# Patient Record
Sex: MALE | Race: BLACK OR AFRICAN AMERICAN | Employment: OTHER | URBAN - METROPOLITAN AREA
[De-identification: names, ages, dates, MRNs, and addresses within clinical notes are randomized per-mention and may not be internally consistent; named-entity substitution may affect disease eponyms.]

---

## 2017-07-18 ENCOUNTER — HOSPITAL ENCOUNTER (EMERGENCY)
Age: 47
Discharge: HOME | End: 2017-07-18
Payer: COMMERCIAL

## 2017-07-18 VITALS
RESPIRATION RATE: 18 BRPM | HEART RATE: 109 BPM | SYSTOLIC BLOOD PRESSURE: 129 MMHG | TEMPERATURE: 98.9 F | DIASTOLIC BLOOD PRESSURE: 87 MMHG | HEIGHT: 71 IN | OXYGEN SATURATION: 98 % | BODY MASS INDEX: 44.1 KG/M2 | WEIGHT: 315 LBS

## 2017-07-18 DIAGNOSIS — F32.A DEPRESSION, UNSPECIFIED DEPRESSION TYPE: Primary | ICD-10-CM

## 2017-07-18 LAB
ALBUMIN SERPL-MCNC: 4.4 G/DL (ref 3.9–4.9)
ALP BLD-CCNC: 82 U/L (ref 35–104)
ALT SERPL-CCNC: 15 U/L (ref 0–41)
AMPHETAMINE SCREEN, URINE: NORMAL
ANION GAP SERPL CALCULATED.3IONS-SCNC: 14 MEQ/L (ref 7–13)
AST SERPL-CCNC: 17 U/L (ref 0–40)
BACTERIA: NORMAL /HPF
BARBITURATE SCREEN URINE: NORMAL
BASOPHILS ABSOLUTE: 0 K/UL (ref 0–0.2)
BASOPHILS RELATIVE PERCENT: 0.4 %
BENZODIAZEPINE SCREEN, URINE: NORMAL
BILIRUB SERPL-MCNC: 0.3 MG/DL (ref 0–1.2)
BILIRUBIN URINE: NEGATIVE
BLOOD, URINE: NEGATIVE
BUN BLDV-MCNC: 42 MG/DL (ref 6–20)
CALCIUM SERPL-MCNC: 9.4 MG/DL (ref 8.6–10.2)
CANNABINOID SCREEN URINE: NORMAL
CHLORIDE BLD-SCNC: 101 MEQ/L (ref 98–107)
CLARITY: CLEAR
CO2: 29 MEQ/L (ref 22–29)
COCAINE METABOLITE SCREEN URINE: NORMAL
COLOR: YELLOW
CREAT SERPL-MCNC: 6.08 MG/DL (ref 0.7–1.2)
EOSINOPHILS ABSOLUTE: 0.4 K/UL (ref 0–0.7)
EOSINOPHILS RELATIVE PERCENT: 4.7 %
ETHANOL PERCENT: NORMAL G/DL
ETHANOL: <10 MG/DL (ref 0–0.08)
GFR AFRICAN AMERICAN: 12.1
GFR NON-AFRICAN AMERICAN: 10
GLOBULIN: 2.5 G/DL (ref 2.3–3.5)
GLUCOSE BLD-MCNC: 110 MG/DL (ref 74–109)
GLUCOSE URINE: NEGATIVE MG/DL
HCT VFR BLD CALC: 35 % (ref 42–52)
HEMOGLOBIN: 11.4 G/DL (ref 14–18)
INR BLD: 1.3
KETONES, URINE: NEGATIVE MG/DL
LEUKOCYTE ESTERASE, URINE: ABNORMAL
LYMPHOCYTES ABSOLUTE: 1.2 K/UL (ref 1–4.8)
LYMPHOCYTES RELATIVE PERCENT: 14.8 %
Lab: NORMAL
MCH RBC QN AUTO: 30 PG (ref 27–31.3)
MCHC RBC AUTO-ENTMCNC: 32.5 % (ref 33–37)
MCV RBC AUTO: 92.3 FL (ref 80–100)
MONOCYTES ABSOLUTE: 0.7 K/UL (ref 0.2–0.8)
MONOCYTES RELATIVE PERCENT: 8.2 %
NEUTROPHILS ABSOLUTE: 6.1 K/UL (ref 1.4–6.5)
NEUTROPHILS RELATIVE PERCENT: 71.9 %
NITRITE, URINE: NEGATIVE
OPIATE SCREEN URINE: NORMAL
PDW BLD-RTO: 16.1 % (ref 11.5–14.5)
PH UA: 6.5 (ref 5–9)
PHENCYCLIDINE SCREEN URINE: NORMAL
PLATELET # BLD: 234 K/UL (ref 130–400)
POTASSIUM SERPL-SCNC: 3.9 MEQ/L (ref 3.5–5.1)
PROTEIN UA: ABNORMAL MG/DL
PROTHROMBIN TIME: 14.3 SEC (ref 8.1–13.7)
RBC # BLD: 3.79 M/UL (ref 4.7–6.1)
RBC UA: NORMAL /HPF (ref 0–2)
SODIUM BLD-SCNC: 144 MEQ/L (ref 132–144)
SPECIFIC GRAVITY UA: 1.01 (ref 1–1.03)
TOTAL CK: 143 U/L (ref 0–190)
TOTAL PROTEIN: 6.9 G/DL (ref 6.4–8.1)
TSH SERPL DL<=0.05 MIU/L-ACNC: 1.08 UIU/ML (ref 0.27–4.2)
UROBILINOGEN, URINE: 1 E.U./DL
VALPROIC ACID LEVEL: <2.8 UG/ML (ref 50–100)
WBC # BLD: 8.4 K/UL (ref 4.8–10.8)
WBC UA: NORMAL /HPF (ref 0–5)

## 2017-07-18 PROCEDURE — 84443 ASSAY THYROID STIM HORMONE: CPT

## 2017-07-18 PROCEDURE — 36415 COLL VENOUS BLD VENIPUNCTURE: CPT

## 2017-07-18 PROCEDURE — G0480 DRUG TEST DEF 1-7 CLASSES: HCPCS

## 2017-07-18 PROCEDURE — 82550 ASSAY OF CK (CPK): CPT

## 2017-07-18 PROCEDURE — 85025 COMPLETE CBC W/AUTO DIFF WBC: CPT

## 2017-07-18 PROCEDURE — 80307 DRUG TEST PRSMV CHEM ANLYZR: CPT

## 2017-07-18 PROCEDURE — 80164 ASSAY DIPROPYLACETIC ACD TOT: CPT

## 2017-07-18 PROCEDURE — 85610 PROTHROMBIN TIME: CPT

## 2017-07-18 PROCEDURE — 80053 COMPREHEN METABOLIC PANEL: CPT

## 2017-07-18 PROCEDURE — 99285 EMERGENCY DEPT VISIT HI MDM: CPT

## 2017-07-18 PROCEDURE — 81001 URINALYSIS AUTO W/SCOPE: CPT

## 2017-07-18 ASSESSMENT — ENCOUNTER SYMPTOMS
SHORTNESS OF BREATH: 0
ABDOMINAL PAIN: 0
RHINORRHEA: 0
ABDOMINAL DISTENTION: 0
SORE THROAT: 0
COLOR CHANGE: 0
EYE DISCHARGE: 0
CONSTIPATION: 0

## 2017-08-20 ENCOUNTER — HOSPITAL ENCOUNTER (INPATIENT)
Age: 47
LOS: 3 days | Discharge: HOME | DRG: 244 | End: 2017-08-24
Attending: EMERGENCY MEDICINE | Admitting: INTERNAL MEDICINE
Payer: COMMERCIAL

## 2017-08-20 DIAGNOSIS — K92.1 GASTROINTESTINAL HEMORRHAGE WITH MELENA: Primary | ICD-10-CM

## 2017-08-20 LAB
BASOPHILS ABSOLUTE: 0 K/UL (ref 0–0.2)
BASOPHILS RELATIVE PERCENT: 0.5 %
EOSINOPHILS ABSOLUTE: 0.4 K/UL (ref 0–0.7)
EOSINOPHILS RELATIVE PERCENT: 4.2 %
HCT VFR BLD CALC: 31.7 % (ref 42–52)
HEMOGLOBIN: 10.5 G/DL (ref 14–18)
LYMPHOCYTES ABSOLUTE: 1.3 K/UL (ref 1–4.8)
LYMPHOCYTES RELATIVE PERCENT: 15.3 %
MCH RBC QN AUTO: 30.9 PG (ref 27–31.3)
MCHC RBC AUTO-ENTMCNC: 33 % (ref 33–37)
MCV RBC AUTO: 93.5 FL (ref 80–100)
MONOCYTES ABSOLUTE: 0.7 K/UL (ref 0.2–0.8)
MONOCYTES RELATIVE PERCENT: 8.6 %
NEUTROPHILS ABSOLUTE: 6.2 K/UL (ref 1.4–6.5)
NEUTROPHILS RELATIVE PERCENT: 71.4 %
PDW BLD-RTO: 16 % (ref 11.5–14.5)
PLATELET # BLD: 210 K/UL (ref 130–400)
RBC # BLD: 3.39 M/UL (ref 4.7–6.1)
WBC # BLD: 8.7 K/UL (ref 4.8–10.8)

## 2017-08-20 PROCEDURE — G0480 DRUG TEST DEF 1-7 CLASSES: HCPCS

## 2017-08-20 PROCEDURE — 99284 EMERGENCY DEPT VISIT MOD MDM: CPT

## 2017-08-20 PROCEDURE — 86901 BLOOD TYPING SEROLOGIC RH(D): CPT

## 2017-08-20 PROCEDURE — 93005 ELECTROCARDIOGRAM TRACING: CPT

## 2017-08-20 PROCEDURE — 85025 COMPLETE CBC W/AUTO DIFF WBC: CPT

## 2017-08-20 PROCEDURE — 82553 CREATINE MB FRACTION: CPT

## 2017-08-20 PROCEDURE — 84484 ASSAY OF TROPONIN QUANT: CPT

## 2017-08-20 PROCEDURE — 86900 BLOOD TYPING SEROLOGIC ABO: CPT

## 2017-08-20 PROCEDURE — 96374 THER/PROPH/DIAG INJ IV PUSH: CPT

## 2017-08-20 PROCEDURE — 80053 COMPREHEN METABOLIC PANEL: CPT

## 2017-08-20 PROCEDURE — 86850 RBC ANTIBODY SCREEN: CPT

## 2017-08-20 PROCEDURE — 36415 COLL VENOUS BLD VENIPUNCTURE: CPT

## 2017-08-20 PROCEDURE — 82550 ASSAY OF CK (CPK): CPT

## 2017-08-20 PROCEDURE — 83735 ASSAY OF MAGNESIUM: CPT

## 2017-08-20 RX ORDER — PANTOPRAZOLE SODIUM 40 MG/10ML
40 INJECTION, POWDER, LYOPHILIZED, FOR SOLUTION INTRAVENOUS ONCE
Status: COMPLETED | OUTPATIENT
Start: 2017-08-20 | End: 2017-08-21

## 2017-08-20 RX ORDER — 0.9 % SODIUM CHLORIDE 0.9 %
10 VIAL (ML) INJECTION DAILY
Status: DISCONTINUED | OUTPATIENT
Start: 2017-08-21 | End: 2017-08-21 | Stop reason: ALTCHOICE

## 2017-08-20 RX ORDER — 0.9 % SODIUM CHLORIDE 0.9 %
1000 INTRAVENOUS SOLUTION INTRAVENOUS ONCE
Status: DISCONTINUED | OUTPATIENT
Start: 2017-08-20 | End: 2017-08-20

## 2017-08-20 ASSESSMENT — ENCOUNTER SYMPTOMS
ALLERGIC/IMMUNOLOGIC NEGATIVE: 1
TROUBLE SWALLOWING: 0
ABDOMINAL PAIN: 0
APNEA: 0
SHORTNESS OF BREATH: 1
VOMITING: 1
COLOR CHANGE: 0
ANAL BLEEDING: 1
EYE PAIN: 0

## 2017-08-20 ASSESSMENT — PAIN DESCRIPTION - LOCATION: LOCATION: LEG

## 2017-08-20 ASSESSMENT — PAIN SCALES - GENERAL: PAINLEVEL_OUTOF10: 6

## 2017-08-20 ASSESSMENT — PAIN DESCRIPTION - ORIENTATION: ORIENTATION: RIGHT;LEFT;LOWER

## 2017-08-20 ASSESSMENT — PAIN DESCRIPTION - PAIN TYPE: TYPE: CHRONIC PAIN

## 2017-08-21 PROBLEM — K92.0 GASTROINTESTINAL HEMORRHAGE WITH HEMATEMESIS: Status: ACTIVE | Noted: 2017-08-21

## 2017-08-21 PROBLEM — I48.0 PAROXYSMAL ATRIAL FIBRILLATION (HCC): Status: ACTIVE | Noted: 2017-08-21

## 2017-08-21 PROBLEM — K21.9 GERD (GASTROESOPHAGEAL REFLUX DISEASE): Status: ACTIVE | Noted: 2017-08-21

## 2017-08-21 PROBLEM — E27.40 ADRENAL INSUFFICIENCY (HCC): Status: ACTIVE | Noted: 2017-08-21

## 2017-08-21 PROBLEM — F32.A DEPRESSION: Status: ACTIVE | Noted: 2017-08-21

## 2017-08-21 PROBLEM — D50.0 IRON DEFICIENCY ANEMIA DUE TO CHRONIC BLOOD LOSS: Status: ACTIVE | Noted: 2017-08-21

## 2017-08-21 PROBLEM — I12.0 ESRD DUE TO HYPERTENSION (HCC): Status: ACTIVE | Noted: 2017-08-21

## 2017-08-21 PROBLEM — E66.09 OBESITY DUE TO EXCESS CALORIES: Status: ACTIVE | Noted: 2017-08-21

## 2017-08-21 PROBLEM — E03.9 ACQUIRED HYPOTHYROIDISM: Status: ACTIVE | Noted: 2017-08-21

## 2017-08-21 PROBLEM — M19.90 DJD (DEGENERATIVE JOINT DISEASE): Status: ACTIVE | Noted: 2017-08-21

## 2017-08-21 PROBLEM — F41.0 ANXIETY ATTACK: Status: ACTIVE | Noted: 2017-08-21

## 2017-08-21 PROBLEM — E11.9 DIABETES MELLITUS TYPE 2, DIET-CONTROLLED (HCC): Status: ACTIVE | Noted: 2017-08-21

## 2017-08-21 PROBLEM — E78.2 HYPERCHOLESTEROLEMIA WITH HYPERGLYCERIDEMIA: Status: ACTIVE | Noted: 2017-08-21

## 2017-08-21 PROBLEM — K27.4 PEPTIC ULCER WITH HEMORRHAGE: Status: ACTIVE | Noted: 2017-08-21

## 2017-08-21 PROBLEM — N18.6 ESRD DUE TO HYPERTENSION (HCC): Status: ACTIVE | Noted: 2017-08-21

## 2017-08-21 PROBLEM — I10 ESSENTIAL HYPERTENSION: Status: ACTIVE | Noted: 2017-08-21

## 2017-08-21 PROBLEM — Z86.79: Status: ACTIVE | Noted: 2017-08-21

## 2017-08-21 PROBLEM — G47.33 OSA (OBSTRUCTIVE SLEEP APNEA): Status: ACTIVE | Noted: 2017-08-21

## 2017-08-21 PROBLEM — Z86.718 HISTORY OF DVT OF LOWER EXTREMITY: Status: ACTIVE | Noted: 2017-08-21

## 2017-08-21 LAB
ABO/RH: NORMAL
ALBUMIN SERPL-MCNC: 3.9 G/DL (ref 3.9–4.9)
ALBUMIN SERPL-MCNC: 4.2 G/DL (ref 3.9–4.9)
ALP BLD-CCNC: 59 U/L (ref 35–104)
ALP BLD-CCNC: 75 U/L (ref 35–104)
ALT SERPL-CCNC: 13 U/L (ref 0–41)
ALT SERPL-CCNC: 15 U/L (ref 0–41)
ANION GAP SERPL CALCULATED.3IONS-SCNC: 21 MEQ/L (ref 7–13)
ANTIBODY SCREEN: NORMAL
APTT: 31.9 SEC (ref 21.6–35.4)
AST SERPL-CCNC: 16 U/L (ref 0–40)
AST SERPL-CCNC: 17 U/L (ref 0–40)
BASOPHILS ABSOLUTE: 0 K/UL (ref 0–0.2)
BASOPHILS RELATIVE PERCENT: 0.5 %
BILIRUB SERPL-MCNC: 0.3 MG/DL (ref 0–1.2)
BILIRUB SERPL-MCNC: 0.4 MG/DL (ref 0–1.2)
BILIRUBIN DIRECT: 0 MG/DL (ref 0–0.3)
BILIRUBIN, INDIRECT: 0.3 MG/DL (ref 0–0.6)
BUN BLDV-MCNC: 61 MG/DL (ref 6–20)
CALCIUM SERPL-MCNC: 10.2 MG/DL (ref 8.6–10.2)
CHLORIDE BLD-SCNC: 100 MEQ/L (ref 98–107)
CHOLESTEROL, TOTAL: 194 MG/DL (ref 0–199)
CHP ED QC CHECK: NORMAL
CK MB: 2.7 NG/ML (ref 0–6.7)
CO2: 25 MEQ/L (ref 22–29)
CREAT SERPL-MCNC: 9.32 MG/DL (ref 0.7–1.2)
CREATINE KINASE-MB INDEX: 0.8 % (ref 0–3.5)
EOSINOPHILS ABSOLUTE: 0.4 K/UL (ref 0–0.7)
EOSINOPHILS RELATIVE PERCENT: 5.2 %
ETHANOL PERCENT: NORMAL G/DL
ETHANOL: <10 MG/DL (ref 0–0.08)
FOLATE: >20 NG/ML (ref 7.3–26.1)
GFR AFRICAN AMERICAN: 7.4
GFR NON-AFRICAN AMERICAN: 6.1
GLOBULIN: 2.6 G/DL (ref 2.3–3.5)
GLUCOSE BLD-MCNC: 102 MG/DL (ref 74–109)
HBA1C MFR BLD: 5.5 % (ref 4.8–5.9)
HCT VFR BLD CALC: 29.3 % (ref 42–52)
HCT VFR BLD CALC: 30.9 % (ref 42–52)
HDLC SERPL-MCNC: 40 MG/DL (ref 40–59)
HEMOCCULT STL QL: NORMAL
HEMOGLOBIN: 10 G/DL (ref 14–18)
HEMOGLOBIN: 9.7 G/DL (ref 14–18)
INR BLD: 1.4
IRON SATURATION: 30 % (ref 11–46)
IRON: 64 UG/DL (ref 59–158)
LDL CHOLESTEROL CALCULATED: 100 MG/DL (ref 0–129)
LYMPHOCYTES ABSOLUTE: 1.5 K/UL (ref 1–4.8)
LYMPHOCYTES RELATIVE PERCENT: 21.2 %
MAGNESIUM: 2.2 MG/DL (ref 1.7–2.3)
MAGNESIUM: 2.3 MG/DL (ref 1.7–2.3)
MCH RBC QN AUTO: 31.2 PG (ref 27–31.3)
MCHC RBC AUTO-ENTMCNC: 33.2 % (ref 33–37)
MCV RBC AUTO: 94 FL (ref 80–100)
MONOCYTES ABSOLUTE: 0.7 K/UL (ref 0.2–0.8)
MONOCYTES RELATIVE PERCENT: 9.5 %
NEUTROPHILS ABSOLUTE: 4.6 K/UL (ref 1.4–6.5)
NEUTROPHILS RELATIVE PERCENT: 63.6 %
PDW BLD-RTO: 15.9 % (ref 11.5–14.5)
PLATELET # BLD: 181 K/UL (ref 130–400)
POTASSIUM SERPL-SCNC: 4.1 MEQ/L (ref 3.5–5.1)
PROTHROMBIN TIME: 14.5 SEC (ref 8.1–13.7)
RBC # BLD: 3.12 M/UL (ref 4.7–6.1)
SODIUM BLD-SCNC: 146 MEQ/L (ref 132–144)
TOTAL CK: 322 U/L (ref 0–190)
TOTAL IRON BINDING CAPACITY: 216 UG/DL (ref 178–450)
TOTAL PROTEIN: 6.2 G/DL (ref 6.4–8.1)
TOTAL PROTEIN: 6.8 G/DL (ref 6.4–8.1)
TRIGL SERPL-MCNC: 269 MG/DL (ref 0–200)
TROPONIN: <0.01 NG/ML (ref 0–0.01)
TSH SERPL DL<=0.05 MIU/L-ACNC: 1.09 UIU/ML (ref 0.27–4.2)
VITAMIN B-12: 503 PG/ML (ref 211–946)
WBC # BLD: 7.3 K/UL (ref 4.8–10.8)

## 2017-08-21 PROCEDURE — 2580000003 HC RX 258: Performed by: INTERNAL MEDICINE

## 2017-08-21 PROCEDURE — 80076 HEPATIC FUNCTION PANEL: CPT

## 2017-08-21 PROCEDURE — 85025 COMPLETE CBC W/AUTO DIFF WBC: CPT

## 2017-08-21 PROCEDURE — 6370000000 HC RX 637 (ALT 250 FOR IP): Performed by: EMERGENCY MEDICINE

## 2017-08-21 PROCEDURE — 6360000002 HC RX W HCPCS: Performed by: PHYSICIAN ASSISTANT

## 2017-08-21 PROCEDURE — 85018 HEMOGLOBIN: CPT

## 2017-08-21 PROCEDURE — 80061 LIPID PANEL: CPT

## 2017-08-21 PROCEDURE — 83735 ASSAY OF MAGNESIUM: CPT

## 2017-08-21 PROCEDURE — 83540 ASSAY OF IRON: CPT

## 2017-08-21 PROCEDURE — C9113 INJ PANTOPRAZOLE SODIUM, VIA: HCPCS | Performed by: PHYSICIAN ASSISTANT

## 2017-08-21 PROCEDURE — 6370000000 HC RX 637 (ALT 250 FOR IP): Performed by: INTERNAL MEDICINE

## 2017-08-21 PROCEDURE — 85610 PROTHROMBIN TIME: CPT

## 2017-08-21 PROCEDURE — 84443 ASSAY THYROID STIM HORMONE: CPT

## 2017-08-21 PROCEDURE — 82607 VITAMIN B-12: CPT

## 2017-08-21 PROCEDURE — 5A1D60Z PERFORMANCE OF URINARY FILTRATION, MULTIPLE: ICD-10-PCS | Performed by: INTERNAL MEDICINE

## 2017-08-21 PROCEDURE — 6360000002 HC RX W HCPCS: Performed by: EMERGENCY MEDICINE

## 2017-08-21 PROCEDURE — 83550 IRON BINDING TEST: CPT

## 2017-08-21 PROCEDURE — 1210000000 HC MED SURG R&B

## 2017-08-21 PROCEDURE — 85730 THROMBOPLASTIN TIME PARTIAL: CPT

## 2017-08-21 PROCEDURE — 82746 ASSAY OF FOLIC ACID SERUM: CPT

## 2017-08-21 PROCEDURE — 83036 HEMOGLOBIN GLYCOSYLATED A1C: CPT

## 2017-08-21 PROCEDURE — 8010000000 HC HEMODIALYSIS ACUTE INPT

## 2017-08-21 PROCEDURE — 36415 COLL VENOUS BLD VENIPUNCTURE: CPT

## 2017-08-21 PROCEDURE — 85014 HEMATOCRIT: CPT

## 2017-08-21 RX ORDER — HYDROCODONE BITARTRATE AND ACETAMINOPHEN 5; 325 MG/1; MG/1
1 TABLET ORAL ONCE
Status: COMPLETED | OUTPATIENT
Start: 2017-08-21 | End: 2017-08-21

## 2017-08-21 RX ORDER — PHYTONADIONE 10 MG/ML
10 INJECTION, EMULSION INTRAMUSCULAR; INTRAVENOUS; SUBCUTANEOUS ONCE
Status: COMPLETED | OUTPATIENT
Start: 2017-08-21 | End: 2017-08-21

## 2017-08-21 RX ORDER — SODIUM CHLORIDE 0.9 % (FLUSH) 0.9 %
10 SYRINGE (ML) INJECTION PRN
Status: DISCONTINUED | OUTPATIENT
Start: 2017-08-21 | End: 2017-08-24 | Stop reason: HOSPADM

## 2017-08-21 RX ORDER — 0.9 % SODIUM CHLORIDE 0.9 %
10 VIAL (ML) INJECTION EVERY 12 HOURS SCHEDULED
Status: DISCONTINUED | OUTPATIENT
Start: 2017-08-21 | End: 2017-08-24 | Stop reason: HOSPADM

## 2017-08-21 RX ORDER — 0.9 % SODIUM CHLORIDE 0.9 %
10 VIAL (ML) INJECTION PRN
Status: DISCONTINUED | OUTPATIENT
Start: 2017-08-21 | End: 2017-08-24 | Stop reason: HOSPADM

## 2017-08-21 RX ORDER — POTASSIUM CHLORIDE 20 MEQ/1
20 TABLET, EXTENDED RELEASE ORAL DAILY
Status: DISCONTINUED | OUTPATIENT
Start: 2017-08-21 | End: 2017-08-24 | Stop reason: HOSPADM

## 2017-08-21 RX ORDER — ATORVASTATIN CALCIUM 10 MG/1
10 TABLET, FILM COATED ORAL DAILY
Status: DISCONTINUED | OUTPATIENT
Start: 2017-08-21 | End: 2017-08-24 | Stop reason: HOSPADM

## 2017-08-21 RX ORDER — METOPROLOL TARTRATE 50 MG/1
100 TABLET, FILM COATED ORAL DAILY
Status: DISCONTINUED | OUTPATIENT
Start: 2017-08-21 | End: 2017-08-24 | Stop reason: HOSPADM

## 2017-08-21 RX ORDER — ONDANSETRON 2 MG/ML
4 INJECTION INTRAMUSCULAR; INTRAVENOUS EVERY 6 HOURS PRN
Status: DISCONTINUED | OUTPATIENT
Start: 2017-08-21 | End: 2017-08-24 | Stop reason: HOSPADM

## 2017-08-21 RX ORDER — MIDODRINE HYDROCHLORIDE 5 MG/1
5 TABLET ORAL DAILY
Status: DISCONTINUED | OUTPATIENT
Start: 2017-08-21 | End: 2017-08-24 | Stop reason: HOSPADM

## 2017-08-21 RX ORDER — PANTOPRAZOLE SODIUM 40 MG/10ML
40 INJECTION, POWDER, LYOPHILIZED, FOR SOLUTION INTRAVENOUS DAILY
Status: DISCONTINUED | OUTPATIENT
Start: 2017-08-21 | End: 2017-08-24 | Stop reason: HOSPADM

## 2017-08-21 RX ORDER — HYDROCODONE BITARTRATE AND ACETAMINOPHEN 5; 325 MG/1; MG/1
1 TABLET ORAL EVERY 4 HOURS PRN
Status: DISCONTINUED | OUTPATIENT
Start: 2017-08-21 | End: 2017-08-24 | Stop reason: HOSPADM

## 2017-08-21 RX ORDER — DIVALPROEX SODIUM 250 MG/1
250 TABLET, EXTENDED RELEASE ORAL 2 TIMES DAILY
Status: DISCONTINUED | OUTPATIENT
Start: 2017-08-21 | End: 2017-08-24 | Stop reason: HOSPADM

## 2017-08-21 RX ORDER — LEVOTHYROXINE SODIUM 0.1 MG/1
100 TABLET ORAL DAILY
Status: DISCONTINUED | OUTPATIENT
Start: 2017-08-21 | End: 2017-08-24 | Stop reason: HOSPADM

## 2017-08-21 RX ORDER — SODIUM CHLORIDE 9 MG/ML
INJECTION, SOLUTION INTRAVENOUS
Status: DISPENSED
Start: 2017-08-21 | End: 2017-08-22

## 2017-08-21 RX ORDER — SODIUM CHLORIDE 0.9 % (FLUSH) 0.9 %
10 SYRINGE (ML) INJECTION EVERY 12 HOURS SCHEDULED
Status: DISCONTINUED | OUTPATIENT
Start: 2017-08-21 | End: 2017-08-24 | Stop reason: HOSPADM

## 2017-08-21 RX ORDER — SODIUM CHLORIDE 9 MG/ML
INJECTION, SOLUTION INTRAVENOUS CONTINUOUS
Status: DISCONTINUED | OUTPATIENT
Start: 2017-08-21 | End: 2017-08-22 | Stop reason: ALTCHOICE

## 2017-08-21 RX ORDER — DICYCLOMINE HCL 20 MG
20 TABLET ORAL 3 TIMES DAILY
Status: DISCONTINUED | OUTPATIENT
Start: 2017-08-21 | End: 2017-08-24 | Stop reason: HOSPADM

## 2017-08-21 RX ORDER — HYDROCORTISONE 10 MG/1
10 TABLET ORAL DAILY
Status: DISCONTINUED | OUTPATIENT
Start: 2017-08-21 | End: 2017-08-24 | Stop reason: HOSPADM

## 2017-08-21 RX ORDER — PHYTONADIONE 10 MG/ML
5 INJECTION, EMULSION INTRAMUSCULAR; INTRAVENOUS; SUBCUTANEOUS ONCE
Status: DISCONTINUED | OUTPATIENT
Start: 2017-08-21 | End: 2017-08-21

## 2017-08-21 RX ORDER — ACETAMINOPHEN 325 MG/1
650 TABLET ORAL EVERY 4 HOURS PRN
Status: DISCONTINUED | OUTPATIENT
Start: 2017-08-21 | End: 2017-08-24 | Stop reason: HOSPADM

## 2017-08-21 RX ORDER — DIVALPROEX SODIUM 250 MG/1
250 TABLET, EXTENDED RELEASE ORAL DAILY
Status: DISCONTINUED | OUTPATIENT
Start: 2017-08-21 | End: 2017-08-21

## 2017-08-21 RX ADMIN — DIVALPROEX SODIUM 250 MG: 250 TABLET, FILM COATED, EXTENDED RELEASE ORAL at 23:51

## 2017-08-21 RX ADMIN — PANTOPRAZOLE SODIUM 40 MG: 40 INJECTION, POWDER, FOR SOLUTION INTRAVENOUS at 01:01

## 2017-08-21 RX ADMIN — HYDROCODONE BITARTRATE AND ACETAMINOPHEN 1 TABLET: 5; 325 TABLET ORAL at 02:00

## 2017-08-21 RX ADMIN — PHYTONADIONE 10 MG: 10 INJECTION, EMULSION INTRAMUSCULAR; INTRAVENOUS; SUBCUTANEOUS at 02:25

## 2017-08-21 RX ADMIN — SODIUM CHLORIDE: 9 INJECTION, SOLUTION INTRAVENOUS at 05:40

## 2017-08-21 RX ADMIN — HYDROCODONE BITARTRATE AND ACETAMINOPHEN 1 TABLET: 5; 325 TABLET ORAL at 23:54

## 2017-08-21 ASSESSMENT — PAIN SCALES - GENERAL
PAINLEVEL_OUTOF10: 6
PAINLEVEL_OUTOF10: 0
PAINLEVEL_OUTOF10: 8
PAINLEVEL_OUTOF10: 0
PAINLEVEL_OUTOF10: 6

## 2017-08-21 ASSESSMENT — PAIN DESCRIPTION - ORIENTATION: ORIENTATION: LOWER

## 2017-08-21 ASSESSMENT — PAIN DESCRIPTION - LOCATION: LOCATION: BACK

## 2017-08-21 ASSESSMENT — PAIN DESCRIPTION - PAIN TYPE: TYPE: ACUTE PAIN

## 2017-08-22 ENCOUNTER — ANESTHESIA EVENT (OUTPATIENT)
Dept: OPERATING ROOM | Age: 47
DRG: 244 | End: 2017-08-22
Payer: COMMERCIAL

## 2017-08-22 ENCOUNTER — ANESTHESIA (OUTPATIENT)
Dept: OPERATING ROOM | Age: 47
DRG: 244 | End: 2017-08-22
Payer: COMMERCIAL

## 2017-08-22 VITALS
DIASTOLIC BLOOD PRESSURE: 51 MMHG | SYSTOLIC BLOOD PRESSURE: 102 MMHG | OXYGEN SATURATION: 97 % | RESPIRATION RATE: 17 BRPM

## 2017-08-22 LAB
ANION GAP SERPL CALCULATED.3IONS-SCNC: 17 MEQ/L (ref 7–13)
BASOPHILS ABSOLUTE: 0 K/UL (ref 0–0.2)
BASOPHILS RELATIVE PERCENT: 0.5 %
BUN BLDV-MCNC: 31 MG/DL (ref 6–20)
CALCIUM SERPL-MCNC: 8.8 MG/DL (ref 8.6–10.2)
CHLORIDE BLD-SCNC: 98 MEQ/L (ref 98–107)
CO2: 27 MEQ/L (ref 22–29)
CREAT SERPL-MCNC: 5.77 MG/DL (ref 0.7–1.2)
EOSINOPHILS ABSOLUTE: 0.4 K/UL (ref 0–0.7)
EOSINOPHILS RELATIVE PERCENT: 5.8 %
GFR AFRICAN AMERICAN: 12.8
GFR NON-AFRICAN AMERICAN: 10.6
GLUCOSE BLD-MCNC: 101 MG/DL (ref 60–115)
GLUCOSE BLD-MCNC: 110 MG/DL (ref 60–115)
GLUCOSE BLD-MCNC: 115 MG/DL (ref 60–115)
GLUCOSE BLD-MCNC: 95 MG/DL (ref 74–109)
HCT VFR BLD CALC: 33.1 % (ref 42–52)
HCT VFR BLD CALC: 33.3 % (ref 42–52)
HCT VFR BLD CALC: 34.5 % (ref 42–52)
HEMOGLOBIN: 10.9 G/DL (ref 14–18)
HEMOGLOBIN: 10.9 G/DL (ref 14–18)
HEMOGLOBIN: 11.3 G/DL (ref 14–18)
INR BLD: 2.2
LYMPHOCYTES ABSOLUTE: 1.1 K/UL (ref 1–4.8)
LYMPHOCYTES RELATIVE PERCENT: 15.4 %
MCH RBC QN AUTO: 31 PG (ref 27–31.3)
MCHC RBC AUTO-ENTMCNC: 32.8 % (ref 33–37)
MCV RBC AUTO: 94.3 FL (ref 80–100)
MONOCYTES ABSOLUTE: 0.6 K/UL (ref 0.2–0.8)
MONOCYTES RELATIVE PERCENT: 8.7 %
NEUTROPHILS ABSOLUTE: 4.8 K/UL (ref 1.4–6.5)
NEUTROPHILS RELATIVE PERCENT: 69.6 %
PDW BLD-RTO: 15.9 % (ref 11.5–14.5)
PERFORMED ON: ABNORMAL
PERFORMED ON: NORMAL
PHOSPHORUS: 5.2 MG/DL (ref 2.5–4.5)
PHOSPHORUS: 5.5 MG/DL (ref 2.5–4.5)
PLATELET # BLD: 190 K/UL (ref 130–400)
POC SAMPLE TYPE: ABNORMAL
POTASSIUM SERPL-SCNC: 3.6 MEQ/L (ref 3.5–5.1)
PROTHROMBIN TIME, POC: 25.5 SEC (ref 9.5–12.5)
RBC # BLD: 3.53 M/UL (ref 4.7–6.1)
SODIUM BLD-SCNC: 142 MEQ/L (ref 132–144)
WBC # BLD: 7 K/UL (ref 4.8–10.8)

## 2017-08-22 PROCEDURE — 2580000003 HC RX 258: Performed by: INTERNAL MEDICINE

## 2017-08-22 PROCEDURE — 7100000001 HC PACU RECOVERY - ADDTL 15 MIN: Performed by: SPECIALIST

## 2017-08-22 PROCEDURE — 3609012400 HC EGD TRANSORAL BIOPSY SINGLE/MULTIPLE: Performed by: SPECIALIST

## 2017-08-22 PROCEDURE — 84100 ASSAY OF PHOSPHORUS: CPT

## 2017-08-22 PROCEDURE — 36415 COLL VENOUS BLD VENIPUNCTURE: CPT

## 2017-08-22 PROCEDURE — 1210000000 HC MED SURG R&B

## 2017-08-22 PROCEDURE — 2500000003 HC RX 250 WO HCPCS: Performed by: NURSE ANESTHETIST, CERTIFIED REGISTERED

## 2017-08-22 PROCEDURE — 85018 HEMOGLOBIN: CPT

## 2017-08-22 PROCEDURE — 85014 HEMATOCRIT: CPT

## 2017-08-22 PROCEDURE — 7100000000 HC PACU RECOVERY - FIRST 15 MIN: Performed by: SPECIALIST

## 2017-08-22 PROCEDURE — 2580000003 HC RX 258: Performed by: SPECIALIST

## 2017-08-22 PROCEDURE — 3700000000 HC ANESTHESIA ATTENDED CARE: Performed by: SPECIALIST

## 2017-08-22 PROCEDURE — 6370000000 HC RX 637 (ALT 250 FOR IP): Performed by: INTERNAL MEDICINE

## 2017-08-22 PROCEDURE — 2500000003 HC RX 250 WO HCPCS: Performed by: INTERNAL MEDICINE

## 2017-08-22 PROCEDURE — 85025 COMPLETE CBC W/AUTO DIFF WBC: CPT

## 2017-08-22 PROCEDURE — 6360000002 HC RX W HCPCS: Performed by: INTERNAL MEDICINE

## 2017-08-22 PROCEDURE — 80048 BASIC METABOLIC PNL TOTAL CA: CPT

## 2017-08-22 PROCEDURE — 3700000001 HC ADD 15 MINUTES (ANESTHESIA): Performed by: SPECIALIST

## 2017-08-22 PROCEDURE — 6360000002 HC RX W HCPCS: Performed by: NURSE ANESTHETIST, CERTIFIED REGISTERED

## 2017-08-22 PROCEDURE — 2580000003 HC RX 258: Performed by: ANESTHESIOLOGY

## 2017-08-22 RX ORDER — LIDOCAINE HYDROCHLORIDE 10 MG/ML
1 INJECTION, SOLUTION EPIDURAL; INFILTRATION; INTRACAUDAL; PERINEURAL
Status: DISCONTINUED | OUTPATIENT
Start: 2017-08-22 | End: 2017-08-22 | Stop reason: HOSPADM

## 2017-08-22 RX ORDER — METOCLOPRAMIDE HYDROCHLORIDE 5 MG/ML
10 INJECTION INTRAMUSCULAR; INTRAVENOUS
Status: DISCONTINUED | OUTPATIENT
Start: 2017-08-22 | End: 2017-08-22

## 2017-08-22 RX ORDER — HYDROCODONE BITARTRATE AND ACETAMINOPHEN 5; 325 MG/1; MG/1
2 TABLET ORAL PRN
Status: DISCONTINUED | OUTPATIENT
Start: 2017-08-22 | End: 2017-08-22

## 2017-08-22 RX ORDER — SODIUM CHLORIDE 0.9 % (FLUSH) 0.9 %
10 SYRINGE (ML) INJECTION EVERY 12 HOURS SCHEDULED
Status: DISCONTINUED | OUTPATIENT
Start: 2017-08-22 | End: 2017-08-22 | Stop reason: HOSPADM

## 2017-08-22 RX ORDER — HYDROCODONE BITARTRATE AND ACETAMINOPHEN 5; 325 MG/1; MG/1
1 TABLET ORAL PRN
Status: DISCONTINUED | OUTPATIENT
Start: 2017-08-22 | End: 2017-08-22

## 2017-08-22 RX ORDER — LIDOCAINE HYDROCHLORIDE 20 MG/ML
INJECTION, SOLUTION INFILTRATION; PERINEURAL PRN
Status: DISCONTINUED | OUTPATIENT
Start: 2017-08-22 | End: 2017-08-22 | Stop reason: SDUPTHER

## 2017-08-22 RX ORDER — MEPERIDINE HYDROCHLORIDE 25 MG/ML
12.5 INJECTION INTRAMUSCULAR; INTRAVENOUS; SUBCUTANEOUS EVERY 5 MIN PRN
Status: DISCONTINUED | OUTPATIENT
Start: 2017-08-22 | End: 2017-08-22

## 2017-08-22 RX ORDER — SODIUM CHLORIDE 0.9 % (FLUSH) 0.9 %
10 SYRINGE (ML) INJECTION PRN
Status: DISCONTINUED | OUTPATIENT
Start: 2017-08-22 | End: 2017-08-22 | Stop reason: HOSPADM

## 2017-08-22 RX ORDER — FENTANYL CITRATE 50 UG/ML
50 INJECTION, SOLUTION INTRAMUSCULAR; INTRAVENOUS EVERY 10 MIN PRN
Status: DISCONTINUED | OUTPATIENT
Start: 2017-08-22 | End: 2017-08-22

## 2017-08-22 RX ORDER — MAGNESIUM HYDROXIDE 1200 MG/15ML
LIQUID ORAL PRN
Status: DISCONTINUED | OUTPATIENT
Start: 2017-08-22 | End: 2017-08-22 | Stop reason: HOSPADM

## 2017-08-22 RX ORDER — SODIUM CHLORIDE 9 MG/ML
INJECTION, SOLUTION INTRAVENOUS CONTINUOUS
Status: DISCONTINUED | OUTPATIENT
Start: 2017-08-22 | End: 2017-08-23

## 2017-08-22 RX ORDER — DIPHENHYDRAMINE HYDROCHLORIDE 50 MG/ML
12.5 INJECTION INTRAMUSCULAR; INTRAVENOUS
Status: DISCONTINUED | OUTPATIENT
Start: 2017-08-22 | End: 2017-08-22

## 2017-08-22 RX ORDER — ONDANSETRON 2 MG/ML
4 INJECTION INTRAMUSCULAR; INTRAVENOUS
Status: DISCONTINUED | OUTPATIENT
Start: 2017-08-22 | End: 2017-08-22

## 2017-08-22 RX ORDER — GLYCOPYRROLATE 0.2 MG/ML
INJECTION INTRAMUSCULAR; INTRAVENOUS PRN
Status: DISCONTINUED | OUTPATIENT
Start: 2017-08-22 | End: 2017-08-22 | Stop reason: SDUPTHER

## 2017-08-22 RX ORDER — CYANOCOBALAMIN 1000 UG/ML
1000 INJECTION INTRAMUSCULAR; SUBCUTANEOUS ONCE
Status: COMPLETED | OUTPATIENT
Start: 2017-08-22 | End: 2017-08-22

## 2017-08-22 RX ORDER — PROPOFOL 10 MG/ML
INJECTION, EMULSION INTRAVENOUS PRN
Status: DISCONTINUED | OUTPATIENT
Start: 2017-08-22 | End: 2017-08-22 | Stop reason: SDUPTHER

## 2017-08-22 RX ADMIN — SODIUM CHLORIDE, PRESERVATIVE FREE 10 ML: 5 INJECTION INTRAVENOUS at 22:43

## 2017-08-22 RX ADMIN — LEVOTHYROXINE SODIUM 100 MCG: 100 TABLET ORAL at 06:04

## 2017-08-22 RX ADMIN — SODIUM CHLORIDE, PRESERVATIVE FREE 10 ML: 5 INJECTION INTRAVENOUS at 22:44

## 2017-08-22 RX ADMIN — CYANOCOBALAMIN 1000 MCG: 1000 INJECTION INTRAMUSCULAR; SUBCUTANEOUS at 09:13

## 2017-08-22 RX ADMIN — CALCIUM ACETATE 1334 MG: 667 CAPSULE ORAL at 18:57

## 2017-08-22 RX ADMIN — GLYCOPYRROLATE 0.2 MG: 0.2 INJECTION, SOLUTION INTRAMUSCULAR; INTRAVENOUS at 15:51

## 2017-08-22 RX ADMIN — LIDOCAINE HYDROCHLORIDE 5 ML: 20 INJECTION, SOLUTION INFILTRATION; PERINEURAL at 15:56

## 2017-08-22 RX ADMIN — PROPOFOL 30 MG: 10 INJECTION, EMULSION INTRAVENOUS at 15:57

## 2017-08-22 RX ADMIN — PROPOFOL 30 MG: 10 INJECTION, EMULSION INTRAVENOUS at 15:59

## 2017-08-22 RX ADMIN — SODIUM CHLORIDE: 9 INJECTION, SOLUTION INTRAVENOUS at 14:58

## 2017-08-22 RX ADMIN — SODIUM CHLORIDE: 9 INJECTION, SOLUTION INTRAVENOUS at 00:26

## 2017-08-22 RX ADMIN — PROPOFOL 60 MG: 10 INJECTION, EMULSION INTRAVENOUS at 15:56

## 2017-08-22 RX ADMIN — DIVALPROEX SODIUM 250 MG: 250 TABLET, FILM COATED, EXTENDED RELEASE ORAL at 22:43

## 2017-08-22 RX ADMIN — PROPOFOL 20 MG: 10 INJECTION, EMULSION INTRAVENOUS at 16:01

## 2017-08-22 RX ADMIN — ASCORBIC ACID, VITAMIN A PALMITATE, CHOLECALCIFEROL, THIAMINE HYDROCHLORIDE, RIBOFLAVIN-5 PHOSPHATE SODIUM, PYRIDOXINE HYDROCHLORIDE, NIACINAMIDE, DEXPANTHENOL, ALPHA-TOCOPHEROL ACETATE, VITAMIN K1, FOLIC ACID, BIOTIN, CYANOCOBALAMIN: 200; 3300; 200; 6; 3.6; 6; 40; 15; 10; 150; 600; 60; 5 INJECTION, SOLUTION INTRAVENOUS at 04:31

## 2017-08-22 RX ADMIN — PROPOFOL 30 MG: 10 INJECTION, EMULSION INTRAVENOUS at 15:58

## 2017-08-22 RX ADMIN — PROPOFOL 20 MG: 10 INJECTION, EMULSION INTRAVENOUS at 16:00

## 2017-08-22 RX ADMIN — DICYCLOMINE HYDROCHLORIDE 20 MG: 20 TABLET ORAL at 22:43

## 2017-08-22 RX ADMIN — PROPOFOL 20 MG: 10 INJECTION, EMULSION INTRAVENOUS at 16:02

## 2017-08-22 ASSESSMENT — PAIN SCALES - GENERAL: PAINLEVEL_OUTOF10: 0

## 2017-08-23 LAB
HCT VFR BLD CALC: 32.5 % (ref 42–52)
HCT VFR BLD CALC: 38.5 % (ref 42–52)
HEMOGLOBIN: 10.6 G/DL (ref 14–18)
HEMOGLOBIN: 13 G/DL (ref 14–18)
PHOSPHORUS: 3.5 MG/DL (ref 2.5–4.5)
PHOSPHORUS: 5.7 MG/DL (ref 2.5–4.5)

## 2017-08-23 PROCEDURE — 85014 HEMATOCRIT: CPT

## 2017-08-23 PROCEDURE — 2580000003 HC RX 258

## 2017-08-23 PROCEDURE — 2580000003 HC RX 258: Performed by: INTERNAL MEDICINE

## 2017-08-23 PROCEDURE — 0DJ08ZZ INSPECTION OF UPPER INTESTINAL TRACT, VIA NATURAL OR ARTIFICIAL OPENING ENDOSCOPIC: ICD-10-PCS | Performed by: SPECIALIST

## 2017-08-23 PROCEDURE — 6360000002 HC RX W HCPCS: Performed by: INTERNAL MEDICINE

## 2017-08-23 PROCEDURE — 1210000000 HC MED SURG R&B

## 2017-08-23 PROCEDURE — 90937 HEMODIALYSIS REPEATED EVAL: CPT

## 2017-08-23 PROCEDURE — 84100 ASSAY OF PHOSPHORUS: CPT

## 2017-08-23 PROCEDURE — 2580000003 HC RX 258: Performed by: ANESTHESIOLOGY

## 2017-08-23 PROCEDURE — 2580000003 HC RX 258: Performed by: SPECIALIST

## 2017-08-23 PROCEDURE — 6370000000 HC RX 637 (ALT 250 FOR IP): Performed by: INTERNAL MEDICINE

## 2017-08-23 PROCEDURE — 85018 HEMOGLOBIN: CPT

## 2017-08-23 PROCEDURE — C9113 INJ PANTOPRAZOLE SODIUM, VIA: HCPCS | Performed by: INTERNAL MEDICINE

## 2017-08-23 PROCEDURE — 36415 COLL VENOUS BLD VENIPUNCTURE: CPT

## 2017-08-23 RX ORDER — SODIUM CHLORIDE 9 MG/ML
INJECTION, SOLUTION INTRAVENOUS
Status: COMPLETED
Start: 2017-08-23 | End: 2017-08-23

## 2017-08-23 RX ADMIN — POTASSIUM CHLORIDE 20 MEQ: 20 TABLET, EXTENDED RELEASE ORAL at 10:18

## 2017-08-23 RX ADMIN — DIVALPROEX SODIUM 250 MG: 250 TABLET, FILM COATED, EXTENDED RELEASE ORAL at 10:19

## 2017-08-23 RX ADMIN — PANTOPRAZOLE SODIUM 40 MG: 40 INJECTION, POWDER, FOR SOLUTION INTRAVENOUS at 00:56

## 2017-08-23 RX ADMIN — HYDROCORTISONE 10 MG: 10 TABLET ORAL at 10:18

## 2017-08-23 RX ADMIN — MIDODRINE HYDROCHLORIDE 5 MG: 5 TABLET ORAL at 10:18

## 2017-08-23 RX ADMIN — CALCIUM ACETATE 1334 MG: 667 CAPSULE ORAL at 13:22

## 2017-08-23 RX ADMIN — SODIUM CHLORIDE: 900 INJECTION, SOLUTION INTRAVENOUS at 18:59

## 2017-08-23 RX ADMIN — METOPROLOL TARTRATE 100 MG: 50 TABLET ORAL at 10:18

## 2017-08-23 RX ADMIN — PANTOPRAZOLE SODIUM 40 MG: 40 INJECTION, POWDER, FOR SOLUTION INTRAVENOUS at 22:45

## 2017-08-23 RX ADMIN — DIVALPROEX SODIUM 250 MG: 250 TABLET, FILM COATED, EXTENDED RELEASE ORAL at 21:31

## 2017-08-23 RX ADMIN — DICYCLOMINE HYDROCHLORIDE 20 MG: 20 TABLET ORAL at 13:22

## 2017-08-23 RX ADMIN — SODIUM CHLORIDE, PRESERVATIVE FREE 10 ML: 5 INJECTION INTRAVENOUS at 10:20

## 2017-08-23 RX ADMIN — DICYCLOMINE HYDROCHLORIDE 20 MG: 20 TABLET ORAL at 10:18

## 2017-08-23 RX ADMIN — LEVOTHYROXINE SODIUM 100 MCG: 100 TABLET ORAL at 05:46

## 2017-08-23 RX ADMIN — CALCIUM ACETATE 1334 MG: 667 CAPSULE ORAL at 10:18

## 2017-08-23 RX ADMIN — ATORVASTATIN CALCIUM 10 MG: 10 TABLET, FILM COATED ORAL at 21:31

## 2017-08-23 RX ADMIN — DICYCLOMINE HYDROCHLORIDE 20 MG: 20 TABLET ORAL at 21:31

## 2017-08-23 RX ADMIN — HYDROCODONE BITARTRATE AND ACETAMINOPHEN 1 TABLET: 5; 325 TABLET ORAL at 22:38

## 2017-08-23 RX ADMIN — SODIUM CHLORIDE: 9 INJECTION, SOLUTION INTRAVENOUS at 00:55

## 2017-08-23 RX ADMIN — SODIUM CHLORIDE, PRESERVATIVE FREE 10 ML: 5 INJECTION INTRAVENOUS at 21:31

## 2017-08-23 RX ADMIN — SODIUM CHLORIDE, PRESERVATIVE FREE 10 ML: 5 INJECTION INTRAVENOUS at 22:40

## 2017-08-23 ASSESSMENT — PAIN SCALES - GENERAL
PAINLEVEL_OUTOF10: 8
PAINLEVEL_OUTOF10: 0
PAINLEVEL_OUTOF10: 0

## 2017-08-24 VITALS
HEIGHT: 71 IN | BODY MASS INDEX: 44.1 KG/M2 | DIASTOLIC BLOOD PRESSURE: 52 MMHG | OXYGEN SATURATION: 97 % | SYSTOLIC BLOOD PRESSURE: 99 MMHG | HEART RATE: 88 BPM | RESPIRATION RATE: 15 BRPM | TEMPERATURE: 98 F | WEIGHT: 315 LBS

## 2017-08-24 LAB
ANION GAP SERPL CALCULATED.3IONS-SCNC: 16 MEQ/L (ref 7–13)
BASOPHILS ABSOLUTE: 0 K/UL (ref 0–0.2)
BASOPHILS RELATIVE PERCENT: 0.4 %
BUN BLDV-MCNC: 28 MG/DL (ref 6–20)
CALCIUM SERPL-MCNC: 9.8 MG/DL (ref 8.6–10.2)
CHLORIDE BLD-SCNC: 97 MEQ/L (ref 98–107)
CO2: 25 MEQ/L (ref 22–29)
CREAT SERPL-MCNC: 5.88 MG/DL (ref 0.7–1.2)
EOSINOPHILS ABSOLUTE: 0.4 K/UL (ref 0–0.7)
EOSINOPHILS RELATIVE PERCENT: 4.9 %
GFR AFRICAN AMERICAN: 12.5
GFR NON-AFRICAN AMERICAN: 10.4
GLUCOSE BLD-MCNC: 86 MG/DL (ref 74–109)
GLUCOSE BLD-MCNC: 98 MG/DL (ref 60–115)
HCT VFR BLD CALC: 35.9 % (ref 42–52)
HEMOGLOBIN: 11.8 G/DL (ref 14–18)
LYMPHOCYTES ABSOLUTE: 1.3 K/UL (ref 1–4.8)
LYMPHOCYTES RELATIVE PERCENT: 15.2 %
MAGNESIUM: 2.3 MG/DL (ref 1.7–2.3)
MCH RBC QN AUTO: 31 PG (ref 27–31.3)
MCHC RBC AUTO-ENTMCNC: 32.8 % (ref 33–37)
MCV RBC AUTO: 94.4 FL (ref 80–100)
MONOCYTES ABSOLUTE: 0.9 K/UL (ref 0.2–0.8)
MONOCYTES RELATIVE PERCENT: 10.3 %
NEUTROPHILS ABSOLUTE: 5.9 K/UL (ref 1.4–6.5)
NEUTROPHILS RELATIVE PERCENT: 69.2 %
PDW BLD-RTO: 16.3 % (ref 11.5–14.5)
PERFORMED ON: NORMAL
PHOSPHORUS: 5.1 MG/DL (ref 2.5–4.5)
PHOSPHORUS: 5.1 MG/DL (ref 2.5–4.5)
PLATELET # BLD: 183 K/UL (ref 130–400)
POTASSIUM SERPL-SCNC: 4.1 MEQ/L (ref 3.5–5.1)
RBC # BLD: 3.8 M/UL (ref 4.7–6.1)
SODIUM BLD-SCNC: 138 MEQ/L (ref 132–144)
WBC # BLD: 8.5 K/UL (ref 4.8–10.8)

## 2017-08-24 PROCEDURE — 84100 ASSAY OF PHOSPHORUS: CPT

## 2017-08-24 PROCEDURE — 83735 ASSAY OF MAGNESIUM: CPT

## 2017-08-24 PROCEDURE — 6370000000 HC RX 637 (ALT 250 FOR IP): Performed by: INTERNAL MEDICINE

## 2017-08-24 PROCEDURE — 80048 BASIC METABOLIC PNL TOTAL CA: CPT

## 2017-08-24 PROCEDURE — 2580000003 HC RX 258: Performed by: SPECIALIST

## 2017-08-24 PROCEDURE — 36415 COLL VENOUS BLD VENIPUNCTURE: CPT

## 2017-08-24 PROCEDURE — 85025 COMPLETE CBC W/AUTO DIFF WBC: CPT

## 2017-08-24 RX ORDER — ACETAMINOPHEN 325 MG/1
650 TABLET ORAL EVERY 4 HOURS PRN
Qty: 120 TABLET | Refills: 1 | Status: SHIPPED | OUTPATIENT
Start: 2017-08-24

## 2017-08-24 RX ADMIN — LEVOTHYROXINE SODIUM 100 MCG: 100 TABLET ORAL at 05:54

## 2017-08-24 RX ADMIN — HYDROCORTISONE 10 MG: 10 TABLET ORAL at 08:59

## 2017-08-24 RX ADMIN — SODIUM CHLORIDE, PRESERVATIVE FREE 10 ML: 5 INJECTION INTRAVENOUS at 11:58

## 2017-08-24 RX ADMIN — CALCIUM ACETATE 1334 MG: 667 CAPSULE ORAL at 08:59

## 2017-08-24 RX ADMIN — MIDODRINE HYDROCHLORIDE 5 MG: 5 TABLET ORAL at 08:59

## 2017-08-24 RX ADMIN — CALCIUM ACETATE 1334 MG: 667 CAPSULE ORAL at 11:57

## 2017-08-24 RX ADMIN — DIVALPROEX SODIUM 250 MG: 250 TABLET, FILM COATED, EXTENDED RELEASE ORAL at 08:59

## 2017-08-24 RX ADMIN — DICYCLOMINE HYDROCHLORIDE 20 MG: 20 TABLET ORAL at 08:59

## 2017-08-24 RX ADMIN — POTASSIUM CHLORIDE 20 MEQ: 20 TABLET, EXTENDED RELEASE ORAL at 08:59

## 2017-08-25 LAB
EKG ATRIAL RATE: 92 BPM
EKG P AXIS: 34 DEGREES
EKG P-R INTERVAL: 214 MS
EKG Q-T INTERVAL: 358 MS
EKG QRS DURATION: 98 MS
EKG QTC CALCULATION (BAZETT): 442 MS
EKG R AXIS: 0 DEGREES
EKG T AXIS: 16 DEGREES
EKG VENTRICULAR RATE: 92 BPM

## 2017-10-05 ENCOUNTER — HOSPITAL ENCOUNTER (EMERGENCY)
Age: 47
Discharge: HOME | End: 2017-10-05
Attending: STUDENT IN AN ORGANIZED HEALTH CARE EDUCATION/TRAINING PROGRAM
Payer: COMMERCIAL

## 2017-10-05 VITALS
TEMPERATURE: 98.4 F | WEIGHT: 315 LBS | DIASTOLIC BLOOD PRESSURE: 86 MMHG | SYSTOLIC BLOOD PRESSURE: 128 MMHG | RESPIRATION RATE: 24 BRPM | HEART RATE: 88 BPM | OXYGEN SATURATION: 98 % | HEIGHT: 71 IN | BODY MASS INDEX: 44.1 KG/M2

## 2017-10-05 DIAGNOSIS — Z87.448 HISTORY OF RENAL FAILURE: ICD-10-CM

## 2017-10-05 DIAGNOSIS — D64.9 CHRONIC ANEMIA: ICD-10-CM

## 2017-10-05 DIAGNOSIS — K62.5 PRB (RECTAL BLEEDING): Primary | ICD-10-CM

## 2017-10-05 DIAGNOSIS — K64.8 INTERNAL HEMORRHOIDS: ICD-10-CM

## 2017-10-05 LAB
APTT: 36.2 SEC (ref 21.6–35.4)
BASOPHILS ABSOLUTE: 0 K/UL (ref 0–0.2)
BASOPHILS RELATIVE PERCENT: 0.4 %
EOSINOPHILS ABSOLUTE: 0.4 K/UL (ref 0–0.7)
EOSINOPHILS RELATIVE PERCENT: 5.9 %
HCT VFR BLD CALC: 36 % (ref 42–52)
HEMOGLOBIN: 11.6 G/DL (ref 14–18)
INR BLD: 1.5
LYMPHOCYTES ABSOLUTE: 0.8 K/UL (ref 1–4.8)
LYMPHOCYTES RELATIVE PERCENT: 11.6 %
MCH RBC QN AUTO: 30.3 PG (ref 27–31.3)
MCHC RBC AUTO-ENTMCNC: 32.2 % (ref 33–37)
MCV RBC AUTO: 94.2 FL (ref 80–100)
MONOCYTES ABSOLUTE: 0.7 K/UL (ref 0.2–0.8)
MONOCYTES RELATIVE PERCENT: 10.6 %
NEUTROPHILS ABSOLUTE: 5 K/UL (ref 1.4–6.5)
NEUTROPHILS RELATIVE PERCENT: 71.5 %
PDW BLD-RTO: 16.7 % (ref 11.5–14.5)
PLATELET # BLD: 229 K/UL (ref 130–400)
PROTHROMBIN TIME: 15.4 SEC (ref 8.1–13.7)
RBC # BLD: 3.83 M/UL (ref 4.7–6.1)
WBC # BLD: 7.1 K/UL (ref 4.8–10.8)

## 2017-10-05 PROCEDURE — 85610 PROTHROMBIN TIME: CPT

## 2017-10-05 PROCEDURE — 99283 EMERGENCY DEPT VISIT LOW MDM: CPT

## 2017-10-05 PROCEDURE — 36415 COLL VENOUS BLD VENIPUNCTURE: CPT

## 2017-10-05 PROCEDURE — 85730 THROMBOPLASTIN TIME PARTIAL: CPT

## 2017-10-05 PROCEDURE — 85025 COMPLETE CBC W/AUTO DIFF WBC: CPT

## 2017-10-05 ASSESSMENT — ENCOUNTER SYMPTOMS
CHEST TIGHTNESS: 0
ABDOMINAL PAIN: 0
SINUS PRESSURE: 0
BACK PAIN: 0
TROUBLE SWALLOWING: 0
DIARRHEA: 0
VOMITING: 0
ANAL BLEEDING: 1
SHORTNESS OF BREATH: 0
COUGH: 0

## 2017-10-05 NOTE — ED AVS SNAPSHOT
potassium chloride 20 MEQ extended release tablet   Commonly known as:  KLOR-CON M   Take 20 mEq by mouth daily       warfarin 5 MG tablet   Commonly known as:  COUMADIN   Take 5 mg by mouth daily            Where to Get Your Medications      Information about where to get these medications is not yet available     ! Ask your nurse or doctor about these medications     hydrocortisone 2.5 % rectal cream               Allergies as of 10/5/2017     No Known Allergies      Immunizations as of 10/5/2017     No immunizations on file. After Visit Summary    This summary was created for you. Thank you for entrusting your care to us. The following information includes details about your hospital/visit stay along with steps you should take to help with your recovery once you leave the hospital.  In this packet, you will find information about the topics listed below:    · Instructions about your medications including a list of your home medications  · A summary of your hospital visit  · Follow-up appointments once you have left the hospital  · Your care plan at home      You may receive a survey regarding the care you received during your stay. Your input is valuable to us. We encourage you to complete and return your survey in the envelope provided. We hope you will choose us in the future for your healthcare needs. Patient Information     Patient Name ANTONIO Grande 1970      Care Provided at:     Name Address Phone       [address] [place] [address] [phone]            Your Visit    Here you will find information about your visit, including the reason for your visit. Please take this sheet with you when you visit your doctor or other health care provider in the future. It will help determine the best possible medical care for you at that time.   If you have any questions once you leave the hospital, please call the department phone number This section includes instructions you will need to follow once you leave the hospital.  Your care team will discuss these with you, so you and those caring for you know how to best care for your health needs at home. This section may also include educational information about certain health topics that may be of help to you. Important Information if you smoke or are exposed to smoking       SMOKING: QUIT SMOKING. THIS IS THE MOST IMPORTANT ACTION YOU CAN TAKE TO IMPROVE YOUR CURRENT AND FUTURE HEALTH. Call the [facility] [place] NOW ([phone])    Smoking harms nonsmokers. When nonsmokers are around people who smoke, they absorb nicotine, carbon monoxide, and other ingredients of tobacco smoke. DO NOT SMOKE AROUND CHILDREN     Children exposed to secondhand smoke are at an increased risk of:  Sudden Infant Death Syndrome (SIDS), acute respiratory infections, inflammation of the middle ear, and severe asthma. Over a longer time, it causes heart disease and lung cancer. There is no safe level of exposure to secondhand smoke. [employer] Signup     [employer] allows you to send messages to your doctor, view your test results, renew your prescriptions, schedule appointments, view visit notes, and more. How Do I Sign Up? 1. In your Internet browser, go to [email or web address]  2. Click on the Sign Up Now link in the Sign In box. You will see the New Member Sign Up page. 3. Enter your [employer] Access Code exactly as it appears below. You will not need to use this code after youve completed the sign-up process. If you do not sign up before the expiration date, you must request a new code. [employer] Access Code: [email or web address]  Expires: 12/4/2017  3:50 PM    4. Enter your Social Security Number ([id number]) and Date of Birth (mm/dd/yyyy) as indicated and click Submit. You will be taken to the next sign-up page. · Take your medicines exactly as prescribed. Call your doctor if you think you are having a problem with your medicine. · If your doctor recommends iron pills, take them as directed:  ¨ Try to take the pills on an empty stomach about 1 hour before or 2 hours after meals. But you may need to take iron with food to avoid an upset stomach. ¨ Do not take antacids or drink milk or caffeine drinks (such as coffee, tea, or cola) at the same time or within 2 hours of the time that you take your iron. They can make it hard for your body to absorb the iron. ¨ Vitamin C (from food or supplements) helps your body absorb iron. Try taking iron pills with a glass of orange juice or some other food that is high in vitamin C, such as citrus fruits. ¨ Iron pills may cause stomach problems, such as heartburn, nausea, diarrhea, constipation, and cramps. Be sure to drink plenty of fluids, and include fruits, vegetables, and fiber in your diet each day. Iron pills often make your bowel movements dark or green. ¨ If you forget to take an iron pill, do not take a double dose of iron the next time you take a pill. ¨ Keep iron pills out of the reach of small children. An overdose of iron can be very dangerous. · Follow your doctor's advice about eating iron-rich foods. These include red meat, shellfish, poultry, eggs, beans, raisins, whole-grain bread, and leafy green vegetables. · Steam vegetables to help them keep their iron content. When should you call for help? Call 911 anytime you think you may need emergency care. For example, call if:  · You have symptoms of a heart attack. These may include:  ¨ Chest pain or pressure, or a strange feeling in the chest.  ¨ Sweating. ¨ Shortness of breath. ¨ Nausea or vomiting. ¨ Pain, pressure, or a strange feeling in the back, neck, jaw, or upper belly or in one or both shoulders or arms. ¨ Lightheadedness or sudden weakness. ¨ A fast or irregular heartbeat. After you call 911, the  may tell you to chew 1 adult-strength or 2 to 4 low-dose aspirin. Wait for an ambulance. Do not try to drive yourself. · You passed out (lost consciousness). Call your doctor now or seek immediate medical care if:  · You have new or increased shortness of breath. · You are dizzy or lightheaded, or you feel like you may faint. · Your fatigue and weakness continue or get worse. · You have any abnormal bleeding, such as:  ¨ Nosebleeds. ¨ Vaginal bleeding that is different (heavier, more frequent, at a different time of the month) than what you are used to. ¨ Bloody or black stools, or rectal bleeding. ¨ Bloody or pink urine. Watch closely for changes in your health, and be sure to contact your doctor if:  · You do not get better as expected. Where can you learn more? Go to [email or web address] org and sign in to your [employer] account. Enter R301 in the [employer] box to learn more about \"Anemia: Care Instructions. \"     If you do not have an account, please click on the \"Sign Up Now\" link. Current as of: October 13, 2016  Content Version: 11.3  © 1605-0628 [employer]. Care instructions adapted under license by [facility] [employer] [facility] ([facility]). If you have questions about a medical condition or this instruction, always ask your healthcare professional. [place] [address] any warranty or liability for your use of this information.

## 2017-10-05 NOTE — ED PROVIDER NOTES
[facility] ED  eMERGENCY dEPARTMENT eNCOUnter      Pt Name: Herbie Pastrana  MRN: [id number]  [place] 1970  Date of evaluation: 10/5/2017  Provider: Myrna Bernal, [address] [place] [address]       Chief Complaint   Patient presents with    Rectal Bleeding     for 2 days denies abd pain or black stool did have a constipated type stool a few days ago         HISTORY OF PRESENT ILLNESS   (Location/Symptom, Timing/Onset, Context/Setting, Quality, Duration, Modifying Factors, Severity)  Note limiting factors. Herbie Pastrana is a 52 y.o. male who presents to the emergency department With complaint that he had blood in his stool with a bowel movement today. Patient states his this is right red blood. Patient denies any abdominal pain. No fever. No chills. No nausea or vomiting. No diarrhea. HPI    Nursing Notes were reviewed. REVIEW OF SYSTEMS    (2-9 systems for level 4, 10 or more for level 5)     Review of Systems   Constitutional: Negative for activity change, appetite change, chills, fever and unexpected weight change. HENT: Negative for drooling, ear pain, nosebleeds, sinus pressure and trouble swallowing. Respiratory: Negative for cough, chest tightness and shortness of breath. Cardiovascular: Negative for chest pain and leg swelling. Gastrointestinal: Positive for anal bleeding. Negative for abdominal pain, diarrhea and vomiting. Endocrine: Negative for polydipsia and polyphagia. Genitourinary: Negative for dysuria, flank pain and frequency. Musculoskeletal: Negative for back pain and myalgias. Skin: Negative for pallor and rash. Neurological: Negative for syncope, weakness and headaches. Hematological: Does not bruise/bleed easily. All other systems reviewed and are negative. Except as noted above the remainder of the review of systems was reviewed and negative.        PAST MEDICAL HISTORY     Past Medical History:   Diagnosis Date    Anticoagulant long-term use     Anxiety     Asthma     Chronic back pain     Depression     GERD (gastroesophageal reflux disease)     Headache     Hyperlipidemia     Hypertension     Hypothyroidism     Osteoarthritis     Seizures (HCC)     Sleep apnea          SURGICAL HISTORY       Past Surgical History:   Procedure Laterality Date    BRAIN SURGERY      NV EGD TRANSORAL BIOPSY SINGLE/MULTIPLE N/A 8/22/2017    EGD  performed by Brandon Yepez MD at [facility]       Previous Medications    ACETAMINOPHEN (TYLENOL) 325 MG TABLET    Take 2 tablets by mouth every 4 hours as needed for Pain    ACETAMINOPHEN-CODEINE (TYLENOL #3) 300-30 MG PER TABLET        ATORVASTATIN (LIPITOR) 10 MG TABLET    Take 10 mg by mouth daily     CALCIUM ACETATE (PHOSLO) 667 MG CAPSULE    Take 667 mg by mouth daily     DICYCLOMINE (BENTYL) 20 MG TABLET    Take 1 tablet by mouth 3 times daily as needed    DIVALPROEX (DEPAKOTE ER) 250 MG ER TABLET    Take 250 mg by mouth daily     GENTAMICIN (GARAMYCIN) 0.1 % CREAM        HYDROCORTISONE (CORTEF) 20 MG TABLET    Take 10 mg by mouth daily     LEVOTHYROXINE (SYNTHROID) 100 MCG TABLET    Take 100 mcg by mouth daily     METOPROLOL (LOPRESSOR) 100 MG TABLET    Take 100 mg by mouth daily     MIDODRINE (PROAMATINE) 5 MG TABLET    Take 5 mg by mouth daily     OMEPRAZOLE (PRILOSEC) 40 MG CAPSULE    Take 40 mg by mouth daily     POTASSIUM CHLORIDE SA (K-DUR;KLOR-CON M) 20 MEQ TABLET    Take 20 mEq by mouth daily     WARFARIN (COUMADIN) 5 MG TABLET    Take 5 mg by mouth daily        ALLERGIES     Review of patient's allergies indicates no known allergies.     FAMILY HISTORY       Family History   Problem Relation Age of Onset    Heart Disease Father     Seizures Sister           SOCIAL HISTORY       Social History     Social History    Marital status:      Spouse name: N/A    Number of children: N/A    Years of education: N/A     Social History Main Topics    Smoking

## 2023-02-24 LAB
ANION GAP IN SER/PLAS: 19 MMOL/L (ref 10–20)
BASOPHILS (10*3/UL) IN BLOOD BY AUTOMATED COUNT: 0.07 X10E9/L (ref 0–0.1)
BASOPHILS/100 LEUKOCYTES IN BLOOD BY AUTOMATED COUNT: 0.9 % (ref 0–2)
CALCIUM (MG/DL) IN SER/PLAS: 9.1 MG/DL (ref 8.6–10.3)
CARBON DIOXIDE, TOTAL (MMOL/L) IN SER/PLAS: 20 MMOL/L (ref 21–32)
CHLORIDE (MMOL/L) IN SER/PLAS: 107 MMOL/L (ref 98–107)
CREATININE (MG/DL) IN SER/PLAS: 8.53 MG/DL (ref 0.5–1.3)
EOSINOPHILS (10*3/UL) IN BLOOD BY AUTOMATED COUNT: 1.3 X10E9/L (ref 0–0.7)
EOSINOPHILS/100 LEUKOCYTES IN BLOOD BY AUTOMATED COUNT: 17.5 % (ref 0–6)
ERYTHROCYTE DISTRIBUTION WIDTH (RATIO) BY AUTOMATED COUNT: 17.9 % (ref 11.5–14.5)
ERYTHROCYTE MEAN CORPUSCULAR HEMOGLOBIN CONCENTRATION (G/DL) BY AUTOMATED: 30.8 G/DL (ref 32–36)
ERYTHROCYTE MEAN CORPUSCULAR VOLUME (FL) BY AUTOMATED COUNT: 89 FL (ref 80–100)
ERYTHROCYTES (10*6/UL) IN BLOOD BY AUTOMATED COUNT: 4.54 X10E12/L (ref 4.5–5.9)
GFR MALE: 7 ML/MIN/1.73M2
GLUCOSE (MG/DL) IN SER/PLAS: 59 MG/DL (ref 74–99)
HEMATOCRIT (%) IN BLOOD BY AUTOMATED COUNT: 40.6 % (ref 41–52)
HEMOGLOBIN (G/DL) IN BLOOD: 12.5 G/DL (ref 13.5–17.5)
IMMATURE GRANULOCYTES/100 LEUKOCYTES IN BLOOD BY AUTOMATED COUNT: 0.3 % (ref 0–0.9)
LEUKOCYTES (10*3/UL) IN BLOOD BY AUTOMATED COUNT: 7.4 X10E9/L (ref 4.4–11.3)
LYMPHOCYTES (10*3/UL) IN BLOOD BY AUTOMATED COUNT: 1.22 X10E9/L (ref 1.2–4.8)
LYMPHOCYTES/100 LEUKOCYTES IN BLOOD BY AUTOMATED COUNT: 16.4 % (ref 13–44)
MONOCYTES (10*3/UL) IN BLOOD BY AUTOMATED COUNT: 0.39 X10E9/L (ref 0.1–1)
MONOCYTES/100 LEUKOCYTES IN BLOOD BY AUTOMATED COUNT: 5.2 % (ref 2–10)
NEUTROPHILS (10*3/UL) IN BLOOD BY AUTOMATED COUNT: 4.43 X10E9/L (ref 1.2–7.7)
NEUTROPHILS/100 LEUKOCYTES IN BLOOD BY AUTOMATED COUNT: 59.7 % (ref 40–80)
PLATELETS (10*3/UL) IN BLOOD AUTOMATED COUNT: 263 X10E9/L (ref 150–450)
POTASSIUM (MMOL/L) IN SER/PLAS: 5.3 MMOL/L (ref 3.5–5.3)
SODIUM (MMOL/L) IN SER/PLAS: 141 MMOL/L (ref 136–145)
UREA NITROGEN (MG/DL) IN SER/PLAS: 84 MG/DL (ref 6–23)

## 2023-02-26 LAB
ABO GROUP (TYPE) IN BLOOD: NORMAL
ANTIBODY SCREEN: NEGATIVE
RH FACTOR: POSITIVE
STAPH/MRSA SCREEN, CULTURE: ABNORMAL

## 2023-11-10 ENCOUNTER — APPOINTMENT (OUTPATIENT)
Dept: RADIOLOGY | Facility: HOSPITAL | Age: 53
End: 2023-11-10
Payer: MEDICARE

## 2023-11-10 ENCOUNTER — HOSPITAL ENCOUNTER (OUTPATIENT)
Facility: HOSPITAL | Age: 53
Setting detail: OBSERVATION
LOS: 3 days | Discharge: HOME | End: 2023-11-14
Attending: EMERGENCY MEDICINE | Admitting: INTERNAL MEDICINE
Payer: MEDICARE

## 2023-11-10 DIAGNOSIS — Z99.2 DIALYSIS PATIENT (CMS-HCC): ICD-10-CM

## 2023-11-10 DIAGNOSIS — R11.2 NAUSEA AND VOMITING, UNSPECIFIED VOMITING TYPE: Primary | ICD-10-CM

## 2023-11-10 DIAGNOSIS — N18.9 CHRONIC KIDNEY DISEASE, UNSPECIFIED CKD STAGE: ICD-10-CM

## 2023-11-10 LAB
ALBUMIN SERPL BCP-MCNC: 4 G/DL (ref 3.4–5)
ALP SERPL-CCNC: 261 U/L (ref 33–120)
ALT SERPL W P-5'-P-CCNC: 12 U/L (ref 10–52)
AMPHETAMINES UR QL SCN: NORMAL
AMPHETAMINES UR QL SCN: NORMAL
ANION GAP SERPL CALC-SCNC: 19 MMOL/L (ref 10–20)
APPEARANCE UR: CLEAR
AST SERPL W P-5'-P-CCNC: 14 U/L (ref 9–39)
BARBITURATES UR QL SCN: NORMAL
BARBITURATES UR QL SCN: NORMAL
BASOPHILS # BLD AUTO: 0.02 X10*3/UL (ref 0–0.1)
BASOPHILS NFR BLD AUTO: 0.4 %
BENZODIAZ UR QL SCN: NORMAL
BENZODIAZ UR QL SCN: NORMAL
BILIRUB SERPL-MCNC: 0.6 MG/DL (ref 0–1.2)
BILIRUB UR STRIP.AUTO-MCNC: NEGATIVE MG/DL
BUN SERPL-MCNC: 99 MG/DL (ref 6–23)
BZE UR QL SCN: NORMAL
BZE UR QL SCN: NORMAL
CALCIUM SERPL-MCNC: 10.5 MG/DL (ref 8.6–10.3)
CANNABINOIDS UR QL SCN: NORMAL
CANNABINOIDS UR QL SCN: NORMAL
CARDIAC TROPONIN I PNL SERPL HS: 9 NG/L (ref 0–20)
CHLORIDE SERPL-SCNC: 114 MMOL/L (ref 98–107)
CO2 SERPL-SCNC: 14 MMOL/L (ref 21–32)
COLOR UR: ABNORMAL
CREAT SERPL-MCNC: 8.5 MG/DL (ref 0.5–1.3)
EOSINOPHIL # BLD AUTO: 0.55 X10*3/UL (ref 0–0.7)
EOSINOPHIL NFR BLD AUTO: 10.8 %
ERYTHROCYTE [DISTWIDTH] IN BLOOD BY AUTOMATED COUNT: 14.1 % (ref 11.5–14.5)
FENTANYL+NORFENTANYL UR QL SCN: NORMAL
FENTANYL+NORFENTANYL UR QL SCN: NORMAL
FLUAV RNA RESP QL NAA+PROBE: NOT DETECTED
FLUBV RNA RESP QL NAA+PROBE: NOT DETECTED
GFR SERPL CREATININE-BSD FRML MDRD: 7 ML/MIN/1.73M*2
GLUCOSE SERPL-MCNC: 72 MG/DL (ref 74–99)
GLUCOSE UR STRIP.AUTO-MCNC: NEGATIVE MG/DL
HCT VFR BLD AUTO: 39.9 % (ref 41–52)
HGB BLD-MCNC: 11.8 G/DL (ref 13.5–17.5)
HOLD SPECIMEN: NORMAL
IMM GRANULOCYTES # BLD AUTO: 0.02 X10*3/UL (ref 0–0.7)
IMM GRANULOCYTES NFR BLD AUTO: 0.4 % (ref 0–0.9)
KETONES UR STRIP.AUTO-MCNC: NEGATIVE MG/DL
LEUKOCYTE ESTERASE UR QL STRIP.AUTO: NEGATIVE
LIPASE SERPL-CCNC: 111 U/L (ref 9–82)
LYMPHOCYTES # BLD AUTO: 1.01 X10*3/UL (ref 1.2–4.8)
LYMPHOCYTES NFR BLD AUTO: 19.8 %
MCH RBC QN AUTO: 29.4 PG (ref 26–34)
MCHC RBC AUTO-ENTMCNC: 29.6 G/DL (ref 32–36)
MCV RBC AUTO: 99 FL (ref 80–100)
MONOCYTES # BLD AUTO: 0.28 X10*3/UL (ref 0.1–1)
MONOCYTES NFR BLD AUTO: 5.5 %
NEUTROPHILS # BLD AUTO: 3.23 X10*3/UL (ref 1.2–7.7)
NEUTROPHILS NFR BLD AUTO: 63.1 %
NITRITE UR QL STRIP.AUTO: NEGATIVE
NRBC BLD-RTO: 0 /100 WBCS (ref 0–0)
OPIATES UR QL SCN: NORMAL
OPIATES UR QL SCN: NORMAL
OXYCODONE+OXYMORPHONE UR QL SCN: NORMAL
OXYCODONE+OXYMORPHONE UR QL SCN: NORMAL
PCP UR QL SCN: NORMAL
PCP UR QL SCN: NORMAL
PH UR STRIP.AUTO: 5 [PH]
PLATELET # BLD AUTO: 64 X10*3/UL (ref 150–450)
POTASSIUM SERPL-SCNC: 5.1 MMOL/L (ref 3.5–5.3)
PROT SERPL-MCNC: 7.4 G/DL (ref 6.4–8.2)
PROT UR STRIP.AUTO-MCNC: ABNORMAL MG/DL
RBC # BLD AUTO: 4.02 X10*6/UL (ref 4.5–5.9)
RBC # UR STRIP.AUTO: ABNORMAL /UL
RBC #/AREA URNS AUTO: NORMAL /HPF
RSV RNA RESP QL NAA+PROBE: NOT DETECTED
SARS-COV-2 RNA RESP QL NAA+PROBE: NOT DETECTED
SODIUM SERPL-SCNC: 142 MMOL/L (ref 136–145)
SP GR UR STRIP.AUTO: 1.01
UROBILINOGEN UR STRIP.AUTO-MCNC: <2 MG/DL
WBC # BLD AUTO: 5.1 X10*3/UL (ref 4.4–11.3)
WBC #/AREA URNS AUTO: NORMAL /HPF

## 2023-11-10 PROCEDURE — 81001 URINALYSIS AUTO W/SCOPE: CPT | Mod: 59 | Performed by: EMERGENCY MEDICINE

## 2023-11-10 PROCEDURE — 83690 ASSAY OF LIPASE: CPT | Performed by: EMERGENCY MEDICINE

## 2023-11-10 PROCEDURE — 36415 COLL VENOUS BLD VENIPUNCTURE: CPT | Performed by: EMERGENCY MEDICINE

## 2023-11-10 PROCEDURE — 74176 CT ABD & PELVIS W/O CONTRAST: CPT

## 2023-11-10 PROCEDURE — 84484 ASSAY OF TROPONIN QUANT: CPT | Performed by: EMERGENCY MEDICINE

## 2023-11-10 PROCEDURE — 80307 DRUG TEST PRSMV CHEM ANLYZR: CPT | Performed by: HOSPITALIST

## 2023-11-10 PROCEDURE — 74176 CT ABD & PELVIS W/O CONTRAST: CPT | Performed by: RADIOLOGY

## 2023-11-10 PROCEDURE — 84443 ASSAY THYROID STIM HORMONE: CPT | Performed by: HOSPITALIST

## 2023-11-10 PROCEDURE — 87637 SARSCOV2&INF A&B&RSV AMP PRB: CPT | Performed by: EMERGENCY MEDICINE

## 2023-11-10 PROCEDURE — 71250 CT THORAX DX C-: CPT | Performed by: RADIOLOGY

## 2023-11-10 PROCEDURE — 99285 EMERGENCY DEPT VISIT HI MDM: CPT | Mod: 25 | Performed by: EMERGENCY MEDICINE

## 2023-11-10 PROCEDURE — 99223 1ST HOSP IP/OBS HIGH 75: CPT | Performed by: HOSPITALIST

## 2023-11-10 PROCEDURE — 80053 COMPREHEN METABOLIC PANEL: CPT | Performed by: EMERGENCY MEDICINE

## 2023-11-10 PROCEDURE — 1210000001 HC SEMI-PRIVATE ROOM DAILY

## 2023-11-10 PROCEDURE — 80307 DRUG TEST PRSMV CHEM ANLYZR: CPT | Performed by: EMERGENCY MEDICINE

## 2023-11-10 PROCEDURE — 85025 COMPLETE CBC W/AUTO DIFF WBC: CPT | Performed by: EMERGENCY MEDICINE

## 2023-11-10 RX ORDER — SODIUM BICARBONATE 650 MG/1
325 TABLET ORAL 2 TIMES DAILY
Status: DISCONTINUED | OUTPATIENT
Start: 2023-11-10 | End: 2023-11-14 | Stop reason: HOSPADM

## 2023-11-10 RX ORDER — SEVELAMER CARBONATE 800 MG/1
1600 TABLET, FILM COATED ORAL
Status: DISCONTINUED | OUTPATIENT
Start: 2023-11-11 | End: 2023-11-14 | Stop reason: HOSPADM

## 2023-11-10 RX ORDER — EZETIMIBE 10 MG/1
10 TABLET ORAL DAILY
Status: DISCONTINUED | OUTPATIENT
Start: 2023-11-11 | End: 2023-11-14 | Stop reason: HOSPADM

## 2023-11-10 RX ORDER — ASPIRIN 81 MG/1
81 TABLET ORAL DAILY
COMMUNITY

## 2023-11-10 RX ORDER — SEVELAMER CARBONATE 800 MG/1
2 TABLET, FILM COATED ORAL
Status: ON HOLD | COMMUNITY
End: 2023-11-22 | Stop reason: SDUPTHER

## 2023-11-10 RX ORDER — SENNOSIDES 8.6 MG/1
2 TABLET ORAL 2 TIMES DAILY
Status: DISCONTINUED | OUTPATIENT
Start: 2023-11-10 | End: 2023-11-14 | Stop reason: HOSPADM

## 2023-11-10 RX ORDER — METOPROLOL TARTRATE 25 MG/1
25 TABLET, FILM COATED ORAL 2 TIMES DAILY
Status: DISCONTINUED | OUTPATIENT
Start: 2023-11-10 | End: 2023-11-14 | Stop reason: HOSPADM

## 2023-11-10 RX ORDER — ALLOPURINOL 100 MG/1
100 TABLET ORAL DAILY
Status: DISCONTINUED | OUTPATIENT
Start: 2023-11-11 | End: 2023-11-14 | Stop reason: HOSPADM

## 2023-11-10 RX ORDER — B-COMPLEX WITH VITAMIN C
1 TABLET ORAL DAILY
COMMUNITY
End: 2023-11-17 | Stop reason: ENTERED-IN-ERROR

## 2023-11-10 RX ORDER — ASPIRIN 81 MG/1
81 TABLET ORAL DAILY
Status: DISCONTINUED | OUTPATIENT
Start: 2023-11-11 | End: 2023-11-14 | Stop reason: HOSPADM

## 2023-11-10 RX ORDER — FINASTERIDE 5 MG/1
5 TABLET, FILM COATED ORAL DAILY
COMMUNITY

## 2023-11-10 RX ORDER — ROSUVASTATIN CALCIUM 10 MG/1
10 TABLET, COATED ORAL NIGHTLY
Status: DISCONTINUED | OUTPATIENT
Start: 2023-11-10 | End: 2023-11-14 | Stop reason: HOSPADM

## 2023-11-10 RX ORDER — EZETIMIBE 10 MG/1
10 TABLET ORAL DAILY
Status: ON HOLD | COMMUNITY
End: 2023-12-04 | Stop reason: ENTERED-IN-ERROR

## 2023-11-10 RX ORDER — MIDODRINE HYDROCHLORIDE 10 MG/1
10 TABLET ORAL EVERY 8 HOURS
Status: ON HOLD | COMMUNITY
End: 2023-11-22 | Stop reason: SDUPTHER

## 2023-11-10 RX ORDER — ACETAMINOPHEN 325 MG/1
650 TABLET ORAL EVERY 4 HOURS PRN
Status: DISCONTINUED | OUTPATIENT
Start: 2023-11-10 | End: 2023-11-14 | Stop reason: HOSPADM

## 2023-11-10 RX ORDER — SODIUM BICARBONATE 325 MG/1
325 TABLET ORAL 2 TIMES DAILY
COMMUNITY
End: 2023-11-22 | Stop reason: HOSPADM

## 2023-11-10 RX ORDER — ONDANSETRON 4 MG/1
4 TABLET, ORALLY DISINTEGRATING ORAL EVERY 8 HOURS PRN
Status: DISCONTINUED | OUTPATIENT
Start: 2023-11-10 | End: 2023-11-14 | Stop reason: HOSPADM

## 2023-11-10 RX ORDER — ONDANSETRON 4 MG/1
4 TABLET, FILM COATED ORAL EVERY 12 HOURS PRN
COMMUNITY

## 2023-11-10 RX ORDER — MIDODRINE HYDROCHLORIDE 5 MG/1
10 TABLET ORAL EVERY 8 HOURS
Status: DISCONTINUED | OUTPATIENT
Start: 2023-11-10 | End: 2023-11-14 | Stop reason: HOSPADM

## 2023-11-10 RX ORDER — METOPROLOL TARTRATE 25 MG/1
25 TABLET, FILM COATED ORAL 2 TIMES DAILY
COMMUNITY
End: 2023-11-22 | Stop reason: HOSPADM

## 2023-11-10 RX ORDER — B-COMPLEX WITH VITAMIN C
1 TABLET ORAL DAILY
Status: DISCONTINUED | OUTPATIENT
Start: 2023-11-11 | End: 2023-11-14 | Stop reason: HOSPADM

## 2023-11-10 RX ORDER — MELATONIN 3 MG
1 CAPSULE ORAL NIGHTLY
COMMUNITY

## 2023-11-10 RX ORDER — ALLOPURINOL 100 MG/1
100 TABLET ORAL DAILY
COMMUNITY
End: 2023-11-22 | Stop reason: HOSPADM

## 2023-11-10 RX ORDER — ROSUVASTATIN CALCIUM 20 MG/1
20 TABLET, COATED ORAL NIGHTLY
COMMUNITY

## 2023-11-10 RX ORDER — TALC
3 POWDER (GRAM) TOPICAL NIGHTLY
Status: DISCONTINUED | OUTPATIENT
Start: 2023-11-10 | End: 2023-11-14 | Stop reason: HOSPADM

## 2023-11-10 RX ORDER — ACETAMINOPHEN 500 MG
500 TABLET ORAL DAILY PRN
COMMUNITY

## 2023-11-10 RX ORDER — ERGOCALCIFEROL 1.25 MG/1
1.25 CAPSULE ORAL
COMMUNITY

## 2023-11-10 RX ORDER — ONDANSETRON HYDROCHLORIDE 2 MG/ML
4 INJECTION, SOLUTION INTRAVENOUS EVERY 8 HOURS PRN
Status: DISCONTINUED | OUTPATIENT
Start: 2023-11-10 | End: 2023-11-14 | Stop reason: HOSPADM

## 2023-11-10 RX ORDER — FINASTERIDE 5 MG/1
5 TABLET, FILM COATED ORAL DAILY
Status: DISCONTINUED | OUTPATIENT
Start: 2023-11-11 | End: 2023-11-14 | Stop reason: HOSPADM

## 2023-11-10 ASSESSMENT — COLUMBIA-SUICIDE SEVERITY RATING SCALE - C-SSRS
6. HAVE YOU EVER DONE ANYTHING, STARTED TO DO ANYTHING, OR PREPARED TO DO ANYTHING TO END YOUR LIFE?: NO
2. HAVE YOU ACTUALLY HAD ANY THOUGHTS OF KILLING YOURSELF?: NO
1. IN THE PAST MONTH, HAVE YOU WISHED YOU WERE DEAD OR WISHED YOU COULD GO TO SLEEP AND NOT WAKE UP?: NO

## 2023-11-10 ASSESSMENT — ENCOUNTER SYMPTOMS
VOMITING: 1
FLANK PAIN: 0
HEMATURIA: 0
COLOR CHANGE: 0
NEUROLOGICAL NEGATIVE: 1
HEMATOLOGIC/LYMPHATIC NEGATIVE: 1
FATIGUE: 1
CHILLS: 0
DYSURIA: 0
PALPITATIONS: 0
PSYCHIATRIC NEGATIVE: 1
ALLERGIC/IMMUNOLOGIC NEGATIVE: 1
CARDIOVASCULAR NEGATIVE: 1
EYE PAIN: 0
EYES NEGATIVE: 1
SHORTNESS OF BREATH: 0
SORE THROAT: 0
MUSCULOSKELETAL NEGATIVE: 1
RESPIRATORY NEGATIVE: 1
COUGH: 0
SEIZURES: 0
NAUSEA: 1
CONSTITUTIONAL NEGATIVE: 1
DIARRHEA: 1
FEVER: 0
ABDOMINAL PAIN: 1

## 2023-11-10 ASSESSMENT — PAIN SCALES - GENERAL: PAINLEVEL_OUTOF10: 8

## 2023-11-10 ASSESSMENT — PAIN DESCRIPTION - LOCATION: LOCATION: KNEE

## 2023-11-10 ASSESSMENT — PAIN - FUNCTIONAL ASSESSMENT: PAIN_FUNCTIONAL_ASSESSMENT: 0-10

## 2023-11-10 ASSESSMENT — PAIN DESCRIPTION - PROGRESSION: CLINICAL_PROGRESSION: GRADUALLY WORSENING

## 2023-11-10 ASSESSMENT — PAIN DESCRIPTION - ORIENTATION: ORIENTATION: LEFT

## 2023-11-10 ASSESSMENT — PAIN DESCRIPTION - PAIN TYPE: TYPE: CHRONIC PAIN

## 2023-11-10 NOTE — ED PROVIDER NOTES
HPI   Chief Complaint   Patient presents with    Vomiting         53-year-old male, end-stage renal disease hemodialysis noncompliant with dialysis for at least several weeks, uncertain exact timeframe.  Presents with not feeling well.  Shaky, nausea, vomiting, occasional hematemesis, loose stool but no serenity hematochezia or melena.  Crampy abdominal gurgling sensation off and on as well.  No significant chest pain.  No marked shortness of breath cough sputum hemoptysis.  No fevers or chills.  He still makes urine without dysuria urgency or frequency.  No leg swelling or pain.  Access is left fistula, normally gets dialysis at [facility] locally uncertain exact location, follows with the nephrologist Dr. Goodman.  Patient states he is compliant with his medications which do include Eliquis for prior CVA and likely underlying atrial fibrillation.                          Stan Coma Scale Score: 15                  Patient History   No past medical history on file.  Past Surgical History:   Procedure Laterality Date    CT HEAD ANGIO W AND WO IV CONTRAST  2/4/2014    CT HEAD ANGIO W AND WO IV CONTRAST 2/4/2014 [facility]    CT HEAD ANGIO W AND WO IV CONTRAST  2/13/2014    CT HEAD ANGIO W AND WO IV CONTRAST 2/13/2014 [facility] CLINICAL [facility]GAC[facility]     No family history on file.  Social History     Tobacco Use    Smoking status: Not on file    Smokeless tobacco: Not on file   Substance Use Topics    Alcohol use: Not on file    Drug use: Not on file       Review of Systems   Constitutional:  Positive for fatigue. Negative for chills and fever.   HENT:  Negative for ear pain and sore throat.    Eyes:  Negative for pain and visual disturbance.   Respiratory:  Negative for cough and shortness of breath.    Cardiovascular:  Negative for chest pain and palpitations.   Gastrointestinal:  Positive for abdominal pain, diarrhea, nausea and vomiting.   Genitourinary:  Negative for dysuria, flank pain and hematuria.   Skin:  Negative for  color change and rash.   Neurological:  Negative for seizures and syncope.   All other systems reviewed and are negative.       Physical Exam   ED Triage Vitals   Temp Pulse Resp BP   -- -- -- --      SpO2 Temp src Heart Rate Source Patient Position   -- -- -- --      BP Location FiO2 (%)     -- --       Physical Exam  Vitals reviewed.   Constitutional:       General: He is not in acute distress.     Appearance: He is well-developed. He is obese.   HENT:      Head: Normocephalic and atraumatic.      Right Ear: External ear normal.      Left Ear: External ear normal.      Nose: Nose normal.      Mouth/Throat:      Mouth: Mucous membranes are moist.      Pharynx: Oropharynx is clear.   Eyes:      Conjunctiva/sclera: Conjunctivae normal.      Pupils: Pupils are equal, round, and reactive to light.   Neck:      Vascular: No JVD.   Cardiovascular:      Rate and Rhythm: Normal rate. Rhythm irregular.      Pulses: Normal pulses.   Pulmonary:      Effort: Pulmonary effort is normal. No accessory muscle usage or respiratory distress.      Breath sounds: Normal breath sounds.   Abdominal:      General: Abdomen is flat. There is no distension.      Palpations: Abdomen is soft. There is no mass.      Tenderness: There is no abdominal tenderness.   Musculoskeletal:         General: Normal range of motion.      Cervical back: Normal range of motion and neck supple.      Comments: Palpable thrill in fistula in left forearm.   Lymphadenopathy:      Cervical: No cervical adenopathy.   Skin:     General: Skin is warm and dry.      Capillary Refill: Capillary refill takes less than 2 seconds.      Comments: No zoster rash seen   Neurological:      General: No focal deficit present.      Mental Status: He is alert. Mental status is at baseline.   Psychiatric:         Mood and Affect: Mood normal.                 ECG if performed, ordered and interpreted by ED physician: Atrial fibrillation, 97 bpm.  Normal rate, unchanged rhythm,  normal axis, normal intervals.  No acute ischemic findings.  Atrial fibrillation old per prior ECG.        Labs Reviewed   CBC WITH AUTO DIFFERENTIAL - Abnormal       Result Value    WBC 5.1      nRBC 0.0      RBC 4.02 (*)     Hemoglobin 11.8 (*)     Hematocrit 39.9 (*)     MCV 99      MCH 29.4      MCHC 29.6 (*)     RDW 14.1      Platelets 64 (*)     Neutrophils % 63.1      Immature Granulocytes %, Automated 0.4      Lymphocytes % 19.8      Monocytes % 5.5      Eosinophils % 10.8      Basophils % 0.4      Neutrophils Absolute 3.23      Immature Granulocytes Absolute, Automated 0.02      Lymphocytes Absolute 1.01 (*)     Monocytes Absolute 0.28      Eosinophils Absolute 0.55      Basophils Absolute 0.02     COMPREHENSIVE METABOLIC PANEL - Abnormal    Glucose 72 (*)     Sodium 142      Potassium 5.1      Chloride 114 (*)     Bicarbonate 14 (*)     Anion Gap 19      Urea Nitrogen 99 (*)     Creatinine 8.50 (*)     eGFR 7 (*)     Calcium 10.5 (*)     Albumin 4.0      Alkaline Phosphatase 261 (*)     Total Protein 7.4      AST 14      Bilirubin, Total 0.6      ALT 12     LIPASE - Abnormal    Lipase 111 (*)     Narrative:     Venipuncture immediately after or during the administration of Metamizole may lead to falsely low results. Testing should be performed immediately prior to Metamizole dosing.   URINALYSIS WITH REFLEX MICROSCOPIC AND CULTURE - Abnormal    Color, Urine Straw      Appearance, Urine Clear      Specific Gravity, Urine 1.011      pH, Urine 5.0      Protein, Urine 100 (2+) (*)     Glucose, Urine NEGATIVE      Blood, Urine SMALL (1+) (*)     Ketones, Urine NEGATIVE      Bilirubin, Urine NEGATIVE      Urobilinogen, Urine <2.0      Nitrite, Urine NEGATIVE      Leukocyte Esterase, Urine NEGATIVE     SARS-COV-2 PCR, SYMPTOMATIC - Normal    Coronavirus 2019, PCR Not Detected      Narrative:     This assay has received FDA Emergency Use Authorization (EUA) and is only authorized for the duration of time that  circumstances exist to justify the authorization of the emergency use of in vitro diagnostic tests for the detection of SARS-CoV-2 virus and/or diagnosis of COVID-19 infection under section 564(b)(1) of the Act, 21 U.S.C. 360bbb-3(b)(1). This assay is an in vitro diagnostic nucleic acid amplification test for the qualitative detection of SARS-CoV-2 from nasopharyngeal specimens and has been validated for use at [facility]. Negative results do not preclude COVID-19 infections and should not be used as the sole basis for diagnosis, treatment, or other management decisions.     INFLUENZA A AND B PCR - Normal    Flu A Result Not Detected      Flu B Result Not Detected      Narrative:     This assay is an in vitro diagnostic multiplex nucleic acid amplification test for the detection and discrimination of Influenza A & B from nasopharyngeal specimens, and has been validated for use at [facility]. Negative results do not preclude Influenza A/B infections, and should not be used as the sole basis for diagnosis, treatment, or other management decisions. If Influenza A/B and RSV PCR results are negative, testing for Parainfluenza virus, Adenovirus and Metapneumovirus is routinely performed for [facility] pediatric oncology and intensive care inpatients, and is available on other patients by placing an add-on request.   RSV PCR - Normal    RSV PCR Not Detected      Narrative:     This assay is an FDA-cleared, in vitro diagnostic nucleic acid amplification test for the detection of RSV from nasopharyngeal specimens, and has been validated for use at [facility]. Negative results do not preclude RSV infections, and should not be used as the sole basis for diagnosis, treatment, or other management decisions. If Influenza A/B and RSV PCR results are negative, testing for Parainfluenza virus, Adenovirus and Metapneumovirus is routinely performed for pediatric oncology  and intensive care inpatients at [facility], and is available on other patients by placing an add-on request.       DRUG SCREEN,URINE - Normal    Amphetamine Screen, Urine Presumptive Negative      Barbiturate Screen, Urine Presumptive Negative      Benzodiazepines Screen, Urine Presumptive Negative      Cannabinoid Screen, Urine Presumptive Negative      Cocaine Metabolite Screen, Urine Presumptive Negative      Fentanyl Screen, Urine Presumptive Negative      Opiate Screen, Urine Presumptive Negative      Oxycodone Screen, Urine Presumptive Negative      PCP Screen, Urine Presumptive Negative      Narrative:     Drug screen results are presumptive and should not be used to assess   compliance with prescribed medication. Contact the performing [facility] laboratory   to add-on definitive confirmatory testing if clinically indicated.    Toxicology screening results are reported qualitatively. The concentration must   be greater than or equal to the cutoff to be reported as positive. The concentration   at which the screening test can detect an individual drug or metabolite varies.   The absence of expected drug(s) and/or drug metabolite(s) may indicate non-compliance,   inappropriate timing of specimen collection relative to drug administration, poor drug   absorption, diluted/adulterated urine, or limitations of testing. For medical purposes   only; not valid for forensic use.    Interpretive questions should be directed to the laboratory medical directors.   SERIAL TROPONIN-INITIAL - Normal    Troponin I, High Sensitivity 9      Narrative:     Less than 99th percentile of normal range cutoff-  Female and children under 18 years old <14 ng/L; Male <21 ng/L: Negative  Repeat testing should be performed if clinically indicated.     Female and children under 18 years old 14-50 ng/L; Male 21-50 ng/L:  Consistent with possible cardiac damage and possible increased clinical   risk. Serial measurements may help to assess extent of  myocardial damage.     >50 ng/L: Consistent with cardiac damage, increased clinical risk and  myocardial infarction. Serial measurements may help assess extent of   myocardial damage.      NOTE: Children less than 1 year old may have higher baseline troponin   levels and results should be interpreted in conjunction with the overall   clinical context.     NOTE: Troponin I testing is performed using a different   testing methodology at [facility] than at other   [facility]. Direct result comparisons should only   be made within the same method.   URINALYSIS MICROSCOPIC WITH REFLEX CULTURE - Normal    WBC, Urine NONE      RBC, Urine 1-2     URINALYSIS WITH REFLEX MICROSCOPIC AND CULTURE    Narrative:     The following orders were created for panel order Urinalysis with Reflex Microscopic and Culture.  Procedure                               Abnormality         Status                     ---------                               -----------         ------                     Urinalysis with Reflex M...[[id number]]  Abnormal            Final result               Extra Urine Gray Tube[[id number]]                            Final result                 Please view results for these tests on the individual orders.   TROPONIN SERIES- (INITIAL, 1 HR)    Narrative:     The following orders were created for panel order Troponin I Series, High Sensitivity (0, 1 HR).  Procedure                               Abnormality         Status                     ---------                               -----------         ------                     Troponin I, High Sensiti...[[id number]]  Normal              Final result               Troponin, High Sensitivi...[[id number]]                                                   Please view results for these tests on the individual orders.   EXTRA URINE GRAY TUBE    Extra Tube Hold for add-ons.            CT chest abdomen pelvis wo IV contrast   Final Result   No acute  abnormality.        Small fat containing right inguinal and umbilical hernias.        Redemonstrated thyromegaly with stable mild narrowing of the trachea   at the level of the upper mediastinum.        Atrophic kidneys with multiple bilateral renal cysts. Findings   suggesting renal osteodystrophy.        Signed by: Trixie Moe 11/10/2023 6:55 PM   Dictation workstation:   [id number]               ED Course & MDM                Diagnoses as of 11/10/23 2032   Nausea and vomiting, unspecified vomiting type   Chronic kidney disease, unspecified CKD stage   Dialysis patient (CMS/[facility])       Medical Decision Making  Noncompliant diabetic hypertension end-stage renal disease hemodialysis patient.  Likely noncompliant for several weeks.  Said he was having nausea vomiting occasional hematemesis, contacted his nephrologist and asked him to present to the ED.      Patient likely needs dialysis.  BUN markedly elevated obviously.  Bicarb down to 14 but potassium 5.1.  No marked EKG changes.  Imaging with no acute process chest abdomen pelvis.  Given persistent nausea vomiting, likely from acidosis and has renal failure noncompliance with dialysis, recommend hospitalization.            Procedure  Procedures                   Dilip Burden MD MPH  11/10/23 1920       Dilip Burden MD MPH  11/10/23 2032

## 2023-11-10 NOTE — ED TRIAGE NOTES
Patient presents to the ed VIA private vehicle for multiple medical complaints. Patient states that he has missed a month and a half of dialysis. Reports that this morning he started to have bloody vomit and chest pain. Spoke with his dialysis center and was told to come to ED

## 2023-11-11 ENCOUNTER — APPOINTMENT (OUTPATIENT)
Dept: DIALYSIS | Facility: HOSPITAL | Age: 53
End: 2023-11-11
Payer: COMMERCIAL

## 2023-11-11 LAB
ANION GAP SERPL CALC-SCNC: 17 MMOL/L (ref 10–20)
BUN SERPL-MCNC: 107 MG/DL (ref 6–23)
CALCIUM SERPL-MCNC: 9.5 MG/DL (ref 8.6–10.3)
CHLORIDE SERPL-SCNC: 116 MMOL/L (ref 98–107)
CO2 SERPL-SCNC: 15 MMOL/L (ref 21–32)
CREAT SERPL-MCNC: 8.9 MG/DL (ref 0.5–1.3)
ERYTHROCYTE [DISTWIDTH] IN BLOOD BY AUTOMATED COUNT: 14.4 % (ref 11.5–14.5)
GFR SERPL CREATININE-BSD FRML MDRD: 7 ML/MIN/1.73M*2
GLUCOSE SERPL-MCNC: 97 MG/DL (ref 74–99)
HBV SURFACE AB SER-ACNC: 3.6 MIU/ML
HCT VFR BLD AUTO: 30.8 % (ref 41–52)
HGB BLD-MCNC: 9.7 G/DL (ref 13.5–17.5)
MCH RBC QN AUTO: 30 PG (ref 26–34)
MCHC RBC AUTO-ENTMCNC: 31.5 G/DL (ref 32–36)
MCV RBC AUTO: 95 FL (ref 80–100)
NRBC BLD-RTO: 0 /100 WBCS (ref 0–0)
PLATELET # BLD AUTO: 213 X10*3/UL (ref 150–450)
POTASSIUM SERPL-SCNC: 4.7 MMOL/L (ref 3.5–5.3)
RBC # BLD AUTO: 3.23 X10*6/UL (ref 4.5–5.9)
SODIUM SERPL-SCNC: 143 MMOL/L (ref 136–145)
TSH SERPL-ACNC: 1.86 MIU/L (ref 0.44–3.98)
WBC # BLD AUTO: 6.7 X10*3/UL (ref 4.4–11.3)

## 2023-11-11 PROCEDURE — 80048 BASIC METABOLIC PNL TOTAL CA: CPT | Performed by: HOSPITALIST

## 2023-11-11 PROCEDURE — 99222 1ST HOSP IP/OBS MODERATE 55: CPT | Performed by: INTERNAL MEDICINE

## 2023-11-11 PROCEDURE — 2500000004 HC RX 250 GENERAL PHARMACY W/ HCPCS (ALT 636 FOR OP/ED): Performed by: HOSPITALIST

## 2023-11-11 PROCEDURE — 90937 HEMODIALYSIS REPEATED EVAL: CPT

## 2023-11-11 PROCEDURE — 86706 HEP B SURFACE ANTIBODY: CPT | Mod: AHULAB | Performed by: INTERNAL MEDICINE

## 2023-11-11 PROCEDURE — 99233 SBSQ HOSP IP/OBS HIGH 50: CPT | Performed by: INTERNAL MEDICINE

## 2023-11-11 PROCEDURE — 8010000001 HC DIALYSIS - HEMODIALYSIS PER DAY

## 2023-11-11 PROCEDURE — 36415 COLL VENOUS BLD VENIPUNCTURE: CPT | Performed by: HOSPITALIST

## 2023-11-11 PROCEDURE — 2500000001 HC RX 250 WO HCPCS SELF ADMINISTERED DRUGS (ALT 637 FOR MEDICARE OP): Performed by: HOSPITALIST

## 2023-11-11 PROCEDURE — 36415 COLL VENOUS BLD VENIPUNCTURE: CPT | Performed by: INTERNAL MEDICINE

## 2023-11-11 PROCEDURE — 2500000002 HC RX 250 W HCPCS SELF ADMINISTERED DRUGS (ALT 637 FOR MEDICARE OP, ALT 636 FOR OP/ED): Performed by: HOSPITALIST

## 2023-11-11 PROCEDURE — 1100000001 HC PRIVATE ROOM DAILY

## 2023-11-11 PROCEDURE — 87340 HEPATITIS B SURFACE AG IA: CPT | Mod: AHULAB | Performed by: INTERNAL MEDICINE

## 2023-11-11 PROCEDURE — 85027 COMPLETE CBC AUTOMATED: CPT | Performed by: HOSPITALIST

## 2023-11-11 RX ADMIN — Medication 3 MG: at 20:47

## 2023-11-11 RX ADMIN — SEVELAMER CARBONATE 1600 MG: 800 TABLET, FILM COATED ORAL at 09:13

## 2023-11-11 RX ADMIN — METOPROLOL TARTRATE 25 MG: 25 TABLET, FILM COATED ORAL at 20:47

## 2023-11-11 RX ADMIN — SODIUM BICARBONATE 325 MG: 650 TABLET ORAL at 11:29

## 2023-11-11 RX ADMIN — ROSUVASTATIN 10 MG: 10 TABLET, FILM COATED ORAL at 00:02

## 2023-11-11 RX ADMIN — ROSUVASTATIN 10 MG: 10 TABLET, FILM COATED ORAL at 20:47

## 2023-11-11 RX ADMIN — MIDODRINE HYDROCHLORIDE 10 MG: 5 TABLET ORAL at 06:35

## 2023-11-11 RX ADMIN — APIXABAN 2.5 MG: 5 TABLET, FILM COATED ORAL at 00:02

## 2023-11-11 RX ADMIN — Medication 3 MG: at 00:02

## 2023-11-11 RX ADMIN — MIDODRINE HYDROCHLORIDE 10 MG: 5 TABLET ORAL at 18:22

## 2023-11-11 RX ADMIN — SEVELAMER CARBONATE 1600 MG: 800 TABLET, FILM COATED ORAL at 18:22

## 2023-11-11 RX ADMIN — SODIUM CHLORIDE 1000 ML: 9 INJECTION, SOLUTION INTRAVENOUS at 00:03

## 2023-11-11 RX ADMIN — SENNOSIDES 17.2 MG: 8.6 TABLET, FILM COATED ORAL at 09:18

## 2023-11-11 RX ADMIN — Medication 1 TABLET: at 11:30

## 2023-11-11 RX ADMIN — METOPROLOL TARTRATE 25 MG: 25 TABLET, FILM COATED ORAL at 00:02

## 2023-11-11 RX ADMIN — SENNOSIDES 17.2 MG: 8.6 TABLET, FILM COATED ORAL at 00:02

## 2023-11-11 RX ADMIN — ACETAMINOPHEN 650 MG: 325 TABLET ORAL at 00:02

## 2023-11-11 RX ADMIN — METOPROLOL TARTRATE 25 MG: 25 TABLET, FILM COATED ORAL at 09:15

## 2023-11-11 RX ADMIN — ALLOPURINOL 100 MG: 100 TABLET ORAL at 09:16

## 2023-11-11 RX ADMIN — ACETAMINOPHEN 650 MG: 325 TABLET ORAL at 23:53

## 2023-11-11 RX ADMIN — ASPIRIN 81 MG: 81 TABLET, COATED ORAL at 09:16

## 2023-11-11 RX ADMIN — FINASTERIDE 5 MG: 5 TABLET, FILM COATED ORAL at 09:14

## 2023-11-11 RX ADMIN — SODIUM BICARBONATE 325 MG: 650 TABLET ORAL at 01:27

## 2023-11-11 RX ADMIN — ACETAMINOPHEN 650 MG: 325 TABLET ORAL at 11:27

## 2023-11-11 RX ADMIN — EZETIMIBE 10 MG: 10 TABLET ORAL at 09:15

## 2023-11-11 RX ADMIN — APIXABAN 2.5 MG: 5 TABLET, FILM COATED ORAL at 09:18

## 2023-11-11 RX ADMIN — APIXABAN 2.5 MG: 5 TABLET, FILM COATED ORAL at 20:47

## 2023-11-11 RX ADMIN — MIDODRINE HYDROCHLORIDE 10 MG: 5 TABLET ORAL at 00:02

## 2023-11-11 RX ADMIN — SODIUM BICARBONATE 325 MG: 650 TABLET ORAL at 20:47

## 2023-11-11 RX ADMIN — SENNOSIDES 17.2 MG: 8.6 TABLET, FILM COATED ORAL at 20:47

## 2023-11-11 SDOH — HEALTH STABILITY: PHYSICAL HEALTH: ON AVERAGE, HOW MANY MINUTES DO YOU ENGAGE IN EXERCISE AT THIS LEVEL?: 0 MIN

## 2023-11-11 SDOH — ECONOMIC STABILITY: INCOME INSECURITY
IN THE PAST 12 MONTHS, HAS THE ELECTRIC, GAS, OIL, OR WATER COMPANY THREATENED TO SHUT OFF SERVICE IN YOUR HOME?: PATIENT REFUSED

## 2023-11-11 SDOH — SOCIAL STABILITY: SOCIAL INSECURITY: DO YOU FEEL ANYONE HAS EXPLOITED OR TAKEN ADVANTAGE OF YOU FINANCIALLY OR OF YOUR PERSONAL PROPERTY?: NO

## 2023-11-11 SDOH — SOCIAL STABILITY: SOCIAL INSECURITY: WITHIN THE LAST YEAR, HAVE YOU BEEN HUMILIATED OR EMOTIONALLY ABUSED IN OTHER WAYS BY YOUR PARTNER OR EX-PARTNER?: NO

## 2023-11-11 SDOH — SOCIAL STABILITY: SOCIAL INSECURITY: ARE THERE ANY APPARENT SIGNS OF INJURIES/BEHAVIORS THAT COULD BE RELATED TO ABUSE/NEGLECT?: NO

## 2023-11-11 SDOH — ECONOMIC STABILITY: TRANSPORTATION INSECURITY
IN THE PAST 12 MONTHS, HAS THE LACK OF TRANSPORTATION KEPT YOU FROM MEDICAL APPOINTMENTS OR FROM GETTING MEDICATIONS?: PATIENT DECLINED

## 2023-11-11 SDOH — HEALTH STABILITY: MENTAL HEALTH
STRESS IS WHEN SOMEONE FEELS TENSE, NERVOUS, ANXIOUS, OR CAN'T SLEEP AT NIGHT BECAUSE THEIR MIND IS TROUBLED. HOW STRESSED ARE YOU?: NOT AT ALL

## 2023-11-11 SDOH — SOCIAL STABILITY: SOCIAL INSECURITY
WITHIN THE LAST YEAR, HAVE TO BEEN RAPED OR FORCED TO HAVE ANY KIND OF SEXUAL ACTIVITY BY YOUR PARTNER OR EX-PARTNER?: NO

## 2023-11-11 SDOH — SOCIAL STABILITY: SOCIAL INSECURITY: HAS ANYONE EVER THREATENED TO HURT YOUR FAMILY OR YOUR PETS?: NO

## 2023-11-11 SDOH — HEALTH STABILITY: MENTAL HEALTH: HOW OFTEN DO YOU HAVE A DRINK CONTAINING ALCOHOL?: NEVER

## 2023-11-11 SDOH — SOCIAL STABILITY: SOCIAL INSECURITY: WERE YOU ABLE TO COMPLETE ALL THE BEHAVIORAL HEALTH SCREENINGS?: YES

## 2023-11-11 SDOH — ECONOMIC STABILITY: TRANSPORTATION INSECURITY
IN THE PAST 12 MONTHS, HAS LACK OF TRANSPORTATION KEPT YOU FROM MEETINGS, WORK, OR FROM GETTING THINGS NEEDED FOR DAILY LIVING?: PATIENT DECLINED

## 2023-11-11 SDOH — ECONOMIC STABILITY: HOUSING INSECURITY
IN THE LAST 12 MONTHS, WAS THERE A TIME WHEN YOU DID NOT HAVE A STEADY PLACE TO SLEEP OR SLEPT IN A SHELTER (INCLUDING NOW)?: PATIENT REFUSED

## 2023-11-11 SDOH — SOCIAL STABILITY: SOCIAL INSECURITY: DO YOU FEEL UNSAFE GOING BACK TO THE PLACE WHERE YOU ARE LIVING?: NO

## 2023-11-11 SDOH — SOCIAL STABILITY: SOCIAL NETWORK: ARE YOU MARRIED, WIDOWED, DIVORCED, SEPARATED, NEVER MARRIED, OR LIVING WITH A PARTNER?: NEVER MARRIED

## 2023-11-11 SDOH — SOCIAL STABILITY: SOCIAL INSECURITY: ARE YOU OR HAVE YOU BEEN THREATENED OR ABUSED PHYSICALLY, EMOTIONALLY, OR SEXUALLY BY ANYONE?: NO

## 2023-11-11 SDOH — SOCIAL STABILITY: SOCIAL INSECURITY: HAVE YOU HAD THOUGHTS OF HARMING ANYONE ELSE?: NO

## 2023-11-11 SDOH — ECONOMIC STABILITY: INCOME INSECURITY: IN THE LAST 12 MONTHS, WAS THERE A TIME WHEN YOU WERE NOT ABLE TO PAY THE MORTGAGE OR RENT ON TIME?: PATIENT REFUSED

## 2023-11-11 SDOH — HEALTH STABILITY: MENTAL HEALTH: HOW OFTEN DO YOU HAVE 6 OR MORE DRINKS ON ONE OCCASION?: NEVER

## 2023-11-11 SDOH — SOCIAL STABILITY: SOCIAL NETWORK: HOW OFTEN DO YOU GET TOGETHER WITH FRIENDS OR RELATIVES?: ONCE A WEEK

## 2023-11-11 SDOH — SOCIAL STABILITY: SOCIAL NETWORK
DO YOU BELONG TO ANY CLUBS OR ORGANIZATIONS SUCH AS CHURCH GROUPS UNIONS, FRATERNAL OR ATHLETIC GROUPS, OR SCHOOL GROUPS?: YES

## 2023-11-11 SDOH — HEALTH STABILITY: MENTAL HEALTH: HOW MANY STANDARD DRINKS CONTAINING ALCOHOL DO YOU HAVE ON A TYPICAL DAY?: PATIENT DOES NOT DRINK

## 2023-11-11 SDOH — SOCIAL STABILITY: SOCIAL INSECURITY: WITHIN THE LAST YEAR, HAVE YOU BEEN AFRAID OF YOUR PARTNER OR EX-PARTNER?: NO

## 2023-11-11 SDOH — SOCIAL STABILITY: SOCIAL NETWORK: HOW OFTEN DO YOU ATTEND CHURCH OR RELIGIOUS SERVICES?: 1 TO 4 TIMES PER YEAR

## 2023-11-11 SDOH — ECONOMIC STABILITY: INCOME INSECURITY: HOW HARD IS IT FOR YOU TO PAY FOR THE VERY BASICS LIKE FOOD, HOUSING, MEDICAL CARE, AND HEATING?: NOT HARD AT ALL

## 2023-11-11 SDOH — SOCIAL STABILITY: SOCIAL NETWORK
IN A TYPICAL WEEK, HOW MANY TIMES DO YOU TALK ON THE PHONE WITH FAMILY, FRIENDS, OR NEIGHBORS?: MORE THAN THREE TIMES A WEEK

## 2023-11-11 SDOH — SOCIAL STABILITY: SOCIAL INSECURITY
WITHIN THE LAST YEAR, HAVE YOU BEEN KICKED, HIT, SLAPPED, OR OTHERWISE PHYSICALLY HURT BY YOUR PARTNER OR EX-PARTNER?: NO

## 2023-11-11 SDOH — SOCIAL STABILITY: SOCIAL INSECURITY: DOES ANYONE TRY TO KEEP YOU FROM HAVING/CONTACTING OTHER FRIENDS OR DOING THINGS OUTSIDE YOUR HOME?: NO

## 2023-11-11 SDOH — HEALTH STABILITY: PHYSICAL HEALTH: ON AVERAGE, HOW MANY DAYS PER WEEK DO YOU ENGAGE IN MODERATE TO STRENUOUS EXERCISE (LIKE A BRISK WALK)?: 0 DAYS

## 2023-11-11 SDOH — ECONOMIC STABILITY: FOOD INSECURITY: WITHIN THE PAST 12 MONTHS, YOU WORRIED THAT YOUR FOOD WOULD RUN OUT BEFORE YOU GOT MONEY TO BUY MORE.: PATIENT DECLINED

## 2023-11-11 SDOH — SOCIAL STABILITY: SOCIAL NETWORK: HOW OFTEN DO YOU ATTENT MEETINGS OF THE CLUB OR ORGANIZATION YOU BELONG TO?: 1 TO 4 TIMES PER YEAR

## 2023-11-11 SDOH — ECONOMIC STABILITY: FOOD INSECURITY: WITHIN THE PAST 12 MONTHS, THE FOOD YOU BOUGHT JUST DIDN'T LAST AND YOU DIDN'T HAVE MONEY TO GET MORE.: PATIENT DECLINED

## 2023-11-11 SDOH — SOCIAL STABILITY: SOCIAL INSECURITY: ABUSE: ADULT

## 2023-11-11 ASSESSMENT — PAIN DESCRIPTION - ORIENTATION
ORIENTATION: LEFT
ORIENTATION: LEFT

## 2023-11-11 ASSESSMENT — PAIN - FUNCTIONAL ASSESSMENT
PAIN_FUNCTIONAL_ASSESSMENT: 0-10

## 2023-11-11 ASSESSMENT — LIFESTYLE VARIABLES
HOW OFTEN DO YOU HAVE 6 OR MORE DRINKS ON ONE OCCASION: NEVER
SUBSTANCE_ABUSE_PAST_12_MONTHS: NO
AUDIT-C TOTAL SCORE: 0
HOW MANY STANDARD DRINKS CONTAINING ALCOHOL DO YOU HAVE ON A TYPICAL DAY: PATIENT DOES NOT DRINK
SKIP TO QUESTIONS 9-10: 1
SKIP TO QUESTIONS 9-10: 1
HOW OFTEN DO YOU HAVE A DRINK CONTAINING ALCOHOL: NEVER
PRESCIPTION_ABUSE_PAST_12_MONTHS: NO

## 2023-11-11 ASSESSMENT — COGNITIVE AND FUNCTIONAL STATUS - GENERAL
DAILY ACTIVITIY SCORE: 24
PATIENT BASELINE BEDBOUND: NO
MOBILITY SCORE: 24
MOBILITY SCORE: 24
DAILY ACTIVITIY SCORE: 24

## 2023-11-11 ASSESSMENT — PAIN SCALES - GENERAL
PAINLEVEL_OUTOF10: 0 - NO PAIN
PAINLEVEL_OUTOF10: 8
PAINLEVEL_OUTOF10: 3
PAINLEVEL_OUTOF10: 0 - NO PAIN

## 2023-11-11 ASSESSMENT — ENCOUNTER SYMPTOMS
CARDIOVASCULAR NEGATIVE: 1
EYES NEGATIVE: 1
MUSCULOSKELETAL NEGATIVE: 1
CONSTITUTIONAL NEGATIVE: 1
RESPIRATORY NEGATIVE: 1
VOMITING: 1
NEUROLOGICAL NEGATIVE: 1
ENDOCRINE NEGATIVE: 1
PSYCHIATRIC NEGATIVE: 1
NAUSEA: 1

## 2023-11-11 ASSESSMENT — ACTIVITIES OF DAILY LIVING (ADL)
TOILETING: INDEPENDENT
HEARING - LEFT EAR: FUNCTIONAL
DRESSING YOURSELF: INDEPENDENT
FEEDING YOURSELF: INDEPENDENT
GROOMING: INDEPENDENT
JUDGMENT_ADEQUATE_SAFELY_COMPLETE_DAILY_ACTIVITIES: YES
PATIENT'S MEMORY ADEQUATE TO SAFELY COMPLETE DAILY ACTIVITIES?: YES
WALKS IN HOME: INDEPENDENT
ASSISTIVE_DEVICE: WALKER;CANE
ADEQUATE_TO_COMPLETE_ADL: YES
BATHING: INDEPENDENT
HEARING - RIGHT EAR: FUNCTIONAL

## 2023-11-11 ASSESSMENT — PATIENT HEALTH QUESTIONNAIRE - PHQ9
SUM OF ALL RESPONSES TO PHQ9 QUESTIONS 1 & 2: 0
1. LITTLE INTEREST OR PLEASURE IN DOING THINGS: NOT AT ALL
2. FEELING DOWN, DEPRESSED OR HOPELESS: NOT AT ALL

## 2023-11-11 ASSESSMENT — PAIN DESCRIPTION - LOCATION
LOCATION: KNEE
LOCATION: KNEE

## 2023-11-11 NOTE — PROGRESS NOTES
Pharmacy Medication History Review    Kj Colmenares is a 53 y.o. male admitted for No Principal Problem: There is no principal problem currently on the Problem List. Please update the Problem List and refresh.. Pharmacy reviewed the patient's [id number] medications and allergies for accuracy.    The list below reflectives the updated PTA list. Please review each medication in order reconciliation for additional clarification and justification.  Prior to Admission Medications   Prescriptions Last Dose Informant Patient Reported? Taking?   B complex-vitamin C tablet 11/9/2023  Yes No   Sig: Take 1 tablet by mouth once daily.   acetaminophen (Tylenol) 500 mg tablet 11/9/2023  Yes No   Sig: Take 1 tablet (500 mg) by mouth once daily as needed for mild pain (1 - 3).   allopurinol (Zyloprim) 100 mg tablet 11/9/2023  Yes No   Sig: Take 1 tablet (100 mg) by mouth once daily.   apixaban (Eliquis) 2.5 mg tablet 11/9/2023  Yes No   Sig: Take 1 tablet (2.5 mg) by mouth 2 times a day.   aspirin 81 mg EC tablet 11/9/2023  Yes No   Sig: Take 1 tablet (81 mg) by mouth once daily.   ergocalciferol (Vitamin D-2) 1.25 MG (13874 UT) capsule 11/6/2023  Yes No   Sig: Take 1 capsule (1,250 mcg) by mouth 1 (one) time per week. MONDAY NEED AN REFILL   ezetimibe (Zetia) 10 mg tablet 11/9/2023  Yes No   Sig: Take 1 tablet (10 mg) by mouth once daily.   finasteride (Proscar) 5 mg tablet 11/9/2023  Yes No   Sig: Take 1 tablet (5 mg) by mouth once daily. Do not crush, chew, or split.   melatonin 3 mg capsule 11/9/2023  Yes No   Sig: Take 1 capsule by mouth once daily at bedtime.   metoprolol tartrate (Lopressor) 25 mg tablet 11/9/2023  Yes No   Sig: Take 1 tablet (25 mg) by mouth 2 times a day.   midodrine (Proamatine) 10 mg tablet 11/9/2023  Yes No   Sig: Take 1 tablet (10 mg) by mouth every 8 hours.   ondansetron (Zofran) 4 mg tablet 11/9/2023  Yes No   Sig: Take 1 tablet (4 mg) by mouth every 12 hours if needed for nausea or  vomiting.   rosuvastatin (Crestor) 20 mg tablet 11/9/2023  Yes No   Sig: Take 1 tablet (20 mg) by mouth once daily at bedtime.   sevelamer carbonate (Renvela) 800 mg tablet 11/9/2023  Yes No   Sig: Take 2 tablets (1,600 mg) by mouth 3 times a day with meals. Swallow tablet whole; do not crush, break, or chew.   sodium bicarbonate 325 mg tablet 11/9/2023  Yes No   Sig: Take 1 tablet (325 mg) by mouth 2 times a day. 1 TABS 2 TIMES FOR 7 DAYS      Facility-Administered Medications: None           The list below reflectives the updated allergy list. Please review each documented allergy for additional clarification and justification.  Allergies  Reviewed by Dilip Burden MD MPH on 11/10/2023   Not on File         Below are additional concerns with the patient's PTA list.  PATIENT HAVE SOME OF HIS MEDICATION IN HIS BAG  WENT OVER MEDICATION WITH PATIENT    Sheyla Tomlinson CPhT

## 2023-11-11 NOTE — H&P
History Of Present Illness  Kj Colmenares is a 53 y.o. male with a PMH of ESRD on HD, DM2, HTN, GERD, DVT (on Eliquis), HLD, PAF, stroke,  presenting with nausea and vomiting.    Mr Colmenares had one episode of nausea and vomiting this morning. No associated abdominal pain.   Has missed what he says is a month of dialysis, has not been taking all of his medications. Called his nephrologist today, Dr Coronel and Taras Meza who instructed him to get to the ED. Mr Colmenares came here...    Work up in the ED shows Cr of 8.5, K 5.1, bicarb 14   WBC ct 5.1, thrombocytopenic at 64.   CT C/A/P shows no acute process.        Past Medical History  No past medical history on file.    Surgical History  Past Surgical History:   Procedure Laterality Date    CT HEAD ANGIO W AND WO IV CONTRAST  2/4/2014    CT HEAD ANGIO W AND WO IV CONTRAST 2/4/2014 [facility] CLINICAL LEGACY    CT HEAD ANGIO W AND WO IV CONTRAST  2/13/2014    CT HEAD ANGIO W AND WO IV CONTRAST 2/13/2014 [facility] CLINICAL LEGACY        Social History  He has no history on file for tobacco use, alcohol use, and drug use.    Family History  No family history on file.     Allergies  Patient has no allergy information on record.    Review of Systems   Constitutional: Negative.    HENT: Negative.     Eyes: Negative.    Respiratory: Negative.     Cardiovascular: Negative.    Gastrointestinal:  Positive for nausea and vomiting.   Genitourinary: Negative.    Musculoskeletal: Negative.    Skin: Negative.    Allergic/Immunologic: Negative.    Neurological: Negative.    Hematological: Negative.    Psychiatric/Behavioral: Negative.          Physical Exam  Vitals reviewed.   Constitutional:       Appearance: Normal appearance.   HENT:      Head: Normocephalic and atraumatic.      Mouth/Throat:      Mouth: Mucous membranes are moist.   Eyes:      Extraocular Movements: Extraocular movements intact.      Conjunctiva/sclera: Conjunctivae normal.      Pupils: Pupils are equal, round, and  "reactive to light.   Cardiovascular:      Rate and Rhythm: Normal rate and regular rhythm.      Pulses: Normal pulses.      Heart sounds: Normal heart sounds.   Pulmonary:      Effort: Pulmonary effort is normal.      Breath sounds: Normal breath sounds.   Abdominal:      General: Abdomen is flat. Bowel sounds are normal.      Palpations: Abdomen is soft.   Musculoskeletal:         General: Normal range of motion.      Comments: Good thrill in fistula left forearm   Skin:     General: Skin is warm and dry.   Neurological:      General: No focal deficit present.      Mental Status: He is alert and oriented to person, place, and time.   Psychiatric:         Mood and Affect: Mood normal.         Behavior: Behavior normal.         Thought Content: Thought content normal.         Judgment: Judgment normal.          Last Recorded Vitals  Blood pressure (!) 166/102, pulse 99, resp. rate 18, height 1.803 m (5' 11\"), weight 108 kg (239 lb), SpO2 99 %.    Relevant Results        Results for orders placed or performed during the hospital encounter of 11/10/23 (from the past 24 hour(s))   CBC and Auto Differential   Result Value Ref Range    WBC 5.1 4.4 - 11.3 x10*3/uL    nRBC 0.0 0.0 - 0.0 /100 WBCs    RBC 4.02 (L) 4.50 - 5.90 x10*6/uL    Hemoglobin 11.8 (L) 13.5 - 17.5 g/dL    Hematocrit 39.9 (L) 41.0 - 52.0 %    MCV 99 80 - 100 fL    MCH 29.4 26.0 - 34.0 pg    MCHC 29.6 (L) 32.0 - 36.0 g/dL    RDW 14.1 11.5 - 14.5 %    Platelets 64 (L) 150 - 450 x10*3/uL    Neutrophils % 63.1 40.0 - 80.0 %    Immature Granulocytes %, Automated 0.4 0.0 - 0.9 %    Lymphocytes % 19.8 13.0 - 44.0 %    Monocytes % 5.5 2.0 - 10.0 %    Eosinophils % 10.8 0.0 - 6.0 %    Basophils % 0.4 0.0 - 2.0 %    Neutrophils Absolute 3.23 1.20 - 7.70 x10*3/uL    Immature Granulocytes Absolute, Automated 0.02 0.00 - 0.70 x10*3/uL    Lymphocytes Absolute 1.01 (L) 1.20 - 4.80 x10*3/uL    Monocytes Absolute 0.28 0.10 - 1.00 x10*3/uL    Eosinophils Absolute 0.55 0.00 " - 0.70 x10*3/uL    Basophils Absolute 0.02 0.00 - 0.10 x10*3/uL   Comprehensive Metabolic Panel   Result Value Ref Range    Glucose 72 (L) 74 - 99 mg/dL    Sodium 142 136 - 145 mmol/L    Potassium 5.1 3.5 - 5.3 mmol/L    Chloride 114 (H) 98 - 107 mmol/L    Bicarbonate 14 (L) 21 - 32 mmol/L    Anion Gap 19 10 - 20 mmol/L    Urea Nitrogen 99 (HH) 6 - 23 mg/dL    Creatinine 8.50 (H) 0.50 - 1.30 mg/dL    eGFR 7 (L) >60 mL/min/1.73m*2    Calcium 10.5 (H) 8.6 - 10.3 mg/dL    Albumin 4.0 3.4 - 5.0 g/dL    Alkaline Phosphatase 261 (H) 33 - 120 U/L    Total Protein 7.4 6.4 - 8.2 g/dL    AST 14 9 - 39 U/L    Bilirubin, Total 0.6 0.0 - 1.2 mg/dL    ALT 12 10 - 52 U/L   Lipase   Result Value Ref Range    Lipase 111 (H) 9 - 82 U/L   Sars-CoV-2 PCR, Symptomatic   Result Value Ref Range    Coronavirus 2019, PCR Not Detected Not Detected   Influenza A, and B PCR   Result Value Ref Range    Flu A Result Not Detected Not Detected    Flu B Result Not Detected Not Detected   RSV PCR   Result Value Ref Range    RSV PCR Not Detected Not Detected   Troponin I, High Sensitivity, Initial   Result Value Ref Range    Troponin I, High Sensitivity 9 0 - 20 ng/L   Urinalysis with Reflex Microscopic and Culture   Result Value Ref Range    Color, Urine Straw Straw, Yellow    Appearance, Urine Clear Clear    Specific Gravity, Urine 1.011 1.005 - 1.035    pH, Urine 5.0 5.0, 5.5, 6.0, 6.5, 7.0, 7.5, 8.0    Protein, Urine 100 (2+) (N) NEGATIVE mg/dL    Glucose, Urine NEGATIVE NEGATIVE mg/dL    Blood, Urine SMALL (1+) (A) NEGATIVE    Ketones, Urine NEGATIVE NEGATIVE mg/dL    Bilirubin, Urine NEGATIVE NEGATIVE    Urobilinogen, Urine <2.0 <2.0 mg/dL    Nitrite, Urine NEGATIVE NEGATIVE    Leukocyte Esterase, Urine NEGATIVE NEGATIVE   Extra Urine Gray Tube   Result Value Ref Range    Extra Tube Hold for add-ons.    Urinalysis Microscopic   Result Value Ref Range    WBC, Urine NONE 1-5, NONE /HPF    RBC, Urine 1-2 NONE, 1-2, 3-5 /HPF   Drug Screen, Urine    Result Value Ref Range    Amphetamine Screen, Urine Presumptive Negative Presumptive Negative    Barbiturate Screen, Urine Presumptive Negative Presumptive Negative    Benzodiazepines Screen, Urine Presumptive Negative Presumptive Negative    Cannabinoid Screen, Urine Presumptive Negative Presumptive Negative    Cocaine Metabolite Screen, Urine Presumptive Negative Presumptive Negative    Fentanyl Screen, Urine Presumptive Negative Presumptive Negative    Opiate Screen, Urine Presumptive Negative Presumptive Negative    Oxycodone Screen, Urine Presumptive Negative Presumptive Negative    PCP Screen, Urine Presumptive Negative Presumptive Negative   DRUG SCREEN,URINE   Result Value Ref Range    Amphetamine Screen, Urine Presumptive Negative Presumptive Negative    Barbiturate Screen, Urine Presumptive Negative Presumptive Negative    Benzodiazepines Screen, Urine Presumptive Negative Presumptive Negative    Cannabinoid Screen, Urine Presumptive Negative Presumptive Negative    Cocaine Metabolite Screen, Urine Presumptive Negative Presumptive Negative    Fentanyl Screen, Urine Presumptive Negative Presumptive Negative    Opiate Screen, Urine Presumptive Negative Presumptive Negative    Oxycodone Screen, Urine Presumptive Negative Presumptive Negative    PCP Screen, Urine Presumptive Negative Presumptive Negative        CT chest abdomen pelvis wo IV contrast    Result Date: 11/10/2023  Interpreted By:  Trixie Moe, STUDY: CT CHEST ABDOMEN PELVIS WO CONTRAST;  11/10/2023 5:52 pm   INDICATION: Signs/Symptoms:sob and pain.   COMPARISON: 10/19/2021   ACCESSION NUMBER(S): [id number]   ORDERING CLINICIAN: BANDAR BENNETT   TECHNIQUE: CT of the chest, abdomen, and pelvis was performed.  Contiguous axial images were obtained at 3 mm slice thickness through the chest, abdomen and pelvis. Coronal and sagittal reconstructions at 3 mm slice thickness were performed. No intravenous contrast. Limited evaluation for solid  organs and vasculature due to lack of intravenous contrast.   FINDINGS: Lungs and Pleura: There is a 4 mm right perifissural pulmonary nodule suggestive of an intrapulmonary lymph node. Stable 3 mm left lower lobe pulmonary nodule. No pulmonary consolidation. No pleural effusion. No pneumothorax.   Mediastinum: No adenopathy by CT size criteria. No cardiomegaly or pericardial effusion. No thoracic aortic aneurysm. Enlargement of the thyroid gland with narrowing of the upper trachea at the level of the mediastinum. This finding is stable from prior exam. Surgical clips in the anterior mediastinum. Question small hiatal hernia.   Liver: Layering sludge in the gallbladder.   Gallbladder and Biliary: Unremarkable.   Pancreas: No abnormality identified in the pancreas.   Spleen: No abnormality identified in the spleen.   Adrenals: Bilateral adrenal gland thickening.   Urinary: Small kidneys with multiple bilateral renal cysts. No hydronephrosis.   Gastrointestinal/Peritoneum: No small or large bowel obstruction in the visualized abdomen. In the abdomen, there is no extraluminal air. No significant free fluid. No evidence of acute appendicitis.   Vascular: Abdominal aorta is normal in caliber.   Lymphatics: No enlarged lymph nodes by size criteria.   MSK/Body Wall/Chest Wall: Renal osteodystrophy. Mild left gynecomastia. Small fat containing right inguinal hernia. Small fat containing umbilical hernia.       No acute abnormality.   Small fat containing right inguinal and umbilical hernias.   Redemonstrated thyromegaly with stable mild narrowing of the trachea at the level of the upper mediastinum.   Atrophic kidneys with multiple bilateral renal cysts. Findings suggesting renal osteodystrophy.   Signed by: Trixie Moe 11/10/2023 6:55 PM Dictation workstation:   [id number]     Assessment/Plan   Principal Problem:    Nausea and vomiting, unspecified vomiting type  - IVF,   - Zofran  - Only had one episode at home, no  N/V hre     ERSD on HD  - Nephrology consult for HD  - pt has been non-compliant with some of his medications, dora bicarb  - [facility]    DM2   - ISS  - did not see any medications for DM    HTN  - Metoprolol      HLD  - continue statin    Hypothyroidism?   - not on any medication  - check TSH    PAF  - telemetry  - Eliquis    Code status  - FULL       I spent 60 minutes in the professional and overall care of this patient.      Kristine Sabillon MD

## 2023-11-11 NOTE — CONSULTS
NEPHROLOGY CONSULT NOTE    Reason For Consult  Reason For Consult  End-stage renal disease on hemodialysis    History Of Present Illness  Kj Colmenares is a 53 y.o. male  with a PMH of ESRD on HD, DM2, HTN, GERD, DVT (on Eliquis), HLD, PAF, stroke,  presenting with nausea and vomiting.  Patient notifies that he still makes urine and is only scheduled for twice a week hemodialysis.      On admission 11/10/2023: Mr Colmenares had one episode of nausea and vomiting this morning. No associated abdominal pain. Has missed what he says is a month of dialysis, has not been taking all of his medications. Called his nephrologist today, Dr Coronel and Taras Meza who instructed him to get to the ED. Mr Colmenares came here...     Work up in the ED shows Cr of 8.5, K 5.1, bicarb 14   WBC ct 5.1, thrombocytopenic at 64.   CT C/A/P shows no acute process.     Past Medical History:   Diagnosis Date    Stroke (CMS/HCC)      Past Surgical History:   Procedure Laterality Date    CT HEAD ANGIO W AND WO IV CONTRAST  2/4/2014    CT HEAD ANGIO W AND WO IV CONTRAST 2/4/2014 [facility] CLINICAL LEGACY    CT HEAD ANGIO W AND WO IV CONTRAST  2/13/2014    CT HEAD ANGIO W AND WO IV CONTRAST 2/13/2014 [facility] CLINICAL LEGACY     (Not in a hospital admission)    Patient has no allergy information on record.  Social History     Tobacco Use    Smoking status: Never    Smokeless tobacco: Never   Substance Use Topics    Alcohol use: Never     No family history on file.    Scheduled Medications:   allopurinol, 100 mg, oral, Daily  apixaban, 2.5 mg, oral, BID  aspirin, 81 mg, oral, Daily  B complex-vitamin C, 1 tablet, oral, Daily  ezetimibe, 10 mg, oral, Daily  finasteride, 5 mg, oral, Daily  melatonin, 3 mg, oral, Nightly  metoprolol tartrate, 25 mg, oral, BID  midodrine, 10 mg, oral, q8h  rosuvastatin, 10 mg, oral, Nightly  sennosides, 2  tablet, oral, BID  sevelamer carbonate, 1,600 mg, oral, TID with meals  sodium bicarbonate, 325 mg, oral, BID         Continuous Medications:         PRN Medications:   PRN medications: acetaminophen, acetaminophen, ondansetron ODT **OR** ondansetron    Review of Systems:  Review of Systems   Constitutional: Negative.    HENT: Negative.     Eyes: Negative.    Respiratory: Negative.     Cardiovascular: Negative.    Gastrointestinal:  Positive for nausea and vomiting.   Endocrine: Negative.    Genitourinary: Negative.    Musculoskeletal: Negative.    Skin: Negative.    Neurological: Negative.    Psychiatric/Behavioral: Negative.     All other systems reviewed and are negative.       Objective   Vitals:  Most Recent:  Vitals:    11/11/23 1339   BP:    Pulse: 87   Resp:    Temp: 36.4 °C (97.5 °F)   SpO2:        24hr Min/Max:  Temp  Min: 36.4 °C (97.5 °F)  Max: 36.8 °C (98.2 °F)  Pulse  Min: 72  Max: 123  BP  Min: 111/75  Max: 166/102  Resp  Min: 14  Max: 47  SpO2  Min: 98 %  Max: 100 %    LDA:            Hemodynamic parameters for last 24 hours:         Intake/Output Summary (Last 24 hours) at 11/11/2023 1627  Last data filed at 11/11/2023 1132  Gross per 24 hour   Intake 1183.91 ml   Output 550 ml   Net 633.91 ml           Physical exam:    Physical Exam   Vitals reviewed.   Constitutional:       Appearance: Normal appearance.   HENT:      Head: Normocephalic and atraumatic.      Mouth/Throat:      Mouth: Mucous membranes are moist.   Eyes:      Extraocular Movements: Extraocular movements intact.      Conjunctiva/sclera: Conjunctivae normal.      Pupils: Pupils are equal, round, and reactive to light.   Cardiovascular:      Rate and Rhythm: Normal rate and regular rhythm.      Pulses: Normal pulses.      Heart sounds: Normal heart sounds.   Pulmonary:      Effort: Pulmonary effort is normal.      Breath sounds: Normal breath sounds.   Abdominal:      General: Abdomen is flat. Bowel sounds are normal.      Palpations:  Abdomen is soft.   Musculoskeletal:         General: Normal range of motion.      Comments: Good thrill in fistula left forearm   Skin:     General: Skin is warm and dry.   Neurological:      General: No focal deficit present.      Mental Status: He is alert and oriented to person, place, and time.   Psychiatric:         Mood and Affect: Mood normal.         Behavior: Behavior normal.         Thought Content: Thought content normal.         Judgment: Judgment normal.      Lab/Radiology/Diagnostic Review:  Results for orders placed or performed during the hospital encounter of 11/10/23 (from the past 24 hour(s))   CBC and Auto Differential   Result Value Ref Range    WBC 5.1 4.4 - 11.3 x10*3/uL    nRBC 0.0 0.0 - 0.0 /100 WBCs    RBC 4.02 (L) 4.50 - 5.90 x10*6/uL    Hemoglobin 11.8 (L) 13.5 - 17.5 g/dL    Hematocrit 39.9 (L) 41.0 - 52.0 %    MCV 99 80 - 100 fL    MCH 29.4 26.0 - 34.0 pg    MCHC 29.6 (L) 32.0 - 36.0 g/dL    RDW 14.1 11.5 - 14.5 %    Platelets 64 (L) 150 - 450 x10*3/uL    Neutrophils % 63.1 40.0 - 80.0 %    Immature Granulocytes %, Automated 0.4 0.0 - 0.9 %    Lymphocytes % 19.8 13.0 - 44.0 %    Monocytes % 5.5 2.0 - 10.0 %    Eosinophils % 10.8 0.0 - 6.0 %    Basophils % 0.4 0.0 - 2.0 %    Neutrophils Absolute 3.23 1.20 - 7.70 x10*3/uL    Immature Granulocytes Absolute, Automated 0.02 0.00 - 0.70 x10*3/uL    Lymphocytes Absolute 1.01 (L) 1.20 - 4.80 x10*3/uL    Monocytes Absolute 0.28 0.10 - 1.00 x10*3/uL    Eosinophils Absolute 0.55 0.00 - 0.70 x10*3/uL    Basophils Absolute 0.02 0.00 - 0.10 x10*3/uL   Comprehensive Metabolic Panel   Result Value Ref Range    Glucose 72 (L) 74 - 99 mg/dL    Sodium 142 136 - 145 mmol/L    Potassium 5.1 3.5 - 5.3 mmol/L    Chloride 114 (H) 98 - 107 mmol/L    Bicarbonate 14 (L) 21 - 32 mmol/L    Anion Gap 19 10 - 20 mmol/L    Urea Nitrogen 99 (HH) 6 - 23 mg/dL    Creatinine 8.50 (H) 0.50 - 1.30 mg/dL    eGFR 7 (L) >60 mL/min/1.73m*2    Calcium 10.5 (H) 8.6 - 10.3 mg/dL     Albumin 4.0 3.4 - 5.0 g/dL    Alkaline Phosphatase 261 (H) 33 - 120 U/L    Total Protein 7.4 6.4 - 8.2 g/dL    AST 14 9 - 39 U/L    Bilirubin, Total 0.6 0.0 - 1.2 mg/dL    ALT 12 10 - 52 U/L   Lipase   Result Value Ref Range    Lipase 111 (H) 9 - 82 U/L   Sars-CoV-2 PCR, Symptomatic   Result Value Ref Range    Coronavirus 2019, PCR Not Detected Not Detected   Influenza A, and B PCR   Result Value Ref Range    Flu A Result Not Detected Not Detected    Flu B Result Not Detected Not Detected   RSV PCR   Result Value Ref Range    RSV PCR Not Detected Not Detected   Troponin I, High Sensitivity, Initial   Result Value Ref Range    Troponin I, High Sensitivity 9 0 - 20 ng/L   Urinalysis with Reflex Microscopic and Culture   Result Value Ref Range    Color, Urine Straw Straw, Yellow    Appearance, Urine Clear Clear    Specific Gravity, Urine 1.011 1.005 - 1.035    pH, Urine 5.0 5.0, 5.5, 6.0, 6.5, 7.0, 7.5, 8.0    Protein, Urine 100 (2+) (N) NEGATIVE mg/dL    Glucose, Urine NEGATIVE NEGATIVE mg/dL    Blood, Urine SMALL (1+) (A) NEGATIVE    Ketones, Urine NEGATIVE NEGATIVE mg/dL    Bilirubin, Urine NEGATIVE NEGATIVE    Urobilinogen, Urine <2.0 <2.0 mg/dL    Nitrite, Urine NEGATIVE NEGATIVE    Leukocyte Esterase, Urine NEGATIVE NEGATIVE   Extra Urine Gray Tube   Result Value Ref Range    Extra Tube Hold for add-ons.    Urinalysis Microscopic   Result Value Ref Range    WBC, Urine NONE 1-5, NONE /HPF    RBC, Urine 1-2 NONE, 1-2, 3-5 /HPF   Drug Screen, Urine   Result Value Ref Range    Amphetamine Screen, Urine Presumptive Negative Presumptive Negative    Barbiturate Screen, Urine Presumptive Negative Presumptive Negative    Benzodiazepines Screen, Urine Presumptive Negative Presumptive Negative    Cannabinoid Screen, Urine Presumptive Negative Presumptive Negative    Cocaine Metabolite Screen, Urine Presumptive Negative Presumptive Negative    Fentanyl Screen, Urine Presumptive Negative Presumptive Negative    Opiate  Screen, Urine Presumptive Negative Presumptive Negative    Oxycodone Screen, Urine Presumptive Negative Presumptive Negative    PCP Screen, Urine Presumptive Negative Presumptive Negative   TSH   Result Value Ref Range    Thyroid Stimulating Hormone 1.86 0.44 - 3.98 mIU/L   DRUG SCREEN,URINE   Result Value Ref Range    Amphetamine Screen, Urine Presumptive Negative Presumptive Negative    Barbiturate Screen, Urine Presumptive Negative Presumptive Negative    Benzodiazepines Screen, Urine Presumptive Negative Presumptive Negative    Cannabinoid Screen, Urine Presumptive Negative Presumptive Negative    Cocaine Metabolite Screen, Urine Presumptive Negative Presumptive Negative    Fentanyl Screen, Urine Presumptive Negative Presumptive Negative    Opiate Screen, Urine Presumptive Negative Presumptive Negative    Oxycodone Screen, Urine Presumptive Negative Presumptive Negative    PCP Screen, Urine Presumptive Negative Presumptive Negative   CBC   Result Value Ref Range    WBC 6.7 4.4 - 11.3 x10*3/uL    nRBC 0.0 0.0 - 0.0 /100 WBCs    RBC 3.23 (L) 4.50 - 5.90 x10*6/uL    Hemoglobin 9.7 (L) 13.5 - 17.5 g/dL    Hematocrit 30.8 (L) 41.0 - 52.0 %    MCV 95 80 - 100 fL    MCH 30.0 26.0 - 34.0 pg    MCHC 31.5 (L) 32.0 - 36.0 g/dL    RDW 14.4 11.5 - 14.5 %    Platelets 213 150 - 450 x10*3/uL   Basic metabolic panel   Result Value Ref Range    Glucose 97 74 - 99 mg/dL    Sodium 143 136 - 145 mmol/L    Potassium 4.7 3.5 - 5.3 mmol/L    Chloride 116 (H) 98 - 107 mmol/L    Bicarbonate 15 (L) 21 - 32 mmol/L    Anion Gap 17 10 - 20 mmol/L    Urea Nitrogen 107 (HH) 6 - 23 mg/dL    Creatinine 8.90 (H) 0.50 - 1.30 mg/dL    eGFR 7 (L) >60 mL/min/1.73m*2    Calcium 9.5 8.6 - 10.3 mg/dL     CT chest abdomen pelvis wo IV contrast    Result Date: 11/10/2023  Interpreted By:  Trixie Moe, STUDY: CT CHEST ABDOMEN PELVIS WO CONTRAST;  11/10/2023 5:52 pm   INDICATION: Signs/Symptoms:sob and pain.   COMPARISON: 10/19/2021   ACCESSION  NUMBER(S): [id number]   ORDERING CLINICIAN: BANDAR BENNETT   TECHNIQUE: CT of the chest, abdomen, and pelvis was performed.  Contiguous axial images were obtained at 3 mm slice thickness through the chest, abdomen and pelvis. Coronal and sagittal reconstructions at 3 mm slice thickness were performed. No intravenous contrast. Limited evaluation for solid organs and vasculature due to lack of intravenous contrast.   FINDINGS: Lungs and Pleura: There is a 4 mm right perifissural pulmonary nodule suggestive of an intrapulmonary lymph node. Stable 3 mm left lower lobe pulmonary nodule. No pulmonary consolidation. No pleural effusion. No pneumothorax.   Mediastinum: No adenopathy by CT size criteria. No cardiomegaly or pericardial effusion. No thoracic aortic aneurysm. Enlargement of the thyroid gland with narrowing of the upper trachea at the level of the mediastinum. This finding is stable from prior exam. Surgical clips in the anterior mediastinum. Question small hiatal hernia.   Liver: Layering sludge in the gallbladder.   Gallbladder and Biliary: Unremarkable.   Pancreas: No abnormality identified in the pancreas.   Spleen: No abnormality identified in the spleen.   Adrenals: Bilateral adrenal gland thickening.   Urinary: Small kidneys with multiple bilateral renal cysts. No hydronephrosis.   Gastrointestinal/Peritoneum: No small or large bowel obstruction in the visualized abdomen. In the abdomen, there is no extraluminal air. No significant free fluid. No evidence of acute appendicitis.   Vascular: Abdominal aorta is normal in caliber.   Lymphatics: No enlarged lymph nodes by size criteria.   MSK/Body Wall/Chest Wall: Renal osteodystrophy. Mild left gynecomastia. Small fat containing right inguinal hernia. Small fat containing umbilical hernia.       No acute abnormality.   Small fat containing right inguinal and umbilical hernias.   Redemonstrated thyromegaly with stable mild narrowing of the trachea at the  level of the upper mediastinum.   Atrophic kidneys with multiple bilateral renal cysts. Findings suggesting renal osteodystrophy.   Signed by: Trixie Moe 11/10/2023 6:55 PM Dictation workstation:   [id number]      Assessment/Plan   Principal Problem:    Nausea and vomiting, unspecified vomiting type    Kj Colmenares is a 53 y.o. male  with a PMH of ESRD on HD, DM2, HTN, GERD, DVT (on Eliquis), HLD, PAF, stroke,  presenting with nausea and vomiting.  Patient notifies that he still makes urine and is only scheduled for twice a week hemodialysis. On admission 11/10/2023: Mr Colmenares had one episode of nausea and vomiting this morning. No associated abdominal pain. Has missed what he says is a month of dialysis, has not been taking all of his medications. Called his nephrologist today, Dr Coronel and Taras Meza who instructed him to get to the ED.    End-stage renal disease on hemodialysis  11/1/2023: Patient notifies that he still makes urine and is only scheduled for twice a week hemodialysis.  Patient was seen on hemodialysis today.  Patient is adamant that does not hemodialysis more than 2.5 hrs  Using F1 60 dialyzer, with a target ultrafiltration of 1.5 kg, blood flow 350 and dialysate flow 700  Patient tolerating hemodialysis well    2.  Symptomatic uremia with the refractory nausea and vomiting  BUN of 107  Uremia management with hemodialysis as above      I spent 60 minutes of cumulative time with the patient.  Greater than 50% of that time was spent in the direct collaboration and or coordination of care of the patient.     Dragon dictation software was used to dictate this note and thus there may be minor errors in translation/transcription including garbled speech or misspellings. Please contact for clarification if needed.      Cale Santana MD

## 2023-11-11 NOTE — PROGRESS NOTES
"Kj Colmenares is a 53 y.o. male on day 1 of admission presenting with Nausea and vomiting, unspecified vomiting type.    Subjective   No acute events overnight, nausea is improving with dialysis.        Objective     VITALS  Blood pressure (!) 122/98, pulse 95, temperature 36.8 °C (98.2 °F), temperature source Temporal, resp. rate 20, height 1.803 m (5' 11\"), weight 108 kg (239 lb), SpO2 98 %.  Physical Exam  Vitals and nursing note reviewed.   Constitutional:       General: He is not in acute distress.     Appearance: He is not toxic-appearing.   HENT:      Head: Normocephalic and atraumatic.   Eyes:      Extraocular Movements: Extraocular movements intact.      Conjunctiva/sclera: Conjunctivae normal.   Cardiovascular:      Rate and Rhythm: Normal rate and regular rhythm.      Heart sounds: Normal heart sounds.   Pulmonary:      Effort: Pulmonary effort is normal. No respiratory distress.      Breath sounds: Normal breath sounds.   Abdominal:      General: Abdomen is flat. Bowel sounds are normal.      Palpations: Abdomen is soft.   Musculoskeletal:         General: Normal range of motion.   Skin:     General: Skin is warm and dry.      Capillary Refill: Capillary refill takes less than 2 seconds.   Neurological:      General: No focal deficit present.      Mental Status: He is alert and oriented to person, place, and time.   Psychiatric:         Mood and Affect: Mood normal.         Behavior: Behavior normal.           Intake/Output last 3 Shifts:  I/O last 3 completed shifts:  In: - (0 mL/kg)   Out: 200 (1.8 mL/kg) [Urine:200 (0.1 mL/kg/hr)]  Weight: 108.4 kg     Relevant Results  Results for orders placed or performed during the hospital encounter of 11/10/23 (from the past 24 hour(s))   CBC and Auto Differential   Result Value Ref Range    WBC 5.1 4.4 - 11.3 x10*3/uL    nRBC 0.0 0.0 - 0.0 /100 WBCs    RBC 4.02 (L) 4.50 - 5.90 x10*6/uL    Hemoglobin 11.8 (L) 13.5 - 17.5 g/dL    Hematocrit 39.9 (L) 41.0 - " 52.0 %    MCV 99 80 - 100 fL    MCH 29.4 26.0 - 34.0 pg    MCHC 29.6 (L) 32.0 - 36.0 g/dL    RDW 14.1 11.5 - 14.5 %    Platelets 64 (L) 150 - 450 x10*3/uL    Neutrophils % 63.1 40.0 - 80.0 %    Immature Granulocytes %, Automated 0.4 0.0 - 0.9 %    Lymphocytes % 19.8 13.0 - 44.0 %    Monocytes % 5.5 2.0 - 10.0 %    Eosinophils % 10.8 0.0 - 6.0 %    Basophils % 0.4 0.0 - 2.0 %    Neutrophils Absolute 3.23 1.20 - 7.70 x10*3/uL    Immature Granulocytes Absolute, Automated 0.02 0.00 - 0.70 x10*3/uL    Lymphocytes Absolute 1.01 (L) 1.20 - 4.80 x10*3/uL    Monocytes Absolute 0.28 0.10 - 1.00 x10*3/uL    Eosinophils Absolute 0.55 0.00 - 0.70 x10*3/uL    Basophils Absolute 0.02 0.00 - 0.10 x10*3/uL   Comprehensive Metabolic Panel   Result Value Ref Range    Glucose 72 (L) 74 - 99 mg/dL    Sodium 142 136 - 145 mmol/L    Potassium 5.1 3.5 - 5.3 mmol/L    Chloride 114 (H) 98 - 107 mmol/L    Bicarbonate 14 (L) 21 - 32 mmol/L    Anion Gap 19 10 - 20 mmol/L    Urea Nitrogen 99 (HH) 6 - 23 mg/dL    Creatinine 8.50 (H) 0.50 - 1.30 mg/dL    eGFR 7 (L) >60 mL/min/1.73m*2    Calcium 10.5 (H) 8.6 - 10.3 mg/dL    Albumin 4.0 3.4 - 5.0 g/dL    Alkaline Phosphatase 261 (H) 33 - 120 U/L    Total Protein 7.4 6.4 - 8.2 g/dL    AST 14 9 - 39 U/L    Bilirubin, Total 0.6 0.0 - 1.2 mg/dL    ALT 12 10 - 52 U/L   Lipase   Result Value Ref Range    Lipase 111 (H) 9 - 82 U/L   Sars-CoV-2 PCR, Symptomatic   Result Value Ref Range    Coronavirus 2019, PCR Not Detected Not Detected   Influenza A, and B PCR   Result Value Ref Range    Flu A Result Not Detected Not Detected    Flu B Result Not Detected Not Detected   RSV PCR   Result Value Ref Range    RSV PCR Not Detected Not Detected   Troponin I, High Sensitivity, Initial   Result Value Ref Range    Troponin I, High Sensitivity 9 0 - 20 ng/L   Urinalysis with Reflex Microscopic and Culture   Result Value Ref Range    Color, Urine Straw Straw, Yellow    Appearance, Urine Clear Clear    Specific Gravity,  Urine 1.011 1.005 - 1.035    pH, Urine 5.0 5.0, 5.5, 6.0, 6.5, 7.0, 7.5, 8.0    Protein, Urine 100 (2+) (N) NEGATIVE mg/dL    Glucose, Urine NEGATIVE NEGATIVE mg/dL    Blood, Urine SMALL (1+) (A) NEGATIVE    Ketones, Urine NEGATIVE NEGATIVE mg/dL    Bilirubin, Urine NEGATIVE NEGATIVE    Urobilinogen, Urine <2.0 <2.0 mg/dL    Nitrite, Urine NEGATIVE NEGATIVE    Leukocyte Esterase, Urine NEGATIVE NEGATIVE   Extra Urine Gray Tube   Result Value Ref Range    Extra Tube Hold for add-ons.    Urinalysis Microscopic   Result Value Ref Range    WBC, Urine NONE 1-5, NONE /HPF    RBC, Urine 1-2 NONE, 1-2, 3-5 /HPF   Drug Screen, Urine   Result Value Ref Range    Amphetamine Screen, Urine Presumptive Negative Presumptive Negative    Barbiturate Screen, Urine Presumptive Negative Presumptive Negative    Benzodiazepines Screen, Urine Presumptive Negative Presumptive Negative    Cannabinoid Screen, Urine Presumptive Negative Presumptive Negative    Cocaine Metabolite Screen, Urine Presumptive Negative Presumptive Negative    Fentanyl Screen, Urine Presumptive Negative Presumptive Negative    Opiate Screen, Urine Presumptive Negative Presumptive Negative    Oxycodone Screen, Urine Presumptive Negative Presumptive Negative    PCP Screen, Urine Presumptive Negative Presumptive Negative   TSH   Result Value Ref Range    Thyroid Stimulating Hormone 1.86 0.44 - 3.98 mIU/L   DRUG SCREEN,URINE   Result Value Ref Range    Amphetamine Screen, Urine Presumptive Negative Presumptive Negative    Barbiturate Screen, Urine Presumptive Negative Presumptive Negative    Benzodiazepines Screen, Urine Presumptive Negative Presumptive Negative    Cannabinoid Screen, Urine Presumptive Negative Presumptive Negative    Cocaine Metabolite Screen, Urine Presumptive Negative Presumptive Negative    Fentanyl Screen, Urine Presumptive Negative Presumptive Negative    Opiate Screen, Urine Presumptive Negative Presumptive Negative    Oxycodone Screen, Urine  Presumptive Negative Presumptive Negative    PCP Screen, Urine Presumptive Negative Presumptive Negative   CBC   Result Value Ref Range    WBC 6.7 4.4 - 11.3 x10*3/uL    nRBC 0.0 0.0 - 0.0 /100 WBCs    RBC 3.23 (L) 4.50 - 5.90 x10*6/uL    Hemoglobin 9.7 (L) 13.5 - 17.5 g/dL    Hematocrit 30.8 (L) 41.0 - 52.0 %    MCV 95 80 - 100 fL    MCH 30.0 26.0 - 34.0 pg    MCHC 31.5 (L) 32.0 - 36.0 g/dL    RDW 14.4 11.5 - 14.5 %    Platelets 213 150 - 450 x10*3/uL   Basic metabolic panel   Result Value Ref Range    Glucose 97 74 - 99 mg/dL    Sodium 143 136 - 145 mmol/L    Potassium 4.7 3.5 - 5.3 mmol/L    Chloride 116 (H) 98 - 107 mmol/L    Bicarbonate 15 (L) 21 - 32 mmol/L    Anion Gap 17 10 - 20 mmol/L    Urea Nitrogen 107 (HH) 6 - 23 mg/dL    Creatinine 8.90 (H) 0.50 - 1.30 mg/dL    eGFR 7 (L) >60 mL/min/1.73m*2    Calcium 9.5 8.6 - 10.3 mg/dL       Imaging Results  CT chest abdomen pelvis wo IV contrast   Final Result   No acute abnormality.        Small fat containing right inguinal and umbilical hernias.        Redemonstrated thyromegaly with stable mild narrowing of the trachea   at the level of the upper mediastinum.        Atrophic kidneys with multiple bilateral renal cysts. Findings   suggesting renal osteodystrophy.        Signed by: Trixei Moe 11/10/2023 6:55 PM   Dictation workstation:   [id number]          Medications:  allopurinol, 100 mg, oral, Daily  apixaban, 2.5 mg, oral, BID  aspirin, 81 mg, oral, Daily  B complex-vitamin C, 1 tablet, oral, Daily  ezetimibe, 10 mg, oral, Daily  finasteride, 5 mg, oral, Daily  melatonin, 3 mg, oral, Nightly  metoprolol tartrate, 25 mg, oral, BID  midodrine, 10 mg, oral, q8h  rosuvastatin, 10 mg, oral, Nightly  sennosides, 2 tablet, oral, BID  sevelamer carbonate, 1,600 mg, oral, TID with meals  sodium bicarbonate, 325 mg, oral, BID       PRN medications: acetaminophen, acetaminophen, ondansetron ODT **OR** ondansetron        Assessment/Plan   Principal Problem:     Nausea and vomiting, unspecified vomiting type  Nausea and vomiting, unspecified vomiting type suspect related to uremia   - IVF,   - Zofran     ERSD on HD  - Nephrology consult for HD  - pt has been non-compliant      DM2   - ISS  - did not see any medications for DM     HTN  - Metoprolol        HLD  - continue statin     Hypothyroidism?   - not on any medication  - check TSH     PAF  - telemetry  - Eliquis     Code status  - FULL      DVT Prophylaxis:  Eliquis    Disposition:  Likely DC tomorrow or Monday after dialysis     Elliot Quinones, [facility]

## 2023-11-12 LAB
ANION GAP SERPL CALC-SCNC: 17 MMOL/L (ref 10–20)
BUN SERPL-MCNC: 64 MG/DL (ref 6–23)
CALCIUM SERPL-MCNC: 9.6 MG/DL (ref 8.6–10.3)
CHLORIDE SERPL-SCNC: 107 MMOL/L (ref 98–107)
CO2 SERPL-SCNC: 21 MMOL/L (ref 21–32)
CREAT SERPL-MCNC: 6.62 MG/DL (ref 0.5–1.3)
GFR SERPL CREATININE-BSD FRML MDRD: 9 ML/MIN/1.73M*2
GLUCOSE BLD MANUAL STRIP-MCNC: 109 MG/DL (ref 74–99)
GLUCOSE SERPL-MCNC: 117 MG/DL (ref 74–99)
HBV SURFACE AG SERPL QL IA: NONREACTIVE
POTASSIUM SERPL-SCNC: 3.7 MMOL/L (ref 3.5–5.3)
SODIUM SERPL-SCNC: 141 MMOL/L (ref 136–145)

## 2023-11-12 PROCEDURE — 99233 SBSQ HOSP IP/OBS HIGH 50: CPT | Performed by: INTERNAL MEDICINE

## 2023-11-12 PROCEDURE — 96376 TX/PRO/DX INJ SAME DRUG ADON: CPT

## 2023-11-12 PROCEDURE — 2500000002 HC RX 250 W HCPCS SELF ADMINISTERED DRUGS (ALT 637 FOR MEDICARE OP, ALT 636 FOR OP/ED): Performed by: HOSPITALIST

## 2023-11-12 PROCEDURE — 82947 ASSAY GLUCOSE BLOOD QUANT: CPT | Mod: 59

## 2023-11-12 PROCEDURE — 36415 COLL VENOUS BLD VENIPUNCTURE: CPT | Performed by: INTERNAL MEDICINE

## 2023-11-12 PROCEDURE — 80048 BASIC METABOLIC PNL TOTAL CA: CPT | Performed by: INTERNAL MEDICINE

## 2023-11-12 PROCEDURE — 2500000004 HC RX 250 GENERAL PHARMACY W/ HCPCS (ALT 636 FOR OP/ED): Performed by: HOSPITALIST

## 2023-11-12 PROCEDURE — 96374 THER/PROPH/DIAG INJ IV PUSH: CPT

## 2023-11-12 PROCEDURE — 1100000001 HC PRIVATE ROOM DAILY

## 2023-11-12 PROCEDURE — 2500000001 HC RX 250 WO HCPCS SELF ADMINISTERED DRUGS (ALT 637 FOR MEDICARE OP): Performed by: HOSPITALIST

## 2023-11-12 RX ADMIN — ACETAMINOPHEN 650 MG: 325 TABLET ORAL at 08:38

## 2023-11-12 RX ADMIN — ASPIRIN 81 MG: 81 TABLET, COATED ORAL at 08:32

## 2023-11-12 RX ADMIN — SODIUM BICARBONATE 325 MG: 650 TABLET ORAL at 21:20

## 2023-11-12 RX ADMIN — APIXABAN 2.5 MG: 5 TABLET, FILM COATED ORAL at 21:20

## 2023-11-12 RX ADMIN — MIDODRINE HYDROCHLORIDE 10 MG: 5 TABLET ORAL at 06:23

## 2023-11-12 RX ADMIN — Medication 3 MG: at 21:20

## 2023-11-12 RX ADMIN — SENNOSIDES 17.2 MG: 8.6 TABLET, FILM COATED ORAL at 08:32

## 2023-11-12 RX ADMIN — SODIUM BICARBONATE 325 MG: 650 TABLET ORAL at 08:32

## 2023-11-12 RX ADMIN — SENNOSIDES 17.2 MG: 8.6 TABLET, FILM COATED ORAL at 21:20

## 2023-11-12 RX ADMIN — MIDODRINE HYDROCHLORIDE 10 MG: 5 TABLET ORAL at 22:50

## 2023-11-12 RX ADMIN — FINASTERIDE 5 MG: 5 TABLET, FILM COATED ORAL at 08:32

## 2023-11-12 RX ADMIN — APIXABAN 2.5 MG: 5 TABLET, FILM COATED ORAL at 08:33

## 2023-11-12 RX ADMIN — ONDANSETRON 4 MG: 2 INJECTION INTRAMUSCULAR; INTRAVENOUS at 09:04

## 2023-11-12 RX ADMIN — ACETAMINOPHEN 650 MG: 325 TABLET ORAL at 21:24

## 2023-11-12 RX ADMIN — METOPROLOL TARTRATE 25 MG: 25 TABLET, FILM COATED ORAL at 08:33

## 2023-11-12 RX ADMIN — SEVELAMER CARBONATE 1600 MG: 800 TABLET, FILM COATED ORAL at 13:33

## 2023-11-12 RX ADMIN — ROSUVASTATIN 10 MG: 10 TABLET, FILM COATED ORAL at 21:20

## 2023-11-12 RX ADMIN — ALLOPURINOL 100 MG: 100 TABLET ORAL at 08:32

## 2023-11-12 RX ADMIN — ACETAMINOPHEN 650 MG: 325 TABLET ORAL at 15:54

## 2023-11-12 RX ADMIN — SEVELAMER CARBONATE 1600 MG: 800 TABLET, FILM COATED ORAL at 08:33

## 2023-11-12 RX ADMIN — Medication 1 TABLET: at 08:32

## 2023-11-12 RX ADMIN — METOPROLOL TARTRATE 25 MG: 25 TABLET, FILM COATED ORAL at 21:20

## 2023-11-12 RX ADMIN — ONDANSETRON 4 MG: 2 INJECTION INTRAMUSCULAR; INTRAVENOUS at 16:18

## 2023-11-12 RX ADMIN — EZETIMIBE 10 MG: 10 TABLET ORAL at 08:32

## 2023-11-12 ASSESSMENT — PAIN - FUNCTIONAL ASSESSMENT
PAIN_FUNCTIONAL_ASSESSMENT: 0-10

## 2023-11-12 ASSESSMENT — PAIN SCALES - GENERAL
PAINLEVEL_OUTOF10: 8
PAINLEVEL_OUTOF10: 0 - NO PAIN
PAINLEVEL_OUTOF10: 8
PAINLEVEL_OUTOF10: 0 - NO PAIN
PAINLEVEL_OUTOF10: 8
PAINLEVEL_OUTOF10: 0 - NO PAIN
PAINLEVEL_OUTOF10: 7

## 2023-11-12 ASSESSMENT — COGNITIVE AND FUNCTIONAL STATUS - GENERAL
MOBILITY SCORE: 24
DAILY ACTIVITIY SCORE: 24

## 2023-11-12 ASSESSMENT — PAIN DESCRIPTION - LOCATION: LOCATION: BACK

## 2023-11-12 NOTE — PROGRESS NOTES
"Kj Colmenares is a 53 y.o. male on day 2 of admission presenting with Nausea and vomiting, unspecified vomiting type.    Subjective   No acute events overnight, still with some mild nausea this AM, showed me his emesis basin        Objective     VITALS  Blood pressure 126/85, pulse 80, temperature 36.3 °C (97.3 °F), resp. rate 18, height 1.803 m (5' 11\"), weight 108 kg (239 lb), SpO2 100 %.  Physical Exam  Vitals and nursing note reviewed.   Constitutional:       General: He is not in acute distress.     Appearance: He is not toxic-appearing.   HENT:      Head: Normocephalic and atraumatic.   Eyes:      Extraocular Movements: Extraocular movements intact.      Conjunctiva/sclera: Conjunctivae normal.   Cardiovascular:      Rate and Rhythm: Normal rate and regular rhythm.      Heart sounds: Normal heart sounds.   Pulmonary:      Effort: Pulmonary effort is normal. No respiratory distress.      Breath sounds: Normal breath sounds.   Abdominal:      General: Abdomen is flat. Bowel sounds are normal.      Palpations: Abdomen is soft.   Musculoskeletal:         General: Normal range of motion.   Skin:     General: Skin is warm and dry.      Capillary Refill: Capillary refill takes less than 2 seconds.   Neurological:      General: No focal deficit present.      Mental Status: He is alert and oriented to person, place, and time.   Psychiatric:         Mood and Affect: Mood normal.         Behavior: Behavior normal.           Intake/Output last 3 Shifts:  I/O last 3 completed shifts:  In: 1383.9 (12.8 mL/kg) [P.O.:260; I.V.:1123.9 (10.4 mL/kg)]  Out: 2050 (18.9 mL/kg) [Urine:550 (0.1 mL/kg/hr); Other:1500]  Weight: 108.4 kg     Relevant Results  Results for orders placed or performed during the hospital encounter of 11/10/23 (from the past 24 hour(s))   Hepatitis B surface antigen   Result Value Ref Range    Hepatitis B Surface AG Nonreactive Nonreactive   Basic Metabolic Panel   Result Value Ref Range    Glucose 117 " (H) 74 - 99 mg/dL    Sodium 141 136 - 145 mmol/L    Potassium 3.7 3.5 - 5.3 mmol/L    Chloride 107 98 - 107 mmol/L    Bicarbonate 21 21 - 32 mmol/L    Anion Gap 17 10 - 20 mmol/L    Urea Nitrogen 64 (H) 6 - 23 mg/dL    Creatinine 6.62 (H) 0.50 - 1.30 mg/dL    eGFR 9 (L) >60 mL/min/1.73m*2    Calcium 9.6 8.6 - 10.3 mg/dL   Lavender Top   Result Value Ref Range    Extra Tube Hold for add-ons.        Imaging Results  CT chest abdomen pelvis wo IV contrast   Final Result   No acute abnormality.        Small fat containing right inguinal and umbilical hernias.        Redemonstrated thyromegaly with stable mild narrowing of the trachea   at the level of the upper mediastinum.        Atrophic kidneys with multiple bilateral renal cysts. Findings   suggesting renal osteodystrophy.        Signed by: Trixie Moe 11/10/2023 6:55 PM   Dictation workstation:   [id number]          Medications:  allopurinol, 100 mg, oral, Daily  apixaban, 2.5 mg, oral, BID  aspirin, 81 mg, oral, Daily  B complex-vitamin C, 1 tablet, oral, Daily  ezetimibe, 10 mg, oral, Daily  finasteride, 5 mg, oral, Daily  melatonin, 3 mg, oral, Nightly  metoprolol tartrate, 25 mg, oral, BID  midodrine, 10 mg, oral, q8h  rosuvastatin, 10 mg, oral, Nightly  sennosides, 2 tablet, oral, BID  sevelamer carbonate, 1,600 mg, oral, TID with meals  sodium bicarbonate, 325 mg, oral, BID       PRN medications: acetaminophen, acetaminophen, ondansetron ODT **OR** ondansetron        Assessment/Plan   Principal Problem:    Nausea and vomiting, unspecified vomiting type  Nausea and vomiting, unspecified vomiting type suspect related to uremia   - IVF,   - Zofran     ERSD on HD  - Nephrology consult for HD  - pt has been non-compliant      DM2   - ISS  - did not see any medications for DM     HTN  - Metoprolol        HLD  - continue statin     Hypothyroidism?   - not on any medication  - check TSH     PAF  - telemetry  - Eliquis     Code status  - FULL      DVT  Prophylaxis:  Eliquis    Disposition:  Likely DC tomorrow after dialysis     Elliot Quinones, [facility]

## 2023-11-13 PROBLEM — N18.9 CHRONIC KIDNEY DISEASE, UNSPECIFIED CKD STAGE: Status: ACTIVE | Noted: 2023-11-13

## 2023-11-13 LAB
ANION GAP SERPL CALC-SCNC: 17 MMOL/L (ref 10–20)
BUN SERPL-MCNC: 69 MG/DL (ref 6–23)
CALCIUM SERPL-MCNC: 9.9 MG/DL (ref 8.6–10.3)
CHLORIDE SERPL-SCNC: 110 MMOL/L (ref 98–107)
CO2 SERPL-SCNC: 20 MMOL/L (ref 21–32)
CREAT SERPL-MCNC: 7.83 MG/DL (ref 0.5–1.3)
ERYTHROCYTE [DISTWIDTH] IN BLOOD BY AUTOMATED COUNT: 13.9 % (ref 11.5–14.5)
GFR SERPL CREATININE-BSD FRML MDRD: 8 ML/MIN/1.73M*2
GLUCOSE BLD MANUAL STRIP-MCNC: 110 MG/DL (ref 74–99)
GLUCOSE BLD MANUAL STRIP-MCNC: 82 MG/DL (ref 74–99)
GLUCOSE BLD MANUAL STRIP-MCNC: 94 MG/DL (ref 74–99)
GLUCOSE SERPL-MCNC: 85 MG/DL (ref 74–99)
HCT VFR BLD AUTO: 33.8 % (ref 41–52)
HGB BLD-MCNC: 10.5 G/DL (ref 13.5–17.5)
MCH RBC QN AUTO: 29.3 PG (ref 26–34)
MCHC RBC AUTO-ENTMCNC: 31.1 G/DL (ref 32–36)
MCV RBC AUTO: 94 FL (ref 80–100)
NRBC BLD-RTO: 0 /100 WBCS (ref 0–0)
PLATELET # BLD AUTO: 207 X10*3/UL (ref 150–450)
POTASSIUM SERPL-SCNC: 4 MMOL/L (ref 3.5–5.3)
RBC # BLD AUTO: 3.58 X10*6/UL (ref 4.5–5.9)
SODIUM SERPL-SCNC: 143 MMOL/L (ref 136–145)
WBC # BLD AUTO: 6.2 X10*3/UL (ref 4.4–11.3)

## 2023-11-13 PROCEDURE — 80048 BASIC METABOLIC PNL TOTAL CA: CPT | Performed by: INTERNAL MEDICINE

## 2023-11-13 PROCEDURE — 2500000002 HC RX 250 W HCPCS SELF ADMINISTERED DRUGS (ALT 637 FOR MEDICARE OP, ALT 636 FOR OP/ED): Performed by: HOSPITALIST

## 2023-11-13 PROCEDURE — 36415 COLL VENOUS BLD VENIPUNCTURE: CPT | Performed by: INTERNAL MEDICINE

## 2023-11-13 PROCEDURE — 99233 SBSQ HOSP IP/OBS HIGH 50: CPT | Performed by: INTERNAL MEDICINE

## 2023-11-13 PROCEDURE — 85027 COMPLETE CBC AUTOMATED: CPT | Performed by: INTERNAL MEDICINE

## 2023-11-13 PROCEDURE — 96376 TX/PRO/DX INJ SAME DRUG ADON: CPT

## 2023-11-13 PROCEDURE — 2500000004 HC RX 250 GENERAL PHARMACY W/ HCPCS (ALT 636 FOR OP/ED): Performed by: HOSPITALIST

## 2023-11-13 PROCEDURE — G0378 HOSPITAL OBSERVATION PER HR: HCPCS

## 2023-11-13 PROCEDURE — 2500000001 HC RX 250 WO HCPCS SELF ADMINISTERED DRUGS (ALT 637 FOR MEDICARE OP): Performed by: HOSPITALIST

## 2023-11-13 PROCEDURE — 82947 ASSAY GLUCOSE BLOOD QUANT: CPT | Mod: 59

## 2023-11-13 RX ADMIN — MIDODRINE HYDROCHLORIDE 10 MG: 5 TABLET ORAL at 15:17

## 2023-11-13 RX ADMIN — Medication 3 MG: at 20:44

## 2023-11-13 RX ADMIN — METOPROLOL TARTRATE 25 MG: 25 TABLET, FILM COATED ORAL at 10:52

## 2023-11-13 RX ADMIN — EZETIMIBE 10 MG: 10 TABLET ORAL at 10:52

## 2023-11-13 RX ADMIN — SEVELAMER CARBONATE 1600 MG: 800 TABLET, FILM COATED ORAL at 10:53

## 2023-11-13 RX ADMIN — ASPIRIN 81 MG: 81 TABLET, COATED ORAL at 10:52

## 2023-11-13 RX ADMIN — METOPROLOL TARTRATE 25 MG: 25 TABLET, FILM COATED ORAL at 20:44

## 2023-11-13 RX ADMIN — ONDANSETRON 4 MG: 2 INJECTION INTRAMUSCULAR; INTRAVENOUS at 07:34

## 2023-11-13 RX ADMIN — SEVELAMER CARBONATE 1600 MG: 800 TABLET, FILM COATED ORAL at 17:11

## 2023-11-13 RX ADMIN — SODIUM BICARBONATE 325 MG: 650 TABLET ORAL at 10:52

## 2023-11-13 RX ADMIN — APIXABAN 2.5 MG: 5 TABLET, FILM COATED ORAL at 10:53

## 2023-11-13 RX ADMIN — ROSUVASTATIN 10 MG: 10 TABLET, FILM COATED ORAL at 20:44

## 2023-11-13 RX ADMIN — Medication 1 TABLET: at 10:52

## 2023-11-13 RX ADMIN — ALLOPURINOL 100 MG: 100 TABLET ORAL at 10:52

## 2023-11-13 RX ADMIN — APIXABAN 2.5 MG: 5 TABLET, FILM COATED ORAL at 20:44

## 2023-11-13 RX ADMIN — SODIUM BICARBONATE 325 MG: 650 TABLET ORAL at 20:45

## 2023-11-13 RX ADMIN — FINASTERIDE 5 MG: 5 TABLET, FILM COATED ORAL at 10:52

## 2023-11-13 RX ADMIN — SENNOSIDES 17.2 MG: 8.6 TABLET, FILM COATED ORAL at 20:44

## 2023-11-13 RX ADMIN — SENNOSIDES 17.2 MG: 8.6 TABLET, FILM COATED ORAL at 10:52

## 2023-11-13 ASSESSMENT — COGNITIVE AND FUNCTIONAL STATUS - GENERAL
MOBILITY SCORE: 24
MOBILITY SCORE: 24
DAILY ACTIVITIY SCORE: 24
DAILY ACTIVITIY SCORE: 24

## 2023-11-13 ASSESSMENT — PAIN SCALES - GENERAL
PAINLEVEL_OUTOF10: 0 - NO PAIN
PAINLEVEL_OUTOF10: 0 - NO PAIN

## 2023-11-13 ASSESSMENT — PAIN - FUNCTIONAL ASSESSMENT
PAIN_FUNCTIONAL_ASSESSMENT: 0-10
PAIN_FUNCTIONAL_ASSESSMENT: 0-10

## 2023-11-13 NOTE — PROGRESS NOTES
Kj Colmenares is a 53 y.o. male on day 3 of admission presenting with Nausea and vomiting, unspecified vomiting type.    Subjective   Mr. Jenkins is a 53-year-old man with a history of diabetes, hypertension, DVT on Eliquis, hyperlipidemia, paroxysmal atrial fibrillation status post stroke who presented with nausea and vomiting.  He still makes urine, he only goes to dialysis twice per week.  He sees a nephrologist at [facility].  Sounds as if he has not been to dialysis in a month.  In the emergency department bicarbonate 14.  He was seen on Saturday and dialyzed.  It was felt that he had symptomatic uremia.       Objective     ROS  Constitutional: Negative.  Negative for diaphoresis, fatigue and fever.   HENT: Negative.  Negative for hearing loss, sore throat and tinnitus.    Eyes: Negative.  Negative for redness and visual disturbance.   Respiratory:  Negative for chest tightness and shortness of breath.    Cardiovascular:  Negative for chest pain, palpitations and leg swelling.   Gastrointestinal:  Negative for abdominal pain, blood in stool, diarrhea, nausea and vomiting.   Endocrine: Negative.    Genitourinary:  Negative for difficulty urinating, dysuria, frequency, hematuria and urgency.   Musculoskeletal:  Negative for arthralgias and joint swelling.   Skin:  Negative for rash.   Allergic/Immunologic: Negative.  Negative for immunocompromised state.   Neurological:  Negative for tremors, seizures, weakness and headaches.   Psychiatric/Behavioral:  Negative for behavioral problems, confusion and hallucinations.    All other systems reviewed and are negative.     Physical Exam  Constitutional:       Appearance: Normal appearance.   HENT:      Mouth/Throat:      Mouth: Mucous membranes are moist.   Eyes:      Extraocular Movements: Extraocular movements intact.      Pupils: Pupils are equal, round, and reactive to light.   Cardiovascular:      Rate and Rhythm: Regular rhythm.      Heart sounds: S1  normal and S2 normal.   Pulmonary:      Breath sounds: Normal breath sounds.   Abdominal:      Comments: Soft, NT/ND, no masses, normal bowel sounds   Genitourinary:     Comments: No mazariegos  Musculoskeletal:      Right lower leg: No edema.      Left lower leg: No edema.   Skin:     General: Skin is warm and dry.   Neurological:      General: No focal deficit present.      Mental Status: She is alert and oriented to person, place, and time.   Psychiatric:         Behavior: Behavior normal.      Meds    Current Facility-Administered Medications:     acetaminophen (Tylenol) tablet 650 mg, 650 mg, oral, q4h PRN, Kristine Sabillon MD, 650 mg at 11/11/23 0002    acetaminophen (Tylenol) tablet 650 mg, 650 mg, oral, q4h PRN, Kristine Sabillon MD, 650 mg at 11/12/23 2124    allopurinol (Zyloprim) tablet 100 mg, 100 mg, oral, Daily, Kristine Sabillon MD, 100 mg at 11/13/23 1052    apixaban (Eliquis) tablet 2.5 mg, 2.5 mg, oral, BID, Kristine Sabillon MD, 2.5 mg at 11/13/23 1053    aspirin EC tablet 81 mg, 81 mg, oral, Daily, Kristine Sabillon MD, 81 mg at 11/13/23 1052    B complex-vitamin C tablet 1 tablet, 1 tablet, oral, Daily, Kristine Sabillon MD, 1 tablet at 11/13/23 1052    ezetimibe (Zetia) tablet 10 mg, 10 mg, oral, Daily, Kristine Sabillon MD, 10 mg at 11/13/23 1052    finasteride (Proscar) tablet 5 mg, 5 mg, oral, Daily, Kristine Sabillon MD, 5 mg at 11/13/23 1052    melatonin tablet 3 mg, 3 mg, oral, Nightly, Kristine Sabillon MD, 3 mg at 11/12/23 2120    metoprolol tartrate (Lopressor) tablet 25 mg, 25 mg, oral, BID, Kristine Sabillon MD, 25 mg at 11/13/23 1052    midodrine (Proamatine) tablet 10 mg, 10 mg, oral, q8h, Kristine Sabillon MD, 10 mg at 11/13/23 1517    ondansetron ODT (Zofran-ODT) disintegrating tablet 4 mg, 4 mg, oral, q8h PRN **OR** ondansetron (Zofran) injection 4 mg, 4 mg, intravenous, q8h PRN, Kristine Sabillon MD, 4 mg at 11/13/23 0734    rosuvastatin (Crestor) tablet 10 mg, 10 mg, oral, Nightly, Kristine Sabillon MD, 10 mg at  "11/12/23 2120    sennosides (Senokot) tablet 17.2 mg, 2 tablet, oral, BID, Kristine Sabillon MD, 17.2 mg at 11/13/23 1052    sevelamer carbonate (Renvela) tablet 1,600 mg, 1,600 mg, oral, TID with meals, Kristine Sabillon MD, 1,600 mg at 11/13/23 1053    sodium bicarbonate tablet 325 mg, 325 mg, oral, BID, Kristine Sabillon MD, 325 mg at 11/13/23 1052   There are no discontinued medications.     Allergies  No Known Allergies     Last Recorded Vitals  Blood pressure 111/77, pulse 80, temperature 36.6 °C (97.8 °F), temperature source Temporal, resp. rate 17, height 1.803 m (5' 11\"), weight 108 kg (239 lb), SpO2 96 %.  Intake/Output last 3 Shifts:  I/O last 3 completed shifts:  In: - (0 mL/kg)   Out: 750 (6.9 mL/kg) [Urine:750 (0.2 mL/kg/hr)]  Weight: 108.4 kg     Last 24 hour Results  Results for orders placed or performed during the hospital encounter of 11/10/23 (from the past 24 hour(s))   POCT GLUCOSE   Result Value Ref Range    POCT Glucose 109 (H) 74 - 99 mg/dL   Basic Metabolic Panel   Result Value Ref Range    Glucose 85 74 - 99 mg/dL    Sodium 143 136 - 145 mmol/L    Potassium 4.0 3.5 - 5.3 mmol/L    Chloride 110 (H) 98 - 107 mmol/L    Bicarbonate 20 (L) 21 - 32 mmol/L    Anion Gap 17 10 - 20 mmol/L    Urea Nitrogen 69 (H) 6 - 23 mg/dL    Creatinine 7.83 (H) 0.50 - 1.30 mg/dL    eGFR 8 (L) >60 mL/min/1.73m*2    Calcium 9.9 8.6 - 10.3 mg/dL   CBC   Result Value Ref Range    WBC 6.2 4.4 - 11.3 x10*3/uL    nRBC 0.0 0.0 - 0.0 /100 WBCs    RBC 3.58 (L) 4.50 - 5.90 x10*6/uL    Hemoglobin 10.5 (L) 13.5 - 17.5 g/dL    Hematocrit 33.8 (L) 41.0 - 52.0 %    MCV 94 80 - 100 fL    MCH 29.3 26.0 - 34.0 pg    MCHC 31.1 (L) 32.0 - 36.0 g/dL    RDW 13.9 11.5 - 14.5 %    Platelets 207 150 - 450 x10*3/uL   POCT GLUCOSE   Result Value Ref Range    POCT Glucose 82 74 - 99 mg/dL   POCT GLUCOSE   Result Value Ref Range    POCT Glucose 94 74 - 99 mg/dL   POCT GLUCOSE   Result Value Ref Range    POCT Glucose 110 (H) 74 - 99 mg/dL    "     Imaging results  CT chest abdomen pelvis wo IV contrast    Result Date: 11/10/2023  Interpreted By:  Trixie Moe, STUDY: CT CHEST ABDOMEN PELVIS WO CONTRAST;  11/10/2023 5:52 pm   INDICATION: Signs/Symptoms:sob and pain.   COMPARISON: 10/19/2021   ACCESSION NUMBER(S): [id number]   ORDERING CLINICIAN: BANDAR BENNETT   TECHNIQUE: CT of the chest, abdomen, and pelvis was performed.  Contiguous axial images were obtained at 3 mm slice thickness through the chest, abdomen and pelvis. Coronal and sagittal reconstructions at 3 mm slice thickness were performed. No intravenous contrast. Limited evaluation for solid organs and vasculature due to lack of intravenous contrast.   FINDINGS: Lungs and Pleura: There is a 4 mm right perifissural pulmonary nodule suggestive of an intrapulmonary lymph node. Stable 3 mm left lower lobe pulmonary nodule. No pulmonary consolidation. No pleural effusion. No pneumothorax.   Mediastinum: No adenopathy by CT size criteria. No cardiomegaly or pericardial effusion. No thoracic aortic aneurysm. Enlargement of the thyroid gland with narrowing of the upper trachea at the level of the mediastinum. This finding is stable from prior exam. Surgical clips in the anterior mediastinum. Question small hiatal hernia.   Liver: Layering sludge in the gallbladder.   Gallbladder and Biliary: Unremarkable.   Pancreas: No abnormality identified in the pancreas.   Spleen: No abnormality identified in the spleen.   Adrenals: Bilateral adrenal gland thickening.   Urinary: Small kidneys with multiple bilateral renal cysts. No hydronephrosis.   Gastrointestinal/Peritoneum: No small or large bowel obstruction in the visualized abdomen. In the abdomen, there is no extraluminal air. No significant free fluid. No evidence of acute appendicitis.   Vascular: Abdominal aorta is normal in caliber.   Lymphatics: No enlarged lymph nodes by size criteria.   MSK/Body Wall/Chest Wall: Renal osteodystrophy. Mild left  gynecomastia. Small fat containing right inguinal hernia. Small fat containing umbilical hernia.       No acute abnormality.   Small fat containing right inguinal and umbilical hernias.   Redemonstrated thyromegaly with stable mild narrowing of the trachea at the level of the upper mediastinum.   Atrophic kidneys with multiple bilateral renal cysts. Findings suggesting renal osteodystrophy.   Signed by: Trixie Moe 11/10/2023 6:55 PM Dictation workstation:   [id number]       Assessment and Plan  Mr. Colmenares is a 53-year-old man with a history of diabetes, hypertension, DVT on Eliquis, hyperlipidemia, paroxysmal atrial fibrillation status post stroke who presented with nausea and vomiting.  He still makes urine, he only goes to dialysis twice per week.  He sees a nephrologist at [facility].  Sounds as if he has not been to dialysis in a month.  In the emergency department bicarbonate 14.  He was seen on Saturday and dialyzed.  It was felt that he had symptomatic uremia.    I sat with Mr. Colmenares and explained that he must commit to regular dialysis.  I even wonder whether he is a peritoneal dialysis patient/candidate, I explained to him that I believe in him and I feel that he can do it.  We should be focusing on transplant, dialysis as a bridge until we get there.  He voices understanding.  I will dialyze him again tomorrow, I have to review his schedule at [address].  Blood pressures are looking good, hemoglobin is fairly good.  I will check a phosphorus in the morning.    50 minutes in the care of Mr. Colmenares.         Etienne Atikns MD

## 2023-11-13 NOTE — PROGRESS NOTES
"Kj Colmenares is a 53 y.o. male on day 3 of admission presenting with Nausea and vomiting, unspecified vomiting type.    Subjective   No acute events overnight.        Objective     VITALS  Blood pressure 122/84, pulse 79, temperature 36.4 °C (97.5 °F), temperature source Temporal, resp. rate 18, height 1.803 m (5' 11\"), weight 108 kg (239 lb), SpO2 98 %.  Physical Exam  Vitals and nursing note reviewed.   Constitutional:       General: He is not in acute distress.     Appearance: He is not toxic-appearing.   HENT:      Head: Normocephalic and atraumatic.   Eyes:      Extraocular Movements: Extraocular movements intact.      Conjunctiva/sclera: Conjunctivae normal.   Cardiovascular:      Rate and Rhythm: Normal rate and regular rhythm.      Heart sounds: Normal heart sounds.   Pulmonary:      Effort: Pulmonary effort is normal. No respiratory distress.      Breath sounds: Normal breath sounds.   Abdominal:      General: Abdomen is flat. Bowel sounds are normal.      Palpations: Abdomen is soft.   Musculoskeletal:         General: Normal range of motion.   Skin:     General: Skin is warm and dry.      Capillary Refill: Capillary refill takes less than 2 seconds.   Neurological:      General: No focal deficit present.      Mental Status: He is alert and oriented to person, place, and time.   Psychiatric:         Mood and Affect: Mood normal.         Behavior: Behavior normal.           Intake/Output last 3 Shifts:  I/O last 3 completed shifts:  In: - (0 mL/kg)   Out: 750 (6.9 mL/kg) [Urine:750 (0.2 mL/kg/hr)]  Weight: 108.4 kg     Relevant Results  Results for orders placed or performed during the hospital encounter of 11/10/23 (from the past 24 hour(s))   POCT GLUCOSE   Result Value Ref Range    POCT Glucose 109 (H) 74 - 99 mg/dL   Basic Metabolic Panel   Result Value Ref Range    Glucose 85 74 - 99 mg/dL    Sodium 143 136 - 145 mmol/L    Potassium 4.0 3.5 - 5.3 mmol/L    Chloride 110 (H) 98 - 107 mmol/L    " Bicarbonate 20 (L) 21 - 32 mmol/L    Anion Gap 17 10 - 20 mmol/L    Urea Nitrogen 69 (H) 6 - 23 mg/dL    Creatinine 7.83 (H) 0.50 - 1.30 mg/dL    eGFR 8 (L) >60 mL/min/1.73m*2    Calcium 9.9 8.6 - 10.3 mg/dL   CBC   Result Value Ref Range    WBC 6.2 4.4 - 11.3 x10*3/uL    nRBC 0.0 0.0 - 0.0 /100 WBCs    RBC 3.58 (L) 4.50 - 5.90 x10*6/uL    Hemoglobin 10.5 (L) 13.5 - 17.5 g/dL    Hematocrit 33.8 (L) 41.0 - 52.0 %    MCV 94 80 - 100 fL    MCH 29.3 26.0 - 34.0 pg    MCHC 31.1 (L) 32.0 - 36.0 g/dL    RDW 13.9 11.5 - 14.5 %    Platelets 207 150 - 450 x10*3/uL   POCT GLUCOSE   Result Value Ref Range    POCT Glucose 82 74 - 99 mg/dL       Imaging Results  CT chest abdomen pelvis wo IV contrast   Final Result   No acute abnormality.        Small fat containing right inguinal and umbilical hernias.        Redemonstrated thyromegaly with stable mild narrowing of the trachea   at the level of the upper mediastinum.        Atrophic kidneys with multiple bilateral renal cysts. Findings   suggesting renal osteodystrophy.        Signed by: Trixie Moe 11/10/2023 6:55 PM   Dictation workstation:   [id number]          Medications:  allopurinol, 100 mg, oral, Daily  apixaban, 2.5 mg, oral, BID  aspirin, 81 mg, oral, Daily  B complex-vitamin C, 1 tablet, oral, Daily  ezetimibe, 10 mg, oral, Daily  finasteride, 5 mg, oral, Daily  melatonin, 3 mg, oral, Nightly  metoprolol tartrate, 25 mg, oral, BID  midodrine, 10 mg, oral, q8h  rosuvastatin, 10 mg, oral, Nightly  sennosides, 2 tablet, oral, BID  sevelamer carbonate, 1,600 mg, oral, TID with meals  sodium bicarbonate, 325 mg, oral, BID       PRN medications: acetaminophen, acetaminophen, ondansetron ODT **OR** ondansetron        Assessment/Plan   Principal Problem:    Nausea and vomiting, unspecified vomiting type  Nausea and vomiting, unspecified vomiting type suspect related to uremia   - IVF,   - Zofran     ERSD on HD  - Nephrology consult for HD  - pt has been non-compliant    - Will need to work on finding a chair for him prior to discharge. TCC's aware.      DM2   - Diet controlled      HTN  - Metoprolol        HLD  - continue statin     Hypothyroidism?   - TSH normal.   -Apparently not on medication   -Continue to follow up outpatient with endo      PAF  - telemetry  - Eliquis     Code status  - FULL      DVT Prophylaxis:  Eliquis    Disposition:  Likely DC today after dialysis if has a chair for dialysis.     Elliot Quinones, DO  [facility]

## 2023-11-13 NOTE — PROGRESS NOTES
23 1628   Discharge Planning   Living Arrangements Family members  (lives with niece)   Support Systems Family members  (niece)   Type of Residence Private residence   Patient expects to be discharged to: home     Met patient at bedside to confirm CDC placement, spoke to [facility]  [phone] nursing supervisor, she confirmed patient has a active dialysis chair, on Mon/Wed she stated he calls to see if his chair is available, and that he is coming but never shows up. Patient stated he did not show up for his dialysis because his girlfriend was sick and she almost , he was spending all his time with her , patient stated he was homeless but now lives with his niece. Patient he did not know the address, encouraged patient to call his niece to get her address, patient was agreeable, he also stated he will need a lyft ride home, nurse on duty was made aware

## 2023-11-14 ENCOUNTER — APPOINTMENT (OUTPATIENT)
Dept: DIALYSIS | Facility: HOSPITAL | Age: 53
End: 2023-11-14
Payer: COMMERCIAL

## 2023-11-14 VITALS
DIASTOLIC BLOOD PRESSURE: 86 MMHG | HEART RATE: 95 BPM | SYSTOLIC BLOOD PRESSURE: 122 MMHG | BODY MASS INDEX: 33.46 KG/M2 | TEMPERATURE: 97.7 F | HEIGHT: 71 IN | WEIGHT: 239 LBS | OXYGEN SATURATION: 100 % | RESPIRATION RATE: 18 BRPM

## 2023-11-14 PROCEDURE — 90937 HEMODIALYSIS REPEATED EVAL: CPT | Performed by: INTERNAL MEDICINE

## 2023-11-14 PROCEDURE — 2500000002 HC RX 250 W HCPCS SELF ADMINISTERED DRUGS (ALT 637 FOR MEDICARE OP, ALT 636 FOR OP/ED): Performed by: HOSPITALIST

## 2023-11-14 PROCEDURE — 8010000001 HC DIALYSIS - HEMODIALYSIS PER DAY: Performed by: INTERNAL MEDICINE

## 2023-11-14 PROCEDURE — 99239 HOSP IP/OBS DSCHRG MGMT >30: CPT | Performed by: INTERNAL MEDICINE

## 2023-11-14 PROCEDURE — 2500000001 HC RX 250 WO HCPCS SELF ADMINISTERED DRUGS (ALT 637 FOR MEDICARE OP): Performed by: HOSPITALIST

## 2023-11-14 PROCEDURE — G0378 HOSPITAL OBSERVATION PER HR: HCPCS

## 2023-11-14 PROCEDURE — 2500000004 HC RX 250 GENERAL PHARMACY W/ HCPCS (ALT 636 FOR OP/ED): Performed by: HOSPITALIST

## 2023-11-14 RX ADMIN — ASPIRIN 81 MG: 81 TABLET, COATED ORAL at 12:26

## 2023-11-14 RX ADMIN — MIDODRINE HYDROCHLORIDE 10 MG: 5 TABLET ORAL at 06:36

## 2023-11-14 RX ADMIN — METOPROLOL TARTRATE 25 MG: 25 TABLET, FILM COATED ORAL at 12:27

## 2023-11-14 RX ADMIN — SEVELAMER CARBONATE 1600 MG: 800 TABLET, FILM COATED ORAL at 12:26

## 2023-11-14 RX ADMIN — SODIUM BICARBONATE 325 MG: 650 TABLET ORAL at 12:26

## 2023-11-14 RX ADMIN — EZETIMIBE 10 MG: 10 TABLET ORAL at 12:27

## 2023-11-14 RX ADMIN — Medication 1 TABLET: at 12:27

## 2023-11-14 RX ADMIN — ALLOPURINOL 100 MG: 100 TABLET ORAL at 12:27

## 2023-11-14 RX ADMIN — FINASTERIDE 5 MG: 5 TABLET, FILM COATED ORAL at 12:26

## 2023-11-14 RX ADMIN — APIXABAN 2.5 MG: 5 TABLET, FILM COATED ORAL at 12:26

## 2023-11-14 ASSESSMENT — COGNITIVE AND FUNCTIONAL STATUS - GENERAL
DAILY ACTIVITIY SCORE: 24
MOBILITY SCORE: 24

## 2023-11-14 ASSESSMENT — PAIN SCALES - GENERAL: PAINLEVEL_OUTOF10: 0 - NO PAIN

## 2023-11-14 NOTE — PROGRESS NOTES
Report to Receiving RN:    Report To: Tiffanie Nicole  Time Report Called: 1100  Hand-Off Communication: Patient was dialyzed for 1.5 hours only with 200ml net UF removed. IHD tx ended after he aggressively pulled out his arterial fistula needle. Patient looks upset and verbalized that he wants to die. Dr. Atkins was informed and please inform the primary for possible psychiatric consult.  Complications During Treatment: Yes  Ultrafiltration Treatment: Yes  Medications Administered During Dialysis: No  Blood Products Administered During Dialysis: No  Labs Sent During Dialysis: No  Heparin Drip Rate Changes: No    Electronic Signatures:   (Signed )   Authored:    (Signed )   Authored:     Last Updated: 11:57 AM by ADELA NICOLE

## 2023-11-14 NOTE — NURSING NOTE
Report from Sending RN:    Report From: Heather   Recent Surgery of Procedure: No  Baseline Level of Consciousness (LOC): x3  Oxygen Use: No  Type: none  Diabetic: No  Last BP Med Given Day of Dialysis: yesterday  Last Pain Med Given: see mar  Lab Tests to be Obtained with Dialysis: No  Blood Transfusion to be Given During Dialysis: No  Available IV Access: Yes  Medications to be Administered During Dialysis: No  Continuous IV Infusion Running: No  Restraints on Currently or in the Last 24 Hours: No  Hand-Off Communication: Patient stable no s/s of distress noted no pain reported.

## 2023-11-14 NOTE — DISCHARGE SUMMARY
Discharge Diagnosis  Nausea and vomiting, unspecified vomiting type    Issues Requiring Follow-Up  Continue follow-up with dialysis    Discharge Meds     Your medication list        CONTINUE taking these medications        Instructions Last Dose Given Next Dose Due   acetaminophen 500 mg tablet  Commonly known as: Tylenol           allopurinol 100 mg tablet  Commonly known as: Zyloprim           aspirin 81 mg EC tablet           B complex-vitamin C tablet           Eliquis 2.5 mg tablet  Generic drug: apixaban           ergocalciferol 1.25 MG (86623 UT) capsule  Commonly known as: Vitamin D-2           ezetimibe 10 mg tablet  Commonly known as: Zetia           finasteride 5 mg tablet  Commonly known as: Proscar           melatonin 3 mg capsule           metoprolol tartrate 25 mg tablet  Commonly known as: Lopressor           midodrine 10 mg tablet  Commonly known as: Proamatine           ondansetron 4 mg tablet  Commonly known as: Zofran           rosuvastatin 20 mg tablet  Commonly known as: Crestor           sevelamer carbonate 800 mg tablet  Commonly known as: Renvela           sodium bicarbonate 325 mg tablet                    Test Results Pending At Discharge  Pending Labs       Order Current Status    Extra Tubes Preliminary result    Lavender Top Preliminary result            Procedures       Hospital Course   Kj was admitted to hospital with uncontrolled nausea vomiting.  This was felt likely secondary to uremia as he has missed several rounds of dialysis.  He did receive several rounds of dialysis here in the hospital and his symptoms did improve. It was reiterated to him that he does need to continue to follow-up outpatient with his usual start dialysis sessions to prevent any further issues.  At this time he is otherwise hemodynamically stable and appropriate for discharge.  This plan was discussed with him and he is in agreement.  All questions were answered.    Blood pressure 122/86, pulse 95,  "temperature 36.5 °C (97.7 °F), resp. rate 18, height 1.803 m (5' 11\"), weight 108 kg (239 lb), SpO2 100 %.  Pertinent Physical Exam At Time of Discharge  Physical Exam  Vitals and nursing note reviewed.   Constitutional:       General: He is not in acute distress.     Appearance: He is not toxic-appearing.   HENT:      Head: Normocephalic and atraumatic.   Eyes:      Extraocular Movements: Extraocular movements intact.      Conjunctiva/sclera: Conjunctivae normal.   Cardiovascular:      Rate and Rhythm: Normal rate and regular rhythm.      Heart sounds: Normal heart sounds.   Pulmonary:      Effort: Pulmonary effort is normal. No respiratory distress.      Breath sounds: Normal breath sounds.   Abdominal:      General: Abdomen is flat. Bowel sounds are normal.   Musculoskeletal:         General: Normal range of motion.   Skin:     General: Skin is warm and dry.      Capillary Refill: Capillary refill takes less than 2 seconds.   Neurological:      General: No focal deficit present.      Mental Status: He is alert and oriented to person, place, and time.   Psychiatric:         Mood and Affect: Mood normal.         Behavior: Behavior normal.         Outpatient Follow-Up  No future appointments.      Elliot Quinones DO    Time spent during this discharge: >30 min        "

## 2023-11-14 NOTE — NURSING NOTE

## 2023-11-14 NOTE — PROCEDURES
Seen on dialysis.  We will attempt at least 1 L of fluid removal today.  3 potassium bath, Access is functioning.  I explained the importance of going to dialysis regularly as an outpatient.  Hemoglobin is noted, may require erythropoietin.  There were no renal barriers to discharge.

## 2023-11-14 NOTE — CARE PLAN
Problem: Fall/Injury  Goal: Not fall by end of shift  Outcome: Progressing  Goal: Be free from injury by end of the shift  Outcome: Progressing  Goal: Verbalize understanding of personal risk factors for fall in the hospital  Outcome: Progressing  Goal: Verbalize understanding of risk factor reduction measures to prevent injury from fall in the home  Outcome: Progressing  Goal: Use assistive devices by end of the shift  Outcome: Progressing  Goal: Pace activities to prevent fatigue by end of the shift  Outcome: Progressing   The patient's goals for the shift include      The clinical goals for the shift include pt will remain HDS throughout the shift    Over the shift, the patient did make progress toward the following goals.

## 2023-11-16 ENCOUNTER — PATIENT OUTREACH (OUTPATIENT)
Dept: CARE COORDINATION | Facility: CLINIC | Age: 53
End: 2023-11-16
Payer: COMMERCIAL

## 2023-11-16 LAB
ALBUMIN SERPL BCP-MCNC: 4.4 G/DL (ref 3.4–5)
ALP SERPL-CCNC: 279 U/L (ref 33–120)
ALT SERPL W P-5'-P-CCNC: 9 U/L (ref 10–52)
ANION GAP SERPL CALC-SCNC: 22 MMOL/L (ref 10–20)
AST SERPL W P-5'-P-CCNC: 12 U/L (ref 9–39)
BASOPHILS # BLD AUTO: 0.04 X10*3/UL (ref 0–0.1)
BASOPHILS NFR BLD AUTO: 0.5 %
BILIRUB SERPL-MCNC: 0.6 MG/DL (ref 0–1.2)
BUN SERPL-MCNC: 82 MG/DL (ref 6–23)
CALCIUM SERPL-MCNC: 10.5 MG/DL (ref 8.6–10.6)
CHLORIDE SERPL-SCNC: 111 MMOL/L (ref 98–107)
CO2 SERPL-SCNC: 17 MMOL/L (ref 21–32)
CREAT SERPL-MCNC: 9.27 MG/DL (ref 0.5–1.3)
EOSINOPHIL # BLD AUTO: 0.79 X10*3/UL (ref 0–0.7)
EOSINOPHIL NFR BLD AUTO: 10.1 %
ERYTHROCYTE [DISTWIDTH] IN BLOOD BY AUTOMATED COUNT: 13.4 % (ref 11.5–14.5)
GFR SERPL CREATININE-BSD FRML MDRD: 6 ML/MIN/1.73M*2
GLUCOSE SERPL-MCNC: 93 MG/DL (ref 74–99)
HCT VFR BLD AUTO: 34.6 % (ref 41–52)
HGB BLD-MCNC: 10.7 G/DL (ref 13.5–17.5)
IMM GRANULOCYTES # BLD AUTO: 0.04 X10*3/UL (ref 0–0.7)
IMM GRANULOCYTES NFR BLD AUTO: 0.5 % (ref 0–0.9)
LYMPHOCYTES # BLD AUTO: 1.16 X10*3/UL (ref 1.2–4.8)
LYMPHOCYTES NFR BLD AUTO: 14.8 %
MCH RBC QN AUTO: 29.2 PG (ref 26–34)
MCHC RBC AUTO-ENTMCNC: 30.9 G/DL (ref 32–36)
MCV RBC AUTO: 94 FL (ref 80–100)
MONOCYTES # BLD AUTO: 0.54 X10*3/UL (ref 0.1–1)
MONOCYTES NFR BLD AUTO: 6.9 %
NEUTROPHILS # BLD AUTO: 5.25 X10*3/UL (ref 1.2–7.7)
NEUTROPHILS NFR BLD AUTO: 67.2 %
NRBC BLD-RTO: 0 /100 WBCS (ref 0–0)
PLATELET # BLD AUTO: 203 X10*3/UL (ref 150–450)
POTASSIUM SERPL-SCNC: 4.4 MMOL/L (ref 3.5–5.3)
PROT SERPL-MCNC: 7.2 G/DL (ref 6.4–8.2)
RBC # BLD AUTO: 3.67 X10*6/UL (ref 4.5–5.9)
SODIUM SERPL-SCNC: 146 MMOL/L (ref 136–145)
WBC # BLD AUTO: 7.8 X10*3/UL (ref 4.4–11.3)

## 2023-11-16 PROCEDURE — 85025 COMPLETE CBC W/AUTO DIFF WBC: CPT | Performed by: EMERGENCY MEDICINE

## 2023-11-16 PROCEDURE — 96376 TX/PRO/DX INJ SAME DRUG ADON: CPT | Performed by: EMERGENCY MEDICINE

## 2023-11-16 PROCEDURE — 83880 ASSAY OF NATRIURETIC PEPTIDE: CPT | Performed by: STUDENT IN AN ORGANIZED HEALTH CARE EDUCATION/TRAINING PROGRAM

## 2023-11-16 PROCEDURE — 96374 THER/PROPH/DIAG INJ IV PUSH: CPT | Performed by: EMERGENCY MEDICINE

## 2023-11-16 PROCEDURE — 84075 ASSAY ALKALINE PHOSPHATASE: CPT | Performed by: EMERGENCY MEDICINE

## 2023-11-16 PROCEDURE — 83735 ASSAY OF MAGNESIUM: CPT | Performed by: STUDENT IN AN ORGANIZED HEALTH CARE EDUCATION/TRAINING PROGRAM

## 2023-11-16 PROCEDURE — 80053 COMPREHEN METABOLIC PANEL: CPT | Performed by: EMERGENCY MEDICINE

## 2023-11-16 PROCEDURE — 96375 TX/PRO/DX INJ NEW DRUG ADDON: CPT | Performed by: EMERGENCY MEDICINE

## 2023-11-16 PROCEDURE — 99285 EMERGENCY DEPT VISIT HI MDM: CPT | Performed by: EMERGENCY MEDICINE

## 2023-11-16 PROCEDURE — 84484 ASSAY OF TROPONIN QUANT: CPT | Performed by: STUDENT IN AN ORGANIZED HEALTH CARE EDUCATION/TRAINING PROGRAM

## 2023-11-16 PROCEDURE — 84100 ASSAY OF PHOSPHORUS: CPT | Performed by: STUDENT IN AN ORGANIZED HEALTH CARE EDUCATION/TRAINING PROGRAM

## 2023-11-16 PROCEDURE — 36415 COLL VENOUS BLD VENIPUNCTURE: CPT | Performed by: EMERGENCY MEDICINE

## 2023-11-16 PROCEDURE — 99285 EMERGENCY DEPT VISIT HI MDM: CPT | Mod: 25 | Performed by: EMERGENCY MEDICINE

## 2023-11-16 PROCEDURE — 83690 ASSAY OF LIPASE: CPT | Performed by: STUDENT IN AN ORGANIZED HEALTH CARE EDUCATION/TRAINING PROGRAM

## 2023-11-16 PROCEDURE — 93010 ELECTROCARDIOGRAM REPORT: CPT | Performed by: EMERGENCY MEDICINE

## 2023-11-16 NOTE — PROGRESS NOTES
Outreach call to patient to support a smooth transition of care from recent admission.  Left voicemail message for patient with my contact information.  Enrolled patient in [employer] chatbot for additional support and patient education through transition period.  Will continue to monitor through transition period.

## 2023-11-17 ENCOUNTER — HOSPITAL ENCOUNTER (OUTPATIENT)
Facility: HOSPITAL | Age: 53
Setting detail: OBSERVATION
Discharge: HOME | End: 2023-11-22
Attending: EMERGENCY MEDICINE | Admitting: INTERNAL MEDICINE
Payer: COMMERCIAL

## 2023-11-17 ENCOUNTER — APPOINTMENT (OUTPATIENT)
Dept: RADIOLOGY | Facility: HOSPITAL | Age: 53
End: 2023-11-17
Payer: COMMERCIAL

## 2023-11-17 ENCOUNTER — APPOINTMENT (OUTPATIENT)
Dept: DIALYSIS | Facility: HOSPITAL | Age: 53
End: 2023-11-17
Payer: COMMERCIAL

## 2023-11-17 ENCOUNTER — CLINICAL SUPPORT (OUTPATIENT)
Dept: EMERGENCY MEDICINE | Facility: HOSPITAL | Age: 53
End: 2023-11-17
Payer: COMMERCIAL

## 2023-11-17 DIAGNOSIS — I50.20 SYSTOLIC HEART FAILURE, UNSPECIFIED HF CHRONICITY (MULTI): ICD-10-CM

## 2023-11-17 DIAGNOSIS — N18.6 END STAGE RENAL DISEASE ON DIALYSIS (MULTI): ICD-10-CM

## 2023-11-17 DIAGNOSIS — Z99.2 END STAGE RENAL DISEASE ON DIALYSIS (MULTI): ICD-10-CM

## 2023-11-17 DIAGNOSIS — I95.3 HEMODIALYSIS-ASSOCIATED HYPOTENSION: ICD-10-CM

## 2023-11-17 DIAGNOSIS — R45.851 SUICIDAL IDEATION: ICD-10-CM

## 2023-11-17 DIAGNOSIS — N19 HYPERPHOSPHATEMIA ASSOCIATED WITH RENAL FAILURE: ICD-10-CM

## 2023-11-17 DIAGNOSIS — E87.70 HYPERVOLEMIA, UNSPECIFIED HYPERVOLEMIA TYPE: Primary | ICD-10-CM

## 2023-11-17 DIAGNOSIS — E83.39 HYPERPHOSPHATEMIA ASSOCIATED WITH RENAL FAILURE: ICD-10-CM

## 2023-11-17 DIAGNOSIS — G89.29 OTHER CHRONIC PAIN: ICD-10-CM

## 2023-11-17 LAB
ALBUMIN SERPL BCP-MCNC: 4.1 G/DL (ref 3.4–5)
ANION GAP BLDV CALCULATED.4IONS-SCNC: 16 MMOL/L (ref 10–25)
ANION GAP SERPL CALC-SCNC: 15 MMOL/L (ref 10–20)
APPEARANCE UR: CLEAR
BASE EXCESS BLDV CALC-SCNC: -7 MMOL/L (ref -2–3)
BILIRUB UR STRIP.AUTO-MCNC: NEGATIVE MG/DL
BNP SERPL-MCNC: 284 PG/ML (ref 0–99)
BODY TEMPERATURE: 37 DEGREES CELSIUS
BUN SERPL-MCNC: 84 MG/DL (ref 6–23)
CA-I BLDV-SCNC: 1.36 MMOL/L (ref 1.1–1.33)
CALCIUM SERPL-MCNC: 10.2 MG/DL (ref 8.6–10.6)
CARDIAC TROPONIN I PNL SERPL HS: 12 NG/L (ref 0–53)
CARDIAC TROPONIN I PNL SERPL HS: 12 NG/L (ref 0–53)
CARDIAC TROPONIN I PNL SERPL HS: 14 NG/L (ref 0–53)
CHLORIDE BLDV-SCNC: 112 MMOL/L (ref 98–107)
CHLORIDE SERPL-SCNC: 109 MMOL/L (ref 98–107)
CO2 SERPL-SCNC: 21 MMOL/L (ref 21–32)
COLOR UR: YELLOW
CREAT SERPL-MCNC: 8.96 MG/DL (ref 0.5–1.3)
ERYTHROCYTE [DISTWIDTH] IN BLOOD BY AUTOMATED COUNT: 13.6 % (ref 11.5–14.5)
FLUAV RNA RESP QL NAA+PROBE: NOT DETECTED
FLUBV RNA RESP QL NAA+PROBE: NOT DETECTED
GFR SERPL CREATININE-BSD FRML MDRD: 6 ML/MIN/1.73M*2
GLUCOSE BLDV-MCNC: 86 MG/DL (ref 74–99)
GLUCOSE SERPL-MCNC: 95 MG/DL (ref 74–99)
GLUCOSE UR STRIP.AUTO-MCNC: NEGATIVE MG/DL
HCO3 BLDV-SCNC: 17.7 MMOL/L (ref 22–26)
HCT VFR BLD AUTO: 34.1 % (ref 41–52)
HCT VFR BLD EST: 35 % (ref 41–52)
HGB BLD-MCNC: 10.5 G/DL (ref 13.5–17.5)
HGB BLDV-MCNC: 11.6 G/DL (ref 13.5–17.5)
HOLD SPECIMEN: NORMAL
INHALED O2 CONCENTRATION: 21 %
KETONES UR STRIP.AUTO-MCNC: NEGATIVE MG/DL
LACTATE BLDV-SCNC: 0.9 MMOL/L (ref 0.4–2)
LEUKOCYTE ESTERASE UR QL STRIP.AUTO: NEGATIVE
LIPASE SERPL-CCNC: 106 U/L (ref 9–82)
MAGNESIUM SERPL-MCNC: 2.2 MG/DL (ref 1.6–2.4)
MCH RBC QN AUTO: 29.7 PG (ref 26–34)
MCHC RBC AUTO-ENTMCNC: 30.8 G/DL (ref 32–36)
MCV RBC AUTO: 97 FL (ref 80–100)
NITRITE UR QL STRIP.AUTO: NEGATIVE
NRBC BLD-RTO: 0 /100 WBCS (ref 0–0)
OXYHGB MFR BLDV: 95.3 % (ref 45–75)
PCO2 BLDV: 32 MM HG (ref 41–51)
PH BLDV: 7.35 PH (ref 7.33–7.43)
PH UR STRIP.AUTO: 6 [PH]
PHOSPHATE SERPL-MCNC: 4.8 MG/DL (ref 2.5–4.9)
PHOSPHATE SERPL-MCNC: 5.5 MG/DL (ref 2.5–4.9)
PLATELET # BLD AUTO: 206 X10*3/UL (ref 150–450)
PO2 BLDV: 77 MM HG (ref 35–45)
POTASSIUM BLDV-SCNC: 4.4 MMOL/L (ref 3.5–5.3)
POTASSIUM SERPL-SCNC: 5.2 MMOL/L (ref 3.5–5.3)
PROT UR STRIP.AUTO-MCNC: ABNORMAL MG/DL
Q ONSET: 220 MS
QRS COUNT: 17 BEATS
QRS DURATION: 86 MS
QT INTERVAL: 328 MS
QTC CALCULATION(BAZETT): 420 MS
QTC FREDERICIA: 387 MS
R AXIS: 68 DEGREES
RBC # BLD AUTO: 3.53 X10*6/UL (ref 4.5–5.9)
RBC # UR STRIP.AUTO: NEGATIVE /UL
RBC #/AREA URNS AUTO: NORMAL /HPF
SAO2 % BLDV: 97 % (ref 45–75)
SARS-COV-2 RNA RESP QL NAA+PROBE: NOT DETECTED
SODIUM BLDV-SCNC: 141 MMOL/L (ref 136–145)
SODIUM SERPL-SCNC: 140 MMOL/L (ref 136–145)
SP GR UR STRIP.AUTO: 1.01
T AXIS: 31 DEGREES
T OFFSET: 384 MS
UROBILINOGEN UR STRIP.AUTO-MCNC: <2 MG/DL
VENTRICULAR RATE: 99 BPM
WBC # BLD AUTO: 7.9 X10*3/UL (ref 4.4–11.3)
WBC #/AREA URNS AUTO: NORMAL /HPF

## 2023-11-17 PROCEDURE — 93005 ELECTROCARDIOGRAM TRACING: CPT

## 2023-11-17 PROCEDURE — 71275 CT ANGIOGRAPHY CHEST: CPT

## 2023-11-17 PROCEDURE — 2500000001 HC RX 250 WO HCPCS SELF ADMINISTERED DRUGS (ALT 637 FOR MEDICARE OP): Mod: SE | Performed by: NURSE PRACTITIONER

## 2023-11-17 PROCEDURE — 96374 THER/PROPH/DIAG INJ IV PUSH: CPT

## 2023-11-17 PROCEDURE — 84132 ASSAY OF SERUM POTASSIUM: CPT | Performed by: STUDENT IN AN ORGANIZED HEALTH CARE EDUCATION/TRAINING PROGRAM

## 2023-11-17 PROCEDURE — 96375 TX/PRO/DX INJ NEW DRUG ADDON: CPT

## 2023-11-17 PROCEDURE — 2500000004 HC RX 250 GENERAL PHARMACY W/ HCPCS (ALT 636 FOR OP/ED): Mod: SE | Performed by: NURSE PRACTITIONER

## 2023-11-17 PROCEDURE — 8010000001 HC DIALYSIS - HEMODIALYSIS PER DAY

## 2023-11-17 PROCEDURE — 2500000002 HC RX 250 W HCPCS SELF ADMINISTERED DRUGS (ALT 637 FOR MEDICARE OP, ALT 636 FOR OP/ED): Mod: SE | Performed by: NURSE PRACTITIONER

## 2023-11-17 PROCEDURE — 84132 ASSAY OF SERUM POTASSIUM: CPT | Mod: 59 | Performed by: STUDENT IN AN ORGANIZED HEALTH CARE EDUCATION/TRAINING PROGRAM

## 2023-11-17 PROCEDURE — 90937 HEMODIALYSIS REPEATED EVAL: CPT

## 2023-11-17 PROCEDURE — 93010 ELECTROCARDIOGRAM REPORT: CPT

## 2023-11-17 PROCEDURE — 2500000004 HC RX 250 GENERAL PHARMACY W/ HCPCS (ALT 636 FOR OP/ED): Mod: SE

## 2023-11-17 PROCEDURE — 84295 ASSAY OF SERUM SODIUM: CPT | Performed by: STUDENT IN AN ORGANIZED HEALTH CARE EDUCATION/TRAINING PROGRAM

## 2023-11-17 PROCEDURE — G0378 HOSPITAL OBSERVATION PER HR: HCPCS

## 2023-11-17 PROCEDURE — 36415 COLL VENOUS BLD VENIPUNCTURE: CPT | Performed by: STUDENT IN AN ORGANIZED HEALTH CARE EDUCATION/TRAINING PROGRAM

## 2023-11-17 PROCEDURE — 71045 X-RAY EXAM CHEST 1 VIEW: CPT | Performed by: RADIOLOGY

## 2023-11-17 PROCEDURE — 96376 TX/PRO/DX INJ SAME DRUG ADON: CPT

## 2023-11-17 PROCEDURE — 84484 ASSAY OF TROPONIN QUANT: CPT | Performed by: STUDENT IN AN ORGANIZED HEALTH CARE EDUCATION/TRAINING PROGRAM

## 2023-11-17 PROCEDURE — 71045 X-RAY EXAM CHEST 1 VIEW: CPT

## 2023-11-17 PROCEDURE — 99222 1ST HOSP IP/OBS MODERATE 55: CPT | Performed by: NURSE PRACTITIONER

## 2023-11-17 PROCEDURE — 71275 CT ANGIOGRAPHY CHEST: CPT | Performed by: RADIOLOGY

## 2023-11-17 PROCEDURE — 87636 SARSCOV2 & INF A&B AMP PRB: CPT | Performed by: EMERGENCY MEDICINE

## 2023-11-17 PROCEDURE — 2550000001 HC RX 255 CONTRASTS: Mod: SE | Performed by: EMERGENCY MEDICINE

## 2023-11-17 PROCEDURE — 74177 CT ABD & PELVIS W/CONTRAST: CPT

## 2023-11-17 PROCEDURE — 2500000001 HC RX 250 WO HCPCS SELF ADMINISTERED DRUGS (ALT 637 FOR MEDICARE OP): Mod: SE

## 2023-11-17 PROCEDURE — 85018 HEMOGLOBIN: CPT | Mod: 59 | Performed by: STUDENT IN AN ORGANIZED HEALTH CARE EDUCATION/TRAINING PROGRAM

## 2023-11-17 PROCEDURE — 2500000004 HC RX 250 GENERAL PHARMACY W/ HCPCS (ALT 636 FOR OP/ED): Mod: SE | Performed by: STUDENT IN AN ORGANIZED HEALTH CARE EDUCATION/TRAINING PROGRAM

## 2023-11-17 PROCEDURE — 2500000005 HC RX 250 GENERAL PHARMACY W/O HCPCS: Mod: SE | Performed by: NURSE PRACTITIONER

## 2023-11-17 PROCEDURE — 74177 CT ABD & PELVIS W/CONTRAST: CPT | Performed by: RADIOLOGY

## 2023-11-17 PROCEDURE — 85027 COMPLETE CBC AUTOMATED: CPT | Performed by: STUDENT IN AN ORGANIZED HEALTH CARE EDUCATION/TRAINING PROGRAM

## 2023-11-17 PROCEDURE — 81001 URINALYSIS AUTO W/SCOPE: CPT | Performed by: STUDENT IN AN ORGANIZED HEALTH CARE EDUCATION/TRAINING PROGRAM

## 2023-11-17 PROCEDURE — 2500000005 HC RX 250 GENERAL PHARMACY W/O HCPCS: Mod: SE

## 2023-11-17 RX ORDER — ERGOCALCIFEROL 1.25 MG/1
1250 CAPSULE ORAL
Status: DISCONTINUED | OUTPATIENT
Start: 2023-11-17 | End: 2023-11-22 | Stop reason: HOSPADM

## 2023-11-17 RX ORDER — ONDANSETRON HYDROCHLORIDE 2 MG/ML
INJECTION, SOLUTION INTRAVENOUS
Status: COMPLETED
Start: 2023-11-17 | End: 2023-11-17

## 2023-11-17 RX ORDER — HYDROMORPHONE HYDROCHLORIDE 1 MG/ML
1 INJECTION, SOLUTION INTRAMUSCULAR; INTRAVENOUS; SUBCUTANEOUS ONCE
Status: COMPLETED | OUTPATIENT
Start: 2023-11-17 | End: 2023-11-17

## 2023-11-17 RX ORDER — ACETAMINOPHEN 160 MG/5ML
650 SOLUTION ORAL EVERY 4 HOURS PRN
Status: DISCONTINUED | OUTPATIENT
Start: 2023-11-17 | End: 2023-11-22 | Stop reason: HOSPADM

## 2023-11-17 RX ORDER — ACETAMINOPHEN 650 MG/1
650 SUPPOSITORY RECTAL EVERY 4 HOURS PRN
Status: DISCONTINUED | OUTPATIENT
Start: 2023-11-17 | End: 2023-11-22 | Stop reason: HOSPADM

## 2023-11-17 RX ORDER — METOPROLOL SUCCINATE 25 MG/1
TABLET, EXTENDED RELEASE ORAL
Status: COMPLETED
Start: 2023-11-17 | End: 2023-11-17

## 2023-11-17 RX ORDER — B-COMPLEX WITH VITAMIN C
1 TABLET ORAL DAILY
Status: DISCONTINUED | OUTPATIENT
Start: 2023-11-17 | End: 2023-11-22 | Stop reason: HOSPADM

## 2023-11-17 RX ORDER — OLANZAPINE 5 MG/1
5 TABLET ORAL NIGHTLY
Status: ON HOLD | COMMUNITY
End: 2023-11-22 | Stop reason: SDUPTHER

## 2023-11-17 RX ORDER — ONDANSETRON 4 MG/1
TABLET, ORALLY DISINTEGRATING ORAL
Status: COMPLETED
Start: 2023-11-17 | End: 2023-11-17

## 2023-11-17 RX ORDER — FINASTERIDE 5 MG/1
5 TABLET, FILM COATED ORAL DAILY
Status: DISCONTINUED | OUTPATIENT
Start: 2023-11-17 | End: 2023-11-22 | Stop reason: HOSPADM

## 2023-11-17 RX ORDER — ONDANSETRON 4 MG/1
4 TABLET, FILM COATED ORAL EVERY 12 HOURS PRN
Status: DISCONTINUED | OUTPATIENT
Start: 2023-11-17 | End: 2023-11-17

## 2023-11-17 RX ORDER — ACETAMINOPHEN 325 MG/1
650 TABLET ORAL EVERY 4 HOURS PRN
Status: DISCONTINUED | OUTPATIENT
Start: 2023-11-17 | End: 2023-11-22 | Stop reason: HOSPADM

## 2023-11-17 RX ORDER — SEVELAMER CARBONATE 800 MG/1
1600 TABLET, FILM COATED ORAL
Status: DISCONTINUED | OUTPATIENT
Start: 2023-11-17 | End: 2023-11-22 | Stop reason: HOSPADM

## 2023-11-17 RX ORDER — METOPROLOL TARTRATE 25 MG/1
25 TABLET, FILM COATED ORAL 2 TIMES DAILY
Status: DISCONTINUED | OUTPATIENT
Start: 2023-11-17 | End: 2023-11-22 | Stop reason: HOSPADM

## 2023-11-17 RX ORDER — ACETAMINOPHEN 500 MG
10 TABLET ORAL DAILY
Status: DISCONTINUED | OUTPATIENT
Start: 2023-11-17 | End: 2023-11-20

## 2023-11-17 RX ORDER — EZETIMIBE 10 MG/1
10 TABLET ORAL DAILY
Status: DISCONTINUED | OUTPATIENT
Start: 2023-11-17 | End: 2023-11-22 | Stop reason: HOSPADM

## 2023-11-17 RX ORDER — ONDANSETRON HYDROCHLORIDE 2 MG/ML
4 INJECTION, SOLUTION INTRAVENOUS EVERY 8 HOURS PRN
Status: DISCONTINUED | OUTPATIENT
Start: 2023-11-17 | End: 2023-11-22 | Stop reason: HOSPADM

## 2023-11-17 RX ORDER — TORSEMIDE 20 MG/1
40 TABLET ORAL DAILY
Status: DISCONTINUED | OUTPATIENT
Start: 2023-11-17 | End: 2023-11-22 | Stop reason: HOSPADM

## 2023-11-17 RX ORDER — ASPIRIN 81 MG/1
81 TABLET ORAL DAILY
Status: DISCONTINUED | OUTPATIENT
Start: 2023-11-17 | End: 2023-11-22 | Stop reason: HOSPADM

## 2023-11-17 RX ORDER — ONDANSETRON 4 MG/1
4 TABLET, ORALLY DISINTEGRATING ORAL EVERY 8 HOURS PRN
Status: DISCONTINUED | OUTPATIENT
Start: 2023-11-17 | End: 2023-11-22 | Stop reason: HOSPADM

## 2023-11-17 RX ORDER — ONDANSETRON HYDROCHLORIDE 2 MG/ML
4 INJECTION, SOLUTION INTRAVENOUS ONCE
Status: COMPLETED | OUTPATIENT
Start: 2023-11-17 | End: 2023-11-17

## 2023-11-17 RX ORDER — TAMSULOSIN HYDROCHLORIDE 0.4 MG/1
0.4 CAPSULE ORAL DAILY
COMMUNITY

## 2023-11-17 RX ORDER — HYDROMORPHONE HYDROCHLORIDE 1 MG/ML
0.2 INJECTION, SOLUTION INTRAMUSCULAR; INTRAVENOUS; SUBCUTANEOUS ONCE
Status: COMPLETED | OUTPATIENT
Start: 2023-11-17 | End: 2023-11-17

## 2023-11-17 RX ORDER — ROSUVASTATIN CALCIUM 20 MG/1
20 TABLET, COATED ORAL NIGHTLY
Status: DISCONTINUED | OUTPATIENT
Start: 2023-11-17 | End: 2023-11-22 | Stop reason: HOSPADM

## 2023-11-17 RX ORDER — METOPROLOL TARTRATE 1 MG/ML
5 INJECTION, SOLUTION INTRAVENOUS ONCE
Status: COMPLETED | OUTPATIENT
Start: 2023-11-17 | End: 2023-11-17

## 2023-11-17 RX ORDER — HYDROMORPHONE HYDROCHLORIDE 1 MG/ML
INJECTION, SOLUTION INTRAMUSCULAR; INTRAVENOUS; SUBCUTANEOUS
Status: COMPLETED
Start: 2023-11-17 | End: 2023-11-17

## 2023-11-17 RX ADMIN — ONDANSETRON 4 MG: 2 INJECTION INTRAMUSCULAR; INTRAVENOUS at 16:12

## 2023-11-17 RX ADMIN — FINASTERIDE 5 MG: 5 TABLET, FILM COATED ORAL at 08:36

## 2023-11-17 RX ADMIN — HYDROMORPHONE HYDROCHLORIDE 0.2 MG: 1 INJECTION, SOLUTION INTRAMUSCULAR; INTRAVENOUS; SUBCUTANEOUS at 14:20

## 2023-11-17 RX ADMIN — ASPIRIN 81 MG: 81 TABLET, COATED ORAL at 08:36

## 2023-11-17 RX ADMIN — ERGOCALCIFEROL 1250 MCG: 1.25 CAPSULE ORAL at 08:38

## 2023-11-17 RX ADMIN — EZETIMIBE 10 MG: 10 TABLET ORAL at 08:36

## 2023-11-17 RX ADMIN — SEVELAMER CARBONATE 1600 MG: 800 TABLET, FILM COATED ORAL at 08:35

## 2023-11-17 RX ADMIN — METOPROLOL TARTRATE 25 MG: 50 TABLET, FILM COATED ORAL at 08:34

## 2023-11-17 RX ADMIN — METOPROLOL TARTRATE 5 MG: 1 INJECTION, SOLUTION INTRAVENOUS at 04:03

## 2023-11-17 RX ADMIN — Medication 10 MG: at 22:01

## 2023-11-17 RX ADMIN — HYDROMORPHONE HYDROCHLORIDE 1 MG: 1 INJECTION, SOLUTION INTRAMUSCULAR; INTRAVENOUS; SUBCUTANEOUS at 01:04

## 2023-11-17 RX ADMIN — ONDANSETRON 4 MG: 4 TABLET, ORALLY DISINTEGRATING ORAL at 03:55

## 2023-11-17 RX ADMIN — ONDANSETRON 4 MG: 2 INJECTION INTRAMUSCULAR; INTRAVENOUS at 01:04

## 2023-11-17 RX ADMIN — APIXABAN 2.5 MG: 2.5 TABLET, FILM COATED ORAL at 08:37

## 2023-11-17 RX ADMIN — APIXABAN 2.5 MG: 2.5 TABLET, FILM COATED ORAL at 21:54

## 2023-11-17 RX ADMIN — METOPROLOL SUCCINATE 25 MG: 25 TABLET, EXTENDED RELEASE ORAL at 21:06

## 2023-11-17 RX ADMIN — Medication 1 TABLET: at 08:35

## 2023-11-17 RX ADMIN — IOHEXOL 100 ML: 350 INJECTION, SOLUTION INTRAVENOUS at 02:03

## 2023-11-17 SDOH — HEALTH STABILITY: PHYSICAL HEALTH: ON AVERAGE, HOW MANY MINUTES DO YOU ENGAGE IN EXERCISE AT THIS LEVEL?: 0 MIN

## 2023-11-17 SDOH — ECONOMIC STABILITY: INCOME INSECURITY: HOW HARD IS IT FOR YOU TO PAY FOR THE VERY BASICS LIKE FOOD, HOUSING, MEDICAL CARE, AND HEATING?: NOT HARD AT ALL

## 2023-11-17 SDOH — ECONOMIC STABILITY: INCOME INSECURITY: IN THE LAST 12 MONTHS, WAS THERE A TIME WHEN YOU WERE NOT ABLE TO PAY THE MORTGAGE OR RENT ON TIME?: NO

## 2023-11-17 SDOH — SOCIAL STABILITY: SOCIAL INSECURITY: ARE YOU OR HAVE YOU BEEN THREATENED OR ABUSED PHYSICALLY, EMOTIONALLY, OR SEXUALLY BY ANYONE?: NO

## 2023-11-17 SDOH — SOCIAL STABILITY: SOCIAL INSECURITY: ARE THERE ANY APPARENT SIGNS OF INJURIES/BEHAVIORS THAT COULD BE RELATED TO ABUSE/NEGLECT?: NO

## 2023-11-17 SDOH — SOCIAL STABILITY: SOCIAL INSECURITY: HAVE YOU HAD THOUGHTS OF HARMING ANYONE ELSE?: YES

## 2023-11-17 SDOH — SOCIAL STABILITY: SOCIAL INSECURITY: DO YOU FEEL UNSAFE GOING BACK TO THE PLACE WHERE YOU ARE LIVING?: NO

## 2023-11-17 SDOH — SOCIAL STABILITY: SOCIAL INSECURITY: DOES ANYONE TRY TO KEEP YOU FROM HAVING/CONTACTING OTHER FRIENDS OR DOING THINGS OUTSIDE YOUR HOME?: NO

## 2023-11-17 SDOH — SOCIAL STABILITY: SOCIAL INSECURITY: WERE YOU ABLE TO COMPLETE ALL THE BEHAVIORAL HEALTH SCREENINGS?: YES

## 2023-11-17 SDOH — SOCIAL STABILITY: SOCIAL INSECURITY: DO YOU FEEL ANYONE HAS EXPLOITED OR TAKEN ADVANTAGE OF YOU FINANCIALLY OR OF YOUR PERSONAL PROPERTY?: NO

## 2023-11-17 SDOH — HEALTH STABILITY: PHYSICAL HEALTH: ON AVERAGE, HOW MANY DAYS PER WEEK DO YOU ENGAGE IN MODERATE TO STRENUOUS EXERCISE (LIKE A BRISK WALK)?: 0 DAYS

## 2023-11-17 SDOH — SOCIAL STABILITY: SOCIAL INSECURITY: HAS ANYONE EVER THREATENED TO HURT YOUR FAMILY OR YOUR PETS?: NO

## 2023-11-17 SDOH — SOCIAL STABILITY: SOCIAL INSECURITY: ABUSE: ADULT

## 2023-11-17 ASSESSMENT — LIFESTYLE VARIABLES
HOW OFTEN DO YOU HAVE 6 OR MORE DRINKS ON ONE OCCASION: NEVER
AUDIT-C TOTAL SCORE: 0
HOW MANY STANDARD DRINKS CONTAINING ALCOHOL DO YOU HAVE ON A TYPICAL DAY: PATIENT DOES NOT DRINK
PRESCIPTION_ABUSE_PAST_12_MONTHS: NO
AUDIT-C TOTAL SCORE: 0
HOW OFTEN DO YOU HAVE A DRINK CONTAINING ALCOHOL: NEVER
SUBSTANCE_ABUSE_PAST_12_MONTHS: NO
SKIP TO QUESTIONS 9-10: 1

## 2023-11-17 ASSESSMENT — COGNITIVE AND FUNCTIONAL STATUS - GENERAL
MOBILITY SCORE: 24
PATIENT BASELINE BEDBOUND: NO

## 2023-11-17 ASSESSMENT — PAIN SCALES - GENERAL
PAINLEVEL_OUTOF10: 6
PAINLEVEL_OUTOF10: 0 - NO PAIN
PAINLEVEL_OUTOF10: 0 - NO PAIN
PAINLEVEL_OUTOF10: 8
PAINLEVEL_OUTOF10: 10 - WORST POSSIBLE PAIN

## 2023-11-17 ASSESSMENT — PAIN - FUNCTIONAL ASSESSMENT
PAIN_FUNCTIONAL_ASSESSMENT: NO/DENIES PAIN
PAIN_FUNCTIONAL_ASSESSMENT: 0-10

## 2023-11-17 ASSESSMENT — PAIN DESCRIPTION - ORIENTATION: ORIENTATION: LEFT

## 2023-11-17 ASSESSMENT — ACTIVITIES OF DAILY LIVING (ADL)
WALKS IN HOME: INDEPENDENT
FEEDING YOURSELF: INDEPENDENT
ADEQUATE_TO_COMPLETE_ADL: YES
JUDGMENT_ADEQUATE_SAFELY_COMPLETE_DAILY_ACTIVITIES: YES
GROOMING: INDEPENDENT
BATHING: INDEPENDENT
LACK_OF_TRANSPORTATION: NO
PATIENT'S MEMORY ADEQUATE TO SAFELY COMPLETE DAILY ACTIVITIES?: YES
DRESSING YOURSELF: INDEPENDENT
HEARING - RIGHT EAR: FUNCTIONAL
HEARING - LEFT EAR: FUNCTIONAL
TOILETING: INDEPENDENT

## 2023-11-17 ASSESSMENT — ENCOUNTER SYMPTOMS
NAUSEA: 0
ABDOMINAL PAIN: 0
PALPITATIONS: 0
DYSURIA: 0
DIARRHEA: 0
FREQUENCY: 0
SHORTNESS OF BREATH: 1
CONSTIPATION: 0
VOMITING: 0
HEMATURIA: 0
FEVER: 0
CHILLS: 0
FLANK PAIN: 0

## 2023-11-17 ASSESSMENT — PATIENT HEALTH QUESTIONNAIRE - PHQ9
SUM OF ALL RESPONSES TO PHQ9 QUESTIONS 1 & 2: 0
2. FEELING DOWN, DEPRESSED OR HOPELESS: NOT AT ALL
1. LITTLE INTEREST OR PLEASURE IN DOING THINGS: NOT AT ALL

## 2023-11-17 ASSESSMENT — PAIN DESCRIPTION - DESCRIPTORS
DESCRIPTORS: ACHING
DESCRIPTORS: ACHING

## 2023-11-17 ASSESSMENT — COLUMBIA-SUICIDE SEVERITY RATING SCALE - C-SSRS
1. IN THE PAST MONTH, HAVE YOU WISHED YOU WERE DEAD OR WISHED YOU COULD GO TO SLEEP AND NOT WAKE UP?: NO
6. HAVE YOU EVER DONE ANYTHING, STARTED TO DO ANYTHING, OR PREPARED TO DO ANYTHING TO END YOUR LIFE?: NO
2. HAVE YOU ACTUALLY HAD ANY THOUGHTS OF KILLING YOURSELF?: NO

## 2023-11-17 ASSESSMENT — PAIN DESCRIPTION - LOCATION: LOCATION: ARM

## 2023-11-17 NOTE — NURSING NOTE
.Report to Receiving RN:    Report To: GUERITA Garcia  Time Report Called: [id number]  Hand-Off Communication: Pt tolerated 2 Hrs HD instead of 4 hrs AMA form signed, Dr. Hope notified. Fluid removal 1.4 Liter /85 HR 99  Complications During Treatment: No  Ultrafiltration Treatment: No  Medications Administered During Dialysis: No  Blood Products Administered During Dialysis: No  Labs Sent During Dialysis: No  Heparin Drip Rate Changes: No    Last Updated: 1:36 PM by GILA CASTILLO

## 2023-11-17 NOTE — CONSULTS
"NEPHROLOGY CONSULT NOTE   Patient ID: Kj Colmenares is a 53 y.o. male.     Reason for consult: ESRD-HD    Chief Complaint   Patient presents with    Chest Pain    Shortness of Breath        HPI  Kj Colmenares is a 53 y.o. male with ESRD MF via LUEAVF, DM2, HTN, GERD, DVT (on Eliquis), HLD, PAF, stroke, admitted as above with CP/SOB in the setting of medications and HD treatment non-compliance. Was recently discharged from [facility] where he was treated for uremia and received HD on 11/14 - willing to only undergo 2 hrs HD ; he is nonoliguric but his residual renal function is poor with BUN > 100. Is competent to take decisions.     From H&P:   Kj Colmenares is a 53 y.o. male with a past medical history of afib (on Eliquis), ESRD on HD, medical noncompliance, T2DM, HTN, HLD, GERD, remote DVT, and remote CVA ([facility], 2017)who presented to the ED with chest pain and shortness of breath. Apparently, he was at HD earlier when he abruptly removed his HD catheter and left midway through his session. His MD reportedly called him and instructed him to report to the ED. Notably, he was evaluated on 11/14 at [facility] for nausea and vomiting after being discharged earlier that day from [facility] after being admitted for the same.  He denies any fever, chills, cough, congestion, pain with urination, blood in urine or stool.  States that he still makes a small amount of urine. He reports being specifically told to come to this hospital as \"they believe that he had been discharged too soon from the outside hospital previously.\" On exam in ED13, he acknowledge stating he wanted to no longer live, however denies any SI and is future oriented and talks about his children (who have lost their mother previously). He raises concerns with the amount of time he is kept on HD and feels 2 hours is more tolerable     In The ER: BP (!) 132/101   Pulse 101   Temp 36 °C (96.8 °F)   Resp 19   Ht 1.803 m (5' 11\")   Wt 108 kg (239 lb)   " SpO2 97%   BMI 33.33 kg/m²      Past Medical History:   Diagnosis Date    Diabetes mellitus (CMS/HCC)     Hypertension     Stroke (CMS/HCC)       Past Surgical History:   Procedure Laterality Date    CT HEAD ANGIO W AND WO IV CONTRAST  2/4/2014    CT HEAD ANGIO W AND WO IV CONTRAST 2/4/2014 [facility] CLINICAL LEGACY    CT HEAD ANGIO W AND WO IV CONTRAST  2/13/2014    CT HEAD ANGIO W AND WO IV CONTRAST 2/13/2014 [facility] CLINICAL LEGACY      No family history on file.  Social History     Socioeconomic History    Marital status:      Spouse name: Not on file    Number of children: Not on file    Years of education: Not on file    Highest education level: Not on file   Occupational History    Not on file   Tobacco Use    Smoking status: Never    Smokeless tobacco: Never   Substance and Sexual Activity    Alcohol use: Never    Drug use: Never    Sexual activity: Defer   Other Topics Concern    Not on file   Social History Narrative    Not on file     Social Determinants of Health     Financial Resource Strain: Low Risk  (11/11/2023)    Overall Financial Resource Strain (CARDIA)     Difficulty of Paying Living Expenses: Not hard at all   Food Insecurity: Unknown (11/11/2023)    Hunger Vital Sign     Worried About Running Out of Food in the Last Year: Patient refused     Ran Out of Food in the Last Year: Patient refused   Transportation Needs: Unknown (11/11/2023)    PRAPARE - Transportation     Lack of Transportation (Medical): Patient refused     Lack of Transportation (Non-Medical): Patient refused   Physical Activity: Inactive (11/11/2023)    Exercise Vital Sign     Days of Exercise per Week: 0 days     Minutes of Exercise per Session: 0 min   Stress: No Stress Concern Present (11/11/2023)    [place] [employer] - Occupational Stress Questionnaire     Feeling of Stress : Not at all   Social Connections: Moderately Integrated (11/11/2023)    Social Connection and Isolation Panel [NHANES]      Frequency of Communication with Friends and Family: More than three times a week     Frequency of Social Gatherings with Friends and Family: Once a week     Attends Yarsani Services: 1 to 4 times per year     Active Member of Clubs or Organizations: Yes     Attends Club or Organization Meetings: 1 to 4 times per year     Marital Status: Never    Intimate Partner Violence: Not At Risk (11/11/2023)    Humiliation, Afraid, Rape, and Kick questionnaire     Fear of Current or Ex-Partner: No     Emotionally Abused: No     Physically Abused: No     Sexually Abused: No   Housing Stability: Unknown (11/11/2023)    Housing Stability Vital Sign     Unable to Pay for Housing in the Last Year: Patient refused     Number of Places Lived in the Last Year: Not on file     Unstable Housing in the Last Year: Patient refused     No Known Allergies     Prior to Admission Medications   Prescriptions Last Dose Informant Patient Reported? Taking?   B complex-vitamin C tablet  Self Yes No   Sig: Take 1 tablet by mouth once daily.   acetaminophen (Tylenol) 500 mg tablet  Self Yes No   Sig: Take 1 tablet (500 mg) by mouth once daily as needed for mild pain (1 - 3).   allopurinol (Zyloprim) 100 mg tablet  Self Yes No   Sig: Take 1 tablet (100 mg) by mouth once daily.   apixaban (Eliquis) 2.5 mg tablet  Self Yes No   Sig: Take 1 tablet (2.5 mg) by mouth 2 times a day.   aspirin 81 mg EC tablet  Self Yes No   Sig: Take 1 tablet (81 mg) by mouth once daily.   ergocalciferol (Vitamin D-2) 1.25 MG (52463 UT) capsule  Self Yes No   Sig: Take 1 capsule (1,250 mcg) by mouth 1 (one) time per week. MONDAY NEED AN REFILL   ezetimibe (Zetia) 10 mg tablet  Self Yes No   Sig: Take 1 tablet (10 mg) by mouth once daily.   finasteride (Proscar) 5 mg tablet  Self Yes No   Sig: Take 1 tablet (5 mg) by mouth once daily. Do not crush, chew, or split.   melatonin 3 mg capsule  Self Yes No   Sig: Take 1 capsule by mouth once daily at bedtime.   metoprolol  "tartrate (Lopressor) 25 mg tablet  Self Yes No   Sig: Take 1 tablet (25 mg) by mouth 2 times a day.   midodrine (Proamatine) 10 mg tablet  Self Yes No   Sig: Take 1 tablet (10 mg) by mouth every 8 hours.   ondansetron (Zofran) 4 mg tablet  Self Yes No   Sig: Take 1 tablet (4 mg) by mouth every 12 hours if needed for nausea or vomiting.   rosuvastatin (Crestor) 20 mg tablet  Self Yes No   Sig: Take 1 tablet (20 mg) by mouth once daily at bedtime.   sevelamer carbonate (Renvela) 800 mg tablet  Self Yes No   Sig: Take 2 tablets (1,600 mg) by mouth 3 times a day with meals. Swallow tablet whole; do not crush, break, or chew.   sodium bicarbonate 325 mg tablet  Self Yes No   Sig: Take 1 tablet (325 mg) by mouth 2 times a day. 1 TABS 2 TIMES FOR 7 DAYS      Facility-Administered Medications: None      Scheduled medications  apixaban, 2.5 mg, oral, BID  aspirin, 81 mg, oral, Daily  B complex-vitamin C, 1 tablet, oral, Daily  ergocalciferol, 1,250 mcg, oral, Weekly  ezetimibe, 10 mg, oral, Daily  finasteride, 5 mg, oral, Daily  melatonin, 10 mg, oral, Daily  metoprolol tartrate, 25 mg, oral, BID  psyllium, 1 packet, oral, Daily  rosuvastatin, 20 mg, oral, Nightly  sevelamer carbonate, 1,600 mg, oral, TID with meals    No Known Allergies    Heart Rate:  []   Temp:  [36 °C (96.8 °F)-37.3 °C (99.1 °F)]   Resp:  [12-27]   BP: (112-157)/()   Height:  [180.3 cm (5' 11\")]   Weight:  [108 kg (239 lb)]   SpO2:  [92 %-100 %]    Weight: 108 kg (239 lb)   General appearance: Awake and alert, oriented. No distress, pressured speech  HEENT: supple, moist oral mucosa, no mouth ulcers  Neck: No JVD  Skin: no apparent rash  Heart: heart sounds 1 & 2 present and normal, no murmurs heard or friction rub  Lungs: Adequate air entry, b/l crackles  Abdomen: soft, non tender, no masses palpated, no flank tenderness  Extremities: +LE edema, no joint swelling,  : deferred  Neuro: No FND, no asterixis   ACCESS: LUE AVF with palpable " thrill and bruit     Results from last 72 hours   Lab Units 11/17/23  0952   SODIUM mmol/L 140   POTASSIUM mmol/L 5.2   CO2 mmol/L 21   BUN mg/dL 84*   CREATININE mg/dL 8.96*   PHOSPHORUS mg/dL 5.5*   CALCIUM mg/dL 10.2   ALBUMIN g/dL 4.1   GLUCOSE mg/dL 95   HEMOGLOBIN g/dL 10.5*   WBC AUTO x10*3/uL 7.9        Assessment   ESRD-HD admitted with dyspnea in the setting of missed dialysis . His most recent HD was on 11/14 when he only completed 2 hrs     Plan   Plan HD per submitted orders, UF 2-3L as tolerated ; however pt only willing to have 2 hrs HD and he signed AMA from HD so got incomplete HD today ; reassess tomorrow for HD needs; suggest to add torsemide 100 mg every day to help with BP and volume status   MBD - Phosphorus binder: continue with Sevelamer 1600 mg TID AC  Anemia of CKD: EPO not needed   Access: no issues   BP: acceptable during treatment ; continue UF as tolerated   Renal Diet   Daily renal MVI   Continue 3 x per week hemodialysis while admitted, next one on Sunday due to holiday schedule    Suggest psych evaluation re: agitation/anxiety    Luma Hope MD MPH

## 2023-11-17 NOTE — ED PROVIDER NOTES
HPI   Chief Complaint   Patient presents with    Chest Pain    Shortness of Breath       HPI     Kj Colmenares is a 53 y.o. male with a PMH of ESRD on HD, DM2, HTN, GERD, DVT (on Eliquis), HLD, PAF, stroke presenting to the emergency department with multiple medical complaints.  Patient states that he was recently admitted and subsequently discharged 11/14 at an outside hospital for volume overload and nausea/vomiting.  Patient reports that prior to that admission, he had not received dialysis for approximately a month because he had been caring for his significant other who had been sick.  He reports that he has only been intermittently taking his medications.  Patient got dialysis as an inpatient on Saturday, though he says this was an incomplete session.  Today, the patient went to dialysis and then left without getting dialyzed and pulled out his own needles because he reports that he did not feel good being in the dialysis center.  Patient is now reporting subacutely increasing shortness of breath, chest pain, epigastric abdominal pain, nausea, inability to keep down food, and nonbloody diarrhea. He reports that he has been unable to keep down his medications the past few days due to his nausea and vomiting. No trauma, falls, or injuries. No passing out. Denies any headache, dizziness, vision changes, neck pain, fever, chills, cough, congestion, pain with urination, blood in urine or stool, or numbness. He states that he still makes a small amount of urine.  Patient was told to come to the hospital by his outpatient nephrologist Dr. Atkins for admission.  He reports that he was specifically told to come to this hospital as they believe that he had been discharged too soon from the outside hospital previously.               [place] Coma Scale Score: 15                  Patient History   Past Medical History:   Diagnosis Date    Diabetes mellitus (CMS/[facility])     Hypertension     Stroke (CMS/[facility])      Past  Surgical History:   Procedure Laterality Date    CT HEAD ANGIO W AND WO IV CONTRAST  2/4/2014    CT HEAD ANGIO W AND WO IV CONTRAST 2/4/2014 [facility] CLINICAL LEGAC[facility]    CT HEAD ANGIO W AND WO IV CONTRAST  2/13/2014    CT HEAD ANGIO W AND WO IV CONTRAST 2/13/2014 [facility] CLINICAL LEGACY     No family history on file.  Social History     Tobacco Use    Smoking status: Never    Smokeless tobacco: Never   Substance Use Topics    Alcohol use: Never    Drug use: Never       Physical Exam   ED Triage Vitals [11/16/23 2119]   Temp Heart Rate Resp BP   37.3 °C (99.1 °F) 107 18 (!) 151/93      SpO2 Temp Source Heart Rate Source Patient Position   99 % Tympanic Monitor Sitting      BP Location FiO2 (%)     Left arm --       Physical Exam  Constitutional:       Appearance: He is not toxic-appearing or diaphoretic.   HENT:      Head: Normocephalic and atraumatic.      Nose: Nose normal.   Eyes:      General: No scleral icterus.        Right eye: No discharge.         Left eye: No discharge.      Conjunctiva/sclera: Conjunctivae normal.   Cardiovascular:      Rate and Rhythm: Normal rate.      Heart sounds: No murmur heard.     No friction rub. No gallop.      Comments: Tender to palpation over the anterior chest.  Regular rate and rhythm.  Normal breath sounds.  Pulmonary:      Effort: No respiratory distress.      Breath sounds: Normal breath sounds.   Abdominal:      General: There is no distension.      Palpations: Abdomen is soft.      Tenderness: There is abdominal tenderness (Diffusely, in all 4 quadrants without rebound or guarding).   Musculoskeletal:         General: No deformity or signs of injury.      Cervical back: Neck supple. No rigidity.      Comments: Patient has a AV fistula in his left upper extremity with a palpable thrill, no active bleeding   Skin:     General: Skin is warm and dry.   Neurological:      Mental Status: He is alert.   Psychiatric:         Mood and Affect: Mood normal.         Behavior: Behavior  normal.         ED Course & MDM   Diagnoses as of 11/17/23 0259   Hypervolemia, unspecified hypervolemia type   EKG showing normal sinus rate and rhythm, normal axis, normal intervals, no signs of acute ST elevation or depression or peaked T waves.  Compared to previous EKG November 10, 2023.    Medical Decision Making  Patient is a 53-year-old male resenting to the emergency department with multiple medical complaints. In the Emergency Department, hospital records were reviewed and IV access was obtained.  Patient's pain and nausea were treated with Dilaudid and Zofran with partial symptomatic improvement.  Given that patient was initially tachycardic and complaining of shortness of breath alongside his long set of symptoms, was sent for CT PE study that was negative for pulmonary embolus, pneumonia, other new or acute findings.  CT the abdomen pelvis was obtained given his abdominal pain, nausea, vomiting, and diarrhea without new or acute findings.  Given this, low clinical concern for intra-abdominal abscess, ascites development, volvulus, or obstruction. CBC showed no leukocytosis. Creatinine is elevated at 9.27 with BUN of 82, consistent with patient's history of ESRD on dialysis. BNP is elevated at 284. Flu and COVID tests are negative. Patient remained significantly symptomatic, however not requiring a new oxygen requirement.  No evidence of hyperkalemia requiring acute treatment at this time.  Given patient's clinical exam evidence of volume overload, the patient will be admitted to the hospital for further evaluation, medication management, and inpatient dialysis. He remains stable and is agreeable to the plan of care.     Procedure  Procedures     Chico Bah MD  Resident  11/17/23 1555       Shruthi Negrete MD  11/17/23 8192

## 2023-11-17 NOTE — ED NOTES
Patient stated that he was sent in by his MD. Pt stated that he was at dialysis on saturday and only got 2 hours of it because he pulled out his dialysis catheters. When asked why pt stated that he didnt want to live anymore cause he was tired. Pt stated that he wants to start dialysis getting dialysis consistently again and prior to saturday hasnt had dialysis in a month. Pt c/o N/V and hasn't been able to keep any medications down. SOB and CP endorsed by pt     Ivanna Fuentes RN  11/17/23 0032

## 2023-11-17 NOTE — NURSING NOTE
Report from Sending RN:    Report From: GUERITA Garcia  Recent Surgery of Procedure: No  Baseline Level of Consciousness (LOC): x4  Oxygen Use: No  Type:   Diabetic: No  Last BP Med Given Day of Dialysis:   Last Pain Med Given:   Lab Tests to be Obtained with Dialysis: No  Blood Transfusion to be Given During Dialysis: No  Available IV Access: Yes  Medications to be Administered During Dialysis: No  Continuous IV Infusion Running: No  Restraints on Currently or in the Last 24 Hours: No  Hand-Off Communication:   Patient complaint no pain.  Will come in cart.

## 2023-11-17 NOTE — ED NOTES
Assumed pt care rounding completed on pt, pt resting with eyes closed, pt easily awoken upon assessment, pt on bedside monitor. Will continue to monitor pt.      Radha Mckeon RN  11/17/23 2887

## 2023-11-17 NOTE — H&P
"History Of Present Illness  Kj Colmenares is a 53 y.o. male with a past medical history of afib (on Eliquis), ESRD on HD, medical noncompliance, T2DM, HTN, HLD, GERD, remote DVT, and remote CVA (ICH, 2017)who presented to the ED with chest pain and shortness of breath. Apparently, he was at HD earlier when he abruptly removed his HD catheter and left midway through his session. His MD reportedly called him and instructed him to report to the ED. Notably, he was evaluated on 11/14 at [facility] for nausea and vomiting after being discharged earlier that day from [facility] after being admitted for the same.  He denies any fever, chills, cough, congestion, pain with urination, blood in urine or stool.  States that he still makes a small amount of urine. He reports being specifically told to come to this hospital as \"they believe that he had been discharged too soon from the outside hospital previously.\" On exam in ED13, he acknowledge stating he wanted to no longer live, however denies any SI and is future oriented and talks about his children (who have lost their mother previously). He raises concerns with the amount of time he is kept on HD and feels 2 hours is more tolerable     Past Medical History  Past Medical History:   Diagnosis Date    Diabetes mellitus (CMS/HCC)     Hypertension     Stroke (CMS/[facility])        Surgical History  Past Surgical History:   Procedure Laterality Date    CT HEAD ANGIO W AND WO IV CONTRAST  2/4/2014    CT HEAD ANGIO W AND WO IV CONTRAST 2/4/2014 [facility] CLINICAL LEGACY    CT HEAD ANGIO W AND WO IV CONTRAST  2/13/2014    CT HEAD ANGIO W AND WO IV CONTRAST 2/13/2014 [facility] CLINICAL LEGACY        Social History  He reports that he has never smoked. He has never used smokeless tobacco. He reports that he does not drink alcohol and does not use drugs.    Family History  No family history on file.     Allergies  Patient has no known allergies.    Review of Systems   Constitutional:  Negative for " [facility] Hospital Medicine  Progress Note    Patient Name: Hal Sultana  MRN: [id number]  Patient Class: IP- Inpatient   Admission Date: 1/7/2018  Length of Stay: 4 days  Attending Physician: Cj Orr MD  Primary Care Provider: Kaycee Ortega MD    [facility] Medicine Team: [facility] MED 4 Norbert Richardson IV, MD    Subjective:     Principal Problem:Obstructive jaundice    HPI:  This is Mr. Hal Sultana, 87 year old male with PMHx is significant for acquired pancytopenia, aortic aneurysm, stable CAD, esophageal dysmotility, gout, hypertension. Myelodysplastic syndrome. He is a transfer from [place], [place]. He was accepted for transfer by Dr. Sanchez from [facility] for possible intervention for obstructive jaundice.     He presented to ID clinic on January 1st, 2018 with worsening jaundice and T. Bili reached 19. He was discharged from the same hospital on 12/29/2017 after admission for pruritus and jaundice, underwent MRCP was inconclusive because of motion artifact. Plan to do ERCP; however not done because of improvement in his LFTs. During his December stay, where he initially presented with abdominal bloating and weight loss, he was found to have Staphylococcus sciuri bacteremia which was treated with Doxycycline and Daptomycin (with plan to end date on 2/2018 and intention to complete the therapy on with home infusion care). He underwent EGD and biopsy on 12/13/2017 with result showed no evidence of tumor or dysplasia. He also found to have severe duodenal stricture with plan for intervention (AES versus surgically).    For the January 2018 admission, he underwent different investigation for his worsening LFTs. CT abdomen on 1/3/2018 showed interval development of intrahepatic biliary ductal dilation with continued dilatation of CBD, no pancreas lesion. On 1/3/2018 he underwent ERCP which showed Tight duodenal stricture in the post bulbar area with inability to  "chills and fever.   Respiratory:  Positive for shortness of breath.    Cardiovascular:  Positive for chest pain. Negative for palpitations.   Gastrointestinal:  Negative for abdominal pain, constipation, diarrhea, nausea and vomiting.   Genitourinary:  Negative for dysuria, flank pain, frequency, hematuria and urgency.   All other systems reviewed and are negative.       Physical Exam  Vitals reviewed.   Constitutional:       Appearance: He is obese.   HENT:      Head: Normocephalic and atraumatic.   Cardiovascular:      Rate and Rhythm: Normal rate and regular rhythm.      Heart sounds: Normal heart sounds.   Pulmonary:      Effort: Pulmonary effort is normal.      Breath sounds: Normal air entry.   Abdominal:      General: Bowel sounds are normal.      Palpations: Abdomen is soft.      Tenderness: There is no abdominal tenderness.   Musculoskeletal:         General: No deformity.   Skin:     General: Skin is warm and dry.   Neurological:      General: No focal deficit present.      Mental Status: He is alert and oriented to person, place, and time.   Psychiatric:         Mood and Affect: Mood normal.         Speech: Speech is rapid and pressured.         Behavior: Behavior is hyperactive.          Last Recorded Vitals  Blood pressure (!) 152/93, pulse 109, temperature 37.3 °C (99.1 °F), temperature source Tympanic, resp. rate 16, height 1.803 m (5' 11\"), weight 108 kg (239 lb), SpO2 99 %.    Relevant Results      Lab Results   Component Value Date    WBC 7.8 11/16/2023    HGB 10.7 (L) 11/16/2023    HCT 34.6 (L) 11/16/2023    MCV 94 11/16/2023     11/16/2023     Lab Results   Component Value Date    GLUCOSE 93 11/16/2023    CALCIUM 10.5 11/16/2023     (H) 11/16/2023    K 4.4 11/16/2023    CO2 17 (L) 11/16/2023     (H) 11/16/2023    BUN 82 (H) 11/16/2023    CREATININE 9.27 (H) 11/16/2023     Lab Results   Component Value Date    HGBA1C 5.4 07/03/2023     CT abdomen pelvis w IV contrast  Narrative: " get the scope to pass the stricture with concern this could be a malignant duodenal lesion. On 1/5/2018, he underwent percutaneous biliary drain despite multiple phases, and possibly due to intrahepatic duct dilation made the procedure difficult. On 1/5/2018 he underwent Based on labs for the last couple of days were T. Bili 19 and Direct Bili 14, Alk Phosph 469 ALT 52 AST 50. Renal function panel is within normal range. I reviewed medical charts from [facility] and summarize his charts.     Hospital Course:  Patient was accepted to hospital medicine as a transfer for AES evaluation for a duodenal stricture.  Upon presentation, patient found to be pancytopenic, although this is chronic due to his diagnosis of MDS.  His TBili was also found to be 27.7, which continues to rise. His vitals were stable, but blood cultures were drawn due to recent history of staph bacteremia.  These have been NGTD.  Patient was on daptomycin at home for the bactermia, which was continued throughout his stay.  Patient underwent EUS and ERCP on 1/8/18, but was unsuccessful as GI was unable to pass the ERCP scope to 2nd portion of the duodenum despite duodenal dilation. A biopsy was taken, and surgical oncology was consulted who recommended a CT with contrast pancreas protocol and a PTC by IR.  They are awaiting biopsy results to determine next step in plan. During the night after the ERCP,  patient was transfused 1 unit of pRBCs per AES recs and began to have hematemesis (300 cc total) close to midnight towards end of transfusion. His SBP was in the 90s/50s, HR 80s.  Patient started on protonix IV and repeat CBC showed a Hb 7.2.  Another unit of pRBCs was given with follow up Hb 7.7. Patient's BP improved, and his mentation was never altered.  Towards the end of the 2nd unit, patient had 2 melenic BMs with no changes in BP.  He continued to deny abdominal pain.  On 1/10/18, patient had another melenic BM and was  Interpreted By:  Nguyễn Rosales,   STUDY:  CT ABDOMEN PELVIS W IV CONTRAST;  11/17/2023 2:03 am      INDICATION:  Signs/Symptoms:abdominal pain.      COMPARISON:  CT abdomen and pelvis 11/10/2023      ACCESSION NUMBER(S):  [id number]      ORDERING CLINICIAN:  HELENE AHMADI      TECHNIQUE:  Contiguous axial images of the abdomen and pelvis were obtained after  the intravenous administration of 100 mL Omnipaque 350 contrast.  Coronal and sagittal reformatted images were reconstructed from the  axial data.      FINDINGS:  LOWER CHEST: Please see same-day chest CT.      LIVER: No significant parenchymal abnormality.      BILE DUCTS: No significant intrahepatic or extrahepatic dilatation.      GALLBLADDER: Dependent sludge versus stones in the gallbladder. No CT  evidence of acute cholecystitis.      PANCREAS: No significant abnormality.      SPLEEN: No significant abnormality.      ADRENALS: No significant abnormality.      KIDNEYS, URETERS, BLADDER: Mild atrophy of the bilateral kidneys.  Contrast excretion within the central collecting systems. Similar  bilateral renal cysts and additional subcentimeter hypodensities too  small to fully characterize. No hydronephrosis or hydroureter.  Bladder is partially opacified with contrast and overall unremarkable.      REPRODUCTIVE ORGANS: No significant abnormality.      GI: The stomach is distended with fluid. No evidence of bowel  obstruction. No significant bowel wall thickening or adjacent  inflammatory change. The appendix is not visualized. No pericecal  inflammatory change or secondary signs of acute appendicitis.      VESSELS: No significant abnormality. The portal veins and IVC are  patent.      PERITONEUM/RETROPERITONEUM: No ascites, free air, or fluid collection.      LYMPH NODES: No enlarged lymph nodes.      ABDOMINAL WALL: Small fat containing right inguinal hernia.      OSSEOUS STRUCTURES: Mild sclerosis throughout the visualized osseous  structures which may  transfused 1 unit of pRBCs for Hb 7.0.  He also underwent duodenal stenting and choledochoduodenostomy placement by AES.  Because the pathology result from the initial biopsy did not report malignancy, AES will repeat biopsy.     Interval History: SILVIA. Stent placed for obstruction. Reports doing well but has had significant weakness since admission     Review of Systems   Constitutional: Positive for fatigue. Negative for chills and fever.   HENT: Negative for congestion and rhinorrhea.    Eyes: Negative for discharge and redness.   Respiratory: Negative for cough and shortness of breath.    Cardiovascular: Negative for chest pain and palpitations.   Gastrointestinal: Negative for diarrhea, nausea and vomiting.   Genitourinary: Negative for dysuria and frequency.   Musculoskeletal: Negative for arthralgias and myalgias.   Skin: Positive for color change. Negative for rash.   Neurological: Positive for weakness. Negative for headaches.      Objective:     Vital Signs (Most Recent):  Temp: 98.1 °F (36.7 °C) (01/11/18 1547)  Pulse: (!) 115 (01/11/18 1547)  Resp: 18 (01/11/18 1547)  BP: 127/70 (01/11/18 1547)  SpO2: 98 % (01/11/18 1547) Vital Signs (24h Range):  Temp:  [97.8 °F (36.6 °C)-100.2 °F (37.9 °C)] 98.1 °F (36.7 °C)  Pulse:  [] 115  Resp:  [16-20] 18  SpO2:  [96 %-100 %] 98 %  BP: (122-167)/(41-73) 127/70     Weight: 66.5 kg (146 lb 9.7 oz)  Body mass index is 21.65 kg/m².    Intake/Output Summary (Last 24 hours) at 01/11/18 1650  Last data filed at 01/11/18 0354   Gross per 24 hour   Intake               50 ml   Output               80 ml   Net              -30 ml      Physical Exam   Constitutional: He is oriented to person, place, and time. No distress.   HENT:   Head: Normocephalic and atraumatic.   Eyes: EOM are normal. Scleral icterus is present.   Cardiovascular: Normal rate and regular rhythm.    Pulmonary/Chest: Effort normal. No respiratory distress.   Abdominal: Soft. Bowel sounds are normal.  He exhibits no distension. There is no tenderness.   Musculoskeletal: Normal range of motion. He exhibits no edema.   Neurological: He is alert and oriented to person, place, and time.   Skin: Skin is warm and dry. He is not diaphoretic.   jaundiced       Significant Labs:    Recent Labs  Lab 01/10/18  2137 01/11/18  0537 01/11/18  1330   WBC 4.78  4.78 5.58 6.86   HGB 8.0*  8.0* 7.8* 8.9*   HCT 24.0*  24.0* 23.5* 26.2*   PLT 85*  85* 88* 99*   MONO 7.0  7.0  CANCELED  CANCELED 11.6  0.7 11.7  0.8       Recent Labs  Lab 01/10/18  0518 01/11/18  0537 01/11/18  1330    143 136   K 3.9 2.6* 3.3*   * 118* 108   CO2 20* 17* 19*   BUN 41* 23 24*   CREATININE 1.4 1.2 1.2   CALCIUM 8.0* 7.0* 8.0*   PROT 4.7* 4.2* 4.8*   BILITOT 33.7* 27.1* 28.7*   ALKPHOS 371* 337* 348*   ALT 44 35 36   AST 75* 53* 49*         Assessment/Plan:      * Obstructive jaundice    - Extensive workup at [facility]. Unsuccessful ERCP and Percutaneous biliary drain   - Based on CT abdomen, ERCP at [facility] showed severe duodenal stricture and unable to pass the scope through it   - CA 19-9 >60,000, suggestive of pancreatic or biliary malignancy  - AES consulted and performed EUS & ERCP (1/8/18), which was unsuccessful because were not able to pass the ERCP scope to 2nd portion of the duodenum despite duodenal dilation.  - 1/8 Biopsy was taken and showed atypical cells with mucinous background but no reported malignancy   - Surgical Oncology consulted and awaiting biopsy results before further recs  - CT pancreas protocol did not show a pancreatic mass but did show wall thickening and dilatation of the 1st part of duodenum and dilatation of the intrahepatic and CBD  - 1/10 AES to perform duodenal stenting + choledochoduodenostomy placement; repeat bx  - Continue cholestyramine for itching  - Bili downtrending  - D/c plan is home health with referrals to Oncology here to establish care          Benign prostatic  reflect sequela of renal osteodystrophy.      Impression: 1. No acute abnormality within the abdomen or pelvis.  2. Osseous findings suggestive of renal osteodystrophy.  3. Small fat containing right inguinal hernia.      MACRO:  None      Signed by: Nguyễn Rosales 11/17/2023 2:20 AM  Dictation workstation:   [id number]  CT angio chest for pulmonary embolism  Narrative: Interpreted By:  Nguyễn Rosales,   STUDY:  CT ANGIO CHEST FOR PULMONARY EMBOLISM;  11/17/2023 2:03 am      INDICATION:  Signs/Symptoms:SOB, chest pain, tachycardia, hx of clots.      COMPARISON:  CT chest 10/19/2021      ACCESSION NUMBER(S):  [id number]      ORDERING CLINICIAN:  ENA DIAMOND      TECHNIQUE:  Contiguous axial images of the chest were obtained after the  intravenous administration of 100 mL Omnipaque 350 contrast using  angiographic PE protocol. Coronal and sagittal reformatted images  were reconstructed from the axial data. MIP images were created on an  independent workstation and reviewed.      FINDINGS:  PULMONARY ARTERIES: Adequate opacification to the level of the mid  segmental arteries. Mild respiratory motion and mixing artifact  limits assessment of the distal segmental and subsegmental arteries.  No filling defect to suggest pulmonary embolus in the visualized  pulmonary arteries. The main pulmonary artery is enlarged at 3.1 cm.  No CT evidence of right heart strain.      HEART: Global cardiomegaly. No significant coronary artery  calcifications. No significant pericardial effusion.      VESSELS: Normal caliber aorta without dissection. No significant  aortic atherosclerosis.      MEDIASTINUM AND LYMPH NODES: Visualized thyroid is within normal  limits. No enlarged intrathoracic or axillary lymph nodes by imaging  criteria. No pneumomediastinum. The esophagus appears within normal  limits.      LUNG, AIRWAYS, AND PLEURA: Trachea and proximal mainstem bronchi are  patent. Subtle hazy ground-glass opacities in the  hyperplasia with lower urinary tract symptoms    - Stable on Flomax for BPH, will continue         Melena    - 1 episode of hematemesis and 2 melenic BMs in evening of 1/8  - EUS performed on 1/8 noted clots in gastric fundus without active bleeding  - Per AES, to be expected given findings of clot and to continue to monitor vitals  - Started on IVF, octreotide, protonix IV, and rocephin   - No further episodes of hematemesis but did have a melenic BM this morning 1/10          Hematemesis    See melena            Bacteremia due to Staphylococcus    -Per ID dapto stop date 1/12, may have been contaminant as was only 1/4 Cx         Bacterial infection due to Staphylococcus    - On Decemebr 2017 admission found to have Staphylococcus sciuri bactremia   - Was treated with Doxycycline and Daptomycin (with plan to end date on 2/2018 and intention to complete the therapy on with home infusion care).  - ID consulted and appreciate recs  - Home infusion center called and pt was on daptomycin 430mg IV with end date scheduled for 2/7/18  - Will continue Daptomycin 500mg IV qday with weekly CPK labs  - Per ID dapto stop date 1/12, may have been contaminant as was only 1/4 Cx   - Will need repeat blood cultures as outpatient        Duodenal stricture    See obstructive jaundice        Aortic aneurysm    - Remote history and underwent repair         CAD (coronary artery disease)    - Remote history of CAD and CABG  - Currently stable, no chest pain, on Aspirin and Statin at home  - Held aspirin in setting of GIB        Esophageal dysmotilities    - Underwent EGD, and no anatomic abnormalities in EGD   - Stable on Pantoprazole for symptomatic GERD         MDS (myelodysplastic syndrome)    Diagnosed 18 mos ago.  Patient pancytopenic. On Vidaza at home, but hemo onc consulted and recommend holding in setting of additional malignancy workup.   - Will continue to monitor CBCs and transfuse prn  -  marinol 2.5mg BID in future as  peribronchovascular  distribution. 4 mm nodule along the plane of the right minor fissure,  probably an intra fissural lymph node. No focal consolidation,  pleural effusion, or pneumothorax.      OSSEOUS STRUCTURES: No acute osseous abnormality.      CHEST WALL SOFT TISSUES: No discernible abnormality.      UPPER ABDOMEN/OTHER: Please see separately dictated CT.      Impression: 1. No pulmonary embolus identified to the level of the mid segmental  arteries. Small distal filling defects can not be entirely excluded.  2. Cardiomegaly and subtle hazy perivascular opacities which may  reflect mild edema.      MACRO:  None      Signed by: Nguyễn Rosales 11/17/2023 2:16 AM  Dictation workstation:   [id number]  ECG 12 lead  See ED provider note for full interpretation and clinical correlation  Confirmed by Lizz Olvera ([id number]) on 11/17/2023 2:01:11 AM  XR chest 1 view  Narrative: Interpreted By:  Finkelstein, Evan,   STUDY:  XR CHEST 1 VIEW;  11/17/2023 12:52 am      INDICATION:  Signs/Symptoms:chest pain.      COMPARISON:  Chest radiograph 06/16/2023      ACCESSION NUMBER(S):  [id number]      ORDERING CLINICIAN:  HELENE AHMADI      FINDINGS:          CARDIOMEDIASTINAL SILHOUETTE:  Cardiomediastinal silhouette is stable in size and configuration.      LUNGS:  No pulmonary consolidation, pleural effusion or pneumothorax.      ABDOMEN:  No remarkable upper abdominal findings.      BONES:  No acute osseous abnormality.      Impression: No radiographic evidence of acute cardiopulmonary pathology.      MACRO:  None.      Signed by: Evan Finkelstein 11/17/2023 12:58 AM  Dictation workstation:   [id number]    Encounter Date: 11/17/23   ECG 12 lead   Result Value    Ventricular Rate 99    QRS Duration 86    QT Interval 328    QTC Calculation(Bazett) 420    R Axis 68    T Axis 31    QRS Count 17    Q Onset 220    T Offset 384    QTC Fredericia 387    Narrative    See ED provider note for full interpretation and clinical  appetite stimulant          Essential hypertension    - On Amlodipine 5 mg and Sotalol 40 mg PO BID at home  - Continue amlodipine as BP controlled          VTE Risk Mitigation         Ordered     Medium Risk of VTE  Once      01/06/18 2339     Place SANGITA hose  Until discontinued      01/06/18 2339     Place sequential compression device  Until discontinued      01/06/18 2339              Norbert Richardson IV, MD  Department of Hospital Medicine   [facility]   correlation  Confirmed by Lizz Olvera ([id number]) on 11/17/2023 2:01:11 AM     ED Medication Administration from 11/16/2023 2106 to 11/17/2023 0319         Date/Time Order Dose Route Action Action by     11/17/2023 0104 EST HYDROmorphone (Dilaudid) injection 1 mg 1 mg intravenous Given ULISES Fuentes     11/17/2023 0104 EST ondansetron (Zofran) injection 4 mg 4 mg intravenous Given ULISES Fuentes     11/17/2023 0203 EST iohexol (OMNIPaque) 350 mg iodine/mL solution 100 mL 100 mL intravenous Given CATHIE Kahn               Assessment/Plan   Principal Problem:    Hypervolemia, unspecified hypervolemia type      #ESRD on HD  -Patient self-terminated HD today  -Consult nephrology for HD management, input appreciated  -RFP c/w needing HD, electrolytes stable  -Renal dosing where indicated  -Renal diet  -Continue home medications    #Afib  #Tachycardia  #Atypical chest pain  -Troponin -ve x2  -EKG NST with LVH criteria  -Continue home medications  -Continue Eliquis  -Telemetry    #Medical noncompliance  -7 ED visits with 3 admissions since 9/2023  -Consult SW  -May benefit from psych input given statement to ED RN    #HTN  -Uncontrolled  -Continue home metoprolol  -Will hold Midodrine    #T2DM  -Controlled, a1c 5.4 7/2023  -Not on home medications    #Nausea and vomiting  -Chronic in nature  -Continue Zofran PO           NIGEL Irving-CNP

## 2023-11-17 NOTE — PROGRESS NOTES
Kj Colmenares is a 53 y.o. male on day 0 of admission presenting with Hypervolemia, unspecified hypervolemia type. SW consulted with RN and MD to confirm patient's outpatient dialysis bed. SW called and confirmed with [facility] ([phone]) that patient has a MWF 6p-945p chair time. SW did ask if patient has looked into shortening his time; facility advised that patient has specific orders at this time. SW updated medical team, will follow up if additional needs arise.

## 2023-11-17 NOTE — ED NOTES
Pt awake given breakfast tray, pt has no other needs at this time will continue to monitor pt .      Radha Mckeon RN  11/17/23 1422

## 2023-11-17 NOTE — PROGRESS NOTES
Pharmacy Medication History Review    Kj Colmenares is a 53 y.o. male admitted for Hypervolemia, unspecified hypervolemia type. Pharmacy reviewed the patient's [id number] medications and allergies for accuracy.    The list below reflects the updated PTA list. Please review each medication in order reconciliation for additional clarification and justification.    Prior to Admission Medications   Prescriptions Last Dose Informant Patient Reported? Taking?   B complex-vitamin C-folic acid (Nephrocaps) 1 mg capsule   Yes Yes   Sig: Take 1 capsule by mouth once daily.   OLANZapine (ZyPREXA) 5 mg tablet unsure of if he takes this medication      Sig: Take 1 tablet (5 mg) by mouth once daily at bedtime.   acetaminophen (Tylenol) 500 mg tablet  Self Yes No   Sig: Take 1 tablet (500 mg) by mouth once daily as needed for mild pain (1 - 3).   allopurinol (Zyloprim) 100 mg tablet unsure of if he takes this medication Self No No   Sig: Take 1 tablet (100 mg) by mouth once daily.   apixaban (Eliquis) 2.5 mg tablet last filled 7/3 (30 ds) Self Yes Yes   Sig: Take 1 tablet (2.5 mg) by mouth 2 times a day.   aspirin 81 mg EC tablet  Self Yes Yes   Sig: Take 1 tablet (81 mg) by mouth once daily.   ergocalciferol (Vitamin D-2) 1.25 MG (70925 UT) capsule needs refill Self Yes No   Sig: Take 1 capsule (1,250 mcg) by mouth 1 (one) time per week. MONDAY NEED AN REFILL   ezetimibe (Zetia) 10 mg tablet  Self Yes Yes   Sig: Take 1 tablet (10 mg) by mouth once daily.   finasteride (Proscar) 5 mg tablet unsure of if he takes this medication Self Yes Yes   Sig: Take 1 tablet (5 mg) by mouth once daily. Do not crush, chew, or split.   melatonin 3 mg capsule  Self Yes Yes   Sig: Take 1 capsule by mouth once daily at bedtime.   metoprolol tartrate (Lopressor) 25 mg tablet  Self Yes Yes   Sig: Take 1 tablet (25 mg) by mouth 2 times a day.   midodrine (Proamatine) 10 mg tablet  Self Yes Yes   Sig: Take 1 tablet (10 mg) by mouth every 8  hours.   ondansetron (Zofran) 4 mg tablet does not help with nausea Self Yes No   Sig: Take 1 tablet (4 mg) by mouth every 12 hours if needed for nausea or vomiting.   rosuvastatin (Crestor) 20 mg tablet  Self Yes Yes   Sig: Take 1 tablet (20 mg) by mouth once daily at bedtime.   sevelamer carbonate (Renvela) 800 mg tablet  Self Yes Yes; 1 tablet with each meal   Sig: Take 2 tablets (1,600 mg) by mouth 3 times a day with meals. Swallow tablet whole; do not crush, break, or chew.   sodium bicarbonate 325 mg tablet  Self Yes Yes   Sig: Take 1 tablet (325 mg) by mouth 2 times a day. 1 TABS 2 TIMES FOR 7 DAYS   tamsulosin (Flomax) 0.4 mg 24 hr capsule   Yes Yes   Sig: Take 1 capsule (0.4 mg) by mouth once daily.      Facility-Administered Medications: None          The list below reflectives the updated allergy list. Please review each documented allergy for additional clarification and justification.  Allergies  Reviewed by Heather Griffith PharmD on 11/17/2023   No Known Allergies       Sources used: Pharmacy dispense history, OARRs, patient interview (relatively poor historian- can name off 4  medications with frequency, but unable to name others. Was able to confirm based off of prompting/indication), discharge summary from 11/14 and discharge summary from [facility] 9/24    Below are additional concerns with the patient's PTA list.  -no recent fill history for the following: allopurinol, apixaban, renvela, vitamin D2  -pt states that the best source of medication dose is from [facility]  -pt has not been able to take any of his medications d/t his extreme nausea/vomiting- unsure when he was able to swallow medications     Heather Griffith, PharmD  Transitions of Care Pharmacist  Medication reconciliation complete  Please reach out via [employer] secure chat for questions, or if no response call [employer] or [employer] [facility]

## 2023-11-17 NOTE — ED TRIAGE NOTES
Pt presents by [facility] for chest pain and shortness of breath , states he was at dialysis today and took out his dialysis catheter himself.   Dialysis today and in the middle of the session took his needles out and left. Pt states his MD called and told him to go to a hospital.

## 2023-11-18 ENCOUNTER — APPOINTMENT (OUTPATIENT)
Dept: CARDIOLOGY | Facility: HOSPITAL | Age: 53
End: 2023-11-18
Payer: COMMERCIAL

## 2023-11-18 LAB
ALBUMIN SERPL BCP-MCNC: 3.9 G/DL (ref 3.4–5)
ANION GAP SERPL CALC-SCNC: 16 MMOL/L (ref 10–20)
BUN SERPL-MCNC: 59 MG/DL (ref 6–23)
CALCIUM SERPL-MCNC: 10.1 MG/DL (ref 8.6–10.6)
CHLORIDE SERPL-SCNC: 107 MMOL/L (ref 98–107)
CO2 SERPL-SCNC: 22 MMOL/L (ref 21–32)
CREAT SERPL-MCNC: 7.95 MG/DL (ref 0.5–1.3)
ERYTHROCYTE [DISTWIDTH] IN BLOOD BY AUTOMATED COUNT: 13.4 % (ref 11.5–14.5)
GFR SERPL CREATININE-BSD FRML MDRD: 7 ML/MIN/1.73M*2
GLUCOSE SERPL-MCNC: 95 MG/DL (ref 74–99)
HCT VFR BLD AUTO: 35.3 % (ref 41–52)
HGB BLD-MCNC: 10.6 G/DL (ref 13.5–17.5)
MCH RBC QN AUTO: 28.4 PG (ref 26–34)
MCHC RBC AUTO-ENTMCNC: 30 G/DL (ref 32–36)
MCV RBC AUTO: 95 FL (ref 80–100)
NRBC BLD-RTO: 0 /100 WBCS (ref 0–0)
PHOSPHATE SERPL-MCNC: 5.8 MG/DL (ref 2.5–4.9)
PLATELET # BLD AUTO: 176 X10*3/UL (ref 150–450)
POTASSIUM SERPL-SCNC: 4.5 MMOL/L (ref 3.5–5.3)
Q ONSET: 207 MS
QRS COUNT: 16 BEATS
QRS DURATION: 88 MS
QT INTERVAL: 346 MS
QTC CALCULATION(BAZETT): 437 MS
QTC FREDERICIA: 404 MS
R AXIS: 68 DEGREES
RBC # BLD AUTO: 3.73 X10*6/UL (ref 4.5–5.9)
SODIUM SERPL-SCNC: 140 MMOL/L (ref 136–145)
T AXIS: 54 DEGREES
T OFFSET: 380 MS
VENTRICULAR RATE: 96 BPM
WBC # BLD AUTO: 5.6 X10*3/UL (ref 4.4–11.3)

## 2023-11-18 PROCEDURE — 2500000001 HC RX 250 WO HCPCS SELF ADMINISTERED DRUGS (ALT 637 FOR MEDICARE OP): Mod: SE | Performed by: STUDENT IN AN ORGANIZED HEALTH CARE EDUCATION/TRAINING PROGRAM

## 2023-11-18 PROCEDURE — 93005 ELECTROCARDIOGRAM TRACING: CPT

## 2023-11-18 PROCEDURE — 2500000004 HC RX 250 GENERAL PHARMACY W/ HCPCS (ALT 636 FOR OP/ED): Mod: SE | Performed by: NURSE PRACTITIONER

## 2023-11-18 PROCEDURE — 2500000002 HC RX 250 W HCPCS SELF ADMINISTERED DRUGS (ALT 637 FOR MEDICARE OP, ALT 636 FOR OP/ED): Mod: SE | Performed by: NURSE PRACTITIONER

## 2023-11-18 PROCEDURE — 85027 COMPLETE CBC AUTOMATED: CPT | Performed by: STUDENT IN AN ORGANIZED HEALTH CARE EDUCATION/TRAINING PROGRAM

## 2023-11-18 PROCEDURE — 2500000001 HC RX 250 WO HCPCS SELF ADMINISTERED DRUGS (ALT 637 FOR MEDICARE OP): Mod: SE | Performed by: NURSE PRACTITIONER

## 2023-11-18 PROCEDURE — 99232 SBSQ HOSP IP/OBS MODERATE 35: CPT | Performed by: STUDENT IN AN ORGANIZED HEALTH CARE EDUCATION/TRAINING PROGRAM

## 2023-11-18 PROCEDURE — 2500000005 HC RX 250 GENERAL PHARMACY W/O HCPCS: Mod: SE | Performed by: NURSE PRACTITIONER

## 2023-11-18 PROCEDURE — 36415 COLL VENOUS BLD VENIPUNCTURE: CPT | Performed by: STUDENT IN AN ORGANIZED HEALTH CARE EDUCATION/TRAINING PROGRAM

## 2023-11-18 PROCEDURE — G0378 HOSPITAL OBSERVATION PER HR: HCPCS

## 2023-11-18 PROCEDURE — 80069 RENAL FUNCTION PANEL: CPT | Performed by: STUDENT IN AN ORGANIZED HEALTH CARE EDUCATION/TRAINING PROGRAM

## 2023-11-18 RX ADMIN — Medication 10 MG: at 20:01

## 2023-11-18 RX ADMIN — SEVELAMER CARBONATE 1600 MG: 800 TABLET, FILM COATED ORAL at 16:43

## 2023-11-18 RX ADMIN — PSYLLIUM HUSK 1 PACKET: 3.4 POWDER ORAL at 09:00

## 2023-11-18 RX ADMIN — APIXABAN 2.5 MG: 2.5 TABLET, FILM COATED ORAL at 20:01

## 2023-11-18 RX ADMIN — APIXABAN 2.5 MG: 2.5 TABLET, FILM COATED ORAL at 10:00

## 2023-11-18 RX ADMIN — SEVELAMER CARBONATE 1600 MG: 800 TABLET, FILM COATED ORAL at 12:00

## 2023-11-18 RX ADMIN — TORSEMIDE 40 MG: 20 TABLET ORAL at 10:00

## 2023-11-18 RX ADMIN — METOPROLOL TARTRATE 25 MG: 50 TABLET, FILM COATED ORAL at 10:00

## 2023-11-18 RX ADMIN — EZETIMIBE 10 MG: 10 TABLET ORAL at 10:00

## 2023-11-18 RX ADMIN — FINASTERIDE 5 MG: 5 TABLET, FILM COATED ORAL at 10:00

## 2023-11-18 RX ADMIN — ASPIRIN 81 MG: 81 TABLET, COATED ORAL at 10:00

## 2023-11-18 RX ADMIN — ROSUVASTATIN CALCIUM 20 MG: 20 TABLET, FILM COATED ORAL at 20:01

## 2023-11-18 RX ADMIN — Medication 1 TABLET: at 10:00

## 2023-11-18 RX ADMIN — ONDANSETRON 4 MG: 4 TABLET, ORALLY DISINTEGRATING ORAL at 11:01

## 2023-11-18 RX ADMIN — METOPROLOL TARTRATE 25 MG: 50 TABLET, FILM COATED ORAL at 20:01

## 2023-11-18 SDOH — SOCIAL STABILITY: SOCIAL INSECURITY: ARE YOU OR HAVE YOU BEEN THREATENED OR ABUSED PHYSICALLY, EMOTIONALLY, OR SEXUALLY BY ANYONE?: NO

## 2023-11-18 SDOH — SOCIAL STABILITY: SOCIAL INSECURITY: DO YOU FEEL ANYONE HAS EXPLOITED OR TAKEN ADVANTAGE OF YOU FINANCIALLY OR OF YOUR PERSONAL PROPERTY?: NO

## 2023-11-18 SDOH — SOCIAL STABILITY: SOCIAL INSECURITY: WERE YOU ABLE TO COMPLETE ALL THE BEHAVIORAL HEALTH SCREENINGS?: YES

## 2023-11-18 SDOH — SOCIAL STABILITY: SOCIAL INSECURITY: HAS ANYONE EVER THREATENED TO HURT YOUR FAMILY OR YOUR PETS?: NO

## 2023-11-18 SDOH — SOCIAL STABILITY: SOCIAL INSECURITY: ARE THERE ANY APPARENT SIGNS OF INJURIES/BEHAVIORS THAT COULD BE RELATED TO ABUSE/NEGLECT?: NO

## 2023-11-18 SDOH — SOCIAL STABILITY: SOCIAL INSECURITY: DOES ANYONE TRY TO KEEP YOU FROM HAVING/CONTACTING OTHER FRIENDS OR DOING THINGS OUTSIDE YOUR HOME?: NO

## 2023-11-18 SDOH — SOCIAL STABILITY: SOCIAL INSECURITY: ABUSE: ADULT

## 2023-11-18 SDOH — SOCIAL STABILITY: SOCIAL INSECURITY: DO YOU FEEL UNSAFE GOING BACK TO THE PLACE WHERE YOU ARE LIVING?: NO

## 2023-11-18 ASSESSMENT — COGNITIVE AND FUNCTIONAL STATUS - GENERAL
MOBILITY SCORE: 24
DAILY ACTIVITIY SCORE: 24

## 2023-11-18 ASSESSMENT — PAIN SCALES - GENERAL
PAINLEVEL_OUTOF10: 0 - NO PAIN

## 2023-11-18 ASSESSMENT — PAIN - FUNCTIONAL ASSESSMENT
PAIN_FUNCTIONAL_ASSESSMENT: 0-10

## 2023-11-18 NOTE — PROGRESS NOTES
Kj Colmenares is a 53 y.o. male on day 0 of admission presenting with Hypervolemia, unspecified hypervolemia type.    Subjective   Received update from Dr. Mederos that patient is medically ready for discharge but there are concerns regarding his compliance with HD. Call placed to [facility] where patient gets HD MWF 6p-9:45p. Spoke with Brenton who confirms that patient is non-complaint with his HD. Requested to speak with ELICEO Jensen but she is not in over the weekend. LM for her regarding above to collaborate on patients care to ensure better HD compliance.     Dr. Mederos and [facility] updated.     UPDATE 1500 Chart reviewed, noted that patient had pulled out his HD line prior to admission and made statement that he did not want to live anymore. Further, patient terminated his HD session early yesterday as well. Discussed with Dr. Mederos who plans to consult palliative care.    -Gayla CANNON MA, LSW  [phone] or [facility]  Care Transitions

## 2023-11-18 NOTE — CARE PLAN
The patient's goals for the shift include Pt wants to relax    The clinical goals for the shift include Remain safe and free of injury    Problem: Safety - Adult  Goal: Free from fall injury  Outcome: Progressing     Problem: Chronic Conditions and Co-morbidities  Goal: Patient's chronic conditions and co-morbidity symptoms are monitored and maintained or improved  Outcome: Progressing     Problem: Fall/Injury  Goal: Not fall by end of shift  Outcome: Progressing  Goal: Be free from injury by end of the shift  Outcome: Progressing  Goal: Verbalize understanding of personal risk factors for fall in the hospital  Outcome: Progressing  Goal: Verbalize understanding of risk factor reduction measures to prevent injury from fall in the home  Outcome: Progressing  Goal: Use assistive devices by end of the shift  Outcome: Progressing  Goal: Pace activities to prevent fatigue by end of the shift  Outcome: Progressing     Problem: Pain  Goal: Takes deep breaths with improved pain control throughout the shift  Outcome: Progressing  Goal: Turns in bed with improved pain control throughout the shift  Outcome: Progressing  Goal: Walks with improved pain control throughout the shift  Outcome: Progressing  Goal: Performs ADL's with improved pain control throughout shift  Outcome: Progressing  Goal: Participates in PT with improved pain control throughout the shift  Outcome: Progressing  Goal: Free from opioid side effects throughout the shift  Outcome: Progressing  Goal: Free from acute confusion related to pain meds throughout the shift  Outcome: Progressing     Problem: Diabetes  Goal: Achieve decreasing blood glucose levels by end of shift  Outcome: Progressing  Goal: Increase stability of blood glucose readings by end of shift  Outcome: Progressing  Goal: Decrease in ketones present in urine by end of shift  Outcome: Progressing  Goal: Maintain electrolyte levels within acceptable range throughout shift  Outcome:  Progressing  Goal: Maintain glucose levels >70mg/dl to <250mg/dl throughout shift  Outcome: Progressing  Goal: No changes in neurological exam by end of shift  Outcome: Progressing  Goal: Learn about and adhere to nutrition recommendations by end of shift  Outcome: Progressing  Goal: Vital signs within normal range for age by end of shift  Outcome: Progressing  Goal: Increase self care and/or family involovement by end of shift  Outcome: Progressing  Goal: Receive DSME education by end of shift  Outcome: Progressing

## 2023-11-18 NOTE — CARE PLAN
The patient's goals for the shift include Pt wants to relax    The clinical goals for the shift include free from falls and injury    Over the shift, the patient did not make progress toward the following goals. Barriers to progression include BP within baseline of patient hx. Recommendations to address these barriers include continue antihypertensive medications.

## 2023-11-18 NOTE — PROGRESS NOTES
"Kj Colmenares is a 53 y.o. male on day 0 of admission presenting with Hypervolemia, unspecified hypervolemia type.    Subjective   No events       Medically clear for dc, Reviewing chart, looks like prior to admission pulled out his HD line because he \"did not want to live anymore\" and that he is tired. It looks like he also terminated his HD session early yesterday too, will get palliative care on board      Objective     Physical Exam    Last Recorded Vitals  Blood pressure 115/72, pulse 91, temperature 37.1 °C (98.8 °F), resp. rate 18, height 1.803 m (5' 11\"), weight 108 kg (239 lb), SpO2 97 %.  Intake/Output last 3 Shifts:  I/O last 3 completed shifts:  In: 200 (1.8 mL/kg) [I.V.:200 (1.8 mL/kg)]  Out: - (0 mL/kg)   Weight: 108.4 kg     Relevant Results                             Assessment/Plan   Principal Problem:    Hypervolemia, unspecified hypervolemia type      #Care noncompliance  Reviewing chart, looks like prior to admission pulled out his HD line because he \"did not want to live anymore\" and that he is tired. It looks like he also terminated his HD session early yesterday too, will get palliative care on board           #ESRD on HD  -Patient self-terminated HD today  -Consult nephrology for HD management, input appreciated  -RFP c/w needing HD, electrolytes stable  -Renal dosing where indicated  -Renal diet  -Continue home medications     #Afib  #Tachycardia  #Atypical chest pain  -Troponin -ve x2  -EKG NST with LVH criteria  -Continue home medications  -Continue Eliquis  -Telemetry     #Medical noncompliance  -7 ED visits with 3 admissions since 9/2023  -Consult SW  -May benefit from psych input given statement to ED RN     #HTN  -Uncontrolled  -Continue home metoprolol  -Will hold Midodrine     #T2DM  -Controlled, a1c 5.4 7/2023  -Not on home medications     #Nausea and vomiting  -Chronic in nature  -Continue Zofran PO       Care noncompliance  Reviewing chart, looks like prior to admission pulled " "out his HD line because he \"did not want to live anymore\" and that he is tired. It looks like he also terminated his HD session early yesterday too       I spent 30 minutes in the professional and overall care of this patient.      Melissa Mederos MD      "

## 2023-11-19 ENCOUNTER — APPOINTMENT (OUTPATIENT)
Dept: DIALYSIS | Facility: HOSPITAL | Age: 53
End: 2023-11-19
Payer: COMMERCIAL

## 2023-11-19 PROCEDURE — 2500000001 HC RX 250 WO HCPCS SELF ADMINISTERED DRUGS (ALT 637 FOR MEDICARE OP): Mod: SE | Performed by: NURSE PRACTITIONER

## 2023-11-19 PROCEDURE — G0378 HOSPITAL OBSERVATION PER HR: HCPCS

## 2023-11-19 PROCEDURE — 2500000001 HC RX 250 WO HCPCS SELF ADMINISTERED DRUGS (ALT 637 FOR MEDICARE OP): Mod: SE | Performed by: STUDENT IN AN ORGANIZED HEALTH CARE EDUCATION/TRAINING PROGRAM

## 2023-11-19 PROCEDURE — 2500000002 HC RX 250 W HCPCS SELF ADMINISTERED DRUGS (ALT 637 FOR MEDICARE OP, ALT 636 FOR OP/ED): Mod: SE | Performed by: NURSE PRACTITIONER

## 2023-11-19 PROCEDURE — 99231 SBSQ HOSP IP/OBS SF/LOW 25: CPT | Performed by: STUDENT IN AN ORGANIZED HEALTH CARE EDUCATION/TRAINING PROGRAM

## 2023-11-19 PROCEDURE — 2500000004 HC RX 250 GENERAL PHARMACY W/ HCPCS (ALT 636 FOR OP/ED): Mod: SE | Performed by: NURSE PRACTITIONER

## 2023-11-19 RX ADMIN — APIXABAN 2.5 MG: 2.5 TABLET, FILM COATED ORAL at 08:17

## 2023-11-19 RX ADMIN — SEVELAMER CARBONATE 1600 MG: 800 TABLET, FILM COATED ORAL at 17:19

## 2023-11-19 RX ADMIN — SEVELAMER CARBONATE 1600 MG: 800 TABLET, FILM COATED ORAL at 08:17

## 2023-11-19 RX ADMIN — METOPROLOL TARTRATE 25 MG: 50 TABLET, FILM COATED ORAL at 20:13

## 2023-11-19 RX ADMIN — ACETAMINOPHEN 650 MG: 325 TABLET ORAL at 15:34

## 2023-11-19 RX ADMIN — TORSEMIDE 40 MG: 20 TABLET ORAL at 08:16

## 2023-11-19 RX ADMIN — SEVELAMER CARBONATE 1600 MG: 800 TABLET, FILM COATED ORAL at 14:09

## 2023-11-19 RX ADMIN — APIXABAN 2.5 MG: 2.5 TABLET, FILM COATED ORAL at 20:12

## 2023-11-19 RX ADMIN — EZETIMIBE 10 MG: 10 TABLET ORAL at 08:16

## 2023-11-19 RX ADMIN — FINASTERIDE 5 MG: 5 TABLET, FILM COATED ORAL at 08:17

## 2023-11-19 RX ADMIN — ASPIRIN 81 MG: 81 TABLET, COATED ORAL at 08:16

## 2023-11-19 RX ADMIN — ROSUVASTATIN CALCIUM 20 MG: 20 TABLET, FILM COATED ORAL at 20:13

## 2023-11-19 RX ADMIN — METOPROLOL TARTRATE 25 MG: 50 TABLET, FILM COATED ORAL at 08:21

## 2023-11-19 RX ADMIN — Medication 10 MG: at 20:12

## 2023-11-19 ASSESSMENT — COGNITIVE AND FUNCTIONAL STATUS - GENERAL
DAILY ACTIVITIY SCORE: 24
MOBILITY SCORE: 24

## 2023-11-19 ASSESSMENT — PAIN SCALES - GENERAL
PAINLEVEL_OUTOF10: 2
PAINLEVEL_OUTOF10: 0 - NO PAIN

## 2023-11-19 ASSESSMENT — PAIN - FUNCTIONAL ASSESSMENT
PAIN_FUNCTIONAL_ASSESSMENT: 0-10
PAIN_FUNCTIONAL_ASSESSMENT: 0-10

## 2023-11-19 ASSESSMENT — PAIN DESCRIPTION - DESCRIPTORS: DESCRIPTORS: ACHING

## 2023-11-19 NOTE — CARE PLAN
Problem: Safety - Adult  Goal: Free from fall injury  Outcome: Progressing     Problem: Discharge Planning  Goal: Discharge to home or other facility with appropriate resources  Outcome: Progressing     Problem: Chronic Conditions and Co-morbidities  Goal: Patient's chronic conditions and co-morbidity symptoms are monitored and maintained or improved  Outcome: Progressing     Problem: Fall/Injury  Goal: Not fall by end of shift  Outcome: Progressing  Goal: Be free from injury by end of the shift  Outcome: Progressing  Goal: Verbalize understanding of personal risk factors for fall in the hospital  Outcome: Progressing  Goal: Verbalize understanding of risk factor reduction measures to prevent injury from fall in the home  Outcome: Progressing  Goal: Use assistive devices by end of the shift  Outcome: Progressing  Goal: Pace activities to prevent fatigue by end of the shift  Outcome: Progressing     Problem: Diabetes  Goal: Achieve decreasing blood glucose levels by end of shift  Outcome: Progressing  Goal: Increase stability of blood glucose readings by end of shift  Outcome: Progressing  Goal: Decrease in ketones present in urine by end of shift  Outcome: Progressing  Goal: Maintain electrolyte levels within acceptable range throughout shift  Outcome: Progressing  Goal: Maintain glucose levels >70mg/dl to <250mg/dl throughout shift  Outcome: Progressing  Goal: No changes in neurological exam by end of shift  Outcome: Progressing  Goal: Learn about and adhere to nutrition recommendations by end of shift  Outcome: Progressing  Goal: Vital signs within normal range for age by end of shift  Outcome: Progressing  Goal: Increase self care and/or family involovement by end of shift  Outcome: Progressing  Goal: Receive DSME education by end of shift  Outcome: Progressing     Problem: Pain  Goal: Takes deep breaths with improved pain control throughout the shift  Outcome: Progressing  Goal: Turns in bed with improved pain  control throughout the shift  Outcome: Progressing  Goal: Walks with improved pain control throughout the shift  Outcome: Progressing  Goal: Performs ADL's with improved pain control throughout shift  Outcome: Progressing  Goal: Participates in PT with improved pain control throughout the shift  Outcome: Progressing  Goal: Free from opioid side effects throughout the shift  Outcome: Progressing  Goal: Free from acute confusion related to pain meds throughout the shift  Outcome: Progressing     Problem: Safety - Adult  Goal: Free from fall injury  Outcome: Progressing     Problem: Discharge Planning  Goal: Discharge to home or other facility with appropriate resources  Outcome: Progressing     Problem: Chronic Conditions and Co-morbidities  Goal: Patient's chronic conditions and co-morbidity symptoms are monitored and maintained or improved  Outcome: Progressing     Problem: Fall/Injury  Goal: Not fall by end of shift  Outcome: Progressing  Goal: Be free from injury by end of the shift  Outcome: Progressing  Goal: Verbalize understanding of personal risk factors for fall in the hospital  Outcome: Progressing  Goal: Verbalize understanding of risk factor reduction measures to prevent injury from fall in the home  Outcome: Progressing  Goal: Use assistive devices by end of the shift  Outcome: Progressing  Goal: Pace activities to prevent fatigue by end of the shift  Outcome: Progressing     Problem: Diabetes  Goal: Achieve decreasing blood glucose levels by end of shift  Outcome: Progressing  Goal: Increase stability of blood glucose readings by end of shift  Outcome: Progressing  Goal: Decrease in ketones present in urine by end of shift  Outcome: Progressing  Goal: Maintain electrolyte levels within acceptable range throughout shift  Outcome: Progressing  Goal: Maintain glucose levels >70mg/dl to <250mg/dl throughout shift  Outcome: Progressing  Goal: No changes in neurological exam by end of shift  Outcome:  Progressing  Goal: Learn about and adhere to nutrition recommendations by end of shift  Outcome: Progressing  Goal: Vital signs within normal range for age by end of shift  Outcome: Progressing  Goal: Increase self care and/or family involovement by end of shift  Outcome: Progressing  Goal: Receive DSME education by end of shift  Outcome: Progressing     Problem: Pain  Goal: Takes deep breaths with improved pain control throughout the shift  Outcome: Progressing  Goal: Turns in bed with improved pain control throughout the shift  Outcome: Progressing  Goal: Walks with improved pain control throughout the shift  Outcome: Progressing  Goal: Performs ADL's with improved pain control throughout shift  Outcome: Progressing  Goal: Participates in PT with improved pain control throughout the shift  Outcome: Progressing  Goal: Free from opioid side effects throughout the shift  Outcome: Progressing  Goal: Free from acute confusion related to pain meds throughout the shift  Outcome: Progressing

## 2023-11-19 NOTE — PROGRESS NOTES
"Kj Colmenares is a 53 y.o. male on day 0 of admission presenting with Hypervolemia, unspecified hypervolemia type.    Subjective   No events       Medically clear for dc, Reviewing chart, looks like prior to admission pulled out his HD line because he \"did not want to live anymore\" and that he is tired. It looks like he also terminated his HD session early yesterday too, will get palliative care on board      Objective     Physical Exam    Last Recorded Vitals  Blood pressure 116/72, pulse 55, temperature 36 °C (96.8 °F), temperature source Temporal, resp. rate 17, height 1.803 m (5' 11\"), weight 108 kg (239 lb), SpO2 99 %.  Intake/Output last 3 Shifts:  I/O last 3 completed shifts:  In: 120 (1.1 mL/kg) [P.O.:120]  Out: 600 (5.5 mL/kg) [Urine:600 (0.2 mL/kg/hr)]  Weight: 108.4 kg     Relevant Results                             Assessment/Plan   Principal Problem:    Hypervolemia, unspecified hypervolemia type    Kj Colmenares is a 53 y.o. male with ESRD MF via LUEAVF, DM2, HTN, GERD, DVT (on Eliquis), HLD, PAF, stroke, admitted with CP/SOB in the setting of medications and HD treatment non-compliance.    Things to do:  -Medically clear for dc. However; main issue is care noncompliance (7 ED visits with 3 admissions since 9/2023), reviewing chart, looks like prior to admission pulled out his HD line because he \"did not want to live anymore\" and that he is tired. It looks like he also terminated his HD session early yesterday too, has been missing many HD and ignoring care, consulted palliative care to discuss goal of care.        #Care noncompliance  Reviewing chart, looks like prior to admission pulled out his HD line because he \"did not want to live anymore\" and that he is tired. It looks like he also terminated his HD session early yesterday too, will get palliative care on board           #ESRD on HD  -Patient self-terminated HD  upon admission   -Consult nephrology for HD management, input " appreciated  -RFP c/w needing HD, electrolytes stable  -Renal dosing where indicated  -Renal diet  -Continue home medications     #Afib  #Tachycardia  #Atypical chest pain  -Troponin -ve x2  -EKG NST with LVH criteria  -Continue home medications  -Continue Eliquis  -Telemetry     #Medical noncompliance  -7 ED visits with 3 admissions since 9/2023  -Consult SW  -May benefit from psych input given statement to ED RN     #HTN  -Uncontrolled  -Continue home metoprolol  -Will hold Midodrine     #T2DM  -Controlled, a1c 5.4 7/2023  -Not on home medications     #Nausea and vomiting  -Chronic in nature  -Continue Zofran               I spent 30 minutes in the professional and overall care of this patient.      Melissa Mederos MD

## 2023-11-19 NOTE — NURSING NOTE
Pt stated he is only going to stay on HD tx for 2hr. I educated pt and pt stated he will stay on tx for 3hr. Pt signed AMA due to change in tx time. Pt educated about the risk of stopping tx early and missing HD treatments.

## 2023-11-19 NOTE — NURSING NOTE
Report from Sending RN:    Report From: Samm ( RN)  Recent Surgery of Procedure: No  Baseline Level of Consciousness (LOC): A/O x 4  Oxygen Use: No  Type: none  Diabetic: No  Last BP Med Given Day of Dialysis: metoprolol 25 mg; Torsemide 40 mg 0900 am  Last Pain Med Given: none  Lab Tests to be Obtained with Dialysis: No  Blood Transfusion to be Given During Dialysis: No  Available IV Access: Yes  Medications to be Administered During Dialysis: No  Continuous IV Infusion Running: No  Restraints on Currently or in the Last 24 Hours: No  Hand-Off Communication: No overnight or morning events; vss; Pt did take morning medications; No labs needed; Pt may go off unit without telemetry; Pt is a full code; Alesia Hightower RN.

## 2023-11-19 NOTE — NURSING NOTE
.Report to Receiving RN:    Report To: GUERITA Quijano  Time Report Called: [id number]  Hand-Off Communication: Pt tolerated HD well. Fluid removal 0.6 Liter /67 HR 80  Complications During Treatment: No  Ultrafiltration Treatment: No  Medications Administered During Dialysis: No  Blood Products Administered During Dialysis: No  Labs Sent During Dialysis: No  Heparin Drip Rate Changes: No      Last Updated: 1:18 PM by GILA CASTILLO

## 2023-11-20 LAB
ALBUMIN SERPL BCP-MCNC: 4.1 G/DL (ref 3.4–5)
ANION GAP SERPL CALC-SCNC: 16 MMOL/L (ref 10–20)
BUN SERPL-MCNC: 54 MG/DL (ref 6–23)
CALCIUM SERPL-MCNC: 10.5 MG/DL (ref 8.6–10.6)
CHLORIDE SERPL-SCNC: 103 MMOL/L (ref 98–107)
CO2 SERPL-SCNC: 26 MMOL/L (ref 21–32)
CREAT SERPL-MCNC: 8.28 MG/DL (ref 0.5–1.3)
ERYTHROCYTE [DISTWIDTH] IN BLOOD BY AUTOMATED COUNT: 12.9 % (ref 11.5–14.5)
GFR SERPL CREATININE-BSD FRML MDRD: 7 ML/MIN/1.73M*2
GLUCOSE SERPL-MCNC: 86 MG/DL (ref 74–99)
HCT VFR BLD AUTO: 37.9 % (ref 41–52)
HGB BLD-MCNC: 11.4 G/DL (ref 13.5–17.5)
MCH RBC QN AUTO: 28.6 PG (ref 26–34)
MCHC RBC AUTO-ENTMCNC: 30.1 G/DL (ref 32–36)
MCV RBC AUTO: 95 FL (ref 80–100)
NRBC BLD-RTO: 0 /100 WBCS (ref 0–0)
PHOSPHATE SERPL-MCNC: 5 MG/DL (ref 2.5–4.9)
PLATELET # BLD AUTO: 193 X10*3/UL (ref 150–450)
POTASSIUM SERPL-SCNC: 4.2 MMOL/L (ref 3.5–5.3)
RBC # BLD AUTO: 3.98 X10*6/UL (ref 4.5–5.9)
SODIUM SERPL-SCNC: 141 MMOL/L (ref 136–145)
WBC # BLD AUTO: 6.5 X10*3/UL (ref 4.4–11.3)

## 2023-11-20 PROCEDURE — 8010000001 HC DIALYSIS - HEMODIALYSIS PER DAY

## 2023-11-20 PROCEDURE — 2500000002 HC RX 250 W HCPCS SELF ADMINISTERED DRUGS (ALT 637 FOR MEDICARE OP, ALT 636 FOR OP/ED): Mod: SE | Performed by: NURSE PRACTITIONER

## 2023-11-20 PROCEDURE — 90937 HEMODIALYSIS REPEATED EVAL: CPT

## 2023-11-20 PROCEDURE — 36415 COLL VENOUS BLD VENIPUNCTURE: CPT | Performed by: STUDENT IN AN ORGANIZED HEALTH CARE EDUCATION/TRAINING PROGRAM

## 2023-11-20 PROCEDURE — 2500000004 HC RX 250 GENERAL PHARMACY W/ HCPCS (ALT 636 FOR OP/ED): Mod: SE | Performed by: NURSE PRACTITIONER

## 2023-11-20 PROCEDURE — 99233 SBSQ HOSP IP/OBS HIGH 50: CPT | Performed by: STUDENT IN AN ORGANIZED HEALTH CARE EDUCATION/TRAINING PROGRAM

## 2023-11-20 PROCEDURE — 99223 1ST HOSP IP/OBS HIGH 75: CPT | Performed by: PSYCHIATRY & NEUROLOGY

## 2023-11-20 PROCEDURE — 99232 SBSQ HOSP IP/OBS MODERATE 35: CPT | Performed by: NURSE PRACTITIONER

## 2023-11-20 PROCEDURE — G0378 HOSPITAL OBSERVATION PER HR: HCPCS

## 2023-11-20 PROCEDURE — 99223 1ST HOSP IP/OBS HIGH 75: CPT | Performed by: NURSE PRACTITIONER

## 2023-11-20 PROCEDURE — 2500000005 HC RX 250 GENERAL PHARMACY W/O HCPCS: Mod: SE | Performed by: STUDENT IN AN ORGANIZED HEALTH CARE EDUCATION/TRAINING PROGRAM

## 2023-11-20 PROCEDURE — 2500000001 HC RX 250 WO HCPCS SELF ADMINISTERED DRUGS (ALT 637 FOR MEDICARE OP): Mod: SE | Performed by: STUDENT IN AN ORGANIZED HEALTH CARE EDUCATION/TRAINING PROGRAM

## 2023-11-20 PROCEDURE — 2500000001 HC RX 250 WO HCPCS SELF ADMINISTERED DRUGS (ALT 637 FOR MEDICARE OP): Mod: SE | Performed by: NURSE PRACTITIONER

## 2023-11-20 PROCEDURE — 85027 COMPLETE CBC AUTOMATED: CPT | Performed by: STUDENT IN AN ORGANIZED HEALTH CARE EDUCATION/TRAINING PROGRAM

## 2023-11-20 PROCEDURE — 80069 RENAL FUNCTION PANEL: CPT | Performed by: STUDENT IN AN ORGANIZED HEALTH CARE EDUCATION/TRAINING PROGRAM

## 2023-11-20 PROCEDURE — 2500000004 HC RX 250 GENERAL PHARMACY W/ HCPCS (ALT 636 FOR OP/ED): Mod: SE,MUE | Performed by: STUDENT IN AN ORGANIZED HEALTH CARE EDUCATION/TRAINING PROGRAM

## 2023-11-20 RX ORDER — OLANZAPINE 5 MG/1
5 TABLET ORAL NIGHTLY
Status: DISCONTINUED | OUTPATIENT
Start: 2023-11-20 | End: 2023-11-22 | Stop reason: HOSPADM

## 2023-11-20 RX ORDER — LIDOCAINE 560 MG/1
1 PATCH PERCUTANEOUS; TOPICAL; TRANSDERMAL DAILY
Status: DISCONTINUED | OUTPATIENT
Start: 2023-11-20 | End: 2023-11-22 | Stop reason: HOSPADM

## 2023-11-20 RX ORDER — ACETAMINOPHEN 325 MG/1
650 TABLET ORAL EVERY 6 HOURS
Status: DISCONTINUED | OUTPATIENT
Start: 2023-11-20 | End: 2023-11-20 | Stop reason: SDUPTHER

## 2023-11-20 RX ORDER — ACETAMINOPHEN 500 MG
10 TABLET ORAL
Status: DISCONTINUED | OUTPATIENT
Start: 2023-11-20 | End: 2023-11-22 | Stop reason: HOSPADM

## 2023-11-20 RX ADMIN — APIXABAN 2.5 MG: 2.5 TABLET, FILM COATED ORAL at 20:46

## 2023-11-20 RX ADMIN — Medication 10 MG: at 18:02

## 2023-11-20 RX ADMIN — ASPIRIN 81 MG: 81 TABLET, COATED ORAL at 08:01

## 2023-11-20 RX ADMIN — SEVELAMER CARBONATE 1600 MG: 800 TABLET, FILM COATED ORAL at 12:25

## 2023-11-20 RX ADMIN — OLANZAPINE 5 MG: 5 TABLET, FILM COATED ORAL at 20:46

## 2023-11-20 RX ADMIN — ONDANSETRON 4 MG: 2 INJECTION INTRAMUSCULAR; INTRAVENOUS at 08:07

## 2023-11-20 RX ADMIN — EZETIMIBE 10 MG: 10 TABLET ORAL at 08:01

## 2023-11-20 RX ADMIN — METOPROLOL TARTRATE 25 MG: 50 TABLET, FILM COATED ORAL at 20:46

## 2023-11-20 RX ADMIN — Medication 1 TABLET: at 08:01

## 2023-11-20 RX ADMIN — ROSUVASTATIN CALCIUM 20 MG: 20 TABLET, FILM COATED ORAL at 20:46

## 2023-11-20 RX ADMIN — LIDOCAINE 1 PATCH: 4 PATCH TOPICAL at 16:17

## 2023-11-20 RX ADMIN — SEVELAMER CARBONATE 1600 MG: 800 TABLET, FILM COATED ORAL at 07:42

## 2023-11-20 RX ADMIN — FINASTERIDE 5 MG: 5 TABLET, FILM COATED ORAL at 08:08

## 2023-11-20 RX ADMIN — TORSEMIDE 40 MG: 20 TABLET ORAL at 07:59

## 2023-11-20 RX ADMIN — METOPROLOL TARTRATE 25 MG: 50 TABLET, FILM COATED ORAL at 08:08

## 2023-11-20 RX ADMIN — SEVELAMER CARBONATE 1600 MG: 800 TABLET, FILM COATED ORAL at 17:12

## 2023-11-20 RX ADMIN — APIXABAN 2.5 MG: 2.5 TABLET, FILM COATED ORAL at 08:01

## 2023-11-20 RX ADMIN — PSYLLIUM HUSK 1 PACKET: 3.4 POWDER ORAL at 09:00

## 2023-11-20 SDOH — SOCIAL STABILITY: SOCIAL INSECURITY: WERE YOU ABLE TO COMPLETE ALL THE BEHAVIORAL HEALTH SCREENINGS?: YES

## 2023-11-20 SDOH — SOCIAL STABILITY: SOCIAL INSECURITY: ARE YOU OR HAVE YOU BEEN THREATENED OR ABUSED PHYSICALLY, EMOTIONALLY, OR SEXUALLY BY ANYONE?: NO

## 2023-11-20 SDOH — SOCIAL STABILITY: SOCIAL INSECURITY: ABUSE: ADULT

## 2023-11-20 SDOH — SOCIAL STABILITY: SOCIAL INSECURITY: HAVE YOU HAD THOUGHTS OF HARMING ANYONE ELSE?: NO

## 2023-11-20 SDOH — SOCIAL STABILITY: SOCIAL INSECURITY: DO YOU FEEL UNSAFE GOING BACK TO THE PLACE WHERE YOU ARE LIVING?: NO

## 2023-11-20 SDOH — SOCIAL STABILITY: SOCIAL INSECURITY: HAS ANYONE EVER THREATENED TO HURT YOUR FAMILY OR YOUR PETS?: NO

## 2023-11-20 SDOH — SOCIAL STABILITY: SOCIAL INSECURITY: DO YOU FEEL ANYONE HAS EXPLOITED OR TAKEN ADVANTAGE OF YOU FINANCIALLY OR OF YOUR PERSONAL PROPERTY?: NO

## 2023-11-20 SDOH — SOCIAL STABILITY: SOCIAL INSECURITY: DOES ANYONE TRY TO KEEP YOU FROM HAVING/CONTACTING OTHER FRIENDS OR DOING THINGS OUTSIDE YOUR HOME?: NO

## 2023-11-20 SDOH — SOCIAL STABILITY: SOCIAL INSECURITY: ARE THERE ANY APPARENT SIGNS OF INJURIES/BEHAVIORS THAT COULD BE RELATED TO ABUSE/NEGLECT?: NO

## 2023-11-20 ASSESSMENT — ENCOUNTER SYMPTOMS
HEADACHES: 0
DYSPHORIC MOOD: 1
DIZZINESS: 0
FEVER: 0
CONSTIPATION: 0
FREQUENCY: 0
CHILLS: 0
WHEEZING: 0
DIARRHEA: 1
DIAPHORESIS: 0
NAUSEA: 1
VOMITING: 0
CHEST TIGHTNESS: 0
APPETITE CHANGE: 0
JOINT SWELLING: 1
ARTHRALGIAS: 1
FATIGUE: 1
SLEEP DISTURBANCE: 1
NERVOUS/ANXIOUS: 1
PALPITATIONS: 0
COUGH: 0
SHORTNESS OF BREATH: 1
DYSURIA: 0

## 2023-11-20 ASSESSMENT — ACTIVITIES OF DAILY LIVING (ADL): LACK_OF_TRANSPORTATION: NO

## 2023-11-20 ASSESSMENT — PATIENT HEALTH QUESTIONNAIRE - PHQ9
2. FEELING DOWN, DEPRESSED OR HOPELESS: MORE THAN HALF THE DAYS
SUM OF ALL RESPONSES TO PHQ9 QUESTIONS 1 & 2: 2
1. LITTLE INTEREST OR PLEASURE IN DOING THINGS: NOT AT ALL

## 2023-11-20 ASSESSMENT — LIFESTYLE VARIABLES
SKIP TO QUESTIONS 9-10: 1
HOW OFTEN DO YOU HAVE A DRINK CONTAINING ALCOHOL: NEVER
HOW OFTEN DO YOU HAVE 6 OR MORE DRINKS ON ONE OCCASION: NEVER
HOW MANY STANDARD DRINKS CONTAINING ALCOHOL DO YOU HAVE ON A TYPICAL DAY: PATIENT DOES NOT DRINK
AUDIT-C TOTAL SCORE: 0
AUDIT-C TOTAL SCORE: 0

## 2023-11-20 NOTE — PROGRESS NOTES
Assessment/Plan   Kj Colmenares is a 53 y.o. male with a past medical history of afib (on Eliquis), ESRD on HD, medical noncompliance, T2DM, HTN, HLD, GERD, remote DVT, and remote CVA ([facility], 2017)who presented to the ED with chest pain and shortness of breath in setting of medication and HD treatment non-compliance.   Patient has had not many recent emergency department visits.  He also is endorsing passive SI.  Has not been completing his HD sessions when he does undergo them.Overall overall patient is now stable.  He is undergoing HD with nephrology input.  Palliative consulted for goals of care.  May need to discuss with ethics.    #treatment noncompliance  #Acute SI on chronic passive SI  Reviewing chart, prior to admission, patient had his HD line removed because he did not want dialysis anymore.  He has been terminating his sessions early.  He has had recurrent hospitalizations.  He is endorsing SI.  He reports several episodes with a knife where he held it to his neck.    -Psych consulted   -One-to-one, suicide precautions, safety tray.    -Palliative was consulted appreciate recommendations   - SW consult    #ESRD on HD  -Patient self-terminated HD  upon admission   -Consult nephrology for HD management, input appreciated  -RFP c/w needing HD, electrolytes stable  -Renal dosing where indicated  -Renal diet  -Continue home medications     #Afib  #Tachycardia  #Atypical chest pain  -Troponin -ve x2  -EKG NST with LVH criteria  -Continue home medications  -Continue Eliquis  -Telemetry     #HTN  -Uncontrolled  -Continue home metoprolol  -Will hold Midodrine     #T2DM  -Controlled, a1c 5.4 7/2023  -Not on home medications     #Nausea and vomiting  -Chronic in nature  -Continue Zofran  .       Scheduled outpatient appointments in system:   No future appointments.  ---------------------------------------------------------------------------------------------------  Subjective   No acute events.  Patient was  "feeling okay this morning, endorsed passive SI chronically.  Denies chest pain/shortness of breath currently.  On being with palliative he endorsed active SI including previous episodes where he held a knife to his neck.  He is actively endorsing SI currently.  Psychiatry to be involved.     ---------------------------------------------------------------------------------------------------  Objective   Last Recorded Vitals  Blood pressure (!) 134/93, pulse 80, temperature 36 °C (96.8 °F), resp. rate 16, height 1.803 m (5' 11\"), weight 108 kg (239 lb), SpO2 99 %.  Intake/Output last 3 Shifts:  I/O last 3 completed shifts:  In: 640 (5.9 mL/kg) [P.O.:240; I.V.:400 (3.7 mL/kg)]  Out: 700 (6.5 mL/kg) [Urine:700 (0.2 mL/kg/hr)]  Weight: 108.4 kg     Physical Exam  Vitals and nursing note reviewed.   Constitutional:       General: He is not in acute distress.     Appearance: Normal appearance. He is not ill-appearing or toxic-appearing.   HENT:      Head: Normocephalic and atraumatic.      Mouth/Throat:      Mouth: Mucous membranes are moist.   Eyes:      General: No scleral icterus.     Extraocular Movements: Extraocular movements intact.      Conjunctiva/sclera: Conjunctivae normal.   Cardiovascular:      Rate and Rhythm: Normal rate and regular rhythm.      Heart sounds: S1 normal and S2 normal. No murmur heard.  Pulmonary:      Effort: Pulmonary effort is normal. No respiratory distress.      Breath sounds: No wheezing, rhonchi or rales.   Abdominal:      General: Bowel sounds are normal. There is no distension.      Palpations: Abdomen is soft.      Tenderness: There is no abdominal tenderness. There is no guarding or rebound.   Musculoskeletal:         General: No swelling or deformity.      Cervical back: Neck supple.   Skin:     General: Skin is warm and dry.      Findings: No rash.   Neurological:      General: No focal deficit present.      Mental Status: He is alert. Mental status is at baseline.   Psychiatric: "      Comments: Mood is abnormal, behavioral is normal.  Patient is engaged         Relevant Results  Lab Results   Component Value Date    WBC 6.5 11/20/2023    HGB 11.4 (L) 11/20/2023    HCT 37.9 (L) 11/20/2023    MCV 95 11/20/2023     11/20/2023      Lab Results   Component Value Date    GLUCOSE 86 11/20/2023    CALCIUM 10.5 11/20/2023     11/20/2023    K 4.2 11/20/2023    CO2 26 11/20/2023     11/20/2023    BUN 54 (H) 11/20/2023    CREATININE 8.28 (H) 11/20/2023     Scheduled medications  apixaban, 2.5 mg, oral, BID  aspirin, 81 mg, oral, Daily  B complex-vitamin C, 1 tablet, oral, Daily  ergocalciferol, 1,250 mcg, oral, Weekly  ezetimibe, 10 mg, oral, Daily  finasteride, 5 mg, oral, Daily  melatonin, 10 mg, oral, Daily  metoprolol tartrate, 25 mg, oral, BID  psyllium, 1 packet, oral, Daily  rosuvastatin, 20 mg, oral, Nightly  sevelamer carbonate, 1,600 mg, oral, TID with meals  torsemide, 40 mg, oral, Daily      Continuous medications     PRN medications  PRN medications: acetaminophen **OR** acetaminophen **OR** acetaminophen, ondansetron ODT **OR** ondansetron    Nguyễn Snyder MD

## 2023-11-20 NOTE — PROGRESS NOTES
"Kj Colmenares is a 53 y.o. male on day 0 of admission presenting with Hypervolemia, unspecified hypervolemia type.    Subjective   Pt sitting up in bed. Denies sob, n/v/d, fever, cough, chills, pain, chest pains       Objective     Physical Exam  Vitals and nursing note reviewed.   Constitutional:       Appearance: Normal appearance.   Cardiovascular:      Rate and Rhythm: Tachycardia present. Rhythm irregular.      Comments: Slight tachy AFIB   Pulmonary:      Comments: Asaf lung sounds diminished  Abdominal:      Palpations: Abdomen is soft.   Genitourinary:     Comments: States make urine  Musculoskeletal:      Comments: Ble without edema   Skin:     General: Skin is warm and dry.   Neurological:      Mental Status: He is alert and oriented to person, place, and time.   Psychiatric:         Mood and Affect: Mood normal.         Behavior: Behavior normal.         Last Recorded Vitals  Blood pressure (!) 134/93, pulse 80, temperature 36.6 °C (97.9 °F), resp. rate 16, height 1.803 m (5' 11\"), weight 108 kg (239 lb), SpO2 99 %.  Intake/Output last 3 Shifts:  I/O last 3 completed shifts:  In: 640 (5.9 mL/kg) [P.O.:240; I.V.:400 (3.7 mL/kg)]  Out: 700 (6.5 mL/kg) [Urine:700 (0.2 mL/kg/hr)]  Weight: 108.4 kg     Scheduled medications  apixaban, 2.5 mg, oral, BID  aspirin, 81 mg, oral, Daily  B complex-vitamin C, 1 tablet, oral, Daily  ergocalciferol, 1,250 mcg, oral, Weekly  ezetimibe, 10 mg, oral, Daily  finasteride, 5 mg, oral, Daily  melatonin, 10 mg, oral, Daily  metoprolol tartrate, 25 mg, oral, BID  psyllium, 1 packet, oral, Daily  rosuvastatin, 20 mg, oral, Nightly  sevelamer carbonate, 1,600 mg, oral, TID with meals  torsemide, 40 mg, oral, Daily      Continuous medications     PRN medications  PRN medications: acetaminophen **OR** acetaminophen **OR** acetaminophen, ondansetron ODT **OR** ondansetron   Results for orders placed or performed during the hospital encounter of 11/17/23 (from the past 24 " hour(s))   CBC   Result Value Ref Range    WBC 6.5 4.4 - 11.3 x10*3/uL    nRBC 0.0 0.0 - 0.0 /100 WBCs    RBC 3.98 (L) 4.50 - 5.90 x10*6/uL    Hemoglobin 11.4 (L) 13.5 - 17.5 g/dL    Hematocrit 37.9 (L) 41.0 - 52.0 %    MCV 95 80 - 100 fL    MCH 28.6 26.0 - 34.0 pg    MCHC 30.1 (L) 32.0 - 36.0 g/dL    RDW 12.9 11.5 - 14.5 %    Platelets 193 150 - 450 x10*3/uL   Renal Function Panel   Result Value Ref Range    Glucose 86 74 - 99 mg/dL    Sodium 141 136 - 145 mmol/L    Potassium 4.2 3.5 - 5.3 mmol/L    Chloride 103 98 - 107 mmol/L    Bicarbonate 26 21 - 32 mmol/L    Anion Gap 16 10 - 20 mmol/L    Urea Nitrogen 54 (H) 6 - 23 mg/dL    Creatinine 8.28 (H) 0.50 - 1.30 mg/dL    eGFR 7 (L) >60 mL/min/1.73m*2    Calcium 10.5 8.6 - 10.6 mg/dL    Phosphorus 5.0 (H) 2.5 - 4.9 mg/dL    Albumin 4.1 3.4 - 5.0 g/dL                   Assessment/Plan   Principal Problem:    Hypervolemia, unspecified hypervolemia type    Tolerated hemodialysis yesterday with net fluid loss 600cc... pt term tx after 3hrs on    Soft bp's during tx-stable, euvolemic on exam and has stable electrolytes      Outpatient Dialysis schedule:  M/F [facility]/Dr. Hobbs... will dialyze SUN-TUES this week d/t holiday.. will resume reg schedule next week 11/27      Access: lt lower arm fistula with +thrill/bruit - no issues - able to achieve      Anemia of ESRD:   pt not on VEDA on hemodialysis days.. current hgb 11.4.. will cont to monitor     CKD-MBD Phosphate Binder:sevelamer carbonate (Renvela) tablet 1,600 mg  tid ac, ergocalciferol (Vitamin D-2) capsule 1,250 mcg weekly, B complex-vitamin C tablet 1 tablet daily     Plan HD tomorrow with UF as tolerated     Renal diet      Please obtain daily standing wt (if possible)     Medication to be adjusted for ESRD      Patient to continue regular HD schedule while inpatient and to follow with the outpatient nephrologist at discharge                NIGEL Simon-CNP

## 2023-11-20 NOTE — CONSULTS
Inpatient consult to Palliative Care  Consult performed by: NIGEL Lopez-CNP  Consult ordered by: Melissa Mederos MD    Reason For Consult  Reason for Consult: communication / medical decision making, symptom management, patient/family support, and other goals of care discussion.     Introduction to Palliative Care    Information obtained from chart review, conversation with patient, and discussion with primary care team.    Spoke with patient at bedside.   Palliative care was introduced as a service for patients with serious illness to help with symptoms, assist with goals of care conversations, navigate complex decision making, improve quality of life for patients, and provide support both patients and families.     History Of Present Illness  Kj Colmenares is a 53 y.o. male with past medical history of Atrial fibrillation (on Eliquis), ESRD on HD, medical noncompliance, T2DM, HTN, HLD, GERD, remote DVT, and remote CVA ([facility] in 2017) who presented to the ED with chest pain and shortness of breath on 11/16/2023. Apparently, he has had 7 ED visits and 3 admission since 09/2023. He was seen by [facility] and [facility] recently this month for nausea and vomiting.  He denies any fever, chills, cough, congestion, pain with urination, blood in urine or stool.  Pt states that he still makes a small amount of urine. He acknowledge stating he wanted to no longer live, however denies any SI and is future oriented and talks about his children (who have lost their mother previously 1.5 years ago at [facility]) and his girlfriend. He raises concerns with the amount of time he is kept on HD and reports significant pain toward middle of session. He voices concerns with struggling in the past with transportation issues getting to dialysis (he was stuck waiting for elevator and his transportation leaves). He reports willingness to accept help and get better. Pt reports his family is concerned he will hurt himself if  he goes home soon. Palliative Care consulted for goals of care discussion.      Symptoms (0 - 10, Best to Worst)  Stanley Symptom Assessment System  Pain Score: 2    BM in last 48 hours? yes- diarrhea for last 5 days    Pain: Pt reports left knee especially when walking and swells up, chronic since 2018 (years), worsening, PRN Tylenol helps for 1-2 hours, pt reports history of gout not currently on any medication for gout.   SOB: Pt reports at times with exertion.  Fatigue: Pt reports.   Sleep: Pt reports trouble sleeping- Pt reports unable to fall asleep or stay asleep per patient, mind wont shut off and racing thoughts, pt reports fear of falling asleep and not waking up.  Drowsiness: Pt reports due to poor sleep quality.  Constipation: Pt denies.  Nausea: Pt reports intermittent for last 5 days. PRN Zofran help temporarily.   Appetite: Pt denies.  Anxiety: Pt reports due to health concerns and worried about his 3 minor children who are with family all over [place] from [place] to [place]. Pt has 8 children.  Depression: Pt reports thoughts of harming himself in the recent past and current depression symptoms, but denies any homicidal thoughts.    Serious Illness Conversation completed with patient at bedside today  Information: are you someone who likes to know little details, or do you prefer “big picture”? Both- patient prefers full disclosure.    Understanding: tell me what the doctors have shared with you about what's going on? (caution asking what their understanding is…some people might be offended) Pt has fair understanding.    Prognosis: if we had a sense of time, is that information you would want to know? Pt reports he would like to know prognosis.     Joys: what brings you chase in life? What else? What else? Pt reports his 8 children and his girlfriend. Pt reports seeing his children, parents, and girlfriend make him smile and bring him chase. Pt enjoys watching football and basketball. Pt watched the  "[employer] game yesterday.      Goals: tell me what you're hoping for… what else? What else? Pt reports his goal is to get stronger, go home, and be able to provide for his 3 minor children in the future.     Fears and worries: tell me what worries you…what else? What else? Pt is worried about his medical conditions, dying in the hospital, fear of history of liver trouble, and worried about how much longer he has to live.      Minimal acceptable outcome: what is a function that is so critical to who you are as a person that you can't imagine living without? (Common examples include meaningful interactions with others, eating, and independence) Provide for his children and being alive for his children to visit him.    Maximal burden: what are you willing to go through for more time? What is your line in the stand? What would NOT be considered good QOL for you? Pt reports he \" twice\", previously had CPR, Vent, Trach, and reports was in a coma for 1.5 years back in 2015. Pt reports he has been on and off hemodialysis since  originally started when he was living in [place], [place].     Family: complicated family dynamics and relationships Mother and Father living, wife passed away 1.5 years ago, current girlfriend, 8 children (ranging from ages 9 to 25 years old- 5 boys and 3 girls with 3 under 18)    Emotional/Psychological/Spiritual Needs  Over the past two weeks, how often has the patient been bothered by having little interest or pleasure in doing things?  occurrence (last two weeks): every day    Over the past two weeks, how often has the patient been bothered by feeling down, depressed, or hopeless?  occurrence (last two weeks): every day    Screening for spiritual needs?  Yes  Worship and importance of Zoroastrianism: Important- he relies on God for strength, falls to his knees when needed to pray  Source for spiritual support/meaning: [employer] in [employer] and wants to go to [facility]    Spiritual Hx:  Are you spiritual or " "Latter day? Yes he goes to [employer] and was baptized in his girlfriend's [employer]   What’s your cherry background? Mormon  During difficult times in your life, what have you relied on for strength? God and Family    Music Hx:   Do you enjoy music? Yes What type of music do you enjoy? Unable to pick one chow or group at this time.      Personal/Social History  He reports that he has never smoked. He has never used smokeless tobacco. He reports that he does not drink alcohol and does not use drugs. Pt denies any cigarette smoking, ETOH use, or illicit drug use.     Functional Status  Pt reports he ambulates prior to this hospital admission with cane or walker at times due to the knee. Pt reports declining and weaker over the last few weeks, months, and year.     Caregiving/Caregiver Support  Does the patient require assistance in some or all components of his care, including coordination of medical care? Yes  If Yes, which person serves that role?  other relative Niece- living with currently prior to this hospitalization  Caregiver emotional or practical needs: Community resources, HD transportation, education regarding serious illness, medical condition, and support    Past Medical History  He has a past medical history of Diabetes mellitus (CMS/[facility]), Hypertension, and Stroke (CMS/[facility]ly [facility]). Per patient history of ESRD since 2001- HD 2x/week Monday and Friday, Gout, chronic left knee pain and swelling, and insomnia. Hx [facility] 2017.     Surgical History  He has a past surgical history that includes CT angio head w and wo IV contrast (2/4/2014) and CT angio head w and wo IV contrast (2/13/2014). Hx of AV Fistula left forearm. Hx of trach.      Family History  Mother- Living, history of stents at [facility]  Father- Living- Hx of recent Cancer (pt unsure which kind but father \"beat it\"), Hx Pacemaker placement  Sister- Hx Seizures  Brother- Hx of Pacemaker placement  Aunts- 2 Maternal aunts passed from CVA, 1 Maternal aunt " passed from Cancer, 1 Paternal aunt passed from CVA  Niece in [facility] currently     Allergies  Patient has no known allergies.    Review of Systems   Constitutional:  Positive for fatigue. Negative for appetite change, chills, diaphoresis and fever.   Respiratory:  Positive for shortness of breath. Negative for cough, chest tightness and wheezing.    Cardiovascular:  Negative for chest pain, palpitations and leg swelling.   Gastrointestinal:  Positive for diarrhea and nausea. Negative for constipation and vomiting.   Genitourinary:  Negative for dysuria and frequency.   Musculoskeletal:  Positive for arthralgias, gait problem and joint swelling.   Skin:  Negative for rash.   Neurological:  Negative for dizziness and headaches.   Psychiatric/Behavioral:  Positive for dysphoric mood, sleep disturbance and suicidal ideas. Negative for self-injury. The patient is nervous/anxious.      Physical Exam  Constitutional:       General: He is not in acute distress.     Appearance: He is ill-appearing. He is not toxic-appearing.   HENT:      Head: Normocephalic and atraumatic.      Mouth/Throat:      Mouth: Mucous membranes are moist.   Eyes:      Pupils: Pupils are equal, round, and reactive to light.   Cardiovascular:      Rate and Rhythm: Normal rate. Rhythm irregular.      Pulses: Normal pulses.      Heart sounds: No murmur heard.     No friction rub. No gallop.   Pulmonary:      Effort: Pulmonary effort is normal. No respiratory distress.      Breath sounds: Normal breath sounds. No wheezing, rhonchi or rales.   Abdominal:      General: Abdomen is flat.      Palpations: Abdomen is soft.      Tenderness: There is no abdominal tenderness.   Musculoskeletal:      Left knee: Swelling, deformity, effusion, erythema and bony tenderness present.      Right lower leg: No edema.      Left lower leg: No edema.   Skin:     General: Skin is warm and dry.   Neurological:      Mental Status: He is alert and oriented to person,  "place, and time.   Psychiatric:         Attention and Perception: He is attentive. He does not perceive auditory or visual hallucinations.         Mood and Affect: Mood is anxious and depressed.         Speech: Speech is rapid and pressured and tangential.         Behavior: Behavior normal. Behavior is cooperative.         Thought Content: Thought content is not paranoid or delusional. Thought content includes suicidal ideation. Thought content does not include homicidal ideation. Thought content does not include homicidal plan.         Cognition and Memory: Memory is impaired.      Comments: Unable to recall some details of years and times when talking about significant medical events over the last 20 years.      Last Recorded Vitals  Blood pressure 123/83, pulse 98, temperature 36.6 °C (97.9 °F), resp. rate 16, height 1.803 m (5' 11\"), weight 108 kg (239 lb), SpO2 99 %.    Relevant Results  Scheduled medications  apixaban, 2.5 mg, oral, BID  aspirin, 81 mg, oral, Daily  B complex-vitamin C, 1 tablet, oral, Daily  ergocalciferol, 1,250 mcg, oral, Weekly  ezetimibe, 10 mg, oral, Daily  finasteride, 5 mg, oral, Daily  melatonin, 10 mg, oral, Daily  metoprolol tartrate, 25 mg, oral, BID  psyllium, 1 packet, oral, Daily  rosuvastatin, 20 mg, oral, Nightly  sevelamer carbonate, 1,600 mg, oral, TID with meals  torsemide, 40 mg, oral, Daily    Continuous medications     PRN medications  PRN medications: acetaminophen **OR** acetaminophen **OR** acetaminophen, ondansetron ODT **OR** ondansetron     Results for orders placed or performed during the hospital encounter of 11/17/23 (from the past 24 hour(s))   CBC   Result Value Ref Range    WBC 6.5 4.4 - 11.3 x10*3/uL    nRBC 0.0 0.0 - 0.0 /100 WBCs    RBC 3.98 (L) 4.50 - 5.90 x10*6/uL    Hemoglobin 11.4 (L) 13.5 - 17.5 g/dL    Hematocrit 37.9 (L) 41.0 - 52.0 %    MCV 95 80 - 100 fL    MCH 28.6 26.0 - 34.0 pg    MCHC 30.1 (L) 32.0 - 36.0 g/dL    RDW 12.9 11.5 - 14.5 %    " Platelets 193 150 - 450 x10*3/uL   Renal Function Panel   Result Value Ref Range    Glucose 86 74 - 99 mg/dL    Sodium 141 136 - 145 mmol/L    Potassium 4.2 3.5 - 5.3 mmol/L    Chloride 103 98 - 107 mmol/L    Bicarbonate 26 21 - 32 mmol/L    Anion Gap 16 10 - 20 mmol/L    Urea Nitrogen 54 (H) 6 - 23 mg/dL    Creatinine 8.28 (H) 0.50 - 1.30 mg/dL    eGFR 7 (L) >60 mL/min/1.73m*2    Calcium 10.5 8.6 - 10.6 mg/dL    Phosphorus 5.0 (H) 2.5 - 4.9 mg/dL    Albumin 4.1 3.4 - 5.0 g/dL     Encounter Date: 11/17/23   ECG 12 lead   Result Value    Ventricular Rate 96    QRS Duration 88    QT Interval 346    QTC Calculation(Bazett) 437    R Axis 68    T Axis 54    QRS Count 16    Q Onset 207    T Offset 380    QTC Fredericia 404    Narrative    Atrial fibrillation  Abnormal ECG  When compared with ECG of 16-NOV-2023 21:16,  Previous ECG has undetermined rhythm, needs review    See ED provider note for full interpretation and clinical correlation  Confirmed by Diane Malik ([id number]) on 11/18/2023 12:49:40 AM     ECG 12 lead  Atrial fibrillation  Abnormal ECG  When compared with ECG of 16-NOV-2023 21:16,  Previous ECG has undetermined rhythm, needs review    See ED provider note for full interpretation and clinical correlation  Confirmed by Diane Malik ([id number]) on 11/18/2023 12:49:40 AM    Assessment/Plan   IMP:   Problem List Items Addressed This Visit          Endocrine/Metabolic    * (Principal) Hypervolemia, unspecified hypervolemia type - Primary     Patient/proxy preference for information  Prefers full information/disclosure    Goals of Care  Continue ongoing conversations at subsequent Palliative Medicine visits.     Other Palliative Support  -Palliative   -Palliative SW  -Consult Music Therapy  -Consult Pet Therapy    Symptom Management  Altered Mood/Depression:  Anxiety and depression r/t health concerns  History of anxiety/depression  Home regimen: N/A  -Recommend Psychiatry consult asap for medication initiation  and monitoring, given acute SI at this time, anxiety and depression symptoms.     Chronic Left knee pain:  pain related to hx gout  Home regimen: Tylenol PRN  Personal pain goal: 3/10  Previously tried: Tylenol PRN  Pain is suboptimally controlled and goal is to control pain for patient to participate with PT/OT.  -Recommend Uric Acid level.  -Recommend Acetaminophen 975mg PO [id number] routine and discontinue PRN Acetaminophen.  -Recommend topical lidocaine patch 5% transdermal on 12 hours and off 12 hours.    Insomnia:  -Recommend increasing Melatonin to 10mg routine at q1800 and additional dose 10mg routine at bedtime.    Diarrhea related to anxiety:    Usual bowel pattern  LBM today 11/20/2023  Monitor BM frequency     Nausea/Vomiting:  (At risk for, intermittent, intractable) nausea and vomiting related to (ESRD, Anxiety, Chronic medical illnesses)  Home regimen: N/A  -Recommend addition of Prochlorperazine 5mg [id number] PRN, if Zofran 4mg [id number] is not effective.     Advance Care Planning:   No Advance Directives on file.   Surrogate decision maker is: (if no HCPOA) patient's mother Trini Colmenares [phone]  Code status FULL CODE- Code status discussion with patient today and at this time patient desires to remain FULL CODE    Disposition:  Plan pending hospital course    Thank you for allowing us to participate in the care of this patient. Palliative will continue to follow as needed. Palliative medicine is available Monday-Friday, 8a-6p. Please contact team with any questions or concerns.  Team pager [phone]  Annie Maldonado CNP (on EPIC secure chat)    I spent 85 minutes in the professional and overall care of this patient which included chart review, interviewing patient/family, discussion with primary team, coordination of care, and documentation.    Medical Decision Making was high level due to high complexity of problems, extensive data review, and high risk of management/treatment.    Annie Maldonado,  APRN-CNP

## 2023-11-20 NOTE — PROGRESS NOTES
Assessment/Plan   Kj Colmenares is a 53 y.o. male with a past medical history of afib (on Eliquis), ESRD on HD, medical noncompliance, T2DM, HTN, HLD, GERD, remote DVT, and remote CVA ([facility], 2017)who presented to the ED with chest pain and shortness of breath in setting of medication and HD treatment non-compliance.   Patient has had not many recent emergency department visits.  He also is endorsing passive SI.  Has not been completing his HD sessions when he does undergo them.Overall overall patient is now stable.  He is undergoing HD with nephrology input.  Palliative consulted for goals of care. Endorsing active acute on chronic SI. He has good insight and to ask for help, overall mod risk. Psych evaluated, no IP psych indication, Has worked on safety plan with psych.  Plan to dc 1:1 ad reeval in the am.    #treatment noncompliance  #Acute SI on chronic passive SI  Reviewing chart, prior to admission, patient had his HD line removed because he did not want dialysis anymore.  He has been terminating his sessions early.  He has had recurrent hospitalizations.  He is endorsing SI.  He reports several episodes with a knife where he held it to his neck.    -Psych consulted   -One-to-one, suicide precautions, safety tray.  Dc 1:1 11/21.   -Palliative was consulted appreciate recommendations   - SW consult  - He has good insight and to ask for help, overall mod risk. Psych evaluated, no IP psych indication, Has worked on safety plan with psych.      #ESRD on HD  -Patient self-terminated HD  upon admission   -Consult nephrology for HD management, input appreciated  -RFP c/w needing HD, electrolytes stable  -Renal dosing where indicated  -Renal diet  -Continue home medications     #Afib  #Tachycardia  #Atypical chest pain  -Troponin -ve x2  -EKG NST with LVH criteria  -Continue home medications  -Continue Eliquis  -Telemetry     #HTN  -Uncontrolled  -Continue home metoprolol  -Will hold Midodrine     #T2DM  -Controlled,  "a1c 5.4 7/2023  -Not on home medications     #Nausea and vomiting  -Chronic in nature  -Continue Zofran      Dispo: Pending repeat evaluation by psychiatry tomorrow, possible discharge..       Scheduled outpatient appointments in system:   No future appointments.  ---------------------------------------------------------------------------------------------------  Subjective no events.  Patient felt he was going home today.  Discussed with psychiatry, working on the safety plan.  Patient has good insight.  No inpatient psych required.  To reevaluate in a.m.  Patient was informed.  No new complaints.     ---------------------------------------------------------------------------------------------------  Objective   Last Recorded Vitals  Blood pressure 91/74, pulse 101, temperature 36.9 °C (98.4 °F), temperature source Temporal, resp. rate 19, height 1.803 m (5' 11\"), weight 108 kg (239 lb), SpO2 99 %.  Intake/Output last 3 Shifts:  I/O last 3 completed shifts:  In: 660 (6.1 mL/kg) [P.O.:660]  Out: 950 (8.8 mL/kg) [Urine:950 (0.2 mL/kg/hr)]  Weight: 108.4 kg     Physical Exam  Vitals and nursing note reviewed.   Constitutional:       General: He is not in acute distress.     Appearance: Normal appearance. He is not ill-appearing or toxic-appearing.      Comments: thin   HENT:      Head: Normocephalic and atraumatic.      Mouth/Throat:      Mouth: Mucous membranes are moist.   Eyes:      General: No scleral icterus.     Extraocular Movements: Extraocular movements intact.      Conjunctiva/sclera: Conjunctivae normal.   Cardiovascular:      Rate and Rhythm: Normal rate and regular rhythm.      Heart sounds: S1 normal and S2 normal. No murmur heard.  Pulmonary:      Effort: Pulmonary effort is normal. No respiratory distress.      Breath sounds: No wheezing, rhonchi or rales.   Abdominal:      General: Bowel sounds are normal. There is no distension.      Palpations: Abdomen is soft.      Tenderness: There is no abdominal " tenderness. There is no guarding or rebound.   Musculoskeletal:         General: No swelling or deformity.      Cervical back: Neck supple.   Skin:     General: Skin is warm and dry.      Findings: No rash.   Neurological:      General: No focal deficit present.      Mental Status: He is alert. Mental status is at baseline.   Psychiatric:         Mood and Affect: Mood normal.         Relevant Results  Lab Results   Component Value Date    WBC 6.5 11/20/2023    HGB 11.4 (L) 11/20/2023    HCT 37.9 (L) 11/20/2023    MCV 95 11/20/2023     11/20/2023      Lab Results   Component Value Date    GLUCOSE 86 11/20/2023    CALCIUM 10.5 11/20/2023     11/20/2023    K 4.2 11/20/2023    CO2 26 11/20/2023     11/20/2023    BUN 54 (H) 11/20/2023    CREATININE 8.28 (H) 11/20/2023     Scheduled medications  apixaban, 2.5 mg, oral, BID  aspirin, 81 mg, oral, Daily  B complex-vitamin C, 1 tablet, oral, Daily  ergocalciferol, 1,250 mcg, oral, Weekly  ezetimibe, 10 mg, oral, Daily  finasteride, 5 mg, oral, Daily  lidocaine, 1 patch, transdermal, Daily  melatonin, 10 mg, oral, q24h MAREK  metoprolol tartrate, 25 mg, oral, BID  OLANZapine, 5 mg, oral, Nightly  psyllium, 1 packet, oral, Daily  rosuvastatin, 20 mg, oral, Nightly  sevelamer carbonate, 1,600 mg, oral, TID with meals  torsemide, 40 mg, oral, Daily      Continuous medications     PRN medications  PRN medications: acetaminophen **OR** acetaminophen **OR** acetaminophen, ondansetron ODT **OR** ondansetron    Nguyễn Snyder MD

## 2023-11-20 NOTE — CARE PLAN
The patient's goals for the shift include Pt wants to relax    The clinical goals for the shift include pt will remain free of falls    Patient with good appetite. Has been on the phone throughout am and this afternoon texting. Unsure, of discharge plans as conflicting stories. Received a phone call from Deisi who works at [facility]. She states he has not been there for a month and will probably be removed fro their list. She states he told her he was going to go to a [facility] in [place]. He reports just the opposite  to me. C/O nausea but no emesis

## 2023-11-20 NOTE — CONSULTS
"Referring Provider  Dr. Snyder        HISTORY OF PRESENT ILLNESS:  Kj Colmenares is a 53 y.o. male with past psychiatric history of bipolar disorder and past medical history of ESRD w/ HD MWF via LUEAVF, DM2, HTN, GERD, DVT (on Eliquis), HLD, PAF, stroke, admitted as above with CP/SOB in the setting of medications and HD treatment non-compliance. Psychiatry consulted on 11/20 for SI.    The patient is seen in his room; he is engaged with interviewer and keen on talking. He tells provider that he has been \"on dialysis since 2001. Ever since then nothing has been the same.\" He states that he endorsed suicidal thoughts without plan earlier today but is currently no longer feeling this way, especially after \"being able to talk to some people.\" He of note recently spoke with Palliative Care provider which he states was very therapeutic for him. He endorses Rastafarian and children as major reasons for not wanting to commit suicide - he also indicates wanting to go to heaven and moral issues with suicide. He denies any suicide attempts but endorses \"thoughts and plans\" with most recent about 6 months ago with plan to jump in traffic. He aborted these thoughts after thinking of 8 children and his wife who passed away 1.5 years ago.  He has several aborted attempts with a knife with similar course in which he placed a knife to his neck 2-3 years ago \"once or twice\" and then stopped himself. He never martin blood and never has cut himself per patient. He currently has some anhedonia, endorses depressed mood, low energy, low concentration, low appetite (also due to nausea), sleep disturbances, and some guilt. He denies alcohol, drug, or tobacco use, but admits to drinking occasionally in \"the 80s\". He denies knowing of any bipolar disorder diagnosis but does admit to nearly no sleep for up to 4 days, spending splurges, and energy increases. He cannot clearly state these are clustered however, has no legal history, " "psychiatric hospitalizations, or obvious impulse issues. He denies AVH but admits to \"vivid dreams\" that he describes similarly with regards to seeing \"shadows and my wife talking to me, then I woke up.\" He does not remember any medications psychiatrically he has been on and states he discontinues them after discharge because \"I can't keep things down after I leave here properly.\" He endorses some nausea and malaise currently and states he will be getting dialysis again but has trouble sitting through more than 2 hours of it due to arm pain. Palliative Care will work with him as well. He admits to anxiety about dying as well. His girlfriend was in the ICU recently, and he states this also added to stress - she is \"doing okay now, but still worried about her.\" He lives with his niece and endorses \"great support\" from her and other family members. He is amenable to starting Olanzapine 5mg at bedtime tonight and discusses risks of medication including EPS, dystonia, sedation, and NMS being rare side effects and to speak to a provider if he has any. He expresses understanding. He would like the medication to help him with sleep and mood which is discussed; patient agreeable. He is amenable to counseling/psychiatric care outpatient as well.     PSYCHIATRIC REVIEW OF SYSTEMS  Depression: depressed mood, difficulty concentrating, thinking or making decisions, sleep disturbance: difficulty falling asleep, fatigue or loss of energy, feelings of worthlessness or guilt, feelings of hopelessness, markedly diminished interest or pleasure in all or most activities, and recurrent thoughts of death or suicidal ideation  Anxiety: excessive worry that is difficult to control and irritability  Emily:  Increased rate of speech (not pressured however).  Sleep disturbances, otherwise negative currently.  Psychosis: negative  Delirium: negative   Trauma: history of trauma    PSYCHIATRIC HISTORY  Prior diagnoses: Bipolar Disorder.  Prior " hospitalizations: No psychiatric hospitalizations per patient.  History of Consult Psychiatry: Seen in 6/2023 by Dr. Naik for hypomania/sleep, pt set up with outpatient followup and started on Zyprexa 5mg at bedtime, 1/2023 with unclear course, and 10/2020 with unclear course. Has required a sitter previously, but is discharged from medical hospital without acute inpatient need.  History of suicide attempts: No actual attempts. 6 months ago patient thought about jumping in front of cars but stopped himself. Aborted attempts (~x2) with placing knife to his neck per patient 2-3 years ago.  History of self-harm: Denies.  History of trauma/abuse/loss: Medical trauma of dialysis/constant procedures. Wife passed away 1.5 years ago.  History of violence: Denies.    Current psychiatrist: None.  Current mental health agency: Denies.  Current : [facility].  Current outpatient treatment: None currently. Previously recommended to follow at [facility], pt did not follow up.  Guardian or payee: Self.    Current psychiatric medications: None.  Past psychiatric medications: Olanzapine 5mg at bedtime, Seroquel 25mg at bedtime, Diazepam 5mg q6h prn, Vistaril 25mg TID prn, Gabapentin 100mg at bedtime, Melatonin 3mg qHS.  Past psychiatric treatments: None.    Family psychiatric history:      - Psychiatric disorders: Denies.     - Suicide: Denies.     - Substance use: Denies.      SUBSTANCE USE HISTORY   He reports that he has never smoked. He has never used smokeless tobacco. He reports that he does not drink alcohol and does not use drugs.    Tobacco: denies  Alcohol: denies     - History of severe withdrawal: denies     - Last use: NA  Cannabis: denies.  Other substances: denies.  Prior substance use disorder treatment: denies.    SOCIAL HISTORY  Social History     Socioeconomic History    Marital status:      Spouse name: Not on file    Number of children: Not on file    Years of education:  Not on file    Highest education level: Not on file   Occupational History    Not on file   Tobacco Use    Smoking status: Never    Smokeless tobacco: Never   Substance and Sexual Activity    Alcohol use: Never    Drug use: Never    Sexual activity: Defer   Other Topics Concern    Not on file   Social History Narrative    Not on file     Social Determinants of Health     Financial Resource Strain: High Risk (11/20/2023)    Overall Financial Resource Strain (CARDIA)     Difficulty of Paying Living Expenses: Hard   Food Insecurity: Unknown (11/11/2023)    Hunger Vital Sign     Worried About Running Out of Food in the Last Year: Patient refused     Ran Out of Food in the Last Year: Patient refused   Transportation Needs: No Transportation Needs (11/20/2023)    PRAPARE - Transportation     Lack of Transportation (Medical): No     Lack of Transportation (Non-Medical): No   Physical Activity: Inactive (11/17/2023)    Exercise Vital Sign     Days of Exercise per Week: 0 days     Minutes of Exercise per Session: 0 min   Stress: No Stress Concern Present (11/11/2023)    [employer] [place] [employer] - Occupational Stress Questionnaire     Feeling of Stress : Not at all   Social Connections: Moderately Integrated (11/11/2023)    Social Connection and Isolation Panel [NHANES]     Frequency of Communication with Friends and Family: More than three times a week     Frequency of Social Gatherings with Friends and Family: Once a week     Attends Worship Services: 1 to 4 times per year     Active Member of Clubs or Organizations: Yes     Attends Club or Organization Meetings: 1 to 4 times per year     Marital Status: Never    Intimate Partner Violence: Not At Risk (11/11/2023)    Humiliation, Afraid, Rape, and Kick questionnaire     Fear of Current or Ex-Partner: No     Emotionally Abused: No     Physically Abused: No     Sexually Abused: No   Housing Stability: High Risk (11/20/2023)    Housing Stability Vital  Sign     Unable to Pay for Housing in the Last Year: Yes     Number of Places Lived in the Last Year: 2     Unstable Housing in the Last Year: No      Current living situation: Lives with niece.  Current employment/source of income: Retired, SSI.  Current stressors: Dialysis, thoughts of death, transportation to dialysis, pain during dialysis sessions towards middle.  Activities: Watching football and basketball.     Family: Close with family.  Yarsanism: Zoroastrian, goes to [employer], wants to go to [facility] per palliative note.  Marital status:  1.5 years ago, has current girlfriend.   Children: 8 children, 9-25 years old, 5 boys and 3 girls. Supports them.  Social support: Parents, girlfriend, children.  Legal history: Denies.   history: Denies.  Access to weapons: Denies.    PAST MEDICAL HISTORY  Past Medical History:   Diagnosis Date    Diabetes mellitus (CMS/HCC)     Hypertension     Stroke (CMS/HCC)           PAST SURGICAL HISTORY  Past Surgical History:   Procedure Laterality Date    CT HEAD ANGIO W AND WO IV CONTRAST  2/4/2014    CT HEAD ANGIO W AND WO IV CONTRAST 2/4/2014 [facility] CL[facility]ICAL LEGAC[facility]    CT HEAD ANGIO W AND WO IV CONTRAST  2/13/2014    CT HEAD ANGIO W AND WO IV CONTRAST 2/13/2014 [facility] CLINICAL LEGACY          FAMILY HISTORY  No family history on file.     ALLERGIES  Patient has no known allergies.    OARRS REVIEW  OARRS checked: Yes  OARRS comments: Tramadol and Gabapentin rx, most recent tramadol 6/24 filled for pain, no acute concerns.    OBJECTIVE    VITALS      11/19/2023    10:53 AM 11/19/2023     4:05 PM 11/19/2023     8:00 PM 11/20/2023    12:49 AM 11/20/2023     4:00 AM 11/20/2023     8:35 AM 11/20/2023    12:30 PM   Vitals   Systolic  107 102 92 93 123 134   Diastolic  66 76 61 60 83 93   Heart Rate 55 98 82 81 81 98 80   Temp 36 °C (96.8 °F) 36.6 °C (97.9 °F) 37 °C (98.6 °F) 36.5 °C (97.7 °F) 37 °C (98.6 °F) 36.6 °C (97.9 °F) 36 °C (96.8 °F)   Resp  19 19  17 16 16        MENTAL  "STATUS EXAM  Appearance: AA male, burly, lying in bed, gray hair, appears stated age.  Attitude: Calm, cooperative, engaged.  Behavior: Appropriate eye contact, no agitation noted.  Motor Activity: No psychomotor agitation or retardation, no tremors noted, no TD/EPS, signs of akathisia, or tremors noted. Gait not assessed.  Speech: Spontaneous, normal volume, rhythm, and tone, but increased rate. Not pressured.  Mood: \"Not great.\"  Affect: Euthymic, full range, non-labile.   Thought Process: Organized, linear, goal-directed, no flight of ideas.  Thought Content:  Denies SI, HI currently, previously had SI earlier today without plan or clear intent. No delusions elicited.  Thought Perception: Denies AVH. No internal stimulation noted. No parnoid ideation noted.   Cognition: Grossly AxO, attention and concentration grossly intact, memory appears grossly intact.  Insight: Fair, patient understands condition.   Judgement: Limited, patient is experiencing SI periodically and having difficulty continuing dialysis despite need.      HOME MEDICATIONS  Medication Documentation Review Audit       Reviewed by Heather Griffith PharmD (Pharmacist) on 11/17/23 at 1836      Medication Order Taking? Sig Documenting Provider Last Dose Status   acetaminophen (Tylenol) 500 mg tablet [id number]  Take 1 tablet (500 mg) by mouth once daily as needed for mild pain (1 - 3). Historical Provider, MD  Active   allopurinol (Zyloprim) 100 mg tablet [id number] No Take 1 tablet (100 mg) by mouth once daily. Historical Provider, MD unsure of if he takes this medication Active   apixaban (Eliquis) 2.5 mg tablet [id number] No Take 1 tablet (2.5 mg) by mouth 2 times a day. Historical Provider, MD last filled 7/3 (30 ds) Active   aspirin 81 mg EC tablet [id number]  Take 1 tablet (81 mg) by mouth once daily. Historical Provider, MD  Active   B complex-vitamin C-folic acid (Nephrocaps) 1 mg capsule [id number]  Take 1 capsule by mouth once daily. Historical " Provider, MD  Active   ergocalciferol (Vitamin D-2) 1.25 MG (49615 UT) capsule [id number] No Take 1 capsule (1,250 mcg) by mouth 1 (one) time per week. MONDAY NEED AN REFILL Historical Provider, MD needs refill Active   ezetimibe (Zetia) 10 mg tablet [id number]  Take 1 tablet (10 mg) by mouth once daily. Albin Wheat MD  Active   finasteride (Proscar) 5 mg tablet [id number] No Take 1 tablet (5 mg) by mouth once daily. Do not crush, chew, or split. Albin Wheat MD unsure of if he takes this medication Active   melatonin 3 mg capsule [id number]  Take 1 capsule by mouth once daily at bedtime. Albin Wheat MD  Active   metoprolol tartrate (Lopressor) 25 mg tablet [id number]  Take 1 tablet (25 mg) by mouth 2 times a day. Albin Wheat MD  Active   midodrine (Proamatine) 10 mg tablet [id number]  Take 1 tablet (10 mg) by mouth every 8 hours. Albin Wheat MD  Active   OLANZapine (ZyPREXA) 5 mg tablet [id number] No Take 1 tablet (5 mg) by mouth once daily at bedtime. Albin Wheat MD unsure of if he takes this medication Active   ondansetron (Zofran) 4 mg tablet [id number] No Take 1 tablet (4 mg) by mouth every 12 hours if needed for nausea or vomiting. Albin Wheat MD does not help with nausea Active   rosuvastatin (Crestor) 20 mg tablet [id number]  Take 1 tablet (20 mg) by mouth once daily at bedtime. Albin Wheat MD  Active   sevelamer carbonate (Renvela) 800 mg tablet [id number]  Take 2 tablets (1,600 mg) by mouth 3 times a day with meals. Swallow tablet whole; do not crush, break, or chew. Albin Wheat MD  Active   sodium bicarbonate 325 mg tablet [id number]  Take 1 tablet (325 mg) by mouth 2 times a day. 1 TABS 2 TIMES FOR 7 DAYS Albin Wheat MD  Active   tamsulosin (Flomax) 0.4 mg 24 hr capsule [id number]  Take 1 capsule (0.4 mg) by mouth once daily. Albin Wheat MD  Active                     CURRENT MEDICATIONS  Scheduled  medications  apixaban, 2.5 mg, oral, BID  aspirin, 81 mg, oral, Daily  B complex-vitamin C, 1 tablet, oral, Daily  ergocalciferol, 1,250 mcg, oral, Weekly  ezetimibe, 10 mg, oral, Daily  finasteride, 5 mg, oral, Daily  melatonin, 10 mg, oral, Daily  metoprolol tartrate, 25 mg, oral, BID  psyllium, 1 packet, oral, Daily  rosuvastatin, 20 mg, oral, Nightly  sevelamer carbonate, 1,600 mg, oral, TID with meals  torsemide, 40 mg, oral, Daily        Continuous medications       PRN medications  PRN medications: acetaminophen **OR** acetaminophen **OR** acetaminophen, ondansetron ODT **OR** ondansetron     LABS  Results for orders placed or performed during the hospital encounter of 11/17/23 (from the past 24 hour(s))   CBC   Result Value Ref Range    WBC 6.5 4.4 - 11.3 x10*3/uL    nRBC 0.0 0.0 - 0.0 /100 WBCs    RBC 3.98 (L) 4.50 - 5.90 x10*6/uL    Hemoglobin 11.4 (L) 13.5 - 17.5 g/dL    Hematocrit 37.9 (L) 41.0 - 52.0 %    MCV 95 80 - 100 fL    MCH 28.6 26.0 - 34.0 pg    MCHC 30.1 (L) 32.0 - 36.0 g/dL    RDW 12.9 11.5 - 14.5 %    Platelets 193 150 - 450 x10*3/uL   Renal Function Panel   Result Value Ref Range    Glucose 86 74 - 99 mg/dL    Sodium 141 136 - 145 mmol/L    Potassium 4.2 3.5 - 5.3 mmol/L    Chloride 103 98 - 107 mmol/L    Bicarbonate 26 21 - 32 mmol/L    Anion Gap 16 10 - 20 mmol/L    Urea Nitrogen 54 (H) 6 - 23 mg/dL    Creatinine 8.28 (H) 0.50 - 1.30 mg/dL    eGFR 7 (L) >60 mL/min/1.73m*2    Calcium 10.5 8.6 - 10.6 mg/dL    Phosphorus 5.0 (H) 2.5 - 4.9 mg/dL    Albumin 4.1 3.4 - 5.0 g/dL        IMAGING  No results found.     PSYCHIATRIC RISK ASSESSMENT  Violence Risk Factors:  male, current psychiatric illness, and stress/destabilizers  Acute Risk of Harm to Others is Considered: Low  Suicide Risk Factors: male, having a disability , chronic medical illness, chronic pain, current psychiatric illness, recent loss or impending loss of loved one, failed relationship the last year, global insomnia, and lack of  treatment access, discontinuities in treatment, or recent discharge from hospital  Protective Factors: Taoist affiliation/spirituality, sense of responsibility towards family, social support/connectedness, moral objections to suicide, child-related concerns/living with child < 18 yrs age, positive family relationships, hopefulness/future-orientation, and marriage/partnership  Acute Risk of Harm to Self is Considered: Moderate    ASSESSMENT AND PLAN  Kj Colmenares is a 53 y.o. male with past psychiatric history of bipolar disorder and past medical history of ESRD w/ HD MWF via LUEAVF, DM2, HTN, GERD, DVT (on Eliquis), HLD, PAF, stroke, admitted as above with CP/SOB in the setting of medications and HD treatment non-compliance. Psychiatry consulted on 11/20 for SI.    Patient has moderate suicide risk as described above. This presentation is not significantly different from previous admissions. Patient has continued stressors in life but has good insight currently into need for treatment and is asking for psychiatric help. Patient has symptoms of MDD however potential hypomania history with fast speech and potential clustered traits of sleep issues, increased energy, goal-directed activity, distractibility however it is difficult to ascertain. Olanzapine can help with mood, anxiety, any potential hypomanic symptoms, and sleep which are all issues that need targeting. He will be given resources tomorrow for follow up and can be started on Olanzapine 5mg at bedtime for sleep and mood as it has been utilized in previous admission with efficacy. EKG should be done tomorrow to recheck qTC, previous EKG was < 450 on 11/17. Liver enzymes are wnl, recent TSH was normal, reported hx of thyroid dysfunction was transient; no other workup recommended at this time. Patient would benefit from sitter at this time due to reported SI and self harmful activity of avoiding dialysis, taking out lines, and recent aborted suicidal  "thoughts/plans - should also appreciate palliative recs to help patient through dialysis process due to arm pain. Will consider prn tx if patient having significant anxiety during dialysis however currently it appears to be more of a pain issue than anxiety.    EKG (11/17): A fib, 96 vent rate, QTc of 437.    IMPRESSION:  #other specified Mood Disorder (Bipolar Disorder vs. MDD).  #History of Bipolar Disorder dx, hypomanic episodes, currently no treatment.    RECOMMENDATIONS    Safety:  - Will continue to assess if patient meets criteria for inpatient psychiatric admission prior to discharge.   - Patient lacks the capacity to leave AMA at this time and thus cannot leave AMA. Call [facility] if patient attempts to leave AMA.  - To evaluate decision-making capacity, recommend use of the Capacity Evaluation Tool. Search “ IP Capacity Evaluation under SmartText\" unless the patient has a legal guardian, in which case all decisions per the legal guardian.  - Patient does require a 1:1 sitter from a psychiatric perspective at this time.  - As with all hospitalized patients, would recommend delirium precautions, as below:     -- Minimize use of benzos, opiates, anticholinergics, as these may worsen mental status   -- Would use caution with narcotic pain medications   -- Would still adequately controlling pain, as uncontrolled pain is also a risk factor for delirium   -- Reinforce sleep hygiene; encourage patient to stay awake during the day   -- Keep curtains/blinds open during the day and closed at night.   -- Would recommend reorienting/redirecting patient as much as possible,    -- Aim for consistent staffing, familiar objects, avoiding bright lights and loud noises, etc.      Work-up:  -Please obtain EKG tomorrow after dose tonight of Olanzapine.    Medications:  - Start Olanzapine 5mg PO at bedtime for sleep/mood.    Ancillary Services:  - Recommend , pet/music/art therapy consult.    Follow-up:  - Will give " patient resources for mental health treatment prior to discharge.      - Discussed recommendations with primary team.  - Psychiatry will continue to follow.    Thank you for allowing us to participate in the care of this patient. Please page [phone] with any questions or concerns.    Patient seen and staffed/discussed with Dr. Smith, who agrees with above plan.    Medication Consent  Medication Consent: n/a; consult service    Nakul Fuentes MD     ATTESTATION:  Pt seen for in person staffing 11/20/23. Reviewed hx as above, pt with very rapid though not particularly pressured speech. Endorses depresion with SI but currently wants to pursue treatment. Identifies pain as reason to stop dialysis. Palliative on board. Sitter for now, resume olanzapine, will follow and continue to assess. Agree with rest as above.     Shruthi Smith MD   Psychiatry Attending

## 2023-11-20 NOTE — HOSPITAL COURSE
Kj Colmenares is a 53 y.o. male with a past medical history of afib (on Eliquis), ESRD on HD, medical noncompliance, T2DM, HTN, HLD, GERD, remote DVT, and remote CVA ([facility], 2017)who presented to the ED with chest pain and shortness of breath in setting of medication and HD treatment non-compliance.   Patient has had not many recent emergency department visits.  He also is endorsing passive SI.  Has not been completing his HD sessions when he does undergo them.Overall overall patient is now stable.  He is undergoing HD with nephrology input.  Palliative consulted for goals of care. Endorsing active acute on chronic SI. He has good insight and to ask for help, overall mod risk. Psych evaluated, no IP psych indication, Has worked on safety plan with psych.  Plan to dc 1:1 ad reeval in the am.  Patient remains stable, appropriate insight and ability to ask for help.  Understands he needs to call to make his psychiatry follow-up appointment and it was recommended that he follow-up with psychology for counseling.  We discussed crisis lines and he was given the numbers in his paperwork.  No inpatient psych needs at this time, he has responded well to olanzapine and psychiatry recommends continuing.  Palliative care helped arrange navigator program for the .  Patient to discharge home in stable condition recommending primary care, nephrology, psych and psychology follow-up.  On discharge his sevelamer was not covered and was changed to calcium acetate, recommending repeat phosphorus check at HD.

## 2023-11-20 NOTE — PROGRESS NOTES
"   11/20/23 9502 Transitional Care Coordinator Notes:    Attempted to meet patient at bedside but being seen by Palliative Care. Patient has expressed to the MD feelings of \"not wanting to live anymore.\" MD may also consult psychiatry. Will attempt to see patient at a later time. Will continue to follow for discharge needs.                 Assessment/Plan   Principal Problem:    Hypervolemia, unspecified hypervolemia type    Discharge Plans: discharge to home           Benita Serrano RN      "

## 2023-11-21 ENCOUNTER — APPOINTMENT (OUTPATIENT)
Dept: DIALYSIS | Facility: HOSPITAL | Age: 53
End: 2023-11-21
Payer: COMMERCIAL

## 2023-11-21 ENCOUNTER — APPOINTMENT (OUTPATIENT)
Dept: CARDIOLOGY | Facility: HOSPITAL | Age: 53
End: 2023-11-21
Payer: COMMERCIAL

## 2023-11-21 LAB
ALBUMIN SERPL BCP-MCNC: 4.2 G/DL (ref 3.4–5)
ANION GAP SERPL CALC-SCNC: 15 MMOL/L (ref 10–20)
ATRIAL RATE: 101 BPM
BUN SERPL-MCNC: 34 MG/DL (ref 6–23)
CALCIUM SERPL-MCNC: 10.4 MG/DL (ref 8.6–10.6)
CHLORIDE SERPL-SCNC: 99 MMOL/L (ref 98–107)
CO2 SERPL-SCNC: 29 MMOL/L (ref 21–32)
CREAT SERPL-MCNC: 6.3 MG/DL (ref 0.5–1.3)
GFR SERPL CREATININE-BSD FRML MDRD: 10 ML/MIN/1.73M*2
GLUCOSE SERPL-MCNC: 95 MG/DL (ref 74–99)
PHOSPHATE SERPL-MCNC: 3.9 MG/DL (ref 2.5–4.9)
POTASSIUM SERPL-SCNC: 3.4 MMOL/L (ref 3.5–5.3)
Q ONSET: 208 MS
QRS COUNT: 15 BEATS
QRS DURATION: 100 MS
QT INTERVAL: 356 MS
QTC CALCULATION(BAZETT): 454 MS
QTC FREDERICIA: 419 MS
R AXIS: -2 DEGREES
SODIUM SERPL-SCNC: 140 MMOL/L (ref 136–145)
T AXIS: 31 DEGREES
T OFFSET: 386 MS
VENTRICULAR RATE: 98 BPM

## 2023-11-21 PROCEDURE — G0378 HOSPITAL OBSERVATION PER HR: HCPCS

## 2023-11-21 PROCEDURE — 8010000001 HC DIALYSIS - HEMODIALYSIS PER DAY

## 2023-11-21 PROCEDURE — 2500000004 HC RX 250 GENERAL PHARMACY W/ HCPCS (ALT 636 FOR OP/ED): Mod: SE | Performed by: NURSE PRACTITIONER

## 2023-11-21 PROCEDURE — 2500000002 HC RX 250 W HCPCS SELF ADMINISTERED DRUGS (ALT 637 FOR MEDICARE OP, ALT 636 FOR OP/ED): Mod: SE | Performed by: NURSE PRACTITIONER

## 2023-11-21 PROCEDURE — 93010 ELECTROCARDIOGRAM REPORT: CPT | Performed by: INTERNAL MEDICINE

## 2023-11-21 PROCEDURE — 90937 HEMODIALYSIS REPEATED EVAL: CPT

## 2023-11-21 PROCEDURE — 36415 COLL VENOUS BLD VENIPUNCTURE: CPT | Performed by: STUDENT IN AN ORGANIZED HEALTH CARE EDUCATION/TRAINING PROGRAM

## 2023-11-21 PROCEDURE — 2500000004 HC RX 250 GENERAL PHARMACY W/ HCPCS (ALT 636 FOR OP/ED): Mod: SE | Performed by: STUDENT IN AN ORGANIZED HEALTH CARE EDUCATION/TRAINING PROGRAM

## 2023-11-21 PROCEDURE — 2500000001 HC RX 250 WO HCPCS SELF ADMINISTERED DRUGS (ALT 637 FOR MEDICARE OP): Mod: SE | Performed by: NURSE PRACTITIONER

## 2023-11-21 PROCEDURE — 99233 SBSQ HOSP IP/OBS HIGH 50: CPT | Performed by: STUDENT IN AN ORGANIZED HEALTH CARE EDUCATION/TRAINING PROGRAM

## 2023-11-21 PROCEDURE — 99233 SBSQ HOSP IP/OBS HIGH 50: CPT | Performed by: NURSE PRACTITIONER

## 2023-11-21 PROCEDURE — 80069 RENAL FUNCTION PANEL: CPT | Performed by: STUDENT IN AN ORGANIZED HEALTH CARE EDUCATION/TRAINING PROGRAM

## 2023-11-21 PROCEDURE — 2500000001 HC RX 250 WO HCPCS SELF ADMINISTERED DRUGS (ALT 637 FOR MEDICARE OP): Mod: SE | Performed by: STUDENT IN AN ORGANIZED HEALTH CARE EDUCATION/TRAINING PROGRAM

## 2023-11-21 PROCEDURE — 99497 ADVNCD CARE PLAN 30 MIN: CPT | Performed by: NURSE PRACTITIONER

## 2023-11-21 PROCEDURE — 93005 ELECTROCARDIOGRAM TRACING: CPT

## 2023-11-21 PROCEDURE — 99232 SBSQ HOSP IP/OBS MODERATE 35: CPT

## 2023-11-21 RX ADMIN — METOPROLOL TARTRATE 25 MG: 50 TABLET, FILM COATED ORAL at 20:10

## 2023-11-21 RX ADMIN — FINASTERIDE 5 MG: 5 TABLET, FILM COATED ORAL at 12:51

## 2023-11-21 RX ADMIN — SEVELAMER CARBONATE 1600 MG: 800 TABLET, FILM COATED ORAL at 12:52

## 2023-11-21 RX ADMIN — TORSEMIDE 40 MG: 20 TABLET ORAL at 12:52

## 2023-11-21 RX ADMIN — PSYLLIUM HUSK 1 PACKET: 3.4 POWDER ORAL at 12:53

## 2023-11-21 RX ADMIN — METOPROLOL TARTRATE 25 MG: 50 TABLET, FILM COATED ORAL at 12:52

## 2023-11-21 RX ADMIN — SEVELAMER CARBONATE 1600 MG: 800 TABLET, FILM COATED ORAL at 17:06

## 2023-11-21 RX ADMIN — EZETIMIBE 10 MG: 10 TABLET ORAL at 12:51

## 2023-11-21 RX ADMIN — APIXABAN 2.5 MG: 2.5 TABLET, FILM COATED ORAL at 20:10

## 2023-11-21 RX ADMIN — Medication 1 TABLET: at 12:52

## 2023-11-21 RX ADMIN — ASPIRIN 81 MG: 81 TABLET, COATED ORAL at 12:52

## 2023-11-21 RX ADMIN — APIXABAN 2.5 MG: 2.5 TABLET, FILM COATED ORAL at 12:52

## 2023-11-21 RX ADMIN — ROSUVASTATIN CALCIUM 20 MG: 20 TABLET, FILM COATED ORAL at 20:10

## 2023-11-21 RX ADMIN — OLANZAPINE 5 MG: 5 TABLET, FILM COATED ORAL at 20:09

## 2023-11-21 RX ADMIN — Medication 10 MG: at 20:09

## 2023-11-21 ASSESSMENT — PAIN SCALES - GENERAL
PAINLEVEL_OUTOF10: 0 - NO PAIN

## 2023-11-21 ASSESSMENT — COGNITIVE AND FUNCTIONAL STATUS - GENERAL
MOBILITY SCORE: 24
DAILY ACTIVITIY SCORE: 24

## 2023-11-21 ASSESSMENT — PAIN - FUNCTIONAL ASSESSMENT
PAIN_FUNCTIONAL_ASSESSMENT: 0-10
PAIN_FUNCTIONAL_ASSESSMENT: NO/DENIES PAIN
PAIN_FUNCTIONAL_ASSESSMENT: 0-10

## 2023-11-21 NOTE — PROGRESS NOTES
Kj Colmenares is a 53 y.o. male on day 0 of admission presenting with Hypervolemia, unspecified hypervolemia type.    Subjective   Received phone call from Deisi at [facility] ([phone]); states that patient has not been at their center for over 30 days and may lose his chair. Discussed that patient has had multiple admissions and team is aware of his non compliance with outpatient HD. They are agreeable to hold patients HD chair as long as he is compliant with his care. They requested updated clinical information be faxed to them which was completed.     Met with patient at bedside; he is agreeable to return to [facility] at discharge for his HD. Discussed the importance of being compliant with his sessions and patient reported understanding. He was seen by psychiatry and palliative care yesterday; patient completed mental health safety plan which was at bedside. Patient states that he has already scheduled his transportation for his HD Friday.     Update provided to Dr. Snyder and [facility].     -Gayla CANNON, MA, LSW  [phone] or [facility]  Care Transitions

## 2023-11-21 NOTE — SIGNIFICANT EVENT
Mental Health Safety Plan (with verbatim entries, with explanations to certain discrepancies, safety plan discussed with patient)      Signs of Worsening Mood or Anxiety, or Increased Suicidal Thinkin. I'm a good person (patient indicates 3 things that begin to deteriorate when he starts feeling worse)  2. I like to talk nice  3. I feel better.    Coping Strategies that Help Improve My Mood, Anxiety, or Suicidal Thinkin. Better I like to help others.  2. Nice  3. Good    Reasons to Keep Livin. Take care of my kids.  2. I got to stay strong.  3.I need my own places to help out.      People or Places that can Help (with phone numbers):  1. Mom ([phone]), dad, girlfriend - confirmed numbers are in patient's phone.  2. I'm just that can help [employer].  3. [facility]: [phone] (patient switched this with entries in professional help during a crisis).      Making my Environment Safe:  1. Guns/weapons: No.  2. Access to pills: No.  3. Other: No.    Professional Help During a Crisis:  1. I don't do drugs. (Patient wrote some additional coping strategies/positives in this section, but instead placed [facility] in the people or places that can help section).  2. My family right there for me.  3. I just want to go home for Thanksgiving for kids.  4. Mobile Crisis Team in [facility]: [phone].  5. National Suicide Hotline: 8-034-275-TALK (6517)

## 2023-11-21 NOTE — PROGRESS NOTES
Assessment/Plan   Kj Colmenares is a 53 y.o. male with a past medical history of afib (on Eliquis), ESRD on HD, medical noncompliance, T2DM, HTN, HLD, GERD, remote DVT, and remote CVA ([facility], 2017)who presented to the ED with chest pain and shortness of breath in setting of medication and HD treatment non-compliance.   Patient has had not many recent emergency department visits.  He also is endorsing passive SI.  Has not been completing his HD sessions when he does undergo them.Overall overall patient is now stable.  He is undergoing HD with nephrology input.  Palliative consulted for goals of care. Endorsing active acute on chronic SI. He has good insight and to ask for help, overall mod risk. Psych evaluated, no IP psych indication, Has worked on safety plan with psych.  Plan to dc 1:1 ad reeval in the am.    #treatment noncompliance  #Acute SI on chronic passive SI  Reviewing chart, prior to admission, patient had his HD line removed because he did not want dialysis anymore.  He has been terminating his sessions early.  He has had recurrent hospitalizations.  He is endorsing SI.  He reports several episodes with a knife where he held it to his neck.    -Psych consulted   -One-to-one, suicide precautions, safety tray.  Dc 1:1 11/21.   -Palliative was consulted appreciate recommendations   - SW consult  - He has good insight and to ask for help, overall mod risk. Psych evaluated, no IP psych indication, Has worked on safety plan with psych.      #ESRD on HD  -Patient self-terminated HD  upon admission   -Consult nephrology for HD management, input appreciated  -RFP c/w needing HD, electrolytes stable  -Renal dosing where indicated  -Renal diet  -Continue home medications     #Afib  #Tachycardia  #Atypical chest pain  -Troponin -ve x2  -EKG NST with LVH criteria  -Continue home medications  -Continue Eliquis  -Telemetry     #HTN  -Uncontrolled  -Continue home metoprolol  -Will hold Midodrine     #T2DM  -Controlled,  "a1c 5.4 7/2023  -Not on home medications     #Nausea and vomiting  -Chronic in nature  -Continue Zofran      Dispo: Pending repeat evaluation by psychiatry tomorrow, possible discharge..       Scheduled outpatient appointments in system:   No future appointments.  ---------------------------------------------------------------------------------------------------  Subjective   No new events.  Patient feeling well.  Did safety plan with psychiatry.  He felt he was going home today, informed he will be reevaluated tomorrow.  One-to-one to be stopped.  Patient has no new complaints.     ---------------------------------------------------------------------------------------------------  Objective   Last Recorded Vitals  Blood pressure 91/74, pulse 101, temperature 36.9 °C (98.4 °F), temperature source Temporal, resp. rate 19, height 1.803 m (5' 11\"), weight 108 kg (239 lb), SpO2 99 %.  Intake/Output last 3 Shifts:  I/O last 3 completed shifts:  In: 660 (6.1 mL/kg) [P.O.:660]  Out: 950 (8.8 mL/kg) [Urine:950 (0.2 mL/kg/hr)]  Weight: 108.4 kg     Physical Exam  Vitals and nursing note reviewed.   Constitutional:       General: He is not in acute distress.     Appearance: Normal appearance. He is not ill-appearing or toxic-appearing.   HENT:      Head: Normocephalic and atraumatic.      Mouth/Throat:      Mouth: Mucous membranes are moist.   Eyes:      General: No scleral icterus.     Extraocular Movements: Extraocular movements intact.      Conjunctiva/sclera: Conjunctivae normal.   Cardiovascular:      Rate and Rhythm: Normal rate and regular rhythm.      Heart sounds: S1 normal and S2 normal. No murmur heard.  Pulmonary:      Effort: Pulmonary effort is normal. No respiratory distress.      Breath sounds: No wheezing, rhonchi or rales.   Abdominal:      General: Bowel sounds are normal. There is no distension.      Palpations: Abdomen is soft.      Tenderness: There is no abdominal tenderness. There is no guarding or " rebound.   Musculoskeletal:         General: No swelling or deformity.      Cervical back: Neck supple.   Skin:     General: Skin is warm and dry.      Findings: No rash.   Neurological:      General: No focal deficit present.      Mental Status: He is alert. Mental status is at baseline.   Psychiatric:      Comments: Mood is abnormal, behavioral is normal.  Patient is engaged         Relevant Results  Lab Results   Component Value Date    WBC 6.5 11/20/2023    HGB 11.4 (L) 11/20/2023    HCT 37.9 (L) 11/20/2023    MCV 95 11/20/2023     11/20/2023      Lab Results   Component Value Date    GLUCOSE 86 11/20/2023    CALCIUM 10.5 11/20/2023     11/20/2023    K 4.2 11/20/2023    CO2 26 11/20/2023     11/20/2023    BUN 54 (H) 11/20/2023    CREATININE 8.28 (H) 11/20/2023     Scheduled medications  apixaban, 2.5 mg, oral, BID  aspirin, 81 mg, oral, Daily  B complex-vitamin C, 1 tablet, oral, Daily  ergocalciferol, 1,250 mcg, oral, Weekly  ezetimibe, 10 mg, oral, Daily  finasteride, 5 mg, oral, Daily  lidocaine, 1 patch, transdermal, Daily  melatonin, 10 mg, oral, q24h MAREK  metoprolol tartrate, 25 mg, oral, BID  OLANZapine, 5 mg, oral, Nightly  psyllium, 1 packet, oral, Daily  rosuvastatin, 20 mg, oral, Nightly  sevelamer carbonate, 1,600 mg, oral, TID with meals  torsemide, 40 mg, oral, Daily      Continuous medications     PRN medications  PRN medications: acetaminophen **OR** acetaminophen **OR** acetaminophen, ondansetron ODT **OR** ondansetron    Nguyễn Snyder MD

## 2023-11-21 NOTE — PROGRESS NOTES
Nephrology Follow-up Note   Patient ID: Kj Colmenares is a 53 y.o. male.   Admitted for :   Chief Complaint   Patient presents with    Chest Pain    Shortness of Breath      Seen and examined during hemodialysis. Labs and events reviewed. Started on olanzapine; calm this AM; has sitter ; no events last night    Feels OK, no c/o CP/SOB  No c/o related to HD     Patient Active Problem List   Diagnosis    Nausea and vomiting, unspecified vomiting type    Chronic kidney disease, unspecified CKD stage    Hypervolemia, unspecified hypervolemia type       Scheduled medications:  apixaban, 2.5 mg, oral, BID  aspirin, 81 mg, oral, Daily  B complex-vitamin C, 1 tablet, oral, Daily  ergocalciferol, 1,250 mcg, oral, Weekly  ezetimibe, 10 mg, oral, Daily  finasteride, 5 mg, oral, Daily  lidocaine, 1 patch, transdermal, Daily  melatonin, 10 mg, oral, q24h MAREK  metoprolol tartrate, 25 mg, oral, BID  OLANZapine, 5 mg, oral, Nightly  psyllium, 1 packet, oral, Daily  rosuvastatin, 20 mg, oral, Nightly  sevelamer carbonate, 1,600 mg, oral, TID with meals  torsemide, 40 mg, oral, Daily         PRN medications: acetaminophen **OR** acetaminophen **OR** acetaminophen, ondansetron ODT **OR** ondansetron     Heart Rate:  [41-93]   Temp:  [35.4 °C (95.7 °F)-37 °C (98.6 °F)]   Resp:  [14-18]   BP: ()/(69-93)   SpO2:  [96 %-100 %]    Weight: 108 kg (239 lb)   Gen: alert, NAD  HEENT: NC/AT  Neck: supple, no JVD   Pulm: clear ant b/l   CVS: RRR, no rub  Abd: S/NT/ND  LE: no edema , no cyanosis   Neuro: no asterixis   Dialysis acces:  LUEAVF     Lab Results   Component Value Date    WBC 6.5 11/20/2023    HGB 11.4 (L) 11/20/2023    HCT 37.9 (L) 11/20/2023    MCV 95 11/20/2023     11/20/2023     Lab Results   Component Value Date    GLUCOSE 86 11/20/2023    CALCIUM 10.5 11/20/2023     11/20/2023    K 4.2 11/20/2023    CO2 26 11/20/2023     11/20/2023    BUN 54 (H) 11/20/2023    CREATININE 8.28 (H) 11/20/2023     Results  from last 72 hours   Lab Units 11/20/23  1008   ALBUMIN g/dL 4.1   GLUCOSE mg/dL 86   HEMOGLOBIN g/dL 11.4*   WBC AUTO x10*3/uL 6.5      Results from last 72 hours   Lab Units 11/20/23  1008   SODIUM mmol/L 141   POTASSIUM mmol/L 4.2   CO2 mmol/L 26   BUN mg/dL 54*   CREATININE mg/dL 8.28*   PHOSPHORUS mg/dL 5.0*   CALCIUM mg/dL 10.5        Assessment   ESRD-HD MWF admitted with dyspnea in the setting of missed dialysis .     Plan   Plan HD per submitted orders, UF 1L as tolerated ;   MBD - Phosphorus binder: continue with Sevelamer 1600 mg TID AC  Anemia of CKD: EPO not needed   Access: no issues   BP: acceptable during treatment ; continue UF as tolerated   Renal Diet   Daily renal MVI   Continue 3 x per week hemodialysis while admitted, next one Friday    Luma Hope MD MPH

## 2023-11-21 NOTE — PROGRESS NOTES
"SUBJECTIVE    Patient was seen after returning from dialysis today. In terms of Olanzapine, patient states his sleep was \"great\" and his mood was \"great\" after taking it. Patient enjoyed speaking with music therapy today. He endorsed sitting through 4 hours of dialysis without issue and believes the new placement helped as he expressed he felt no pain this time. He was given Melatonin 10mg and Olanzapine last night which both likely contributed to improved sleep. He stated \"I slept great, they tapped me I was like woah who there, then got right back to it\" implying while he was woken up he was otherwise able to maintain sleep. He denies SI vehemently currently, and today endorses hopefulness about symptoms going away with dialysis, obtaining help outpatient in terms of counseling and psychiatric care, providing for his children, and continuing dialysis. He is also excited and hopeful to eat for Thanksgiving and the rest of the holidays with his family which he states is quite large and very close-knit and supportive. He states he feels incredibly better with dialysis and he agrees not getting it may be making him more depressed due to the painful symptoms of toxin buildup (uremia). No medications were needed during dialysis per patient or chart; patient able to tolerate very well currently. Has appointment on Friday, has transportation planned, social work confirmed chair is maintained.      Additional information: No prn medications needed for agitation. Patient adherent with scheduled medications.       Collateral: Called patient's mother [phone]:  Patients mother talks on the phone with patient frequently. While he endorsed frustration with dialysis and she admits he has stated he did not want to live like this many times to her for years because of difficulties tolerating - although mother cannot state specific issues he had with it, she states he has not stated anything regarding taking his life. She " "states that if he did she would make sure something be done about it - she would get him professional help. She denies that he has any guns in his house. She also states knowing he was expressing some thoughts of hurting himself that she will \"keep an eye on him.\" She also states she will continue to encourage him to continue with his dialysis. She states he has a very strong support network and his children mean \"a whole lot to him as does the rest of his family.\" She is aware he has an appointment on Friday and will encourage patient to go there. The family, per mother, have a family Thanksgiving Dinner Thursday they are all very excited for and Kj has expressed to her several times feelings of hopefulness since he has arrived here in the past several days despite his symptoms - including seeing family, eating with them for the holidays, seeing his children, and hoping to feel better.         OBJECTIVE    VITALS      11/20/2023    12:30 PM 11/20/2023     4:22 PM 11/20/2023     8:07 PM 11/21/2023    12:33 AM 11/21/2023     4:24 AM 11/21/2023     7:51 AM 11/21/2023     8:00 AM   Vitals   Systolic 134 124 107 92 97 123    Diastolic 93 74 81 69 74 78    Heart Rate 80 66 93 84 74 79 41   Temp 36 °C (96.8 °F) 35.4 °C (95.7 °F) 37 °C (98.6 °F) 36.7 °C (98.1 °F) 36.5 °C (97.7 °F) 36.6 °C (97.9 °F) 36.1 °C (97 °F)   Resp 16 18 17 14 17 18         MENTAL STATUS EXAM  Appearance: AA male, burly, lying in bed, gray hair, appears stated age.  Attitude: Calm, cooperative, engaged.  Behavior: Appropriate eye contact, no agitation noted.  Motor Activity: No psychomotor agitation or retardation, no tremors noted, no TD/EPS, signs of akathisia, or tremors noted. Gait not assessed.  Speech: Spontaneous, normal volume, rhythm, and tone, but increased rate. Not pressured.  Mood: \"Great.\"  Affect: Bright, full range, non-labile.   Thought Process: Organized, linear, goal-directed, no flight of ideas.  Thought Content:  Denies SI, " HI currently, previously had situational SI last yesterday without plan or intent. No delusions elicited.  Thought Perception: Denies AVH. No internal stimulation noted. No parnoid ideation noted.   Cognition: Grossly AxO, attention and concentration grossly intact, memory appears grossly intact.  Insight: Fair, patient understands condition.   Judgement: Improving, patient experienced SI recently although tolerating dialysis now and endorsing hope, denying any SI currently.       CURRENT MEDICATIONS  Scheduled medications  apixaban, 2.5 mg, oral, BID  aspirin, 81 mg, oral, Daily  B complex-vitamin C, 1 tablet, oral, Daily  ergocalciferol, 1,250 mcg, oral, Weekly  ezetimibe, 10 mg, oral, Daily  finasteride, 5 mg, oral, Daily  lidocaine, 1 patch, transdermal, Daily  melatonin, 10 mg, oral, q24h MAREK  metoprolol tartrate, 25 mg, oral, BID  OLANZapine, 5 mg, oral, Nightly  psyllium, 1 packet, oral, Daily  rosuvastatin, 20 mg, oral, Nightly  sevelamer carbonate, 1,600 mg, oral, TID with meals  torsemide, 40 mg, oral, Daily        Continuous medications       PRN medications  PRN medications: acetaminophen **OR** acetaminophen **OR** acetaminophen, ondansetron ODT **OR** ondansetron     LABS  No results found for this or any previous visit (from the past 24 hour(s)).     IMAGING  No results found.     EK/21: A fib, 98 vent rate, QTc of 454.   : A fib, 96 vent rate, QTc of 437.    PSYCHIATRIC RISK ASSESSMENT  Acute Risk of Harm to Others is Considered: Low  Acute Risk of Harm to Self is Considered: Low    ASSESSMENT AND PLAN  Assessment as of :  Kj Colmenares is a 53 y.o. male with past psychiatric history of bipolar disorder and past medical history of ESRD w/ HD MWF via LUEAVF, DM2, HTN, GERD, DVT (on Eliquis), HLD, PAF, stroke, admitted as above with CP/SOB in the setting of medications and HD treatment non-compliance. Psychiatry consulted on  for SI.     Patient has moderate suicide risk as  described above. This presentation is not significantly different from previous admissions. Patient has continued stressors in life but has good insight currently into need for treatment and is asking for psychiatric help. Patient has symptoms of MDD however potential hypomania history with fast speech and potential clustered traits of sleep issues, increased energy, goal-directed activity, distractibility however it is difficult to ascertain. Olanzapine can help with mood, anxiety, any potential hypomanic symptoms, and sleep which are all issues that need targeting. He will be given resources tomorrow for follow up and can be started on Olanzapine 5mg at bedtime for sleep and mood as it has been utilized in previous admission with efficacy. EKG should be done tomorrow to recheck qTC, previous EKG was < 450 on 11/17. Liver enzymes are wnl, recent TSH was normal, reported hx of thyroid dysfunction was transient; no other workup recommended at this time. Patient would benefit from sitter at this time due to reported SI and self harmful activity of avoiding dialysis, taking out lines, and recent aborted suicidal thoughts/plans - should also appreciate palliative recs to help patient through dialysis process due to arm pain. Will consider prn tx if patient having significant anxiety during dialysis however currently it appears to be more of a pain issue than anxiety.       Update 11/21:  Patient is improving in mood and sleep with Olanzapine 5mg at bedtime. He denies SI today, is hopeful about symptoms going away, obtaining help outpatient in terms of counseling and psychiatric care, providing for his children, and continuing dialysis with palliative care assistance. He appeared proud today regarding able to sit through dialysis and also had hope to continue this. EKG on 11/21 post-Olanzapine initiation showed Qtc of 454, pt still in known Afib, 98 vent rate - slight increase from 11/17 with QTc of 437, still QTc  continues to fall well below 500 at this time, no acute concerns, asymptomatic.    In terms of risk assessment for harm to self, patient did arrive with SI in relation to frustrations with dialysis 2/2 pain as well as noncompliance with symptomatic uremia/electrolyte dysfunction that is resolving with restarting dialysis. Patient does have self-aborted suicidal plans in the past with some preparatory behavior, however most lethal aborted behavior was with knife to neck years ago per patient. Mother denies patient has made suicidal claims to her but admits he has been frustrated with dialysis. Patient no longer feeling pain with dialysis with resumption and change of access, expresses hopefulness, has child raising concerns which have stopped him in the past, strong social support with mother actively speaking to psychiatrist and aware of plan, past psychiatric consult service followings while medically admitted with similar response, had sitter while here that will now be discontinued, expressed Buddhism ideals against suicide, has no actual attempts in the past, completed a safety plan 11/21 with adequate motivation and content, looking forward to holidays and spending time with children, is now on psychotropic medication for mood, sleep, and anxiety, has palliative care consultation, and engaged in music therapy as mitigating factors to suicide risk. While chronic risk is moderate as it is not possible to predict compliance with dialysis or new medical issues that could come in the future, his acute risk is low, and he would not benefit from an inpatient psychiatric hospitalization currently. His sitter can also be discontinued.        IMPRESSION:  #other specified Mood Disorder (Bipolar Disorder vs. MDD).  #History of Bipolar Disorder dx, hypomanic episodes, currently no treatment.     RECOMMENDATIONS     Safety:  - Safety Plan completed today 11/21, adequately completed, placed in paper chart, event note, and  "copy given to patient.  - Patient does not currently meet criteria for inpatient psychiatric hospitalization.   - To evaluate decision-making capacity, recommend use of the Capacity Evaluation Tool. Search “ IP Capacity Evaluation under SmartText\" unless the patient has a legal guardian, in which case all decisions per the legal guardian.  - Patient does not require a 1:1 sitter from a psychiatric perspective at this time.  - As with all hospitalized patients, would recommend delirium precautions, as below:      -- Minimize use of benzos, opiates, anticholinergics, as these may worsen mental status   -- Would use caution with narcotic pain medications   -- Would still adequately controlling pain, as uncontrolled pain is also a risk factor for delirium   -- Reinforce sleep hygiene; encourage patient to stay awake during the day   -- Keep curtains/blinds open during the day and closed at night.   -- Would recommend reorienting/redirecting patient as much as possible,    -- Aim for consistent staffing, familiar objects, avoiding bright lights and loud noises, etc.        Work-up:  -Per primary team.     Medications:  - Continue Olanzapine 5mg PO at bedtime for sleep/mood. Please continue on discharge     Ancillary Services:  - Recommend , pet/music/art therapy consult.     Follow-up:  - Provided patient with pamphlet containing resources for mental health treatment today 11/21.       - Discussed recommendations with primary team.  - Psychiatry will continue to follow.     Thank you for allowing us to participate in the care of this patient. Please page [phone] with any questions or concerns.     Patient seen and staffed/discussed with Dr. Smith, who agrees with above plan.     Medication Consent  Medication Consent: n/a; consult service     Nakul Fuentes MD        Medication Consent  Medication Consent: n/a; consult service      Nakul Fuentes MD      "

## 2023-11-21 NOTE — PROGRESS NOTES
Asked by MARY Maldonado to refer pt to navigator emiliana thru [facility].  Referral sent.    Paige Moon, YOELW

## 2023-11-21 NOTE — PROGRESS NOTES
Follow-Up Palliative Care Progress Note    Information obtained from chart review, conversation with patient, and discussion with primary care team, Palliative SW, [place] SW, and patient's nurse.     Spoke with patient at bedside and patient's girlfriend on the phone.      History Of Present Illness  Kj Colmenares is a 53 y.o. male with past medical history of Atrial fibrillation (on Eliquis), ESRD on HD, medical noncompliance, T2DM, HTN, HLD, GERD, remote DVT, and remote CVA ([facility] in 2017) who presented to the ED with chest pain and shortness of breath on 11/16/2023. Apparently, he has had 7 ED visits and 3 admission since 09/2023. He was seen by [facility] and [facility] recently this month for nausea and vomiting.  He denies any fever, chills, cough, congestion, pain with urination, blood in urine or stool.  Pt states that he still makes a small amount of urine. He acknowledge stating he wanted to no longer live, however denies any SI and is future oriented and talks about his children (who have lost their mother previously 1.5 years ago at [facility]) and his girlfriend. He raises concerns with the amount of time he is kept on HD and reports significant pain toward middle of session. He voices concerns with struggling in the past with transportation issues getting to dialysis (he was stuck waiting for elevator and his transportation leaves). He reports willingness to accept help and get better. Pt reported yesterday at initial visit that his family is concerned he will hurt himself if he goes home soon. Palliative Care consulted yesterday for initial visit and for goals of care discussion. Psychiatry was consulted and saw pt yesterday after initial visit as recommended. Psychiatry started Zyprexa 5 mg at Bedtime routinely. {Pt has a 1:1 sitter for safety concerns. Pt reports he slept well last night. Pt reports he tolerated his entire dialysis session today and will be discharged home today.       Subjective    Symptoms (0 - 10, Best to Worst)  [place] Symptom Assessment System  Pain Score: 0 - No pain      BM in last 48 hours? yes     Pain: Pt reports left knee especially when walking and swells up, chronic since 2018 (years), worsening, PRN Tylenol helps for 1-2 hours, pt reports history of gout not currently on any medication for gout. Pt reports left knee pain improved with Lidocaine patch.   SOB: Pt denies.  Fatigue: Pt denies.   Sleep: Pt reports hx of trouble sleeping- Pt reported yesterday unable to fall asleep or stay asleep per patient, mind wont shut off and racing thoughts, pt reports fear of falling asleep and not waking up. Pt reports slept better with Melatonin 10mg routine and Zyprexa 5 mg at bedtime.  Drowsiness: Pt denies.  Constipation: Pt denies.  Nausea: Pt denies currently. PRN Zofran helps.   Appetite: Pt denies.  Anxiety: Pt reported yesterday due to health concerns and worried about his 3 minor children who are with family all over [facility] from [place] to [place]. Pt has 8 children. Pt denies any anxiety today and is looking forward to returning home later today.  Depression: Pt reported yesterday thoughts of harming himself in the recent past and current depression symptoms, but denies any homicidal thoughts. Pt denies any SI/HI today. Pt denies any depression symptoms today.      Serious Illness Conversation completed with patient at bedside today  Information: are you someone who likes to know little details, or do you prefer “big picture”? Both- patient prefers full disclosure.     Understanding: tell me what the doctors have shared with you about what's going on? (caution asking what their understanding is…some people might be offended) Pt has fair understanding.     Prognosis: if we had a sense of time, is that information you would want to know? Pt reports he would like to know prognosis.      Joys: what brings you chase in life? What else? What else? Pt reports his 8 children and his  "girlfriend. Pt reports seeing his children, parents, and girlfriend make him smile and bring him chase. Pt enjoys watching football and basketball. Pt watched the 1d4 Pty game yesterday.       Goals: tell me what you're hoping for… what else? What else? Pt reports his goal is to get stronger, go home, and be able to provide for his 3 minor children in the future.      Fears and worries: tell me what worries you…what else? What else? Pt is worried about his medical conditions, dying in the hospital, fear of history of liver trouble, and worried about how much longer he has to live.       Minimal acceptable outcome: what is a function that is so critical to who you are as a person that you can't imagine living without? (Common examples include meaningful interactions with others, eating, and independence) Provide for his children and being alive for his children to visit him.     Maximal burden: what are you willing to go through for more time? What is your line in the stand? What would NOT be considered good QOL for you? Pt reports he \" twice\", previously had CPR, Vent, Trach, and reports was in a coma for 1.5 years back in 2015. Pt reports he has been on and off hemodialysis since  originally started when he was living in [place], [place].      Family: complicated family dynamics and relationships Mother and Father living, wife passed away 1.5 years ago, current girlfriend, 8 children (ranging from ages 9 to 25 years old- 5 boys and 3 girls with 3 under 18)     Emotional/Psychological/Spiritual Needs  Screening for spiritual needs?  Yes  U[place]din and importance of Scientology: Important- he relies on God for strength, falls to his knees when needed to pray  Source for spiritual support/meaning: Bruna in God     Spiritual Hx:  Are you spiritual or Episcopalian? Yes he goes to [employer] and was baptized in his girlfriend's [employer]   What’s your bruna background? Evangelical  During difficult times in your life, what have you relied " "on for strength? God and Family     Music Hx:   Do you enjoy music? Yes What type of music do you enjoy? Unable to pick one chow or group at this time.       Personal/Social History  He reports that he has never smoked. He has never used smokeless tobacco. He reports that he does not drink alcohol and does not use drugs. Pt denies any cigarette smoking, ETOH use, or illicit drug use.      Functional Status  Pt reports he ambulates prior to this hospital admission with cane or walker at times due to the knee. Pt reports declining and weaker over the last few weeks, months, and year.      Caregiving/Caregiver Support  Does the patient require assistance in some or all components of his care, including coordination of medical care? Yes  If Yes, which person serves that role?  other relative Niece- living with currently prior to this hospitalization  Caregiver emotional or practical needs: Community resources, HD transportation, education regarding serious illness, medical condition, and support     Past Medical History  He has a past medical history of Diabetes mellitus (CMS/[facility]), Hypertension, and Stroke (CMS/[facility]). Per patient history of ESRD since 2001- HD 2x/week Monday and Friday, Gout, chronic left knee pain and swelling, and insomnia. Hx ICH 2017.      Surgical History  He has a past surgical history that includes CT angio head w and wo IV contrast (2/4/2014) and CT angio head w and wo IV contrast (2/13/2014). Hx of AV Fistula left forearm. Hx of trach.      Family History  Mother- Living, history of stents at [facility]  Father- Living- Hx of recent Cancer (pt unsure which kind but father \"beat it\"), Hx Pacemaker placement  Sister- Hx Seizures  Brother- Hx of Pacemaker placement  Aunts- 2 Maternal aunts passed from CVA, 1 Maternal aunt passed from Cancer, 1 Paternal aunt passed from CVA  Niece in [facility] currently      Allergies  Patient has no known allergies.     Review of Systems "   Constitutional:  Negative for fatigue, appetite change, chills, diaphoresis and fever.   Respiratory:  Negative for shortness of breath, cough, chest tightness and wheezing.    Cardiovascular:  Negative for chest pain, palpitations and leg swelling.   Gastrointestinal:  Negative for diarrhea, constipation, nausea, and vomiting.   Genitourinary:  Negative for dysuria and frequency.   Musculoskeletal:  Positive for arthralgias, gait problem and joint swelling.   Skin:  Negative for rash.   Neurological:  Negative for dizziness and headaches.   Psychiatric/Behavioral:  Positive for dysphoric mood sleep and disturbance. Negative for suicidal ideas, self-injury, or nervousness/anxiety.     Objective   Physical Exam  Constitutional:       General: He is not in acute distress.     Appearance: He is ill-appearing. He is not toxic-appearing.   HENT:      Head: Normocephalic and atraumatic.      Mouth/Throat:      Mouth: Mucous membranes are moist.   Eyes:      Pupils: Pupils are equal, round, and reactive to light.   Cardiovascular:      Rate and Rhythm: Normal rate. Rhythm irregular.      Pulses: Normal pulses.      Heart sounds: No murmur heard.     No friction rub. No gallop.   Pulmonary:      Effort: Pulmonary effort is normal. No respiratory distress.      Breath sounds: Normal breath sounds. No wheezing, rhonchi or rales.   Abdominal:      General: Abdomen is flat.      Palpations: Abdomen is soft.      Tenderness: There is no abdominal tenderness.   Musculoskeletal:      Left knee: Swelling, deformity, effusion, and bony tenderness present.      Right lower leg: No edema.      Left lower leg: No edema.   Skin:     General: Skin is warm and dry.   Neurological:      Mental Status: He is alert and oriented to person, place, and time.   Psychiatric:         Attention and Perception: He is attentive. He does not perceive auditory or visual hallucinations.         Mood and Affect: Mood and affect is appropriate.          "Speech: Speech is rapid but not pressured.         Behavior: Behavior normal. Behavior is cooperative.         Thought Content: Thought content is not paranoid or delusional. Thought content does not include suicidal ideation or homicidal ideation. Thought content does not include suicidal plan or homicidal plan.    Last Recorded Vitals  Blood pressure 114/73, pulse 63, temperature 37 °C (98.6 °F), temperature source Temporal, resp. rate 18, height 1.803 m (5' 11\"), weight 108 kg (239 lb), SpO2 100 %.  Intake/Output last 3 Shifts:  I/O last 3 completed shifts:  In: 660 (6.1 mL/kg) [P.O.:660]  Out: 950 (8.8 mL/kg) [Urine:950 (0.2 mL/kg/hr)]  Weight: 108.4 kg     Relevant Results  Scheduled medications  apixaban, 2.5 mg, oral, BID  aspirin, 81 mg, oral, Daily  B complex-vitamin C, 1 tablet, oral, Daily  ergocalciferol, 1,250 mcg, oral, Weekly  ezetimibe, 10 mg, oral, Daily  finasteride, 5 mg, oral, Daily  lidocaine, 1 patch, transdermal, Daily  melatonin, 10 mg, oral, q24h MAREK  metoprolol tartrate, 25 mg, oral, BID  OLANZapine, 5 mg, oral, Nightly  psyllium, 1 packet, oral, Daily  rosuvastatin, 20 mg, oral, Nightly  sevelamer carbonate, 1,600 mg, oral, TID with meals  torsemide, 40 mg, oral, Daily    Continuous medications     PRN medications  PRN medications: acetaminophen **OR** acetaminophen **OR** acetaminophen, ondansetron ODT **OR** ondansetron     Results for orders placed or performed during the hospital encounter of 11/17/23 (from the past 24 hour(s))   ECG 12 Lead   Result Value Ref Range    Ventricular Rate 98 BPM    Atrial Rate 101 BPM    QRS Duration 100 ms    QT Interval 356 ms    QTC Calculation(Bazett) 454 ms    R Axis -2 degrees    T Axis 31 degrees    QRS Count 15 beats    Q Onset 208 ms    T Offset 386 ms    QTC Fredericia 419 ms     Results for orders placed or performed during the hospital encounter of 11/17/23 (from the past 96 hour(s))   ECG 12 lead   Result Value Ref Range    Ventricular Rate " 96 BPM    QRS Duration 88 ms    QT Interval 346 ms    QTC Calculation(Bazett) 437 ms    R Axis 68 degrees    T Axis 54 degrees    QRS Count 16 beats    Q Onset 207 ms    T Offset 380 ms    QTC Fredericia 404 ms   Renal Function Panel   Result Value Ref Range    Glucose 95 74 - 99 mg/dL    Sodium 140 136 - 145 mmol/L    Potassium 4.5 3.5 - 5.3 mmol/L    Chloride 107 98 - 107 mmol/L    Bicarbonate 22 21 - 32 mmol/L    Anion Gap 16 10 - 20 mmol/L    Urea Nitrogen 59 (H) 6 - 23 mg/dL    Creatinine 7.95 (H) 0.50 - 1.30 mg/dL    eGFR 7 (L) >60 mL/min/1.73m*2    Calcium 10.1 8.6 - 10.6 mg/dL    Phosphorus 5.8 (H) 2.5 - 4.9 mg/dL    Albumin 3.9 3.4 - 5.0 g/dL   CBC   Result Value Ref Range    WBC 5.6 4.4 - 11.3 x10*3/uL    nRBC 0.0 0.0 - 0.0 /100 WBCs    RBC 3.73 (L) 4.50 - 5.90 x10*6/uL    Hemoglobin 10.6 (L) 13.5 - 17.5 g/dL    Hematocrit 35.3 (L) 41.0 - 52.0 %    MCV 95 80 - 100 fL    MCH 28.4 26.0 - 34.0 pg    MCHC 30.0 (L) 32.0 - 36.0 g/dL    RDW 13.4 11.5 - 14.5 %    Platelets 176 150 - 450 x10*3/uL   CBC   Result Value Ref Range    WBC 6.5 4.4 - 11.3 x10*3/uL    nRBC 0.0 0.0 - 0.0 /100 WBCs    RBC 3.98 (L) 4.50 - 5.90 x10*6/uL    Hemoglobin 11.4 (L) 13.5 - 17.5 g/dL    Hematocrit 37.9 (L) 41.0 - 52.0 %    MCV 95 80 - 100 fL    MCH 28.6 26.0 - 34.0 pg    MCHC 30.1 (L) 32.0 - 36.0 g/dL    RDW 12.9 11.5 - 14.5 %    Platelets 193 150 - 450 x10*3/uL   Renal Function Panel   Result Value Ref Range    Glucose 86 74 - 99 mg/dL    Sodium 141 136 - 145 mmol/L    Potassium 4.2 3.5 - 5.3 mmol/L    Chloride 103 98 - 107 mmol/L    Bicarbonate 26 21 - 32 mmol/L    Anion Gap 16 10 - 20 mmol/L    Urea Nitrogen 54 (H) 6 - 23 mg/dL    Creatinine 8.28 (H) 0.50 - 1.30 mg/dL    eGFR 7 (L) >60 mL/min/1.73m*2    Calcium 10.5 8.6 - 10.6 mg/dL    Phosphorus 5.0 (H) 2.5 - 4.9 mg/dL    Albumin 4.1 3.4 - 5.0 g/dL   ECG 12 Lead   Result Value Ref Range    Ventricular Rate 98 BPM    Atrial Rate 101 BPM    QRS Duration 100 ms    QT Interval 356 ms     QTC Calculation(Bazett) 454 ms    R Axis -2 degrees    T Axis 31 degrees    QRS Count 15 beats    Q Onset 208 ms    T Offset 386 ms    QTC Fredericia 419 ms      Encounter Date: 11/17/23   ECG 12 Lead   Result Value    Ventricular Rate 98    Atrial Rate 101    QRS Duration 100    QT Interval 356    QTC Calculation(Bazett) 454    R Axis -2    T Axis 31    QRS Count 15    Q Onset 208    T Offset 386    QTC Fredericia 419    Narrative    Atrial fibrillation  Abnormal ECG  When compared with ECG of 17-NOV-2023 14:14,  Questionable change in QRS axis  Confirmed by Aldair Patel ([id number]) on 11/21/2023 1:59:53 PM     Assessment/Plan   IMP:   Problem List Items Addressed This Visit          Endocrine/Metabolic    * (Principal) Hypervolemia, unspecified hypervolemia type - Primary      Patient/proxy preference for information  Prefers full information/disclosure     Goals of Care  Pt wishes to continue hemodialysis treatment and remain FULL CODE at this time. Continue ongoing conversations at subsequent Palliative Medicine visits. Recommend outpatient palliative referral prior to discharge for symptom management, education, and ongoing patient and family support.      Other Palliative Support  -Palliative   -Palliative SW  -Consult Music Therapy  -Consult Pet Therapy     Symptom Management  Altered Mood/Depression: improved   Anxiety and depression r/t health concerns  History of anxiety/depression  Home regimen: N/A  -Recommend continued follow-up with Psychiatry and continue Zyprexa 5 mg at bedtime as ordered.       Chronic Left knee pain: improved  pain related to hx gout  Home regimen: Tylenol PRN  Personal pain goal: 3/10  Previously tried: Tylenol PRN  Pain is controlled and goal is to control pain for patient to participate with PT/OT.  -Recommend continue PRN Acetaminophen.  -Recommend continue topical lidocaine patch 5% transdermal on 12 hours and off 12 hours.     Insomnia: improved  -Recommend continue  Melatonin to 10mg routine at 1800 and Zyprexa 5 mg routine at bedtime.     Diarrhea related to anxiety: improved  Usual bowel pattern  LBM today 11/21/2023  Monitor BM frequency      Nausea/Vomiting: improved and denies currently  (At risk for, intermittent, intractable) nausea and vomiting related to (ESRD, Anxiety, Chronic medical illnesses)  Home regimen: N/A  -Recommend continue Zofran 4mg [id number] PRN.     Advance Care Planning:   No Advance Directives on file. Pt girlfriend reports HCPOA paperwork completed previously and mother Trini is HCPOA primary and she is secondary. (Documents not available at this time but requested copies)   Surrogate decision maker is: (if no HCPOA) patient's mother Trini Filmore [phone]  Code status FULL CODE- Code status discussion again with patient today and at this time patient desires to remain FULL CODE.     Disposition:  Plan pending hospital course, recommend outpatient palliative at discharge, next hemodialysis set up per  for Friday outpatient     Thank you for allowing us to participate in the care of this patient. Palliative will continue to follow as needed. Palliative medicine is available Monday-Friday, 8a-6p. Please contact team with any questions or concerns.  Team pager [phone]  Annie Maldonado CNP (on EPIC secure chat)     I spent 65 minutes in the professional and overall care of this patient which included chart review, interviewing patient/family, discussion with primary team, coordination of care, and documentation.     Medical Decision Making was high level due to high complexity of problems, extensive data review, and high risk of management/treatment.    NIGEL Lopez-MARY

## 2023-11-21 NOTE — NURSING NOTE
.Report from Sending RN:    Report From: GUERITA Gagnon  Recent Surgery of Procedure: No  Baseline Level of Consciousness (LOC): A&OX3  Oxygen Use: No  Type: Room air  Diabetic: No  Last BP Med Given Day of Dialysis: none  Last Pain Med Given: none  Lab Tests to be Obtained with Dialysis: Yes  Blood Transfusion to be Given During Dialysis: No  Available IV Access: No  Medications to be Administered During Dialysis: No  Continuous IV Infusion Running: No  Restraints on Currently or in the Last 24 Hours: No  Hand-Off Communication: Pt had no acute event overnight, vital signs stable. Pt not on precaution. AVF for HD, morning medication not given.

## 2023-11-22 ENCOUNTER — PHARMACY VISIT (OUTPATIENT)
Dept: PHARMACY | Facility: CLINIC | Age: 53
End: 2023-11-22
Payer: MEDICARE

## 2023-11-22 VITALS
HEART RATE: 99 BPM | RESPIRATION RATE: 16 BRPM | TEMPERATURE: 97.7 F | WEIGHT: 239 LBS | DIASTOLIC BLOOD PRESSURE: 58 MMHG | BODY MASS INDEX: 33.46 KG/M2 | HEIGHT: 71 IN | SYSTOLIC BLOOD PRESSURE: 93 MMHG | OXYGEN SATURATION: 98 %

## 2023-11-22 PROBLEM — Z86.73 HISTORY OF STROKE: Status: ACTIVE | Noted: 2017-11-21

## 2023-11-22 PROBLEM — E87.70 HYPERVOLEMIA, UNSPECIFIED HYPERVOLEMIA TYPE: Status: RESOLVED | Noted: 2023-11-17 | Resolved: 2023-11-22

## 2023-11-22 PROBLEM — F10.10 ETOH ABUSE: Status: ACTIVE | Noted: 2023-06-30

## 2023-11-22 PROBLEM — D49.89: Status: ACTIVE | Noted: 2023-11-22

## 2023-11-22 PROBLEM — N18.6 END STAGE RENAL DISEASE (MULTI): Status: ACTIVE | Noted: 2021-10-19

## 2023-11-22 PROBLEM — E11.9 TYPE 2 DIABETES MELLITUS WITHOUT COMPLICATIONS (MULTI): Status: ACTIVE | Noted: 2017-08-21

## 2023-11-22 PROBLEM — T82.858A STENOSIS OF ARTERIOVENOUS DIALYSIS FISTULA (CMS-HCC): Status: ACTIVE | Noted: 2019-04-30

## 2023-11-22 PROBLEM — M10.9 GOUT: Status: ACTIVE | Noted: 2022-11-30

## 2023-11-22 PROBLEM — Z86.718 PERSONAL HISTORY OF OTHER VENOUS THROMBOSIS AND EMBOLISM: Status: ACTIVE | Noted: 2017-08-21

## 2023-11-22 PROBLEM — I10 ESSENTIAL HYPERTENSION: Chronic | Status: ACTIVE | Noted: 2017-11-21

## 2023-11-22 PROBLEM — E78.5 DYSLIPIDEMIA: Status: ACTIVE | Noted: 2017-08-21

## 2023-11-22 PROBLEM — Z86.39 HISTORY OF THYROID DISORDER: Status: ACTIVE | Noted: 2023-01-29

## 2023-11-22 PROBLEM — K92.2 GASTROINTESTINAL HEMORRHAGE: Status: ACTIVE | Noted: 2017-08-21

## 2023-11-22 PROBLEM — I82.409 DVT (DEEP VENOUS THROMBOSIS) (MULTI): Status: ACTIVE | Noted: 2020-11-02

## 2023-11-22 PROBLEM — I48.11 LONGSTANDING PERSISTENT ATRIAL FIBRILLATION (MULTI): Status: ACTIVE | Noted: 2023-09-21

## 2023-11-22 PROCEDURE — G0378 HOSPITAL OBSERVATION PER HR: HCPCS

## 2023-11-22 PROCEDURE — 2500000004 HC RX 250 GENERAL PHARMACY W/ HCPCS (ALT 636 FOR OP/ED): Mod: SE | Performed by: NURSE PRACTITIONER

## 2023-11-22 PROCEDURE — 99239 HOSP IP/OBS DSCHRG MGMT >30: CPT | Performed by: STUDENT IN AN ORGANIZED HEALTH CARE EDUCATION/TRAINING PROGRAM

## 2023-11-22 PROCEDURE — 2500000002 HC RX 250 W HCPCS SELF ADMINISTERED DRUGS (ALT 637 FOR MEDICARE OP, ALT 636 FOR OP/ED): Mod: SE | Performed by: NURSE PRACTITIONER

## 2023-11-22 PROCEDURE — 99232 SBSQ HOSP IP/OBS MODERATE 35: CPT | Performed by: NURSE PRACTITIONER

## 2023-11-22 PROCEDURE — 2500000001 HC RX 250 WO HCPCS SELF ADMINISTERED DRUGS (ALT 637 FOR MEDICARE OP): Mod: SE | Performed by: NURSE PRACTITIONER

## 2023-11-22 PROCEDURE — RXMED WILLOW AMBULATORY MEDICATION CHARGE

## 2023-11-22 PROCEDURE — 2500000001 HC RX 250 WO HCPCS SELF ADMINISTERED DRUGS (ALT 637 FOR MEDICARE OP): Mod: SE | Performed by: STUDENT IN AN ORGANIZED HEALTH CARE EDUCATION/TRAINING PROGRAM

## 2023-11-22 RX ORDER — SEVELAMER CARBONATE 800 MG/1
1600 TABLET, FILM COATED ORAL
Qty: 180 TABLET | Refills: 0 | Status: SHIPPED | OUTPATIENT
Start: 2023-11-22 | End: 2023-11-22 | Stop reason: HOSPADM

## 2023-11-22 RX ORDER — METOPROLOL SUCCINATE 25 MG/1
25 TABLET, EXTENDED RELEASE ORAL DAILY
Qty: 30 TABLET | Refills: 0 | Status: ON HOLD | OUTPATIENT
Start: 2023-11-22 | End: 2024-05-02

## 2023-11-22 RX ORDER — MIDODRINE HYDROCHLORIDE 10 MG/1
10 TABLET ORAL AS NEEDED
Start: 2023-11-22

## 2023-11-22 RX ORDER — LIDOCAINE 560 MG/1
1 PATCH PERCUTANEOUS; TOPICAL; TRANSDERMAL DAILY
Qty: 30 PATCH | Refills: 0 | Status: SHIPPED | OUTPATIENT
Start: 2023-11-22 | End: 2023-12-22

## 2023-11-22 RX ORDER — CALCIUM ACETATE 667 MG/1
1334 CAPSULE ORAL
Qty: 180 CAPSULE | Refills: 0 | Status: SHIPPED | OUTPATIENT
Start: 2023-11-22 | End: 2023-12-22

## 2023-11-22 RX ORDER — OLANZAPINE 5 MG/1
5 TABLET ORAL NIGHTLY
Qty: 30 TABLET | Refills: 0 | Status: ON HOLD | OUTPATIENT
Start: 2023-11-22 | End: 2024-05-02

## 2023-11-22 RX ADMIN — ASPIRIN 81 MG: 81 TABLET, COATED ORAL at 08:16

## 2023-11-22 RX ADMIN — APIXABAN 2.5 MG: 2.5 TABLET, FILM COATED ORAL at 08:16

## 2023-11-22 RX ADMIN — Medication 1 TABLET: at 08:16

## 2023-11-22 RX ADMIN — TORSEMIDE 40 MG: 20 TABLET ORAL at 08:15

## 2023-11-22 RX ADMIN — EZETIMIBE 10 MG: 10 TABLET ORAL at 08:16

## 2023-11-22 RX ADMIN — SEVELAMER CARBONATE 1600 MG: 800 TABLET, FILM COATED ORAL at 08:15

## 2023-11-22 RX ADMIN — METOPROLOL TARTRATE 25 MG: 50 TABLET, FILM COATED ORAL at 08:15

## 2023-11-22 RX ADMIN — SEVELAMER CARBONATE 1600 MG: 800 TABLET, FILM COATED ORAL at 11:35

## 2023-11-22 RX ADMIN — FINASTERIDE 5 MG: 5 TABLET, FILM COATED ORAL at 08:16

## 2023-11-22 ASSESSMENT — COGNITIVE AND FUNCTIONAL STATUS - GENERAL
MOBILITY SCORE: 24
DAILY ACTIVITIY SCORE: 24
MOBILITY SCORE: 24
DAILY ACTIVITIY SCORE: 24

## 2023-11-22 ASSESSMENT — PAIN SCALES - GENERAL
PAINLEVEL_OUTOF10: 0 - NO PAIN
PAINLEVEL_OUTOF10: 0 - NO PAIN

## 2023-11-22 ASSESSMENT — PAIN - FUNCTIONAL ASSESSMENT
PAIN_FUNCTIONAL_ASSESSMENT: 0-10
PAIN_FUNCTIONAL_ASSESSMENT: 0-10

## 2023-11-22 NOTE — DISCHARGE INSTRUCTIONS
You were admitted for chest pain and shortness of breath due to elevated blood pressure and missed dialysis.  You received dialysis and antihypertensive treatment with improvement in symptoms.  Please continue to adhere to dialysis treatment as kidney replacement therapy/dialysis is a life-prolonging measure and necessary for your health.  If you have concerns about your dialysis or being able to adhere to your treatments, please talk to your primary care or nephrologist.  You will also be set up with a palliative care  by the palliative service and it is recommended that you continue to follow with palliative care as outpatient.     You endorsed suicidal ideation, which has been a chronic issue with your depression.  You were seen by the psychiatry service, completed a home safety plan with psychiatry, and okay to discharge without further needs for inpatient psychiatry admission.  Continued close psychiatric care as needed as outpatient.  If you have any concerns or abnormal thoughts about hurting yourself, ending your life, or any other concerns please seek medical attention or reach for help by calling the Mobile Crisis Team in [facility]: [phone] or National Suicide Hotline: 7-044-708-QHMU (9299).  You received resources from psychiatry, you can also receive further local community resources by calling 211 at any time.     Please note that you will have to call to schedule your own psychiatry and psychology follow-up appointments, per our psychiatry service.  Referrals were placed, however they stated that you will need to call yourself to schedule based on the packet of mental health resources they gave you.  If you have questions please contact your primary or you can contact the the care coordinator that is assisted you this admission.      Take all medications as prescribed.  Keep all follow-up appointments.  Contact your primary care physician with any questions or concerns that  arise.  Come to the emergency department with worsening of your symptoms, severe chest pain, shortness of breath, or any other medical emergency.

## 2023-11-22 NOTE — DISCHARGE SUMMARY
Date of Admission: 11/17/2023    Date of Discharge: 11/22/2023    Discharge Diagnosis  Hypervolemia, unspecified hypervolemia type    Issues Requiring Follow-Up  Nonadherence to medical treatment, recurrent SI (acute on chronic ), change in phosphate binder requiring lab to check phosphate.  Continued management of mental health conditions, patient needs to follow with psychiatry and psychology for counseling.  He is being set up with the navigator program.    Discharge Meds     Your medication list        START taking these medications        Instructions Last Dose Given Next Dose Due   calcium acetate 667 mg capsule  Commonly known as: Phoslo      Take 2 capsules (1,334 mg) by mouth 3 times a day with meals.       lidocaine 4 % patch      Place 1 patch over 12 hours on the skin once daily. Remove & discard patch within 12 hours or as directed by MD.       metoprolol succinate XL 25 mg 24 hr tablet  Commonly known as: Toprol-XL      Take 1 tablet (25 mg) by mouth once daily. Do not crush or chew.              CHANGE how you take these medications        Instructions Last Dose Given Next Dose Due   midodrine 10 mg tablet  Commonly known as: Proamatine  What changed:   when to take this  reasons to take this      Take 1 tablet (10 mg) by mouth if needed (for HD with SBP <90).              CONTINUE taking these medications        Instructions Last Dose Given Next Dose Due   acetaminophen 500 mg tablet  Commonly known as: Tylenol           aspirin 81 mg EC tablet           B complex-vitamin C-folic acid 1 mg capsule  Commonly known as: Nephrocaps           Eliquis 2.5 mg tablet  Generic drug: apixaban           ergocalciferol 1.25 MG (26622 UT) capsule  Commonly known as: Vitamin D-2           ezetimibe 10 mg tablet  Commonly known as: Zetia           finasteride 5 mg tablet  Commonly known as: Proscar           Flomax 0.4 mg 24 hr capsule  Generic drug: tamsulosin           melatonin 3 mg capsule           OLANZapine  5 mg tablet  Commonly known as: ZyPREXA      Take 1 tablet (5 mg) by mouth once daily at bedtime.       ondansetron 4 mg tablet  Commonly known as: Zofran           rosuvastatin 20 mg tablet  Commonly known as: Crestor                  STOP taking these medications      allopurinol 100 mg tablet  Commonly known as: Zyloprim        metoprolol tartrate 25 mg tablet  Commonly known as: Lopressor        sevelamer carbonate 800 mg tablet  Commonly known as: Renvela        sodium bicarbonate 325 mg tablet                  Where to Get Your Medications        These medications were sent to [facility]  [id number], [facility] [address]      Hours: 8AM to 6PM Mon-Fri, 8AM to 4PM Sat, 9AM to 1PM Sun Phone: [phone]   calcium acetate 667 mg capsule  lidocaine 4 % patch  metoprolol succinate XL 25 mg 24 hr tablet  OLANZapine 5 mg tablet       Information about where to get these medications is not yet available    Ask your nurse or doctor about these medications  midodrine 10 mg tablet         Test Results Pending At Discharge  Pending Labs       Order Current Status    Drug Screen, Urine Collected (11/17/23 7428)            Hospital Course  Kj Colmenares is a 53 y.o. male with a past medical history of afib (on Eliquis), ESRD on HD, medical noncompliance, T2DM, HTN, HLD, GERD, remote DVT, and remote CVA ([facility], 2017)who presented to the ED with chest pain and shortness of breath in setting of medication and HD treatment non-compliance.   Patient has had not many recent emergency department visits.  He also is endorsing passive SI.  Has not been completing his HD sessions when he does undergo them.Overall overall patient is now stable.  He is undergoing HD with nephrology input.  Palliative consulted for goals of care. Endorsing active acute on chronic SI. He has good insight and to ask for help, overall mod risk. Psych evaluated, no IP psych indication, Has worked on safety plan with psych.   Plan to dc 1:1 ad reeval in the am.  Patient remains stable, appropriate insight and ability to ask for help.  Understands he needs to call to make his psychiatry follow-up appointment and it was recommended that he follow-up with psychology for counseling.  We discussed crisis lines and he was given the numbers in his paperwork.  No inpatient psych needs at this time, he has responded well to olanzapine and psychiatry recommends continuing.  Palliative care helped arrange navigator program for the .  Patient to discharge home in stable condition recommending primary care, nephrology, psych and psychology follow-up.  On discharge his sevelamer was not covered and was changed to calcium acetate, recommending repeat phosphorus check at HD.. Discharge plan of care discussed, education and counseling provided involving active problem care plan, warning signs,  risks/benefits of new medications or medication changes, follow-up care and testing.  Patient advised to follow-up with her primary care within 1 week of discharge or the next available for posthospitalization transition of care.  There was verbalized understanding and agreement with discharge care plan.    Pertinent Physical Exam At Time of Discharge  On the day of discharge, the patient reported feeling well and pain was controlled. Vitals and labs were stable. On exam: No acute distress, interactive, no increased work of breathing, regular rate and rhythm, abdomen soft/nontender, no edema.    Outpatient Follow-Up  No future appointments.    Recommending primary care, psychiatry, psychology, nephrology  Patient informed that he has to call to arrange all of his appointments.    Pt instructed to take all medications as prescribed.  Keep all follow-up appointments.  Contact their primary care physician with any questions or concerns that arise.  Come to the emergency department with worsening of your symptoms or any other medical emergency.    Time  spent >30 minutes on discharge management.    Nguyễn Snyder MD

## 2023-11-22 NOTE — PROGRESS NOTES
Transitional Care Coordinator Progress Note:   Plan to follow up with psychiatry for recommendations. Referral sent to [facility] navigator program.  SW confirmed outpatient HD chair at [facility] for Friday, 11/24. No other discharge needs anticipated at this time. ADOD today vs 11/23.  Care coordinator will continue to follow for discharge planning needs.     Jaja Fonseca MSN, RN-BC  Transitional Care Coordinator (TCC)  [phone]

## 2023-11-22 NOTE — SIGNIFICANT EVENT
Seen today as part of the psychiatry consult team.  Patient sleeping on arrival to room with eaten breakfast tray at bedside.  Able to be woken easily.  Patient states his mood is good and that he likes the addition of the olanzapine as it has helped him with sleep.  Endorses intermittent diarrhea that he says is baseline for him. States good appetite while in the hospital.  Denies SI/HI.  Rapid speech throughout our discussion, but not pressured.  Patient looking forward to discharge soon.    Provided list of mental health resources for patient to use for follow up care.  Psychiatry will sign off.    Thank you for allowing us to participate in this patient's care.    Please page [phone] for additional questions or concerns.      Yaritza Teran, [place], [facility]  Psychiatric Clinical Pharmacy Specialist  Psychiatry Consult Service  Pager: [phone]  Secure Chat

## 2023-11-22 NOTE — PROGRESS NOTES
"Kj Colmenares is a 53 y.o. male on day 0 of admission presenting with Hypervolemia, unspecified hypervolemia type.    Subjective   Pt sitting up in bed. Denies sob, states nausea without emesis and states non-bloody diarrhea. Denies fever, cough, chills, pain, chest pains       Objective     Physical Exam  Vitals and nursing note reviewed.   Constitutional:       Appearance: Normal appearance.   Cardiovascular:      Rate and Rhythm: Tachycardia present. Rhythm irregular.      Comments: Slight tachy AFIB 111  Pulmonary:      Comments: Asaf lung sounds diminished  Abdominal:      Palpations: Abdomen is soft.   Genitourinary:     Comments: States make urine  Musculoskeletal:      Comments: Ble without edema   Skin:     General: Skin is warm and dry.   Neurological:      Mental Status: He is alert and oriented to person, place, and time.   Psychiatric:         Mood and Affect: Mood normal.         Behavior: Behavior normal.         Last Recorded Vitals  Blood pressure 93/58, pulse 99, temperature 36.5 °C (97.7 °F), resp. rate 16, height 1.803 m (5' 11\"), weight 108 kg (239 lb), SpO2 98 %.  Intake/Output last 3 Shifts:  I/O last 3 completed shifts:  In: 2240 (20.7 mL/kg) [P.O.:1440; I.V.:600 (5.5 mL/kg); Other:200]  Out: 1500 (13.8 mL/kg) [Urine:100 (0 mL/kg/hr); Other:1400]  Weight: 108.4 kg     Scheduled medications  apixaban, 2.5 mg, oral, BID  aspirin, 81 mg, oral, Daily  B complex-vitamin C, 1 tablet, oral, Daily  ergocalciferol, 1,250 mcg, oral, Weekly  ezetimibe, 10 mg, oral, Daily  finasteride, 5 mg, oral, Daily  lidocaine, 1 patch, transdermal, Daily  melatonin, 10 mg, oral, q24h MAREK  metoprolol tartrate, 25 mg, oral, BID  OLANZapine, 5 mg, oral, Nightly  psyllium, 1 packet, oral, Daily  rosuvastatin, 20 mg, oral, Nightly  sevelamer carbonate, 1,600 mg, oral, TID with meals  torsemide, 40 mg, oral, Daily      Continuous medications     PRN medications  PRN medications: acetaminophen **OR** acetaminophen **OR** " acetaminophen, ondansetron ODT **OR** ondansetron   Results for orders placed or performed during the hospital encounter of 11/17/23 (from the past 24 hour(s))   Renal function panel   Result Value Ref Range    Glucose 95 74 - 99 mg/dL    Sodium 140 136 - 145 mmol/L    Potassium 3.4 (L) 3.5 - 5.3 mmol/L    Chloride 99 98 - 107 mmol/L    Bicarbonate 29 21 - 32 mmol/L    Anion Gap 15 10 - 20 mmol/L    Urea Nitrogen 34 (H) 6 - 23 mg/dL    Creatinine 6.30 (H) 0.50 - 1.30 mg/dL    eGFR 10 (L) >60 mL/min/1.73m*2    Calcium 10.4 8.6 - 10.6 mg/dL    Phosphorus 3.9 2.5 - 4.9 mg/dL    Albumin 4.2 3.4 - 5.0 g/dL                   Assessment/Plan   Principal Problem:    Hypervolemia, unspecified hypervolemia type    Tolerated hemodialysis yesterday with net fluid loss 1000cc    Soft bp's during tx-stable, euvolemic on exam and has stable electrolytes      Outpatient Dialysis schedule:  M/F [facility]/Dr. Hobbs... will dialyze SUN-TUES this week d/t holiday.. will resume reg schedule next week 11/27      Access: lt lower arm fistula with +thrill/bruit - no issues - able to achieve      Anemia of ESRD:   pt not on VEDA on hemodialysis days.. current hgb 11.4.. will cont to monitor     CKD-MBD Phosphate Binder:sevelamer carbonate (Renvela) tablet 1,600 mg  tid ac, ergocalciferol (Vitamin D-2) capsule 1,250 mcg weekly, B complex-vitamin C tablet 1 tablet daily     Plan HD tomorrow with UF as tolerated     Renal diet      Please obtain daily standing wt (if possible)     Medication to be adjusted for ESRD      Patient to continue regular HD schedule while inpatient and to follow with the outpatient nephrologist at discharge                NIGEL Simon-CNP

## 2023-11-27 ENCOUNTER — PATIENT OUTREACH (OUTPATIENT)
Dept: CARE COORDINATION | Facility: CLINIC | Age: 53
End: 2023-11-27
Payer: COMMERCIAL

## 2023-11-27 NOTE — PROGRESS NOTES
Outreach call to patient to support a smooth transition of care from recent admission.  Patient is enrolled in conversa chatbox.  Left voicemail message for patient with my contact information.

## 2023-12-02 ENCOUNTER — APPOINTMENT (OUTPATIENT)
Dept: RADIOLOGY | Facility: HOSPITAL | Age: 53
End: 2023-12-02
Payer: COMMERCIAL

## 2023-12-02 ENCOUNTER — HOSPITAL ENCOUNTER (OUTPATIENT)
Facility: HOSPITAL | Age: 53
Setting detail: OBSERVATION
Discharge: HOME | End: 2023-12-04
Attending: STUDENT IN AN ORGANIZED HEALTH CARE EDUCATION/TRAINING PROGRAM | Admitting: INTERNAL MEDICINE
Payer: COMMERCIAL

## 2023-12-02 DIAGNOSIS — N19 UREMIA: ICD-10-CM

## 2023-12-02 DIAGNOSIS — R19.7 NAUSEA VOMITING AND DIARRHEA: ICD-10-CM

## 2023-12-02 DIAGNOSIS — Z99.2 ESRD ON DIALYSIS (MULTI): Primary | ICD-10-CM

## 2023-12-02 DIAGNOSIS — R60.0 LOCALIZED EDEMA: ICD-10-CM

## 2023-12-02 DIAGNOSIS — R10.84 GENERALIZED ABDOMINAL PAIN: ICD-10-CM

## 2023-12-02 DIAGNOSIS — R11.2 NAUSEA VOMITING AND DIARRHEA: ICD-10-CM

## 2023-12-02 DIAGNOSIS — N18.6 ESRD ON DIALYSIS (MULTI): Primary | ICD-10-CM

## 2023-12-02 LAB
ALBUMIN SERPL BCP-MCNC: 4 G/DL (ref 3.4–5)
ALP SERPL-CCNC: 267 U/L (ref 33–120)
ALT SERPL W P-5'-P-CCNC: 30 U/L (ref 10–52)
ANION GAP SERPL CALC-SCNC: 18 MMOL/L (ref 10–20)
AST SERPL W P-5'-P-CCNC: 16 U/L (ref 9–39)
BASOPHILS # BLD AUTO: 0.03 X10*3/UL (ref 0–0.1)
BASOPHILS NFR BLD AUTO: 0.5 %
BILIRUB SERPL-MCNC: 0.5 MG/DL (ref 0–1.2)
BUN SERPL-MCNC: 103 MG/DL (ref 6–23)
CALCIUM SERPL-MCNC: 9.8 MG/DL (ref 8.6–10.3)
CHLORIDE SERPL-SCNC: 114 MMOL/L (ref 98–107)
CO2 SERPL-SCNC: 19 MMOL/L (ref 21–32)
CREAT SERPL-MCNC: 10.13 MG/DL (ref 0.5–1.3)
EOSINOPHIL # BLD AUTO: 0.44 X10*3/UL (ref 0–0.7)
EOSINOPHIL NFR BLD AUTO: 7.7 %
ERYTHROCYTE [DISTWIDTH] IN BLOOD BY AUTOMATED COUNT: 13.1 % (ref 11.5–14.5)
FLUAV RNA RESP QL NAA+PROBE: NOT DETECTED
FLUBV RNA RESP QL NAA+PROBE: NOT DETECTED
GFR SERPL CREATININE-BSD FRML MDRD: 6 ML/MIN/1.73M*2
GLUCOSE SERPL-MCNC: 79 MG/DL (ref 74–99)
HCT VFR BLD AUTO: 35.2 % (ref 41–52)
HGB BLD-MCNC: 10.5 G/DL (ref 13.5–17.5)
IMM GRANULOCYTES # BLD AUTO: 0.01 X10*3/UL (ref 0–0.7)
IMM GRANULOCYTES NFR BLD AUTO: 0.2 % (ref 0–0.9)
LIPASE SERPL-CCNC: 98 U/L (ref 9–82)
LYMPHOCYTES # BLD AUTO: 1.03 X10*3/UL (ref 1.2–4.8)
LYMPHOCYTES NFR BLD AUTO: 17.9 %
MAGNESIUM SERPL-MCNC: 2.2 MG/DL (ref 1.6–2.4)
MCH RBC QN AUTO: 29.2 PG (ref 26–34)
MCHC RBC AUTO-ENTMCNC: 29.8 G/DL (ref 32–36)
MCV RBC AUTO: 98 FL (ref 80–100)
MONOCYTES # BLD AUTO: 0.38 X10*3/UL (ref 0.1–1)
MONOCYTES NFR BLD AUTO: 6.6 %
NEUTROPHILS # BLD AUTO: 3.85 X10*3/UL (ref 1.2–7.7)
NEUTROPHILS NFR BLD AUTO: 67.1 %
NRBC BLD-RTO: 0 /100 WBCS (ref 0–0)
PLATELET # BLD AUTO: 229 X10*3/UL (ref 150–450)
POTASSIUM SERPL-SCNC: 4.6 MMOL/L (ref 3.5–5.3)
PROT SERPL-MCNC: 6.7 G/DL (ref 6.4–8.2)
RBC # BLD AUTO: 3.59 X10*6/UL (ref 4.5–5.9)
SARS-COV-2 RNA RESP QL NAA+PROBE: NOT DETECTED
SODIUM SERPL-SCNC: 146 MMOL/L (ref 136–145)
WBC # BLD AUTO: 5.7 X10*3/UL (ref 4.4–11.3)

## 2023-12-02 PROCEDURE — 74176 CT ABD & PELVIS W/O CONTRAST: CPT

## 2023-12-02 PROCEDURE — 36415 COLL VENOUS BLD VENIPUNCTURE: CPT | Performed by: STUDENT IN AN ORGANIZED HEALTH CARE EDUCATION/TRAINING PROGRAM

## 2023-12-02 PROCEDURE — 99285 EMERGENCY DEPT VISIT HI MDM: CPT | Performed by: STUDENT IN AN ORGANIZED HEALTH CARE EDUCATION/TRAINING PROGRAM

## 2023-12-02 PROCEDURE — 96374 THER/PROPH/DIAG INJ IV PUSH: CPT | Performed by: STUDENT IN AN ORGANIZED HEALTH CARE EDUCATION/TRAINING PROGRAM

## 2023-12-02 PROCEDURE — 2500000004 HC RX 250 GENERAL PHARMACY W/ HCPCS (ALT 636 FOR OP/ED): Performed by: STUDENT IN AN ORGANIZED HEALTH CARE EDUCATION/TRAINING PROGRAM

## 2023-12-02 PROCEDURE — 85025 COMPLETE CBC W/AUTO DIFF WBC: CPT | Performed by: STUDENT IN AN ORGANIZED HEALTH CARE EDUCATION/TRAINING PROGRAM

## 2023-12-02 PROCEDURE — 83690 ASSAY OF LIPASE: CPT | Performed by: STUDENT IN AN ORGANIZED HEALTH CARE EDUCATION/TRAINING PROGRAM

## 2023-12-02 PROCEDURE — 87636 SARSCOV2 & INF A&B AMP PRB: CPT | Performed by: STUDENT IN AN ORGANIZED HEALTH CARE EDUCATION/TRAINING PROGRAM

## 2023-12-02 PROCEDURE — 83735 ASSAY OF MAGNESIUM: CPT | Performed by: STUDENT IN AN ORGANIZED HEALTH CARE EDUCATION/TRAINING PROGRAM

## 2023-12-02 PROCEDURE — 80053 COMPREHEN METABOLIC PANEL: CPT | Performed by: STUDENT IN AN ORGANIZED HEALTH CARE EDUCATION/TRAINING PROGRAM

## 2023-12-02 PROCEDURE — 71045 X-RAY EXAM CHEST 1 VIEW: CPT

## 2023-12-02 PROCEDURE — 74176 CT ABD & PELVIS W/O CONTRAST: CPT | Performed by: RADIOLOGY

## 2023-12-02 PROCEDURE — 71045 X-RAY EXAM CHEST 1 VIEW: CPT | Performed by: STUDENT IN AN ORGANIZED HEALTH CARE EDUCATION/TRAINING PROGRAM

## 2023-12-02 PROCEDURE — 96375 TX/PRO/DX INJ NEW DRUG ADDON: CPT | Performed by: STUDENT IN AN ORGANIZED HEALTH CARE EDUCATION/TRAINING PROGRAM

## 2023-12-02 RX ORDER — ONDANSETRON HYDROCHLORIDE 2 MG/ML
4 INJECTION, SOLUTION INTRAVENOUS ONCE
Status: COMPLETED | OUTPATIENT
Start: 2023-12-02 | End: 2023-12-02

## 2023-12-02 RX ORDER — MORPHINE SULFATE 4 MG/ML
4 INJECTION, SOLUTION INTRAMUSCULAR; INTRAVENOUS ONCE
Status: COMPLETED | OUTPATIENT
Start: 2023-12-02 | End: 2023-12-02

## 2023-12-02 RX ADMIN — ONDANSETRON 4 MG: 2 INJECTION INTRAMUSCULAR; INTRAVENOUS at 21:28

## 2023-12-02 RX ADMIN — MORPHINE SULFATE 4 MG: 4 INJECTION, SOLUTION INTRAMUSCULAR; INTRAVENOUS at 21:28

## 2023-12-02 ASSESSMENT — COLUMBIA-SUICIDE SEVERITY RATING SCALE - C-SSRS
2. HAVE YOU ACTUALLY HAD ANY THOUGHTS OF KILLING YOURSELF?: NO
6. HAVE YOU EVER DONE ANYTHING, STARTED TO DO ANYTHING, OR PREPARED TO DO ANYTHING TO END YOUR LIFE?: NO
1. IN THE PAST MONTH, HAVE YOU WISHED YOU WERE DEAD OR WISHED YOU COULD GO TO SLEEP AND NOT WAKE UP?: NO

## 2023-12-02 NOTE — ED TRIAGE NOTES
Missed dialysis. Per pt been has been feeling bad and didnt feel good enough to go. Also c/o L knee pain. Was taken off blood thinners a year ago.

## 2023-12-03 ENCOUNTER — APPOINTMENT (OUTPATIENT)
Dept: RADIOLOGY | Facility: HOSPITAL | Age: 53
End: 2023-12-03
Payer: COMMERCIAL

## 2023-12-03 PROBLEM — Z99.2 ESRD ON DIALYSIS (MULTI): Status: ACTIVE | Noted: 2023-12-03

## 2023-12-03 PROBLEM — N18.6 ESRD ON DIALYSIS (MULTI): Status: ACTIVE | Noted: 2023-12-03

## 2023-12-03 LAB
ALBUMIN SERPL BCP-MCNC: 3.4 G/DL (ref 3.4–5)
ALP SERPL-CCNC: 245 U/L (ref 33–120)
ALT SERPL W P-5'-P-CCNC: 22 U/L (ref 10–52)
ANION GAP SERPL CALC-SCNC: 17 MMOL/L (ref 10–20)
AST SERPL W P-5'-P-CCNC: 12 U/L (ref 9–39)
BILIRUB SERPL-MCNC: 0.4 MG/DL (ref 0–1.2)
BUN SERPL-MCNC: 103 MG/DL (ref 6–23)
CALCIUM SERPL-MCNC: 9 MG/DL (ref 8.6–10.3)
CHLORIDE SERPL-SCNC: 113 MMOL/L (ref 98–107)
CO2 SERPL-SCNC: 19 MMOL/L (ref 21–32)
CREAT SERPL-MCNC: 9.95 MG/DL (ref 0.5–1.3)
ERYTHROCYTE [DISTWIDTH] IN BLOOD BY AUTOMATED COUNT: 13.2 % (ref 11.5–14.5)
GFR SERPL CREATININE-BSD FRML MDRD: 6 ML/MIN/1.73M*2
GLUCOSE SERPL-MCNC: 92 MG/DL (ref 74–99)
HCT VFR BLD AUTO: 30.7 % (ref 41–52)
HGB BLD-MCNC: 9.2 G/DL (ref 13.5–17.5)
MCH RBC QN AUTO: 29.5 PG (ref 26–34)
MCHC RBC AUTO-ENTMCNC: 30 G/DL (ref 32–36)
MCV RBC AUTO: 98 FL (ref 80–100)
NRBC BLD-RTO: 0 /100 WBCS (ref 0–0)
PHOSPHATE SERPL-MCNC: 5.4 MG/DL (ref 2.5–4.9)
PLATELET # BLD AUTO: 196 X10*3/UL (ref 150–450)
POTASSIUM SERPL-SCNC: 4.6 MMOL/L (ref 3.5–5.3)
PROT SERPL-MCNC: 5.5 G/DL (ref 6.4–8.2)
RBC # BLD AUTO: 3.12 X10*6/UL (ref 4.5–5.9)
SODIUM SERPL-SCNC: 144 MMOL/L (ref 136–145)
WBC # BLD AUTO: 5.1 X10*3/UL (ref 4.4–11.3)

## 2023-12-03 PROCEDURE — 96374 THER/PROPH/DIAG INJ IV PUSH: CPT | Performed by: STUDENT IN AN ORGANIZED HEALTH CARE EDUCATION/TRAINING PROGRAM

## 2023-12-03 PROCEDURE — G0378 HOSPITAL OBSERVATION PER HR: HCPCS

## 2023-12-03 PROCEDURE — 96375 TX/PRO/DX INJ NEW DRUG ADDON: CPT | Performed by: STUDENT IN AN ORGANIZED HEALTH CARE EDUCATION/TRAINING PROGRAM

## 2023-12-03 PROCEDURE — 2500000001 HC RX 250 WO HCPCS SELF ADMINISTERED DRUGS (ALT 637 FOR MEDICARE OP): Performed by: HOSPITALIST

## 2023-12-03 PROCEDURE — 86706 HEP B SURFACE ANTIBODY: CPT | Mod: AHULAB | Performed by: INTERNAL MEDICINE

## 2023-12-03 PROCEDURE — 76705 ECHO EXAM OF ABDOMEN: CPT | Performed by: RADIOLOGY

## 2023-12-03 PROCEDURE — 76705 ECHO EXAM OF ABDOMEN: CPT

## 2023-12-03 PROCEDURE — 93971 EXTREMITY STUDY: CPT | Performed by: STUDENT IN AN ORGANIZED HEALTH CARE EDUCATION/TRAINING PROGRAM

## 2023-12-03 PROCEDURE — 2500000004 HC RX 250 GENERAL PHARMACY W/ HCPCS (ALT 636 FOR OP/ED): Performed by: HOSPITALIST

## 2023-12-03 PROCEDURE — 84100 ASSAY OF PHOSPHORUS: CPT | Performed by: INTERNAL MEDICINE

## 2023-12-03 PROCEDURE — 87506 IADNA-DNA/RNA PROBE TQ 6-11: CPT | Performed by: INTERNAL MEDICINE

## 2023-12-03 PROCEDURE — 99222 1ST HOSP IP/OBS MODERATE 55: CPT | Performed by: NURSE PRACTITIONER

## 2023-12-03 PROCEDURE — 80053 COMPREHEN METABOLIC PANEL: CPT | Performed by: NURSE PRACTITIONER

## 2023-12-03 PROCEDURE — 85027 COMPLETE CBC AUTOMATED: CPT | Performed by: NURSE PRACTITIONER

## 2023-12-03 PROCEDURE — 93971 EXTREMITY STUDY: CPT

## 2023-12-03 PROCEDURE — 36415 COLL VENOUS BLD VENIPUNCTURE: CPT | Performed by: NURSE PRACTITIONER

## 2023-12-03 RX ORDER — ONDANSETRON 4 MG/1
4 TABLET, ORALLY DISINTEGRATING ORAL EVERY 8 HOURS PRN
Status: DISCONTINUED | OUTPATIENT
Start: 2023-12-03 | End: 2023-12-04 | Stop reason: HOSPADM

## 2023-12-03 RX ORDER — METOPROLOL SUCCINATE 25 MG/1
25 TABLET, EXTENDED RELEASE ORAL DAILY
Status: DISCONTINUED | OUTPATIENT
Start: 2023-12-03 | End: 2023-12-04 | Stop reason: HOSPADM

## 2023-12-03 RX ORDER — FINASTERIDE 5 MG/1
5 TABLET, FILM COATED ORAL DAILY
Status: DISCONTINUED | OUTPATIENT
Start: 2023-12-03 | End: 2023-12-04 | Stop reason: HOSPADM

## 2023-12-03 RX ORDER — ACETAMINOPHEN 325 MG/1
650 TABLET ORAL EVERY 4 HOURS PRN
Status: DISCONTINUED | OUTPATIENT
Start: 2023-12-03 | End: 2023-12-04 | Stop reason: HOSPADM

## 2023-12-03 RX ORDER — TALC
3 POWDER (GRAM) TOPICAL NIGHTLY PRN
Status: DISCONTINUED | OUTPATIENT
Start: 2023-12-03 | End: 2023-12-04 | Stop reason: HOSPADM

## 2023-12-03 RX ORDER — LIDOCAINE 560 MG/1
1 PATCH PERCUTANEOUS; TOPICAL; TRANSDERMAL DAILY
Status: DISCONTINUED | OUTPATIENT
Start: 2023-12-03 | End: 2023-12-04 | Stop reason: HOSPADM

## 2023-12-03 RX ORDER — TALC
3 POWDER (GRAM) TOPICAL DAILY
Status: DISCONTINUED | OUTPATIENT
Start: 2023-12-03 | End: 2023-12-03 | Stop reason: SDUPTHER

## 2023-12-03 RX ORDER — ONDANSETRON HYDROCHLORIDE 2 MG/ML
4 INJECTION, SOLUTION INTRAVENOUS EVERY 8 HOURS PRN
Status: DISCONTINUED | OUTPATIENT
Start: 2023-12-03 | End: 2023-12-04 | Stop reason: HOSPADM

## 2023-12-03 RX ORDER — CALCIUM ACETATE 667 MG/1
1334 CAPSULE ORAL
Status: DISCONTINUED | OUTPATIENT
Start: 2023-12-03 | End: 2023-12-04 | Stop reason: HOSPADM

## 2023-12-03 RX ORDER — ASPIRIN 81 MG/1
81 TABLET ORAL DAILY
Status: DISCONTINUED | OUTPATIENT
Start: 2023-12-03 | End: 2023-12-04 | Stop reason: HOSPADM

## 2023-12-03 RX ORDER — MIDODRINE HYDROCHLORIDE 5 MG/1
10 TABLET ORAL AS NEEDED
Status: DISCONTINUED | OUTPATIENT
Start: 2023-12-03 | End: 2023-12-04 | Stop reason: HOSPADM

## 2023-12-03 RX ORDER — ROSUVASTATIN CALCIUM 20 MG/1
20 TABLET, COATED ORAL NIGHTLY
Status: DISCONTINUED | OUTPATIENT
Start: 2023-12-03 | End: 2023-12-04 | Stop reason: HOSPADM

## 2023-12-03 RX ORDER — OLANZAPINE 5 MG/1
5 TABLET ORAL NIGHTLY
Status: DISCONTINUED | OUTPATIENT
Start: 2023-12-03 | End: 2023-12-04 | Stop reason: HOSPADM

## 2023-12-03 RX ORDER — POLYETHYLENE GLYCOL 3350 17 G/17G
17 POWDER, FOR SOLUTION ORAL DAILY
Status: DISCONTINUED | OUTPATIENT
Start: 2023-12-03 | End: 2023-12-04 | Stop reason: HOSPADM

## 2023-12-03 RX ORDER — TAMSULOSIN HYDROCHLORIDE 0.4 MG/1
0.4 CAPSULE ORAL DAILY
Status: DISCONTINUED | OUTPATIENT
Start: 2023-12-03 | End: 2023-12-04 | Stop reason: HOSPADM

## 2023-12-03 RX ADMIN — ASPIRIN 81 MG: 81 TABLET, COATED ORAL at 10:49

## 2023-12-03 RX ADMIN — APIXABAN 2.5 MG: 5 TABLET, FILM COATED ORAL at 10:48

## 2023-12-03 RX ADMIN — APIXABAN 2.5 MG: 5 TABLET, FILM COATED ORAL at 21:58

## 2023-12-03 RX ADMIN — TAMSULOSIN HYDROCHLORIDE 0.4 MG: 0.4 CAPSULE ORAL at 10:52

## 2023-12-03 RX ADMIN — OLANZAPINE 5 MG: 5 TABLET, FILM COATED ORAL at 21:59

## 2023-12-03 RX ADMIN — FINASTERIDE 5 MG: 5 TABLET, FILM COATED ORAL at 10:48

## 2023-12-03 RX ADMIN — ROSUVASTATIN CALCIUM 20 MG: 20 TABLET, FILM COATED ORAL at 21:59

## 2023-12-03 RX ADMIN — Medication 1 CAPSULE: at 10:49

## 2023-12-03 RX ADMIN — CALCIUM ACETATE 1334 MG: 667 CAPSULE ORAL at 10:46

## 2023-12-03 RX ADMIN — ACETAMINOPHEN 650 MG: 325 TABLET ORAL at 21:58

## 2023-12-03 RX ADMIN — METOPROLOL SUCCINATE 25 MG: 25 TABLET, EXTENDED RELEASE ORAL at 10:47

## 2023-12-03 RX ADMIN — CALCIUM ACETATE 1334 MG: 667 CAPSULE ORAL at 13:45

## 2023-12-03 ASSESSMENT — PAIN - FUNCTIONAL ASSESSMENT: PAIN_FUNCTIONAL_ASSESSMENT: 0-10

## 2023-12-03 ASSESSMENT — PAIN SCALES - GENERAL
PAINLEVEL_OUTOF10: 0 - NO PAIN

## 2023-12-03 NOTE — ED PROVIDER NOTES
EMERGENCY MEDICINE EVALUATION NOTE    History of Present Illness     Chief Complaint:   Chief Complaint   Patient presents with    Vascular Access Problem     Missed dialysis and is not feeling well. +nausea and vomiting, diarrhea         HPI: Kj Colmenares is a 53 y.o. male with past medical history of retention, CVA, diabetes, ESRD on dialysis who presents with complaint of nausea and vomiting.  Patient's for the past 2 days she has had persistent nausea with multiple episodes of nonbloody nonbilious emesis as well as watery diarrhea every states is nonbloody.  He does state that he missed dialysis this prior Friday and Wednesday secondary to his sickness.  He states he did go this prior Monday which was 5 days ago.  He denies missing prior sessions to that.  He does admit generalized abdominal pain and discomfort.  Denies any dysuria or hematuria and states he does make a small amount of urine still.  He does admit left knee pain though states is chronic in nature and he is planned to receive surgery on his without any new trauma or injury.  Denies any fever, chills, chest pain, shortness of breath.  He was states he was told by his primary care provider to come in for dialysis.    Previous History     Past Medical History:   Diagnosis Date    Diabetes mellitus (CMS/HCC)     Hypertension     Stroke (CMS/[facility])      Past Surgical History:   Procedure Laterality Date    CT HEAD ANGIO W AND WO IV CONTRAST  2/4/2014    CT HEAD ANGIO W AND WO IV CONTRAST 2/4/2014 [facility] CLINICAL LEGAC[facility]    CT HEAD ANGIO W AND WO IV CONTRAST  2/13/2014    CT HEAD ANGIO W AND WO IV CONTRAST 2/13/2014 [facility] CLINICAL LEGACY     Social History     Tobacco Use    Smoking status: Never    Smokeless tobacco: Never   Substance Use Topics    Alcohol use: Never    Drug use: Never     No family history on file.  No Known Allergies  Current Outpatient Medications   Medication Instructions    acetaminophen (TYLENOL) 500 mg, oral, Daily PRN    apixaban  (ELIQUIS) 2.5 mg, oral, 2 times daily    aspirin 81 mg, oral, Daily    B complex-vitamin C-folic acid (Nephrocaps) 1 mg capsule 1 capsule, oral, Daily    calcium acetate (PHOSLO) 1,334 mg, oral, 3 times daily with meals    ergocalciferol (VITAMIN D-2) 1.25 mg, oral, Weekly, MONDAY NEED AN REFILL    ezetimibe (ZETIA) 10 mg, oral, Daily    finasteride (PROSCAR) 5 mg, oral, Daily, Do not crush, chew, or split.    lidocaine 4 % patch 1 patch, transdermal, Daily, Remove & discard patch within 12 hours or as directed by MD.    melatonin 3 mg capsule 1 capsule, oral, Nightly    metoprolol succinate XL (TOPROL-XL) 25 mg, oral, Daily, Do not crush or chew.    midodrine (PROAMATINE) 10 mg, oral, As needed    OLANZapine (ZYPREXA) 5 mg, oral, Nightly    ondansetron (ZOFRAN) 4 mg, oral, Every 12 hours PRN    rosuvastatin (CRESTOR) 20 mg, oral, Nightly    tamsulosin (FLOMAX) 0.4 mg, oral, Daily       Physical Exam     Appearance: Alert, oriented , cooperative,  in acute distress. Well nourished & well hydrated.     Skin: Intact,  dry skin, no lesions, rash, petechiae or purpura.      Eyes: PERRLA, EOMs intact,  Conjunctiva pink with no redness or exudates. Cornea & anterior chamber are clear, Eyelids without lesions. No scleral icterus.      ENT: Hearing grossly intact. External auditory canals patent, tympanic membranes intact with visible landmarks. Nares patent, mucus membranes moist. Dentition without lesions. Pharynx clear, uvula midline.      Neck: Supple, without meningismus. Thyroid not palpable. Trachea at midline. No lymphadenopathy.     Pulmonary: Clear bilaterally with good chest wall excursion. No rales, rhonchi or wheezing. No accessory muscle use or stridor.     Cardiac: Normal S1, S2 without murmur, rub, gallop or extrasystole. No JVD, Carotids without bruits.     Abdomen: Soft, moderate generalized abdominal tenderness, active bowel sounds.  No palpable organomegaly.  No rebound or guarding.  No CVA tenderness.      Genitourinary: Exam deferred.     Musculoskeletal: Full range of motion. no pain, edema, or deformity. Pulses full and equal. No cyanosis or clubbing.  Left distal upper extremity fistula with palpable thrill.     Neurological:  Cranial nerves II through XII are grossly intact, finger-nose touch is normal, normal sensation, no weakness, no focal findings identified.     Psychiatric: Appropriate mood and affect.      Results     Labs Reviewed   COMPREHENSIVE METABOLIC PANEL - Abnormal       Result Value    Glucose 79      Sodium 146 (*)     Potassium 4.6      Chloride 114 (*)     Bicarbonate 19 (*)     Anion Gap 18      Urea Nitrogen 103 (*)     Creatinine 10.13 (*)     eGFR 6 (*)     Calcium 9.8      Albumin 4.0      Alkaline Phosphatase 267 (*)     Total Protein 6.7      AST 16      Bilirubin, Total 0.5      ALT 30     CBC WITH AUTO DIFFERENTIAL - Abnormal    WBC 5.7      nRBC 0.0      RBC 3.59 (*)     Hemoglobin 10.5 (*)     Hematocrit 35.2 (*)     MCV 98      MCH 29.2      MCHC 29.8 (*)     RDW 13.1      Platelets 229      Neutrophils % 67.1      Immature Granulocytes %, Automated 0.2      Lymphocytes % 17.9      Monocytes % 6.6      Eosinophils % 7.7      Basophils % 0.5      Neutrophils Absolute 3.85      Immature Granulocytes Absolute, Automated 0.01      Lymphocytes Absolute 1.03 (*)     Monocytes Absolute 0.38      Eosinophils Absolute 0.44      Basophils Absolute 0.03     LIPASE - Abnormal    Lipase 98 (*)     Narrative:     Venipuncture immediately after or during the administration of Metamizole may lead to falsely low results. Testing should be performed immediately prior to Metamizole dosing.   SARS-COV-2 AND INFLUENZA A/B PCR - Normal    Flu A Result Not Detected      Flu B Result Not Detected      Coronavirus 2019, PCR Not Detected      Narrative:     This assay has received FDA Emergency Use Authorization (EUA) and  is only authorized for the duration of time that circumstances exist to justify the  authorization of the emergency use of in vitro diagnostic tests for the detection of SARS-CoV-2 virus and/or diagnosis of COVID-19 infection under section 564(b)(1) of the Act, 21 U.S.C. 360bbb-3(b)(1). Testing for SARS-CoV-2 is only recommended for patients who meet current clinical and/or epidemiological criteria as defined by federal, state, or local public health directives. This assay is an in vitro diagnostic nucleic acid amplification test for the qualitative detection of SARS-CoV-2, Influenza A, and Influenza B from nasopharyngeal specimens and has been validated for use at [facility]. Negative results do not preclude COVID-19 infections or Influenza A/B infections, and should not be used as the sole basis for diagnosis, treatment, or other management decisions. If Influenza A/B and RSV PCR results are negative, testing for Parainfluenza virus, Adenovirus and Metapneumovirus is routinely performed for [facility] pediatric oncology and intensive care inpatients, and is available on other patients by placing an add-on request.    MAGNESIUM - Normal    Magnesium 2.20       CT abdomen pelvis wo IV contrast   Final Result   No acute abdominal or pelvic process.                  MACRO:   None        Signed by: Korin Hernández 12/2/2023 11:14 PM   Dictation workstation:   [id number]      XR chest 1 view   Final Result   Cardiomegaly without evidence of acute disease in the chest.             MACRO:   None.        Signed by: Etienne Potter 12/2/2023 10:17 PM   Dictation workstation:   [id number]            ED Course & Medical Decision Making     Medications   ondansetron (Zofran) injection 4 mg (4 mg intravenous Given 12/2/23 2128)   morphine injection 4 mg (4 mg intravenous Given 12/2/23 2128)     Diagnoses as of 12/02/23 2324   ESRD on dialysis (CMS/[facility])   Nausea vomiting and diarrhea   Generalized abdominal pain   Uremia     Heart Rate:  [95]   Temp:  [37.1 °C (98.8 °F)]   Resp:  [18]   BP:  (151)/(107)   SpO2:  [100 %]      Kj Colmenares is a 53 y.o. male with past medical history of retention, CVA, diabetes, ESRD on dialysis who presents with complaint of nausea and vomiting.  Patient is hypertensive on arrival to /107.  Does have diffuse generalized abdominal pain without any significant peritonitic findings.  Differential includes but is limited to metabolic abnormality, worsening renal dysfunction, uremia, need for urgent dialysis or electrolyte abnormality, viscus perforation, or other intra-abdominal pathology.  Patient given morphine and Zofran for pain nausea control did have significant relief in reexamination.  EKG did show what appear to be atrial fibrillation with a rate of 90 bpm with no other additional signs of acute ischemic change noted.  This is not new for the patient.  He did have worsening uremia with a elevated BUN of 103 otherwise normal potassium mildly elevated hyponatremia of 146.  LFTs and bilirubin within normal limits.  Lipase mildly elevated 98.  No leukocytosis noted.  Stable anemia with hemoglobin 10.5.  Patient was negative for influenza and COVID-19.  CT scan of the abdomen pelvis was negative for acute finding and chest x-ray also was negative for significant pulmonary edema or fluid overload.  Patient informed these findings and was admitted to the hospitalist team for urgent dialysis as he did miss the past 2 sessions with significant uremia and worsening renal function.  Nephrology team consulted.    Procedures   Procedures    Diagnosis     1. ESRD on dialysis (CMS/[facility])    2. Nausea vomiting and diarrhea    3. Generalized abdominal pain    4. Uremia        Disposition     Admitted to hospitalist team, discussed differential and findings with patient as well as any family members at bedside.      ED Prescriptions    None            Arya Bo DO  12/02/23 [id number]

## 2023-12-03 NOTE — H&P
HPI:  This is a 53 y.o. male with PMH of mood disorder (Bipolar vs. MDD), ESRD on HD with prior documented noncompliance, CVA/ICH 2017, DMT2, HTN, A-fib on Eliquis, GERD, HLD, remote DVT, severe left knee osteoarthritis, recent admission at [facility] 11/16 - 11/22/2023 for chest pain/SOB in setting of medication/HD noncompliance, as well as reports of SI (seen by psych, started on olanzapine, improved and did not qualify for IP psych admission), presents with nausea/vomiting/diarrhea/abdominal pain, missed dialysis x 2.  Pt states he had emesis x 3-4 yesterday (vomited food), along with diarrhea x 2 and mid abdominal pain - to the ER. He denies any spoiled foods or sick contacts. States GI symptoms are resolved today.     Workup notable for initially elevated /107, subsequently improved, labs with mild hypernatremia 146, elevated chloride, mild metabolic acidosis, elevated BUN/creatinine 103/10.13, elevated alk phos 267 (chronic elevation) mildly elevated lipase 98, chronic anemia consistent with prior baseline, negative flu/COVID screen; CXR with cardiomegaly, otherwise nonacute; CT A/P without contrast showed cholelithiasis without inflammatory changes, stable bilateral renal cysts, no acute findings; GB US with cholelithiasis without cholecystitis, no biliary dilatation, + multiple right renal cysts.  Patient was given IV morphine and Zofran, admitted for further care/dialysis.    Full ROS done and negative except as stated above.      Surgical History:  AVF creation    Social History:  Denies tobacco, alcohol, illicits     Family History:  Mother - DVTs, hrs disease, father - HTN, PPM, epilepsy     Home meds:  No current facility-administered medications on file prior to encounter.     Current Outpatient Medications on File Prior to Encounter   Medication Sig    acetaminophen (Tylenol) 500 mg tablet Take 1 tablet (500 mg) by mouth once daily as needed for mild pain (1 - 3).    apixaban  "(Eliquis) 2.5 mg tablet Take 1 tablet (2.5 mg) by mouth 2 times a day.    aspirin 81 mg EC tablet Take 1 tablet (81 mg) by mouth once daily.    B complex-vitamin C-folic acid (Nephrocaps) 1 mg capsule Take 1 capsule by mouth once daily.    calcium acetate (Phoslo) 667 mg capsule Take 2 capsules (1,334 mg) by mouth 3 times a day with meals.    ergocalciferol (Vitamin D-2) 1.25 MG (07200 UT) capsule Take 1 capsule (1,250 mcg) by mouth 1 (one) time per week. MONDAY NEED AN REFILL    ezetimibe (Zetia) 10 mg tablet Take 1 tablet (10 mg) by mouth once daily.    finasteride (Proscar) 5 mg tablet Take 1 tablet (5 mg) by mouth once daily. Do not crush, chew, or split.    lidocaine 4 % patch Place 1 patch over 12 hours on the skin once daily. Remove & discard patch within 12 hours or as directed by MD.    melatonin 3 mg capsule Take 1 capsule by mouth once daily at bedtime.    metoprolol succinate XL (Toprol-XL) 25 mg 24 hr tablet Take 1 tablet (25 mg) by mouth once daily. Do not crush or chew.    midodrine (Proamatine) 10 mg tablet Take 1 tablet (10 mg) by mouth if needed (for HD with SBP <90).    OLANZapine (ZyPREXA) 5 mg tablet Take 1 tablet (5 mg) by mouth once daily at bedtime.    ondansetron (Zofran) 4 mg tablet Take 1 tablet (4 mg) by mouth every 12 hours if needed for nausea or vomiting.    rosuvastatin (Crestor) 20 mg tablet Take 1 tablet (20 mg) by mouth once daily at bedtime.    tamsulosin (Flomax) 0.4 mg 24 hr capsule Take 1 capsule (0.4 mg) by mouth once daily.      Vitals (Last 24 Hours):  Heart Rate:  []   Temp:  [37.1 °C (98.8 °F)]   Resp:  [12-29]   BP: (111-151)/()   Height:  [180.3 cm (5' 11\")]   Weight:  [108 kg (239 lb)]   SpO2:  [98 %-100 %]      PHYSICAL EXAM:  Constitutional: NAD, alert and cooperative, tangential speech   Eyes: no icterus  ENMT: mucous membranes moist, no lesions  Head/Neck: supple  Respiratory/Thorax: CTA bilaterally, non-labored breathing, no cough, on " RA  Cardiovascular: irregular, no murmurs heard  Gastrointestinal: obese, ND/S/NT  : no Abnks, no SP/flank discomfort  Musculoskeletal: L knee/thing edema   Extremities: L thigh edema (chronic), LUE AVF, elongated toe nails   Neurological: tangential speech, otherwise grossly non-focal  Skin: warm and dry  Psych: calm, stable mood     MEDS:  apixaban, 2.5 mg, oral, BID  aspirin, 81 mg, oral, Daily  B complex-vitamin C-folic acid, 1 capsule, oral, Daily  calcium acetate, 1,334 mg, oral, TID with meals  finasteride, 5 mg, oral, Daily  lidocaine, 1 patch, transdermal, Daily  metoprolol succinate XL, 25 mg, oral, Daily  OLANZapine, 5 mg, oral, Nightly  rosuvastatin, 20 mg, oral, Nightly  tamsulosin, 0.4 mg, oral, Daily    PRN medications: acetaminophen, acetaminophen, melatonin, midodrine, ondansetron ODT **OR** ondansetron      I have reviewed all imaging reports and labs pertinent to this visit    ASSESSMENT/PLAN:    Nausea/vomiting/diarrhea/abdominal pain x 1 day  - Resolved, patient able to tolerate p.o. intake today, no acute findings on CT/GB US   - Continue to monitor for recurrence, check stool studies if diarrhea returns (recent hospital admission)    ESRD  Metabolic acidosis  -Plan for HD tomorrow, getting Lasix per renal    HTN  -Improved since admission, continue metoprolol and fluid removal with dialysis    Left thigh/knee edema  History severe left knee OA  Hx DVT  -Outpatient follow-up with Ortho  -Check LE Doppler for completeness (lower suspicion for VTE given chronic symptoms and Eliquis use)    Chronic alk phos elevation  - CT abdomen and gallbladder ultrasound reviewed, no acute biliary findings    Elongated toe nails   -refer to podiatry as OP     Hx mood disorder (Bipolar vs. MDD)  Recent hx SI  -recently seen by psych at [facility] [place], continue zyprexa     Other comorbidities including:  CVA/ICH 2017, DMT2, HTN, A-fib on Eliquis, GERD, HLD, remote DVT, chronic anemia   -continue meds as ordered and adjust  based on clinical course     VTE / GI prophylaxis   -on eliquis, bowel regimen on hold 2/2 recent diarrhea     Discharge planning  -no PT needs   -OP follow up with primary care, psychiatry/psychology, nephrology, ortho and podiatry     Will d/w the hospitalist team     Madison Reed, NIGEL-CNP

## 2023-12-03 NOTE — PROGRESS NOTES
"Kj Colmenares is a 53 y.o. male on day 0 of admission presenting with ESRD on dialysis (CMS/Formerly [facility]).    Subjective   Mr. Jenkins is a 53-year-old man with a history of diabetes, hypertension, DVT on Eliquis, hyperlipidemia, paroxysmal atrial fibrillation status post stroke who presented with nausea and vomiting.  He still makes urine, he only goes to dialysis twice per week.  He sees a nephrologist at [facility].  When we saw him a couple of weeks ago he have not been dialyzed for a month.  He was symptomatic, uremic.  We dialyzed him without issue.  But during his last hospital stay he said that he \"wanted to kill himself \".  He was inadvertently discharged, I called him immediately, asked him to come back to the hospital.  He was admitted to [facility].  He was seen by psychiatry, they discussed the importance of using the crisis line.  He was started on olanzapine, palliative care helped him arrange a navigator program. He comes back in with nausea and vomiting.  He has not been dialyzed since last Monday.  He still makes a lot of urine.       Objective       Physical Exam  Constitutional:       Appearance: Normal appearance.   HENT:      Mouth/Throat:      Mouth: Mucous membranes are moist.   Eyes:      Extraocular Movements: Extraocular movements intact.      Pupils: Pupils are equal, round, and reactive to light.   Cardiovascular:      Rate and Rhythm: Regular rhythm.      Heart sounds: S1 normal and S2 normal.   Pulmonary:      Breath sounds: Normal breath sounds.   Abdominal:      Comments: Soft, NT/ND, no masses, normal bowel sounds   Genitourinary:     Comments: No mazariegos  Musculoskeletal:      Right lower leg: No edema.      Left lower leg: No edema.   Skin:     General: Skin is warm and dry.   Neurological:      General: No focal deficit present.      Mental Status: She is alert and oriented to person, place, and time.   Psychiatric:         Behavior: Behavior normal.    Left upper " extremity thrill and bruit, AV fistula    Meds    Current Facility-Administered Medications:     acetaminophen (Tylenol) tablet 650 mg, 650 mg, oral, q4h PRN, Kristine Sabillon MD    acetaminophen (Tylenol) tablet 650 mg, 650 mg, oral, q4h PRN, Kristine Sabillon MD    apixaban (Eliquis) tablet 2.5 mg, 2.5 mg, oral, BID, Kristine Sabillon MD, 2.5 mg at 12/03/23 1048    aspirin EC tablet 81 mg, 81 mg, oral, Daily, Kristine Sabillon MD, 81 mg at 12/03/23 1049    B complex-vitamin C-folic acid (Nephrocaps) capsule 1 capsule, 1 capsule, oral, Daily, Kristine Sabillon MD, 1 capsule at 12/03/23 1049    calcium acetate (Phoslo) capsule 1,334 mg, 1,334 mg, oral, TID with meals, Kristine Sabillon MD, 1,334 mg at 12/03/23 1046    finasteride (Proscar) tablet 5 mg, 5 mg, oral, Daily, Kristine Sabillon MD, 5 mg at 12/03/23 1048    lidocaine 4 % patch 1 patch, 1 patch, transdermal, Daily, Kristine Sabillon MD    melatonin tablet 3 mg, 3 mg, oral, Nightly PRN, Kristine Sabillno MD    metoprolol succinate XL (Toprol-XL) 24 hr tablet 25 mg, 25 mg, oral, Daily, Kristine Sabillon MD, 25 mg at 12/03/23 1047    midodrine (Proamatine) tablet 10 mg, 10 mg, oral, PRN, Kristine Sabillon MD    OLANZapine (ZyPREXA) tablet 5 mg, 5 mg, oral, Nightly, Kristine Sabillon MD    ondansetron ODT (Zofran-ODT) disintegrating tablet 4 mg, 4 mg, oral, q8h PRN **OR** ondansetron (Zofran) injection 4 mg, 4 mg, intravenous, q8h PRN, Kristine Sabillon MD    polyethylene glycol (Glycolax, Miralax) packet 17 g, 17 g, oral, Daily, Kristine Sabillon MD    rosuvastatin (Crestor) tablet 20 mg, 20 mg, oral, Nightly, Kristine Sabillon MD    tamsulosin (Flomax) 24 hr capsule 0.4 mg, 0.4 mg, oral, Daily, Kristine Sabillon MD, 0.4 mg at 12/03/23 1052    Current Outpatient Medications:     acetaminophen (Tylenol) 500 mg tablet, Take 1 tablet (500 mg) by mouth once daily as needed for mild pain (1 - 3)., Disp: , Rfl:     apixaban (Eliquis) 2.5 mg tablet, Take 1 tablet (2.5 mg) by mouth 2 times a day., Disp: , Rfl:      aspirin 81 mg EC tablet, Take 1 tablet (81 mg) by mouth once daily., Disp: , Rfl:     B complex-vitamin C-folic acid (Nephrocaps) 1 mg capsule, Take 1 capsule by mouth once daily., Disp: , Rfl:     calcium acetate (Phoslo) 667 mg capsule, Take 2 capsules (1,334 mg) by mouth 3 times a day with meals., Disp: 180 capsule, Rfl: 0    ergocalciferol (Vitamin D-2) 1.25 MG (63208 UT) capsule, Take 1 capsule (1,250 mcg) by mouth 1 (one) time per week. MONDAY NEED AN REFILL, Disp: , Rfl:     ezetimibe (Zetia) 10 mg tablet, Take 1 tablet (10 mg) by mouth once daily., Disp: , Rfl:     finasteride (Proscar) 5 mg tablet, Take 1 tablet (5 mg) by mouth once daily. Do not crush, chew, or split., Disp: , Rfl:     lidocaine 4 % patch, Place 1 patch over 12 hours on the skin once daily. Remove & discard patch within 12 hours or as directed by MD., Disp: 30 patch, Rfl: 0    melatonin 3 mg capsule, Take 1 capsule by mouth once daily at bedtime., Disp: , Rfl:     metoprolol succinate XL (Toprol-XL) 25 mg 24 hr tablet, Take 1 tablet (25 mg) by mouth once daily. Do not crush or chew., Disp: 30 tablet, Rfl: 0    midodrine (Proamatine) 10 mg tablet, Take 1 tablet (10 mg) by mouth if needed (for HD with SBP <90)., Disp: , Rfl:     OLANZapine (ZyPREXA) 5 mg tablet, Take 1 tablet (5 mg) by mouth once daily at bedtime., Disp: 30 tablet, Rfl: 0    ondansetron (Zofran) 4 mg tablet, Take 1 tablet (4 mg) by mouth every 12 hours if needed for nausea or vomiting., Disp: , Rfl:     rosuvastatin (Crestor) 20 mg tablet, Take 1 tablet (20 mg) by mouth once daily at bedtime., Disp: , Rfl:     tamsulosin (Flomax) 0.4 mg 24 hr capsule, Take 1 capsule (0.4 mg) by mouth once daily., Disp: , Rfl:    Medications Discontinued During This Encounter   Medication Reason    melatonin tablet 3 mg Duplicate order        Allergies  No Known Allergies     Last Recorded Vitals  Blood pressure (!) 124/99, pulse 93, temperature 37.1 °C (98.8 °F), resp. rate 16, height 1.803  "m (5' 11\"), weight 108 kg (239 lb), SpO2 100 %.  Intake/Output last 3 Shifts:  No intake/output data recorded.    Last 24 hour Results  Results for orders placed or performed during the hospital encounter of 12/02/23 (from the past 24 hour(s))   Comprehensive Metabolic Panel   Result Value Ref Range    Glucose 79 74 - 99 mg/dL    Sodium 146 (H) 136 - 145 mmol/L    Potassium 4.6 3.5 - 5.3 mmol/L    Chloride 114 (H) 98 - 107 mmol/L    Bicarbonate 19 (L) 21 - 32 mmol/L    Anion Gap 18 10 - 20 mmol/L    Urea Nitrogen 103 (HH) 6 - 23 mg/dL    Creatinine 10.13 (H) 0.50 - 1.30 mg/dL    eGFR 6 (L) >60 mL/min/1.73m*2    Calcium 9.8 8.6 - 10.3 mg/dL    Albumin 4.0 3.4 - 5.0 g/dL    Alkaline Phosphatase 267 (H) 33 - 120 U/L    Total Protein 6.7 6.4 - 8.2 g/dL    AST 16 9 - 39 U/L    Bilirubin, Total 0.5 0.0 - 1.2 mg/dL    ALT 30 10 - 52 U/L   CBC and Auto Differential   Result Value Ref Range    WBC 5.7 4.4 - 11.3 x10*3/uL    nRBC 0.0 0.0 - 0.0 /100 WBCs    RBC 3.59 (L) 4.50 - 5.90 x10*6/uL    Hemoglobin 10.5 (L) 13.5 - 17.5 g/dL    Hematocrit 35.2 (L) 41.0 - 52.0 %    MCV 98 80 - 100 fL    MCH 29.2 26.0 - 34.0 pg    MCHC 29.8 (L) 32.0 - 36.0 g/dL    RDW 13.1 11.5 - 14.5 %    Platelets 229 150 - 450 x10*3/uL    Neutrophils % 67.1 40.0 - 80.0 %    Immature Granulocytes %, Automated 0.2 0.0 - 0.9 %    Lymphocytes % 17.9 13.0 - 44.0 %    Monocytes % 6.6 2.0 - 10.0 %    Eosinophils % 7.7 0.0 - 6.0 %    Basophils % 0.5 0.0 - 2.0 %    Neutrophils Absolute 3.85 1.20 - 7.70 x10*3/uL    Immature Granulocytes Absolute, Automated 0.01 0.00 - 0.70 x10*3/uL    Lymphocytes Absolute 1.03 (L) 1.20 - 4.80 x10*3/uL    Monocytes Absolute 0.38 0.10 - 1.00 x10*3/uL    Eosinophils Absolute 0.44 0.00 - 0.70 x10*3/uL    Basophils Absolute 0.03 0.00 - 0.10 x10*3/uL   Lipase   Result Value Ref Range    Lipase 98 (H) 9 - 82 U/L   Sars-CoV-2 and Influenza A/B PCR   Result Value Ref Range    Flu A Result Not Detected Not Detected    Flu B Result Not " Detected Not Detected    Coronavirus 2019, PCR Not Detected Not Detected   Magnesium   Result Value Ref Range    Magnesium 2.20 1.60 - 2.40 mg/dL        Imaging results  US gallbladder    Result Date: 12/3/2023  Interpreted By:  Effie Coffey, STUDY: US GALLBLADDER;  12/3/2023 9:48 am   INDICATION: Signs/Symptoms:abd pain, N/V, cholelithiasis.   COMPARISON: CT abdomen from 12/02/2023   ACCESSION NUMBER(S): [id number]   ORDERING CLINICIAN: ABILIO SALOMON   TECHNIQUE: Static images of the right upper quadrant are available for interpretation. Multiple images of the right upper quadrant were obtained.   FINDINGS: LIVER: Liver measures 17 cm in length. Normal echogenicity of the liver parenchyma. No focal masses.   GALLBLADDER: Limited evaluation of the gallbladder due to patient's body habitus. Gallbladder wall measures 2 mm in thickness. Suspect multiple gallbladder calculi. No pericholecystic free fluid. Negative sonographic Saravia's sign reported by sonographer.   BILIARY TREE: No biliary dilatation. Common bile duct measures 4 mm in caliber.   PANCREAS: The pancreas is obscured by overlying bowel gas.   RIGHT KIDNEY: The right kidney measures 9 cm in length. No hydronephrosis. Increased echogenicity of the right renal cortex is noted. Multiple anechoic structures suggestive of cysts measuring up to 2 cm in size.       Cholelithiasis. No secondary signs of acute cholecystitis. Clinical correlation recommended.   No biliary dilatation.   Multiple right renal cysts.   Increased echogenicity of the right renal cortex concerning for medical renal disease. Correlation with renal function tests recommended.   MACRO: None   Signed by: Effie Coffey 12/3/2023 10:04 AM Dictation workstation:   [id number]    CT abdomen pelvis wo IV contrast    Result Date: 12/2/2023  Interpreted By:  Korin Hernández, STUDY: CT ABDOMEN PELVIS WO IV CONTRAST;  12/2/2023 10:11 pm   INDICATION: Signs/Symptoms:gen abd pain, nausea vomiting.    COMPARISON: 11/17/2023   ACCESSION NUMBER(S): [id number]   ORDERING CLINICIAN: RON NUNEZ   TECHNIQUE: Axial noncontrast CT images of the abdomen and pelvis with coronal and sagittal reconstructed images.   FINDINGS: LOWER CHEST: No acute abnormality of the lung bases. Cardiomegaly. BONES: No acute osseous abnormality. Diffuse osseous sclerosis suggesting renal osteodystrophy. Symmetric sacroiliac erosion the likely secondary to renal osteodystrophy. ABDOMINAL WALL: Small fat containing umbilical hernia.   ABDOMEN: Lack of intravenous contrast limits evaluation of vessels and solid organs. LIVER: Within normal limits. BILE DUCTS: No biliary dilatation. GALLBLADDER: Cholelithiasis. No pericholecystic inflammatory changes. PANCREAS: No peripancreatic inflammatory stranding or duct dilatation. SPLEEN: Within normal limits. ADRENALS: Thickening of the adrenal glands.   KIDNEYS, URETERS, URINARY BLADDER: No hydronephrosis or urinary tract calculus. Stable bilateral renal cysts. The urinary bladder is unremarkable.   VESSELS: No aortic aneurysm. RETROPERITONEUM: No pathologically enlarged lymph nodes.   PELVIS:   REPRODUCTIVE ORGANS: No abnormality, given limitations of the noncontrast CT.  No significant free pelvic fluid.   BOWEL: The stomach is mildly distended. No dilated small bowel. Normal appendix. PERITONEUM: No ascites or free air, no fluid collection.       No acute abdominal or pelvic process.       MACRO: None   Signed by: Korin Hernández 12/2/2023 11:14 PM Dictation workstation:   [id number]    XR chest 1 view    Result Date: 12/2/2023  Interpreted By:  Etienne Potter, STUDY: XR CHEST 1 VIEW;  12/2/2023 9:36 pm   INDICATION: Signs/Symptoms:poss fluid overload.   COMPARISON: 11/17/2023   ACCESSION NUMBER(S): [id number]   ORDERING CLINICIAN: RON NUNEZ   FINDINGS:     Cardiomegaly. The pulmonary vasculature is within normal limits. No consolidation, pleural effusion or pneumothorax.          Cardiomegaly without evidence of acute disease in the chest.     MACRO: None.   Signed by: Etienne Potter 12/2/2023 10:17 PM Dictation workstation:   [id number]    ECG 12 Lead    Result Date: 11/21/2023  Atrial fibrillation Abnormal ECG When compared with ECG of 17-NOV-2023 14:14, Questionable change in QRS axis Confirmed by Aldair Patel ([id number]) on 11/21/2023 1:59:53 PM    ECG 12 lead    Result Date: 11/18/2023  Atrial fibrillation Abnormal ECG When compared with ECG of 16-NOV-2023 21:16, Previous ECG has undetermined rhythm, needs review See ED provider note for full interpretation and clinical correlation Confirmed by Diane Malik ([id number]) on 11/18/2023 12:49:40 AM    CT abdomen pelvis w IV contrast    Result Date: 11/17/2023  Interpreted By:  Nguyễn Rosales, STUDY: CT ABDOMEN PELVIS W IV CONTRAST;  11/17/2023 2:03 am   INDICATION: Signs/Symptoms:abdominal pain.   COMPARISON: CT abdomen and pelvis 11/10/2023   ACCESSION NUMBER(S): [id number]   ORDERING CLINICIAN: HELENE AHMADI   TECHNIQUE: Contiguous axial images of the abdomen and pelvis were obtained after the intravenous administration of 100 mL Omnipaque 350 contrast. Coronal and sagittal reformatted images were reconstructed from the axial data.   FINDINGS: LOWER CHEST: Please see same-day chest CT.   LIVER: No significant parenchymal abnormality.   BILE DUCTS: No significant intrahepatic or extrahepatic dilatation.   GALLBLADDER: Dependent sludge versus stones in the gallbladder. No CT evidence of acute cholecystitis.   PANCREAS: No significant abnormality.   SPLEEN: No significant abnormality.   ADRENALS: No significant abnormality.   KIDNEYS, URETERS, BLADDER: Mild atrophy of the bilateral kidneys. Contrast excretion within the central collecting systems. Similar bilateral renal cysts and additional subcentimeter hypodensities too small to fully characterize. No hydronephrosis or hydroureter. Bladder is partially opacified with contrast and overall  unremarkable.   REPRODUCTIVE ORGANS: No significant abnormality.   GI: The stomach is distended with fluid. No evidence of bowel obstruction. No significant bowel wall thickening or adjacent inflammatory change. The appendix is not visualized. No pericecal inflammatory change or secondary signs of acute appendicitis.   VESSELS: No significant abnormality. The portal veins and IVC are patent.   PERITONEUM/RETROPERITONEUM: No ascites, free air, or fluid collection.   LYMPH NODES: No enlarged lymph nodes.   ABDOMINAL WALL: Small fat containing right inguinal hernia.   OSSEOUS STRUCTURES: Mild sclerosis throughout the visualized osseous structures which may reflect sequela of renal osteodystrophy.       1. No acute abnormality within the abdomen or pelvis. 2. Osseous findings suggestive of renal osteodystrophy. 3. Small fat containing right inguinal hernia.   MACRO: None   Signed by: Nguyễn Rosales 11/17/2023 2:20 AM Dictation workstation:   [id number]    CT angio chest for pulmonary embolism    Result Date: 11/17/2023  Interpreted By:  Nguyễn Rosales, STUDY: CT ANGIO CHEST FOR PULMONARY EMBOLISM;  11/17/2023 2:03 am   INDICATION: Signs/Symptoms:SOB, chest pain, tachycardia, hx of clots.   COMPARISON: CT chest 10/19/2021   ACCESSION NUMBER(S): [id number]   ORDERING CLINICIAN: ENA DIAMOND   TECHNIQUE: Contiguous axial images of the chest were obtained after the intravenous administration of 100 mL Omnipaque 350 contrast using angiographic PE protocol. Coronal and sagittal reformatted images were reconstructed from the axial data. MIP images were created on an independent workstation and reviewed.   FINDINGS: PULMONARY ARTERIES: Adequate opacification to the level of the mid segmental arteries. Mild respiratory motion and mixing artifact limits assessment of the distal segmental and subsegmental arteries. No filling defect to suggest pulmonary embolus in the visualized pulmonary arteries. The main pulmonary artery is  enlarged at 3.1 cm. No CT evidence of right heart strain.   HEART: Global cardiomegaly. No significant coronary artery calcifications. No significant pericardial effusion.   VESSELS: Normal caliber aorta without dissection. No significant aortic atherosclerosis.   MEDIASTINUM AND LYMPH NODES: Visualized thyroid is within normal limits. No enlarged intrathoracic or axillary lymph nodes by imaging criteria. No pneumomediastinum. The esophagus appears within normal limits.   LUNG, AIRWAYS, AND PLEURA: Trachea and proximal mainstem bronchi are patent. Subtle hazy ground-glass opacities in the peribronchovascular distribution. 4 mm nodule along the plane of the right minor fissure, probably an intra fissural lymph node. No focal consolidation, pleural effusion, or pneumothorax.   OSSEOUS STRUCTURES: No acute osseous abnormality.   CHEST WALL SOFT TISSUES: No discernible abnormality.   UPPER ABDOMEN/OTHER: Please see separately dictated CT.       1. No pulmonary embolus identified to the level of the mid segmental arteries. Small distal filling defects can not be entirely excluded. 2. Cardiomegaly and subtle hazy perivascular opacities which may reflect mild edema.   MACRO: None   Signed by: Nguyễn Rosales 11/17/2023 2:16 AM Dictation workstation:   [id number]    ECG 12 lead    Result Date: 11/17/2023  See ED provider note for full interpretation and clinical correlation Confirmed by Lizz Olvera ([id number]) on 11/17/2023 2:01:11 AM    XR chest 1 view    Result Date: 11/17/2023  Interpreted By:  Finkelstein, Evan, STUDY: XR CHEST 1 VIEW;  11/17/2023 12:52 am   INDICATION: Signs/Symptoms:chest pain.   COMPARISON: Chest radiograph 06/16/2023   ACCESSION NUMBER(S): [id number]   ORDERING CLINICIAN: HELENE AHMADI   FINDINGS:     CARDIOMEDIASTINAL SILHOUETTE: Cardiomediastinal silhouette is stable in size and configuration.   LUNGS: No pulmonary consolidation, pleural effusion or pneumothorax.   ABDOMEN: No remarkable upper  abdominal findings.   BONES: No acute osseous abnormality.       No radiographic evidence of acute cardiopulmonary pathology.   MACRO: None.   Signed by: Ted Sahastein 11/17/2023 12:58 AM Dictation workstation:   [id number]    CT chest abdomen pelvis wo IV contrast    Result Date: 11/10/2023  Interpreted By:  Trixie Moe, STUDY: CT CHEST ABDOMEN PELVIS WO CONTRAST;  11/10/2023 5:52 pm   INDICATION: Signs/Symptoms:sob and pain.   COMPARISON: 10/19/2021   ACCESSION NUMBER(S): [id number]   ORDERING CLINICIAN: BANDAR BENNETT   TECHNIQUE: CT of the chest, abdomen, and pelvis was performed.  Contiguous axial images were obtained at 3 mm slice thickness through the chest, abdomen and pelvis. Coronal and sagittal reconstructions at 3 mm slice thickness were performed. No intravenous contrast. Limited evaluation for solid organs and vasculature due to lack of intravenous contrast.   FINDINGS: Lungs and Pleura: There is a 4 mm right perifissural pulmonary nodule suggestive of an intrapulmonary lymph node. Stable 3 mm left lower lobe pulmonary nodule. No pulmonary consolidation. No pleural effusion. No pneumothorax.   Mediastinum: No adenopathy by CT size criteria. No cardiomegaly or pericardial effusion. No thoracic aortic aneurysm. Enlargement of the thyroid gland with narrowing of the upper trachea at the level of the mediastinum. This finding is stable from prior exam. Surgical clips in the anterior mediastinum. Question small hiatal hernia.   Liver: Layering sludge in the gallbladder.   Gallbladder and Biliary: Unremarkable.   Pancreas: No abnormality identified in the pancreas.   Spleen: No abnormality identified in the spleen.   Adrenals: Bilateral adrenal gland thickening.   Urinary: Small kidneys with multiple bilateral renal cysts. No hydronephrosis.   Gastrointestinal/Peritoneum: No small or large bowel obstruction in the visualized abdomen. In the abdomen, there is no extraluminal air. No significant  "free fluid. No evidence of acute appendicitis.   Vascular: Abdominal aorta is normal in caliber.   Lymphatics: No enlarged lymph nodes by size criteria.   MSK/Body Wall/Chest Wall: Renal osteodystrophy. Mild left gynecomastia. Small fat containing right inguinal hernia. Small fat containing umbilical hernia.       No acute abnormality.   Small fat containing right inguinal and umbilical hernias.   Redemonstrated thyromegaly with stable mild narrowing of the trachea at the level of the upper mediastinum.   Atrophic kidneys with multiple bilateral renal cysts. Findings suggesting renal osteodystrophy.   Signed by: Trixie Moe 11/10/2023 6:55 PM Dictation workstation:   [id number]       Assessment and Plan  Mr. Jenkins is a 53-year-old man with a history of diabetes, hypertension, DVT on Eliquis, hyperlipidemia, paroxysmal atrial fibrillation status post stroke who presented with nausea and vomiting.  He still makes urine, he only goes to dialysis twice per week.  He sees a nephrologist at [facility]  When we saw him a couple of weeks ago he have not been dialyzed for a month.  He was symptomatic, uremic.  We dialyzed him without issue.  But during his last hospital stay he said that he \"wanted to kill himself \".  He was inadvertently discharged, I called him immediately, asked him to come back to the hospital.  He was admitted to [facility].  He was seen by psychiatry, they discussed the importance of using the crisis line.  He was started on olanzapine, palliative care helped him arrange a navigator program. He comes back in with nausea and vomiting.  He has not been dialyzed since last Monday.  He still makes a lot of urine.    I will check stat labs now.  I plan on dialyzing him tomorrow, we will then get him back on schedule.  I will check a hemoglobin and phosphorus.  Blood pressures are up and down.  I will give him another dose of Lasix.    50 minutes in the care of Mr. Colmenares.     "   Etienne Atkins MD

## 2023-12-04 ENCOUNTER — APPOINTMENT (OUTPATIENT)
Dept: DIALYSIS | Facility: HOSPITAL | Age: 53
End: 2023-12-04
Payer: COMMERCIAL

## 2023-12-04 VITALS
BODY MASS INDEX: 33.46 KG/M2 | OXYGEN SATURATION: 100 % | RESPIRATION RATE: 19 BRPM | TEMPERATURE: 97.2 F | DIASTOLIC BLOOD PRESSURE: 75 MMHG | SYSTOLIC BLOOD PRESSURE: 104 MMHG | HEIGHT: 71 IN | HEART RATE: 72 BPM | WEIGHT: 239 LBS

## 2023-12-04 LAB
ALBUMIN SERPL BCP-MCNC: 3.1 G/DL (ref 3.4–5)
ANION GAP SERPL CALC-SCNC: 17 MMOL/L (ref 10–20)
BUN SERPL-MCNC: 100 MG/DL (ref 6–23)
C COLI+JEJ+UPSA DNA STL QL NAA+PROBE: NOT DETECTED
CALCIUM SERPL-MCNC: 8.7 MG/DL (ref 8.6–10.3)
CHLORIDE SERPL-SCNC: 115 MMOL/L (ref 98–107)
CO2 SERPL-SCNC: 17 MMOL/L (ref 21–32)
CREAT SERPL-MCNC: 10.1 MG/DL (ref 0.5–1.3)
EC STX1 GENE STL QL NAA+PROBE: NOT DETECTED
EC STX2 GENE STL QL NAA+PROBE: NOT DETECTED
ERYTHROCYTE [DISTWIDTH] IN BLOOD BY AUTOMATED COUNT: 13.1 % (ref 11.5–14.5)
GFR SERPL CREATININE-BSD FRML MDRD: 6 ML/MIN/1.73M*2
GLUCOSE SERPL-MCNC: 90 MG/DL (ref 74–99)
HBV SURFACE AB SER-ACNC: 3.2 MIU/ML
HCT VFR BLD AUTO: 30.5 % (ref 41–52)
HGB BLD-MCNC: 9 G/DL (ref 13.5–17.5)
MCH RBC QN AUTO: 29.4 PG (ref 26–34)
MCHC RBC AUTO-ENTMCNC: 29.5 G/DL (ref 32–36)
MCV RBC AUTO: 100 FL (ref 80–100)
NOROVIRUS GI + GII RNA STL NAA+PROBE: NOT DETECTED
NRBC BLD-RTO: 0 /100 WBCS (ref 0–0)
PHOSPHATE SERPL-MCNC: 5.9 MG/DL (ref 2.5–4.9)
PLATELET # BLD AUTO: 196 X10*3/UL (ref 150–450)
POTASSIUM SERPL-SCNC: 4.7 MMOL/L (ref 3.5–5.3)
RBC # BLD AUTO: 3.06 X10*6/UL (ref 4.5–5.9)
RV RNA STL NAA+PROBE: NOT DETECTED
SALMONELLA DNA STL QL NAA+PROBE: NOT DETECTED
SHIGELLA DNA SPEC QL NAA+PROBE: NOT DETECTED
SODIUM SERPL-SCNC: 144 MMOL/L (ref 136–145)
V CHOLERAE DNA STL QL NAA+PROBE: NOT DETECTED
WBC # BLD AUTO: 4.9 X10*3/UL (ref 4.4–11.3)
Y ENTEROCOL DNA STL QL NAA+PROBE: NOT DETECTED

## 2023-12-04 PROCEDURE — 80069 RENAL FUNCTION PANEL: CPT | Performed by: INTERNAL MEDICINE

## 2023-12-04 PROCEDURE — 36415 COLL VENOUS BLD VENIPUNCTURE: CPT | Performed by: NURSE PRACTITIONER

## 2023-12-04 PROCEDURE — 2500000001 HC RX 250 WO HCPCS SELF ADMINISTERED DRUGS (ALT 637 FOR MEDICARE OP): Performed by: HOSPITALIST

## 2023-12-04 PROCEDURE — G0378 HOSPITAL OBSERVATION PER HR: HCPCS

## 2023-12-04 PROCEDURE — 85027 COMPLETE CBC AUTOMATED: CPT | Performed by: NURSE PRACTITIONER

## 2023-12-04 PROCEDURE — 96374 THER/PROPH/DIAG INJ IV PUSH: CPT | Performed by: STUDENT IN AN ORGANIZED HEALTH CARE EDUCATION/TRAINING PROGRAM

## 2023-12-04 PROCEDURE — 8010000001 HC DIALYSIS - HEMODIALYSIS PER DAY: Performed by: INTERNAL MEDICINE

## 2023-12-04 PROCEDURE — 96375 TX/PRO/DX INJ NEW DRUG ADDON: CPT | Performed by: STUDENT IN AN ORGANIZED HEALTH CARE EDUCATION/TRAINING PROGRAM

## 2023-12-04 PROCEDURE — 2500000004 HC RX 250 GENERAL PHARMACY W/ HCPCS (ALT 636 FOR OP/ED): Performed by: HOSPITALIST

## 2023-12-04 PROCEDURE — 36415 COLL VENOUS BLD VENIPUNCTURE: CPT | Performed by: INTERNAL MEDICINE

## 2023-12-04 PROCEDURE — 90935 HEMODIALYSIS ONE EVALUATION: CPT | Performed by: INTERNAL MEDICINE

## 2023-12-04 PROCEDURE — 90937 HEMODIALYSIS REPEATED EVAL: CPT | Performed by: INTERNAL MEDICINE

## 2023-12-04 PROCEDURE — 99233 SBSQ HOSP IP/OBS HIGH 50: CPT | Performed by: NURSE PRACTITIONER

## 2023-12-04 RX ADMIN — Medication 1 CAPSULE: at 08:04

## 2023-12-04 RX ADMIN — FINASTERIDE 5 MG: 5 TABLET, FILM COATED ORAL at 08:04

## 2023-12-04 RX ADMIN — ASPIRIN 81 MG: 81 TABLET, COATED ORAL at 08:04

## 2023-12-04 RX ADMIN — APIXABAN 2.5 MG: 5 TABLET, FILM COATED ORAL at 08:04

## 2023-12-04 RX ADMIN — CALCIUM ACETATE 1334 MG: 667 CAPSULE ORAL at 11:42

## 2023-12-04 RX ADMIN — TAMSULOSIN HYDROCHLORIDE 0.4 MG: 0.4 CAPSULE ORAL at 08:04

## 2023-12-04 RX ADMIN — MIDODRINE HYDROCHLORIDE 10 MG: 5 TABLET ORAL at 08:04

## 2023-12-04 RX ADMIN — METOPROLOL SUCCINATE 25 MG: 25 TABLET, EXTENDED RELEASE ORAL at 11:42

## 2023-12-04 SDOH — SOCIAL STABILITY: SOCIAL INSECURITY: HAVE YOU HAD THOUGHTS OF HARMING ANYONE ELSE?: NO

## 2023-12-04 SDOH — SOCIAL STABILITY: SOCIAL INSECURITY: ARE YOU OR HAVE YOU BEEN THREATENED OR ABUSED PHYSICALLY, EMOTIONALLY, OR SEXUALLY BY ANYONE?: NO

## 2023-12-04 SDOH — SOCIAL STABILITY: SOCIAL INSECURITY: HAS ANYONE EVER THREATENED TO HURT YOUR FAMILY OR YOUR PETS?: NO

## 2023-12-04 SDOH — SOCIAL STABILITY: SOCIAL INSECURITY: DO YOU FEEL ANYONE HAS EXPLOITED OR TAKEN ADVANTAGE OF YOU FINANCIALLY OR OF YOUR PERSONAL PROPERTY?: NO

## 2023-12-04 SDOH — SOCIAL STABILITY: SOCIAL INSECURITY: WERE YOU ABLE TO COMPLETE ALL THE BEHAVIORAL HEALTH SCREENINGS?: YES

## 2023-12-04 SDOH — SOCIAL STABILITY: SOCIAL INSECURITY: DO YOU FEEL UNSAFE GOING BACK TO THE PLACE WHERE YOU ARE LIVING?: NO

## 2023-12-04 SDOH — SOCIAL STABILITY: SOCIAL INSECURITY: ABUSE: ADULT

## 2023-12-04 SDOH — SOCIAL STABILITY: SOCIAL INSECURITY: ARE THERE ANY APPARENT SIGNS OF INJURIES/BEHAVIORS THAT COULD BE RELATED TO ABUSE/NEGLECT?: NO

## 2023-12-04 ASSESSMENT — COGNITIVE AND FUNCTIONAL STATUS - GENERAL
PATIENT BASELINE BEDBOUND: NO
DAILY ACTIVITIY SCORE: 24
MOBILITY SCORE: 24
MOBILITY SCORE: 24
DAILY ACTIVITIY SCORE: 24

## 2023-12-04 ASSESSMENT — LIFESTYLE VARIABLES
SKIP TO QUESTIONS 9-10: 1
AUDIT-C TOTAL SCORE: 0
AUDIT-C TOTAL SCORE: 0
HOW MANY STANDARD DRINKS CONTAINING ALCOHOL DO YOU HAVE ON A TYPICAL DAY: PATIENT DOES NOT DRINK
HOW OFTEN DO YOU HAVE 6 OR MORE DRINKS ON ONE OCCASION: NEVER
HOW OFTEN DO YOU HAVE A DRINK CONTAINING ALCOHOL: NEVER

## 2023-12-04 ASSESSMENT — PATIENT HEALTH QUESTIONNAIRE - PHQ9
1. LITTLE INTEREST OR PLEASURE IN DOING THINGS: NOT AT ALL
2. FEELING DOWN, DEPRESSED OR HOPELESS: NOT AT ALL
SUM OF ALL RESPONSES TO PHQ9 QUESTIONS 1 & 2: 0

## 2023-12-04 ASSESSMENT — PAIN SCALES - GENERAL
PAINLEVEL_OUTOF10: 0 - NO PAIN
PAINLEVEL_OUTOF10: 0 - NO PAIN

## 2023-12-04 ASSESSMENT — ACTIVITIES OF DAILY LIVING (ADL)
TOILETING: INDEPENDENT
BATHING: INDEPENDENT
DRESSING YOURSELF: INDEPENDENT
HEARING - LEFT EAR: FUNCTIONAL
JUDGMENT_ADEQUATE_SAFELY_COMPLETE_DAILY_ACTIVITIES: YES
LACK_OF_TRANSPORTATION: NO
LACK_OF_TRANSPORTATION: NO
PATIENT'S MEMORY ADEQUATE TO SAFELY COMPLETE DAILY ACTIVITIES?: YES
FEEDING YOURSELF: INDEPENDENT
WALKS IN HOME: INDEPENDENT
GROOMING: INDEPENDENT
ADEQUATE_TO_COMPLETE_ADL: YES
HEARING - RIGHT EAR: FUNCTIONAL

## 2023-12-04 NOTE — PROGRESS NOTES
12/04/23 1219   Discharge Planning   Living Arrangements Family members   Support Systems Spouse/significant other   Type of Residence Private residence   Home or Post Acute Services None   Patient expects to be discharged to: Home   Financial Resource Strain   How hard is it for you to pay for the very basics like food, housing, medical care, and heating? Hard   Housing Stability   In the last 12 months, was there a time when you did not have a steady place to sleep or slept in a shelter (including now)? N   Transportation Needs   In the past 12 months, has lack of transportation kept you from medical appointments or from getting medications? no   In the past 12 months, has lack of transportation kept you from meetings, work, or from getting things needed for daily living? No     Patient to get dialysis today.  Patient should be discharged home once dialysis is complete.  No home going needs identified.

## 2023-12-04 NOTE — PROCEDURES
Hemodialysis note    Seen the patient on hemodialysis.  Patient refused a full treatment of hemodialysis, so has to get on the hemodialysis to tolerate.  To maintain the clearance increase the blood flow to 400 cc and the dialysate flow to 800 ml.  Using F1 80 dialyzer, 2K bath with a 2 L ultrafiltration.  Patient tolerating hemodialysis well.

## 2023-12-04 NOTE — CARE PLAN
Problem: Pain  Goal: My pain/discomfort is manageable  Outcome: Progressing     Problem: Safety  Goal: I will remain free of falls  Outcome: Met     Problem: Daily Care  Goal: Daily care needs are met  Outcome: Met

## 2023-12-04 NOTE — PROGRESS NOTES
12/04/23 1220   Current Planned Discharge Disposition   Current Planned Discharge Disposition Home

## 2023-12-04 NOTE — PROGRESS NOTES
Report to Receiving RN:    Report To: Clara Nayak  Time Report Called: 11:10  Hand-Off Communication: Patient was dialyzed for 2H, net UF took off was 2L. He is vitally stable and tolerate tx well.  Complications During Treatment: No  Ultrafiltration Treatment: Yes  Medications Administered During Dialysis: No  Blood Products Administered During Dialysis: No  Labs Sent During Dialysis: No  Heparin Drip Rate Changes: No    Electronic Signatures:   (Signed )   Authored:    (Signed )   Authored:     Last Updated: 11:19 AM by ADELA DE LUNA

## 2023-12-04 NOTE — CARE PLAN
Problem: Pain  Goal: My pain/discomfort is manageable  Outcome: Met     Problem: Safety  Goal: Patient will be injury free during hospitalization  Outcome: Met     Problem: Psychosocial Needs  Goal: Demonstrates ability to cope with hospitalization/illness  Outcome: Met     Problem: Pain - Adult  Goal: Verbalizes/displays adequate comfort level or baseline comfort level  Outcome: Met     Problem: Safety - Adult  Goal: Free from fall injury  Outcome: Met     Problem: Discharge Planning  Goal: Discharge to home or other facility with appropriate resources  Outcome: Met     Problem: Chronic Conditions and Co-morbidities  Goal: Patient's chronic conditions and co-morbidity symptoms are monitored and maintained or improved  Outcome: Met     Problem: Fall/Injury  Goal: Not fall by end of shift  Outcome: Met  Goal: Be free from injury by end of the shift  Outcome: Met     Problem: Diabetes  Goal: Maintain glucose levels >70mg/dl to <250mg/dl throughout shift  Outcome: Met   The patient's goals for the shift include      The clinical goals for the shift include Pt will be able to receive dialysis on following shift.

## 2023-12-04 NOTE — DISCHARGE INSTRUCTIONS
The CT and ultrasound were completed which was negative for any source of the nausea or vomiting.  This could be likely viral or your body coping with electrolyte imbalances.  Please follow-up with your dialysis as scheduled and recommended.

## 2023-12-04 NOTE — PROGRESS NOTES
"Kj Colmenares is a 53 y.o. male on day 0 of admission presenting with ESRD on dialysis (CMS/[facility]).    Subjective   Still feeling nauseous, only received half od dialysis treamtent as he refused full session        Objective     Physical Exam  Constitutional:       Appearance: Normal appearance.   Cardiovascular:      Rate and Rhythm: Normal rate and regular rhythm.      Pulses: Normal pulses.      Heart sounds: Normal heart sounds. No murmur heard.     No gallop.   Pulmonary:      Effort: Pulmonary effort is normal. No respiratory distress.      Breath sounds: Normal breath sounds. No wheezing or rhonchi.   Abdominal:      General: Abdomen is flat. There is no distension.      Palpations: Abdomen is soft.      Tenderness: There is no abdominal tenderness. There is no guarding.   Musculoskeletal:         General: Normal range of motion.   Skin:     General: Skin is warm.      Capillary Refill: Capillary refill takes less than 2 seconds.   Neurological:      Mental Status: He is alert and oriented to person, place, and time.   Psychiatric:         Mood and Affect: Mood normal.         Thought Content: Thought content normal.         Judgment: Judgment normal.         Last Recorded Vitals  Blood pressure 104/75, pulse 72, temperature 36.2 °C (97.2 °F), temperature source Temporal, resp. rate 19, height 1.803 m (5' 11\"), weight 108 kg (239 lb), SpO2 100 %.  Intake/Output last 3 Shifts:  No intake/output data recorded.    Relevant Results  Scheduled medications  apixaban, 2.5 mg, oral, BID  aspirin, 81 mg, oral, Daily  B complex-vitamin C-folic acid, 1 capsule, oral, Daily  calcium acetate, 1,334 mg, oral, TID with meals  epoetin sadie or biosimilar, 5,000 Units, subcutaneous, Once per day on Mon Wed Fri  finasteride, 5 mg, oral, Daily  lidocaine, 1 patch, transdermal, Daily  metoprolol succinate XL, 25 mg, oral, Daily  OLANZapine, 5 mg, oral, Nightly  [Held by provider] polyethylene glycol, 17 g, oral, " Daily  rosuvastatin, 20 mg, oral, Nightly  tamsulosin, 0.4 mg, oral, Daily      Continuous medications     PRN medications  PRN medications: acetaminophen, acetaminophen, melatonin, midodrine, ondansetron ODT **OR** ondansetron    Vascular US lower extremity venous duplex left    Result Date: 12/3/2023  Interpreted By:  Etienne Potter, STUDY: VASC US LOWER EXTREMITY VENOUS DUPLEX LEFT;  12/3/2023 5:49 pm   INDICATION: Signs/Symptoms:LLE edema.   COMPARISON: None.   ACCESSION NUMBER(S): [id number]   ORDERING CLINICIAN: ABILIO SALOMON   TECHNIQUE: Grayscale, color and spectral Doppler sonographic images of the left lower extremity deep venous system. The right common femoral vein was imaged for comparison.   FINDINGS: THIGH VEINS: There is normal compressibility of the left common femoral vein, saphenous-femoral junction, femoral vein and popliteal vein. There is normal spontaneous and phasic variation by spectral doppler.   CALF VEINS: The posterior tibial and peroneal veins demonstrate normal color flow and compressibility.   CONTRALATERAL COMPARISON: The right common femoral vein is patent.   OTHER FINDINGS: None.       No evidence of acute DVT in the left lower extremity.   MACRO: None.   Signed by: Etienne Potter 12/3/2023 6:00 PM Dictation workstation:   [id number]    US gallbladder    Result Date: 12/3/2023  Interpreted By:  Effie Coffey, STUDY: US GALLBLADDER;  12/3/2023 9:48 am   INDICATION: Signs/Symptoms:abd pain, N/V, cholelithiasis.   COMPARISON: CT abdomen from 12/02/2023   ACCESSION NUMBER(S): [id number]   ORDERING CLINICIAN: ABILIO SALOMON   TECHNIQUE: Static images of the right upper quadrant are available for interpretation. Multiple images of the right upper quadrant were obtained.   FINDINGS: LIVER: Liver measures 17 cm in length. Normal echogenicity of the liver parenchyma. No focal masses.   GALLBLADDER: Limited evaluation of the gallbladder due to patient's body habitus. Gallbladder  wall measures 2 mm in thickness. Suspect multiple gallbladder calculi. No pericholecystic free fluid. Negative sonographic Saravia's sign reported by sonographer.   BILIARY TREE: No biliary dilatation. Common bile duct measures 4 mm in caliber.   PANCREAS: The pancreas is obscured by overlying bowel gas.   RIGHT KIDNEY: The right kidney measures 9 cm in length. No hydronephrosis. Increased echogenicity of the right renal cortex is noted. Multiple anechoic structures suggestive of cysts measuring up to 2 cm in size.       Cholelithiasis. No secondary signs of acute cholecystitis. Clinical correlation recommended.   No biliary dilatation.   Multiple right renal cysts.   Increased echogenicity of the right renal cortex concerning for medical renal disease. Correlation with renal function tests recommended.   MACRO: None   Signed by: Effie Coffey 12/3/2023 10:04 AM Dictation workstation:   [id number]    CT abdomen pelvis wo IV contrast    Result Date: 12/2/2023  Interpreted By:  Korin Hernández, STUDY: CT ABDOMEN PELVIS WO IV CONTRAST;  12/2/2023 10:11 pm   INDICATION: Signs/Symptoms:gen abd pain, nausea vomiting.   COMPARISON: 11/17/2023   ACCESSION NUMBER(S): [id number]   ORDERING CLINICIAN: RON NUNEZ   TECHNIQUE: Axial noncontrast CT images of the abdomen and pelvis with coronal and sagittal reconstructed images.   FINDINGS: LOWER CHEST: No acute abnormality of the lung bases. Cardiomegaly. BONES: No acute osseous abnormality. Diffuse osseous sclerosis suggesting renal osteodystrophy. Symmetric sacroiliac erosion the likely secondary to renal osteodystrophy. ABDOMINAL WALL: Small fat containing umbilical hernia.   ABDOMEN: Lack of intravenous contrast limits evaluation of vessels and solid organs. LIVER: Within normal limits. BILE DUCTS: No biliary dilatation. GALLBLADDER: Cholelithiasis. No pericholecystic inflammatory changes. PANCREAS: No peripancreatic inflammatory stranding or duct dilatation. SPLEEN:  Within normal limits. ADRENALS: Thickening of the adrenal glands.   KIDNEYS, URETERS, URINARY BLADDER: No hydronephrosis or urinary tract calculus. Stable bilateral renal cysts. The urinary bladder is unremarkable.   VESSELS: No aortic aneurysm. RETROPERITONEUM: No pathologically enlarged lymph nodes.   PELVIS:   REPRODUCTIVE ORGANS: No abnormality, given limitations of the noncontrast CT.  No significant free pelvic fluid.   BOWEL: The stomach is mildly distended. No dilated small bowel. Normal appendix. PERITONEUM: No ascites or free air, no fluid collection.       No acute abdominal or pelvic process.       MACRO: None   Signed by: Korin Hernández 12/2/2023 11:14 PM Dictation workstation:   [id number]    XR chest 1 view    Result Date: 12/2/2023  Interpreted By:  Etienne Potter, STUDY: XR CHEST 1 VIEW;  12/2/2023 9:36 pm   INDICATION: Signs/Symptoms:poss fluid overload.   COMPARISON: 11/17/2023   ACCESSION NUMBER(S): [id number]   ORDERING CLINICIAN: RON NUNEZ   FINDINGS:     Cardiomegaly. The pulmonary vasculature is within normal limits. No consolidation, pleural effusion or pneumothorax.         Cardiomegaly without evidence of acute disease in the chest.     MACRO: None.   Signed by: Etienne Potter 12/2/2023 10:17 PM Dictation workstation:   [id number]    ECG 12 Lead    Result Date: 11/21/2023  Atrial fibrillation Abnormal ECG When compared with ECG of 17-NOV-2023 14:14, Questionable change in QRS axis Confirmed by Aldair Patel ([id number]) on 11/21/2023 1:59:53 PM    ECG 12 lead    Result Date: 11/18/2023  Atrial fibrillation Abnormal ECG When compared with ECG of 16-NOV-2023 21:16, Previous ECG has undetermined rhythm, needs review See ED provider note for full interpretation and clinical correlation Confirmed by Diane Malik ([id number]) on 11/18/2023 12:49:40 AM    CT abdomen pelvis w IV contrast    Result Date: 11/17/2023  Interpreted By:  Nguyễn Rosales, STUDY: CT ABDOMEN PELVIS W IV CONTRAST;   11/17/2023 2:03 am   INDICATION: Signs/Symptoms:abdominal pain.   COMPARISON: CT abdomen and pelvis 11/10/2023   ACCESSION NUMBER(S): [id number]   ORDERING CLINICIAN: HELENE AHMADI   TECHNIQUE: Contiguous axial images of the abdomen and pelvis were obtained after the intravenous administration of 100 mL Omnipaque 350 contrast. Coronal and sagittal reformatted images were reconstructed from the axial data.   FINDINGS: LOWER CHEST: Please see same-day chest CT.   LIVER: No significant parenchymal abnormality.   BILE DUCTS: No significant intrahepatic or extrahepatic dilatation.   GALLBLADDER: Dependent sludge versus stones in the gallbladder. No CT evidence of acute cholecystitis.   PANCREAS: No significant abnormality.   SPLEEN: No significant abnormality.   ADRENALS: No significant abnormality.   KIDNEYS, URETERS, BLADDER: Mild atrophy of the bilateral kidneys. Contrast excretion within the central collecting systems. Similar bilateral renal cysts and additional subcentimeter hypodensities too small to fully characterize. No hydronephrosis or hydroureter. Bladder is partially opacified with contrast and overall unremarkable.   REPRODUCTIVE ORGANS: No significant abnormality.   GI: The stomach is distended with fluid. No evidence of bowel obstruction. No significant bowel wall thickening or adjacent inflammatory change. The appendix is not visualized. No pericecal inflammatory change or secondary signs of acute appendicitis.   VESSELS: No significant abnormality. The portal veins and IVC are patent.   PERITONEUM/RETROPERITONEUM: No ascites, free air, or fluid collection.   LYMPH NODES: No enlarged lymph nodes.   ABDOMINAL WALL: Small fat containing right inguinal hernia.   OSSEOUS STRUCTURES: Mild sclerosis throughout the visualized osseous structures which may reflect sequela of renal osteodystrophy.       1. No acute abnormality within the abdomen or pelvis. 2. Osseous findings suggestive of renal osteodystrophy.  3. Small fat containing right inguinal hernia.   MACRO: None   Signed by: Nguyễn Rosales 11/17/2023 2:20 AM Dictation workstation:   [id number]    CT angio chest for pulmonary embolism    Result Date: 11/17/2023  Interpreted By:  Nguyễn Rosales, STUDY: CT ANGIO CHEST FOR PULMONARY EMBOLISM;  11/17/2023 2:03 am   INDICATION: Signs/Symptoms:SOB, chest pain, tachycardia, hx of clots.   COMPARISON: CT chest 10/19/2021   ACCESSION NUMBER(S): [id number]   ORDERING CLINICIAN: ENA DIAMOND   TECHNIQUE: Contiguous axial images of the chest were obtained after the intravenous administration of 100 mL Omnipaque 350 contrast using angiographic PE protocol. Coronal and sagittal reformatted images were reconstructed from the axial data. MIP images were created on an independent workstation and reviewed.   FINDINGS: PULMONARY ARTERIES: Adequate opacification to the level of the mid segmental arteries. Mild respiratory motion and mixing artifact limits assessment of the distal segmental and subsegmental arteries. No filling defect to suggest pulmonary embolus in the visualized pulmonary arteries. The main pulmonary artery is enlarged at 3.1 cm. No CT evidence of right heart strain.   HEART: Global cardiomegaly. No significant coronary artery calcifications. No significant pericardial effusion.   VESSELS: Normal caliber aorta without dissection. No significant aortic atherosclerosis.   MEDIASTINUM AND LYMPH NODES: Visualized thyroid is within normal limits. No enlarged intrathoracic or axillary lymph nodes by imaging criteria. No pneumomediastinum. The esophagus appears within normal limits.   LUNG, AIRWAYS, AND PLEURA: Trachea and proximal mainstem bronchi are patent. Subtle hazy ground-glass opacities in the peribronchovascular distribution. 4 mm nodule along the plane of the right minor fissure, probably an intra fissural lymph node. No focal consolidation, pleural effusion, or pneumothorax.   OSSEOUS STRUCTURES: No acute  osseous abnormality.   CHEST WALL SOFT TISSUES: No discernible abnormality.   UPPER ABDOMEN/OTHER: Please see separately dictated CT.       1. No pulmonary embolus identified to the level of the mid segmental arteries. Small distal filling defects can not be entirely excluded. 2. Cardiomegaly and subtle hazy perivascular opacities which may reflect mild edema.   MACRO: None   Signed by: Nguyễn Rosales 11/17/2023 2:16 AM Dictation workstation:   [id number]    ECG 12 lead    Result Date: 11/17/2023  See ED provider note for full interpretation and clinical correlation Confirmed by Lizz Olvera ([id number]) on 11/17/2023 2:01:11 AM    XR chest 1 view    Result Date: 11/17/2023  Interpreted By:  Finkelstein, Evan, STUDY: XR CHEST 1 VIEW;  11/17/2023 12:52 am   INDICATION: Signs/Symptoms:chest pain.   COMPARISON: Chest radiograph 06/16/2023   ACCESSION NUMBER(S): [id number]   ORDERING CLINICIAN: HELENE AHMADI   FINDINGS:     CARDIOMEDIASTINAL SILHOUETTE: Cardiomediastinal silhouette is stable in size and configuration.   LUNGS: No pulmonary consolidation, pleural effusion or pneumothorax.   ABDOMEN: No remarkable upper abdominal findings.   BONES: No acute osseous abnormality.       No radiographic evidence of acute cardiopulmonary pathology.   MACRO: None.   Signed by: Evan Finkelstein 11/17/2023 12:58 AM Dictation workstation:   [id number]    CT chest abdomen pelvis wo IV contrast    Result Date: 11/10/2023  Interpreted By:  Trixie Moe, STUDY: CT CHEST ABDOMEN PELVIS WO CONTRAST;  11/10/2023 5:52 pm   INDICATION: Signs/Symptoms:sob and pain.   COMPARISON: 10/19/2021   ACCESSION NUMBER(S): [id number]   ORDERING CLINICIAN: BANDAR BENNETT   TECHNIQUE: CT of the chest, abdomen, and pelvis was performed.  Contiguous axial images were obtained at 3 mm slice thickness through the chest, abdomen and pelvis. Coronal and sagittal reconstructions at 3 mm slice thickness were performed. No intravenous contrast. Limited  evaluation for solid organs and vasculature due to lack of intravenous contrast.   FINDINGS: Lungs and Pleura: There is a 4 mm right perifissural pulmonary nodule suggestive of an intrapulmonary lymph node. Stable 3 mm left lower lobe pulmonary nodule. No pulmonary consolidation. No pleural effusion. No pneumothorax.   Mediastinum: No adenopathy by CT size criteria. No cardiomegaly or pericardial effusion. No thoracic aortic aneurysm. Enlargement of the thyroid gland with narrowing of the upper trachea at the level of the mediastinum. This finding is stable from prior exam. Surgical clips in the anterior mediastinum. Question small hiatal hernia.   Liver: Layering sludge in the gallbladder.   Gallbladder and Biliary: Unremarkable.   Pancreas: No abnormality identified in the pancreas.   Spleen: No abnormality identified in the spleen.   Adrenals: Bilateral adrenal gland thickening.   Urinary: Small kidneys with multiple bilateral renal cysts. No hydronephrosis.   Gastrointestinal/Peritoneum: No small or large bowel obstruction in the visualized abdomen. In the abdomen, there is no extraluminal air. No significant free fluid. No evidence of acute appendicitis.   Vascular: Abdominal aorta is normal in caliber.   Lymphatics: No enlarged lymph nodes by size criteria.   MSK/Body Wall/Chest Wall: Renal osteodystrophy. Mild left gynecomastia. Small fat containing right inguinal hernia. Small fat containing umbilical hernia.       No acute abnormality.   Small fat containing right inguinal and umbilical hernias.   Redemonstrated thyromegaly with stable mild narrowing of the trachea at the level of the upper mediastinum.   Atrophic kidneys with multiple bilateral renal cysts. Findings suggesting renal osteodystrophy.   Signed by: Trixie Moe 11/10/2023 6:55 PM Dictation workstation:   [id number]     Scheduled medications  apixaban, 2.5 mg, oral, BID  aspirin, 81 mg, oral, Daily  B complex-vitamin C-folic acid, 1  capsule, oral, Daily  calcium acetate, 1,334 mg, oral, TID with meals  epoetin sadie or biosimilar, 5,000 Units, subcutaneous, Once per day on Mon Wed Fri  finasteride, 5 mg, oral, Daily  lidocaine, 1 patch, transdermal, Daily  metoprolol succinate XL, 25 mg, oral, Daily  OLANZapine, 5 mg, oral, Nightly  [Held by provider] polyethylene glycol, 17 g, oral, Daily  rosuvastatin, 20 mg, oral, Nightly  tamsulosin, 0.4 mg, oral, Daily      Continuous medications     PRN medications  PRN medications: acetaminophen, acetaminophen, melatonin, midodrine, ondansetron ODT **OR** ondansetron                    Assessment/Plan   Principal Problem:    ESRD on dialysis (CMS/[facility])    This is a 53 y.o. male with PMH of mood disorder (Bipolar vs. MDD), ESRD on HD with prior documented noncompliance, CVA/ICH 2017, DMT2, HTN, A-fib on Eliquis, GERD, HLD, remote DVT, severe left knee osteoarthritis, recent admission at [facility] 11/16 - 11/22/2023 for chest pain/SOB in setting of medication/HD noncompliance, as well as reports of SI (seen by psych, started on olanzapine, improved and did not qualify for IP psych admission), presents with nausea/vomiting/diarrhea/abdominal pain, missed dialysis x 2.  Pt states he had emesis x 3-4 yesterday (vomited food), along with diarrhea x 2 and mid abdominal pain - to the ER. He denies any spoiled foods or sick contacts. States GI symptoms are resolved today.      Workup notable for initially elevated /107, subsequently improved, labs with mild hypernatremia 146, elevated chloride, mild metabolic acidosis, elevated BUN/creatinine 103/10.13, elevated alk phos 267 (chronic elevation) mildly elevated lipase 98, chronic anemia consistent with prior baseline, negative flu/COVID screen; CXR with cardiomegaly, otherwise nonacute; CT A/P without contrast showed cholelithiasis without inflammatory changes, stable bilateral renal cysts, no acute findings; GB US with cholelithiasis without  cholecystitis, no biliary dilatation, + multiple right renal cysts.  Patient was given IV morphine and Zofran, admitted for further care/dialysis.    Nausea/vomiting/diarrhea/abdominal pain x 1 day  - Resolved, patient able to tolerate p.o. intake today, no acute findings on CT/GB US   - Continue to monitor for recurrence, check stool studies if diarrhea returns (recent hospital admission)  -endorsing nasuea after dialysis  -CT neg and US negative.   -viral?      ESRD  Metabolic acidosis  -Plan for HD tomorrow, getting Lasix per renal     HTN  -Improved since admission, continue metoprolol and fluid removal with dialysis     Left thigh/knee edema  History severe left knee OA  Hx DVT  -Outpatient follow-up with Ortho  -Check LE Doppler for completeness (lower suspicion for VTE given chronic symptoms and Eliquis use)     Chronic alk phos elevation  - CT abdomen and gallbladder ultrasound reviewed, no acute biliary findings     Elongated toe nails   -refer to podiatry as OP      Hx mood disorder (Bipolar vs. MDD)  Recent hx SI  -recently seen by [place] [facility] [place], continue zyprexa      Other comorbidities including:  CVA/ICH 2017, DMT2, HTN, A-fib on Eliquis, GERD, HLD, remote DVT, chronic anemia   -continue meds as ordered and adjust based on clinical course      VTE / GI prophylaxis   -on eliquis, bowel regimen on hold 2/2 recent diarrhea      Discharge planning  -no PT needs   -OP follow up with primary care, psychiatry/psychology, nephrology, ortho and podiatry     I spent 45 minutes in the professional and overall care of this patient. Spoke with Dr. Santana- even tho he only received half his dialysis session ok to DC, do not need to keep as he most likely usually only receives 2 hrs at outpatient lauren Baker, APRN-CNP

## 2023-12-04 NOTE — DISCHARGE SUMMARY
Kj Colmenares is a 53 y.o. male on day 0 of admission presenting with ESRD on dialysis (CMS/[facility]).    Subjective   Still feeling nauseous, only received half od dialysis treamtent as he refused full session           Your medication list        CONTINUE taking these medications        Instructions Last Dose Given Next Dose Due   acetaminophen 500 mg tablet  Commonly known as: Tylenol           aspirin 81 mg EC tablet           B complex-vitamin C-folic acid 1 mg capsule  Commonly known as: Nephrocaps           calcium acetate 667 mg capsule  Commonly known as: Phoslo      Take 2 capsules (1,334 mg) by mouth 3 times a day with meals.       Eliquis 2.5 mg tablet  Generic drug: apixaban           ergocalciferol 1.25 MG (21951 UT) capsule  Commonly known as: Vitamin D-2           finasteride 5 mg tablet  Commonly known as: Proscar           Flomax 0.4 mg 24 hr capsule  Generic drug: tamsulosin           lidocaine 4 % patch      Place 1 patch over 12 hours on the skin once daily. Remove & discard patch within 12 hours or as directed by MD.       melatonin 3 mg capsule           metoprolol succinate XL 25 mg 24 hr tablet  Commonly known as: Toprol-XL      Take 1 tablet (25 mg) by mouth once daily. Do not crush or chew.       midodrine 10 mg tablet  Commonly known as: Proamatine      Take 1 tablet (10 mg) by mouth if needed (for HD with SBP <90).       OLANZapine 5 mg tablet  Commonly known as: ZyPREXA      Take 1 tablet (5 mg) by mouth once daily at bedtime.       ondansetron 4 mg tablet  Commonly known as: Zofran           rosuvastatin 20 mg tablet  Commonly known as: Crestor                   Objective     Physical Exam  Constitutional:       Appearance: Normal appearance.   Cardiovascular:      Rate and Rhythm: Normal rate and regular rhythm.      Pulses: Normal pulses.      Heart sounds: Normal heart sounds. No murmur heard.     No gallop.   Pulmonary:      Effort: Pulmonary effort is normal. No respiratory  "distress.      Breath sounds: Normal breath sounds. No wheezing or rhonchi.   Abdominal:      General: Abdomen is flat. There is no distension.      Palpations: Abdomen is soft.      Tenderness: There is no abdominal tenderness. There is no guarding.   Musculoskeletal:         General: Normal range of motion.   Skin:     General: Skin is warm.      Capillary Refill: Capillary refill takes less than 2 seconds.   Neurological:      Mental Status: He is alert and oriented to person, place, and time.   Psychiatric:         Mood and Affect: Mood normal.         Thought Content: Thought content normal.         Judgment: Judgment normal.         Last Recorded Vitals  Blood pressure 104/75, pulse 72, temperature 36.2 °C (97.2 °F), temperature source Temporal, resp. rate 19, height 1.803 m (5' 11\"), weight 108 kg (239 lb), SpO2 100 %.  Intake/Output last 3 Shifts:  No intake/output data recorded.    Relevant Results  Scheduled medications  apixaban, 2.5 mg, oral, BID  aspirin, 81 mg, oral, Daily  B complex-vitamin C-folic acid, 1 capsule, oral, Daily  calcium acetate, 1,334 mg, oral, TID with meals  epoetin sadie or biosimilar, 5,000 Units, subcutaneous, Once per day on Mon Wed Fri  finasteride, 5 mg, oral, Daily  lidocaine, 1 patch, transdermal, Daily  metoprolol succinate XL, 25 mg, oral, Daily  OLANZapine, 5 mg, oral, Nightly  [Held by provider] polyethylene glycol, 17 g, oral, Daily  rosuvastatin, 20 mg, oral, Nightly  tamsulosin, 0.4 mg, oral, Daily      Continuous medications     PRN medications  PRN medications: acetaminophen, acetaminophen, melatonin, midodrine, ondansetron ODT **OR** ondansetron    Vascular US lower extremity venous duplex left    Result Date: 12/3/2023  Interpreted By:  Etienne Potter, STUDY: VASC US LOWER EXTREMITY VENOUS DUPLEX LEFT;  12/3/2023 5:49 pm   INDICATION: Signs/Symptoms:LLE edema.   COMPARISON: None.   ACCESSION NUMBER(S): [id number]   ORDERING CLINICIAN: ABILIO SALOMON   TECHNIQUE: " Grayscale, color and spectral Doppler sonographic images of the left lower extremity deep venous system. The right common femoral vein was imaged for comparison.   FINDINGS: THIGH VEINS: There is normal compressibility of the left common femoral vein, saphenous-femoral junction, femoral vein and popliteal vein. There is normal spontaneous and phasic variation by spectral doppler.   CALF VEINS: The posterior tibial and peroneal veins demonstrate normal color flow and compressibility.   CONTRALATERAL COMPARISON: The right common femoral vein is patent.   OTHER FINDINGS: None.       No evidence of acute DVT in the left lower extremity.   MACRO: None.   Signed by: Etienne Potter 12/3/2023 6:00 PM Dictation workstation:   [id number]    US gallbladder    Result Date: 12/3/2023  Interpreted By:  Effie Coffey, STUDY: US GALLBLADDER;  12/3/2023 9:48 am   INDICATION: Signs/Symptoms:abd pain, N/V, cholelithiasis.   COMPARISON: CT abdomen from 12/02/2023   ACCESSION NUMBER(S): [id number]   ORDERING CLINICIAN: ABILIO SALOMON   TECHNIQUE: Static images of the right upper quadrant are available for interpretation. Multiple images of the right upper quadrant were obtained.   FINDINGS: LIVER: Liver measures 17 cm in length. Normal echogenicity of the liver parenchyma. No focal masses.   GALLBLADDER: Limited evaluation of the gallbladder due to patient's body habitus. Gallbladder wall measures 2 mm in thickness. Suspect multiple gallbladder calculi. No pericholecystic free fluid. Negative sonographic Saravia's sign reported by sonographer.   BILIARY TREE: No biliary dilatation. Common bile duct measures 4 mm in caliber.   PANCREAS: The pancreas is obscured by overlying bowel gas.   RIGHT KIDNEY: The right kidney measures 9 cm in length. No hydronephrosis. Increased echogenicity of the right renal cortex is noted. Multiple anechoic structures suggestive of cysts measuring up to 2 cm in size.       Cholelithiasis. No secondary  signs of acute cholecystitis. Clinical correlation recommended.   No biliary dilatation.   Multiple right renal cysts.   Increased echogenicity of the right renal cortex concerning for medical renal disease. Correlation with renal function tests recommended.   MACRO: None   Signed by: Effie Coffey 12/3/2023 10:04 AM Dictation workstation:   [id number]    CT abdomen pelvis wo IV contrast    Result Date: 12/2/2023  Interpreted By:  Korin Hernández, STUDY: CT ABDOMEN PELVIS WO IV CONTRAST;  12/2/2023 10:11 pm   INDICATION: Signs/Symptoms:gen abd pain, nausea vomiting.   COMPARISON: 11/17/2023   ACCESSION NUMBER(S): [id number]   ORDERING CLINICIAN: RON NUNEZ   TECHNIQUE: Axial noncontrast CT images of the abdomen and pelvis with coronal and sagittal reconstructed images.   FINDINGS: LOWER CHEST: No acute abnormality of the lung bases. Cardiomegaly. BONES: No acute osseous abnormality. Diffuse osseous sclerosis suggesting renal osteodystrophy. Symmetric sacroiliac erosion the likely secondary to renal osteodystrophy. ABDOMINAL WALL: Small fat containing umbilical hernia.   ABDOMEN: Lack of intravenous contrast limits evaluation of vessels and solid organs. LIVER: Within normal limits. BILE DUCTS: No biliary dilatation. GALLBLADDER: Cholelithiasis. No pericholecystic inflammatory changes. PANCREAS: No peripancreatic inflammatory stranding or duct dilatation. SPLEEN: Within normal limits. ADRENALS: Thickening of the adrenal glands.   KIDNEYS, URETERS, URINARY BLADDER: No hydronephrosis or urinary tract calculus. Stable bilateral renal cysts. The urinary bladder is unremarkable.   VESSELS: No aortic aneurysm. RETROPERITONEUM: No pathologically enlarged lymph nodes.   PELVIS:   REPRODUCTIVE ORGANS: No abnormality, given limitations of the noncontrast CT.  No significant free pelvic fluid.   BOWEL: The stomach is mildly distended. No dilated small bowel. Normal appendix. PERITONEUM: No ascites or free air, no fluid  collection.       No acute abdominal or pelvic process.       MACRO: None   Signed by: Korin Hernández 12/2/2023 11:14 PM Dictation workstation:   [id number]    XR chest 1 view    Result Date: 12/2/2023  Interpreted By:  Etienne Potter, STUDY: XR CHEST 1 VIEW;  12/2/2023 9:36 pm   INDICATION: Signs/Symptoms:poss fluid overload.   COMPARISON: 11/17/2023   ACCESSION NUMBER(S): [id number]   ORDERING CLINICIAN: RON NUNEZ   FINDINGS:     Cardiomegaly. The pulmonary vasculature is within normal limits. No consolidation, pleural effusion or pneumothorax.         Cardiomegaly without evidence of acute disease in the chest.     MACRO: None.   Signed by: Etienne Potter 12/2/2023 10:17 PM Dictation workstation:   [id number]    ECG 12 Lead    Result Date: 11/21/2023  Atrial fibrillation Abnormal ECG When compared with ECG of 17-NOV-2023 14:14, Questionable change in QRS axis Confirmed by Aldair Patel ([id number]) on 11/21/2023 1:59:53 PM    ECG 12 lead    Result Date: 11/18/2023  Atrial fibrillation Abnormal ECG When compared with ECG of 16-NOV-2023 21:16, Previous ECG has undetermined rhythm, needs review See ED provider note for full interpretation and clinical correlation Confirmed by Diane Malik ([id number]) on 11/18/2023 12:49:40 AM    CT abdomen pelvis w IV contrast    Result Date: 11/17/2023  Interpreted By:  Nguyễn Rosales, STUDY: CT ABDOMEN PELVIS W IV CONTRAST;  11/17/2023 2:03 am   INDICATION: Signs/Symptoms:abdominal pain.   COMPARISON: CT abdomen and pelvis 11/10/2023   ACCESSION NUMBER(S): [id number]   ORDERING CLINICIAN: HELENE AHMADI   TECHNIQUE: Contiguous axial images of the abdomen and pelvis were obtained after the intravenous administration of 100 mL Omnipaque 350 contrast. Coronal and sagittal reformatted images were reconstructed from the axial data.   FINDINGS: LOWER CHEST: Please see same-day chest CT.   LIVER: No significant parenchymal abnormality.   BILE DUCTS: No significant intrahepatic or  extrahepatic dilatation.   GALLBLADDER: Dependent sludge versus stones in the gallbladder. No CT evidence of acute cholecystitis.   PANCREAS: No significant abnormality.   SPLEEN: No significant abnormality.   ADRENALS: No significant abnormality.   KIDNEYS, URETERS, BLADDER: Mild atrophy of the bilateral kidneys. Contrast excretion within the central collecting systems. Similar bilateral renal cysts and additional subcentimeter hypodensities too small to fully characterize. No hydronephrosis or hydroureter. Bladder is partially opacified with contrast and overall unremarkable.   REPRODUCTIVE ORGANS: No significant abnormality.   GI: The stomach is distended with fluid. No evidence of bowel obstruction. No significant bowel wall thickening or adjacent inflammatory change. The appendix is not visualized. No pericecal inflammatory change or secondary signs of acute appendicitis.   VESSELS: No significant abnormality. The portal veins and IVC are patent.   PERITONEUM/RETROPERITONEUM: No ascites, free air, or fluid collection.   LYMPH NODES: No enlarged lymph nodes.   ABDOMINAL WALL: Small fat containing right inguinal hernia.   OSSEOUS STRUCTURES: Mild sclerosis throughout the visualized osseous structures which may reflect sequela of renal osteodystrophy.       1. No acute abnormality within the abdomen or pelvis. 2. Osseous findings suggestive of renal osteodystrophy. 3. Small fat containing right inguinal hernia.   MACRO: None   Signed by: Nguyễn Rosales 11/17/2023 2:20 AM Dictation workstation:   [id number]    CT angio chest for pulmonary embolism    Result Date: 11/17/2023  Interpreted By:  Nguyễn Rosales, STUDY: CT ANGIO CHEST FOR PULMONARY EMBOLISM;  11/17/2023 2:03 am   INDICATION: Signs/Symptoms:SOB, chest pain, tachycardia, hx of clots.   COMPARISON: CT chest 10/19/2021   ACCESSION NUMBER(S): [id number]   ORDERING CLINICIAN: EAN DIAMOND   TECHNIQUE: Contiguous axial images of the chest were obtained after  the intravenous administration of 100 mL Omnipaque 350 contrast using angiographic PE protocol. Coronal and sagittal reformatted images were reconstructed from the axial data. MIP images were created on an independent workstation and reviewed.   FINDINGS: PULMONARY ARTERIES: Adequate opacification to the level of the mid segmental arteries. Mild respiratory motion and mixing artifact limits assessment of the distal segmental and subsegmental arteries. No filling defect to suggest pulmonary embolus in the visualized pulmonary arteries. The main pulmonary artery is enlarged at 3.1 cm. No CT evidence of right heart strain.   HEART: Global cardiomegaly. No significant coronary artery calcifications. No significant pericardial effusion.   VESSELS: Normal caliber aorta without dissection. No significant aortic atherosclerosis.   MEDIASTINUM AND LYMPH NODES: Visualized thyroid is within normal limits. No enlarged intrathoracic or axillary lymph nodes by imaging criteria. No pneumomediastinum. The esophagus appears within normal limits.   LUNG, AIRWAYS, AND PLEURA: Trachea and proximal mainstem bronchi are patent. Subtle hazy ground-glass opacities in the peribronchovascular distribution. 4 mm nodule along the plane of the right minor fissure, probably an intra fissural lymph node. No focal consolidation, pleural effusion, or pneumothorax.   OSSEOUS STRUCTURES: No acute osseous abnormality.   CHEST WALL SOFT TISSUES: No discernible abnormality.   UPPER ABDOMEN/OTHER: Please see separately dictated CT.       1. No pulmonary embolus identified to the level of the mid segmental arteries. Small distal filling defects can not be entirely excluded. 2. Cardiomegaly and subtle hazy perivascular opacities which may reflect mild edema.   MACRO: None   Signed by: Nguyễn Rosales 11/17/2023 2:16 AM Dictation workstation:   [id number]    ECG 12 lead    Result Date: 11/17/2023  See ED provider note for full interpretation and clinical  correlation Confirmed by Lizz Olvera ([id number]) on 11/17/2023 2:01:11 AM    XR chest 1 view    Result Date: 11/17/2023  Interpreted By:  Finkelstein, Evan, STUDY: XR CHEST 1 VIEW;  11/17/2023 12:52 am   INDICATION: Signs/Symptoms:chest pain.   COMPARISON: Chest radiograph 06/16/2023   ACCESSION NUMBER(S): [id number]   ORDERING CLINICIAN: HELENE AHMADI   FINDINGS:     CARDIOMEDIASTINAL SILHOUETTE: Cardiomediastinal silhouette is stable in size and configuration.   LUNGS: No pulmonary consolidation, pleural effusion or pneumothorax.   ABDOMEN: No remarkable upper abdominal findings.   BONES: No acute osseous abnormality.       No radiographic evidence of acute cardiopulmonary pathology.   MACRO: None.   Signed by: Evan Finkelstein 11/17/2023 12:58 AM Dictation workstation:   [id number]    CT chest abdomen pelvis wo IV contrast    Result Date: 11/10/2023  Interpreted By:  Trixie Moe, STUDY: CT CHEST ABDOMEN PELVIS WO CONTRAST;  11/10/2023 5:52 pm   INDICATION: Signs/Symptoms:sob and pain.   COMPARISON: 10/19/2021   ACCESSION NUMBER(S): [id number]   ORDERING CLINICIAN: BANDAR BENNETT   TECHNIQUE: CT of the chest, abdomen, and pelvis was performed.  Contiguous axial images were obtained at 3 mm slice thickness through the chest, abdomen and pelvis. Coronal and sagittal reconstructions at 3 mm slice thickness were performed. No intravenous contrast. Limited evaluation for solid organs and vasculature due to lack of intravenous contrast.   FINDINGS: Lungs and Pleura: There is a 4 mm right perifissural pulmonary nodule suggestive of an intrapulmonary lymph node. Stable 3 mm left lower lobe pulmonary nodule. No pulmonary consolidation. No pleural effusion. No pneumothorax.   Mediastinum: No adenopathy by CT size criteria. No cardiomegaly or pericardial effusion. No thoracic aortic aneurysm. Enlargement of the thyroid gland with narrowing of the upper trachea at the level of the mediastinum. This finding is stable  from prior exam. Surgical clips in the anterior mediastinum. Question small hiatal hernia.   Liver: Layering sludge in the gallbladder.   Gallbladder and Biliary: Unremarkable.   Pancreas: No abnormality identified in the pancreas.   Spleen: No abnormality identified in the spleen.   Adrenals: Bilateral adrenal gland thickening.   Urinary: Small kidneys with multiple bilateral renal cysts. No hydronephrosis.   Gastrointestinal/Peritoneum: No small or large bowel obstruction in the visualized abdomen. In the abdomen, there is no extraluminal air. No significant free fluid. No evidence of acute appendicitis.   Vascular: Abdominal aorta is normal in caliber.   Lymphatics: No enlarged lymph nodes by size criteria.   MSK/Body Wall/Chest Wall: Renal osteodystrophy. Mild left gynecomastia. Small fat containing right inguinal hernia. Small fat containing umbilical hernia.       No acute abnormality.   Small fat containing right inguinal and umbilical hernias.   Redemonstrated thyromegaly with stable mild narrowing of the trachea at the level of the upper mediastinum.   Atrophic kidneys with multiple bilateral renal cysts. Findings suggesting renal osteodystrophy.   Signed by: Trixie Moe 11/10/2023 6:55 PM Dictation workstation:   [id number]     Scheduled medications  apixaban, 2.5 mg, oral, BID  aspirin, 81 mg, oral, Daily  B complex-vitamin C-folic acid, 1 capsule, oral, Daily  calcium acetate, 1,334 mg, oral, TID with meals  epoetin sadie or biosimilar, 5,000 Units, subcutaneous, Once per day on Mon Wed Fri  finasteride, 5 mg, oral, Daily  lidocaine, 1 patch, transdermal, Daily  metoprolol succinate XL, 25 mg, oral, Daily  OLANZapine, 5 mg, oral, Nightly  [Held by provider] polyethylene glycol, 17 g, oral, Daily  rosuvastatin, 20 mg, oral, Nightly  tamsulosin, 0.4 mg, oral, Daily      Continuous medications     PRN medications  PRN medications: acetaminophen, acetaminophen, melatonin, midodrine, ondansetron ODT  **OR** ondansetron                    Assessment/Plan   Principal Problem:    ESRD on dialysis (CMS/[facility])    This is a 53 y.o. male with PMH of mood disorder (Bipolar vs. MDD), ESRD on HD with prior documented noncompliance, CVA/ICH 2017, DMT2, HTN, A-fib on Eliquis, GERD, HLD, remote DVT, severe left knee osteoarthritis, recent admission at [facility] 11/16 - 11/22/2023 for chest pain/SOB in setting of medication/HD noncompliance, as well as reports of SI (seen by psych, started on olanzapine, improved and did not qualify for IP psych admission), presents with nausea/vomiting/diarrhea/abdominal pain, missed dialysis x 2.  Pt states he had emesis x 3-4 yesterday (vomited food), along with diarrhea x 2 and mid abdominal pain - to the ER. He denies any spoiled foods or sick contacts. States GI symptoms are resolved today.      Workup notable for initially elevated /107, subsequently improved, labs with mild hypernatremia 146, elevated chloride, mild metabolic acidosis, elevated BUN/creatinine 103/10.13, elevated alk phos 267 (chronic elevation) mildly elevated lipase 98, chronic anemia consistent with prior baseline, negative flu/COVID screen; CXR with cardiomegaly, otherwise nonacute; CT A/P without contrast showed cholelithiasis without inflammatory changes, stable bilateral renal cysts, no acute findings; GB US with cholelithiasis without cholecystitis, no biliary dilatation, + multiple right renal cysts.  Patient was given IV morphine and Zofran, admitted for further care/dialysis.    Nausea/vomiting/diarrhea/abdominal pain x 1 day  - Resolved, patient able to tolerate p.o. intake today, no acute findings on CT/GB US   - Continue to monitor for recurrence, check stool studies if diarrhea returns (recent hospital admission)  -endorsing nasuea after dialysis  -CT neg and US negative.   -viral?      ESRD  Metabolic acidosis  -Plan for HD tomorrow, getting Lasix per renal     HTN  -Improved since  admission, continue metoprolol and fluid removal with dialysis     Left thigh/knee edema  History severe left knee OA  Hx DVT  -Outpatient follow-up with Ortho  -Check LE Doppler for completeness (lower suspicion for VTE given chronic symptoms and Eliquis use)     Chronic alk phos elevation  - CT abdomen and gallbladder ultrasound reviewed, no acute biliary findings     Elongated toe nails   -refer to podiatry as OP      Hx mood disorder (Bipolar vs. MDD)  Recent hx SI  -recently seen by psych at [facility], continue zyprexa      Other comorbidities including:  CVA/ICH 2017, DMT2, HTN, A-fib on Eliquis, GERD, HLD, remote DVT, chronic anemia   -continue meds as ordered and adjust based on clinical course      VTE / GI prophylaxis   -on eliquis, bowel regimen on hold 2/2 recent diarrhea      Discharge planning  -no PT needs   -OP follow up with primary care, psychiatry/psychology, nephrology, ortho and podiatry     I spent 45 minutes in the professional and overall care of this patient. Spoke with Dr. Santana- even tho he only received half his dialysis session ok to DC, do not need to keep as he most likely usually only receives 2 hrs at outpatient lauren Baker, APRN-CNP

## 2023-12-13 LAB — HOLD SPECIMEN: NORMAL

## 2024-04-27 ENCOUNTER — APPOINTMENT (OUTPATIENT)
Dept: RADIOLOGY | Facility: HOSPITAL | Age: 54
DRG: 682 | End: 2024-04-27
Payer: COMMERCIAL

## 2024-04-27 ENCOUNTER — HOSPITAL ENCOUNTER (INPATIENT)
Facility: HOSPITAL | Age: 54
LOS: 4 days | Discharge: HOME | DRG: 682 | End: 2024-05-02
Attending: EMERGENCY MEDICINE | Admitting: HOSPITALIST
Payer: COMMERCIAL

## 2024-04-27 ENCOUNTER — APPOINTMENT (OUTPATIENT)
Dept: CARDIOLOGY | Facility: HOSPITAL | Age: 54
DRG: 682 | End: 2024-04-27
Payer: COMMERCIAL

## 2024-04-27 DIAGNOSIS — R10.9 FLANK PAIN: ICD-10-CM

## 2024-04-27 DIAGNOSIS — Z99.2 ESRD ON DIALYSIS (MULTI): Primary | ICD-10-CM

## 2024-04-27 DIAGNOSIS — N18.6 ESRD ON DIALYSIS (MULTI): Primary | ICD-10-CM

## 2024-04-27 DIAGNOSIS — I50.20 SYSTOLIC HEART FAILURE, UNSPECIFIED HF CHRONICITY (MULTI): ICD-10-CM

## 2024-04-27 DIAGNOSIS — E87.20 METABOLIC ACIDOSIS: ICD-10-CM

## 2024-04-27 DIAGNOSIS — R45.851 SUICIDAL IDEATION: ICD-10-CM

## 2024-04-27 LAB
ALBUMIN SERPL BCP-MCNC: 4 G/DL (ref 3.4–5)
ALP SERPL-CCNC: 258 U/L (ref 33–120)
ALT SERPL W P-5'-P-CCNC: 8 U/L (ref 10–52)
ANION GAP BLDV CALCULATED.4IONS-SCNC: 18 MMOL/L (ref 10–25)
ANION GAP SERPL CALC-SCNC: 19 MMOL/L (ref 10–20)
APPEARANCE UR: CLEAR
AST SERPL W P-5'-P-CCNC: 11 U/L (ref 9–39)
BASE EXCESS BLDV CALC-SCNC: -11.5 MMOL/L (ref -2–3)
BASOPHILS # BLD AUTO: 0.04 X10*3/UL (ref 0–0.1)
BASOPHILS NFR BLD AUTO: 0.7 %
BILIRUB SERPL-MCNC: 0.5 MG/DL (ref 0–1.2)
BILIRUB UR STRIP.AUTO-MCNC: NEGATIVE MG/DL
BODY TEMPERATURE: 37 DEGREES CELSIUS
BUN SERPL-MCNC: 88 MG/DL (ref 6–23)
CA-I BLDV-SCNC: 1.37 MMOL/L (ref 1.1–1.33)
CALCIUM SERPL-MCNC: 9.8 MG/DL (ref 8.6–10.3)
CHLORIDE BLDV-SCNC: 112 MMOL/L (ref 98–107)
CHLORIDE SERPL-SCNC: 112 MMOL/L (ref 98–107)
CO2 SERPL-SCNC: 16 MMOL/L (ref 21–32)
COLOR UR: ABNORMAL
CREAT SERPL-MCNC: 10.56 MG/DL (ref 0.5–1.3)
EGFRCR SERPLBLD CKD-EPI 2021: 5 ML/MIN/1.73M*2
EOSINOPHIL # BLD AUTO: 0.62 X10*3/UL (ref 0–0.7)
EOSINOPHIL NFR BLD AUTO: 11.2 %
ERYTHROCYTE [DISTWIDTH] IN BLOOD BY AUTOMATED COUNT: 15.6 % (ref 11.5–14.5)
GLUCOSE BLDV-MCNC: 84 MG/DL (ref 74–99)
GLUCOSE SERPL-MCNC: 85 MG/DL (ref 74–99)
GLUCOSE UR STRIP.AUTO-MCNC: NORMAL MG/DL
HCO3 BLDV-SCNC: 15 MMOL/L (ref 22–26)
HCT VFR BLD AUTO: 37.5 % (ref 41–52)
HCT VFR BLD EST: 37 % (ref 41–52)
HGB BLD-MCNC: 11.8 G/DL (ref 13.5–17.5)
HGB BLDV-MCNC: 12.2 G/DL (ref 13.5–17.5)
IMM GRANULOCYTES # BLD AUTO: 0.01 X10*3/UL (ref 0–0.7)
IMM GRANULOCYTES NFR BLD AUTO: 0.2 % (ref 0–0.9)
INHALED O2 CONCENTRATION: 21 %
KETONES UR STRIP.AUTO-MCNC: NEGATIVE MG/DL
LACTATE BLDV-SCNC: 1 MMOL/L (ref 0.4–2)
LEUKOCYTE ESTERASE UR QL STRIP.AUTO: NEGATIVE
LYMPHOCYTES # BLD AUTO: 1.06 X10*3/UL (ref 1.2–4.8)
LYMPHOCYTES NFR BLD AUTO: 19.2 %
MAGNESIUM SERPL-MCNC: 2.1 MG/DL (ref 1.6–2.4)
MCH RBC QN AUTO: 29.1 PG (ref 26–34)
MCHC RBC AUTO-ENTMCNC: 31.5 G/DL (ref 32–36)
MCV RBC AUTO: 92 FL (ref 80–100)
MONOCYTES # BLD AUTO: 0.37 X10*3/UL (ref 0.1–1)
MONOCYTES NFR BLD AUTO: 6.7 %
NEUTROPHILS # BLD AUTO: 3.42 X10*3/UL (ref 1.2–7.7)
NEUTROPHILS NFR BLD AUTO: 62 %
NITRITE UR QL STRIP.AUTO: NEGATIVE
NRBC BLD-RTO: 0 /100 WBCS (ref 0–0)
OXYHGB MFR BLDV: 88.2 % (ref 45–75)
PCO2 BLDV: 35 MM HG (ref 41–51)
PH BLDV: 7.24 PH (ref 7.33–7.43)
PH UR STRIP.AUTO: 6 [PH]
PLATELET # BLD AUTO: 219 X10*3/UL (ref 150–450)
PO2 BLDV: 61 MM HG (ref 35–45)
POTASSIUM BLDV-SCNC: 5.2 MMOL/L (ref 3.5–5.3)
POTASSIUM SERPL-SCNC: 5.1 MMOL/L (ref 3.5–5.3)
PROT SERPL-MCNC: 6.6 G/DL (ref 6.4–8.2)
PROT UR STRIP.AUTO-MCNC: ABNORMAL MG/DL
RBC # BLD AUTO: 4.06 X10*6/UL (ref 4.5–5.9)
RBC # UR STRIP.AUTO: ABNORMAL /UL
RBC #/AREA URNS AUTO: NORMAL /HPF
SAO2 % BLDV: 91 % (ref 45–75)
SODIUM BLDV-SCNC: 140 MMOL/L (ref 136–145)
SODIUM SERPL-SCNC: 142 MMOL/L (ref 136–145)
SP GR UR STRIP.AUTO: 1.02
UROBILINOGEN UR STRIP.AUTO-MCNC: NORMAL MG/DL
WBC # BLD AUTO: 5.5 X10*3/UL (ref 4.4–11.3)
WBC #/AREA URNS AUTO: NORMAL /HPF

## 2024-04-27 PROCEDURE — 93005 ELECTROCARDIOGRAM TRACING: CPT

## 2024-04-27 PROCEDURE — 74176 CT ABD & PELVIS W/O CONTRAST: CPT | Performed by: STUDENT IN AN ORGANIZED HEALTH CARE EDUCATION/TRAINING PROGRAM

## 2024-04-27 PROCEDURE — 85025 COMPLETE CBC W/AUTO DIFF WBC: CPT | Performed by: EMERGENCY MEDICINE

## 2024-04-27 PROCEDURE — 74176 CT ABD & PELVIS W/O CONTRAST: CPT

## 2024-04-27 PROCEDURE — 83036 HEMOGLOBIN GLYCOSYLATED A1C: CPT | Mod: AHULAB | Performed by: HOSPITALIST

## 2024-04-27 PROCEDURE — 36415 COLL VENOUS BLD VENIPUNCTURE: CPT | Performed by: EMERGENCY MEDICINE

## 2024-04-27 PROCEDURE — 83735 ASSAY OF MAGNESIUM: CPT | Performed by: EMERGENCY MEDICINE

## 2024-04-27 PROCEDURE — 84132 ASSAY OF SERUM POTASSIUM: CPT | Performed by: EMERGENCY MEDICINE

## 2024-04-27 PROCEDURE — 96375 TX/PRO/DX INJ NEW DRUG ADDON: CPT

## 2024-04-27 PROCEDURE — 81001 URINALYSIS AUTO W/SCOPE: CPT | Performed by: EMERGENCY MEDICINE

## 2024-04-27 PROCEDURE — 99285 EMERGENCY DEPT VISIT HI MDM: CPT | Mod: 25

## 2024-04-27 PROCEDURE — 96374 THER/PROPH/DIAG INJ IV PUSH: CPT

## 2024-04-27 PROCEDURE — 2500000004 HC RX 250 GENERAL PHARMACY W/ HCPCS (ALT 636 FOR OP/ED): Performed by: EMERGENCY MEDICINE

## 2024-04-27 RX ORDER — ONDANSETRON HYDROCHLORIDE 2 MG/ML
4 INJECTION, SOLUTION INTRAVENOUS ONCE
Status: COMPLETED | OUTPATIENT
Start: 2024-04-27 | End: 2024-04-27

## 2024-04-27 RX ADMIN — HYDROMORPHONE HYDROCHLORIDE 0.5 MG: 1 INJECTION, SOLUTION INTRAMUSCULAR; INTRAVENOUS; SUBCUTANEOUS at 21:32

## 2024-04-27 RX ADMIN — ONDANSETRON 4 MG: 2 INJECTION INTRAMUSCULAR; INTRAVENOUS at 21:33

## 2024-04-27 ASSESSMENT — PAIN - FUNCTIONAL ASSESSMENT: PAIN_FUNCTIONAL_ASSESSMENT: 0-10

## 2024-04-27 ASSESSMENT — PAIN DESCRIPTION - LOCATION: LOCATION: ABDOMEN

## 2024-04-27 ASSESSMENT — PAIN SCALES - GENERAL: PAINLEVEL_OUTOF10: 10 - WORST POSSIBLE PAIN

## 2024-04-27 ASSESSMENT — PAIN DESCRIPTION - PAIN TYPE: TYPE: ACUTE PAIN

## 2024-04-28 ENCOUNTER — APPOINTMENT (OUTPATIENT)
Dept: DIALYSIS | Facility: HOSPITAL | Age: 54
End: 2024-04-28
Payer: COMMERCIAL

## 2024-04-28 PROBLEM — Z91.158: Status: ACTIVE | Noted: 2024-04-28

## 2024-04-28 LAB
ANION GAP SERPL CALC-SCNC: 18 MMOL/L (ref 10–20)
BASOPHILS # BLD AUTO: 0.03 X10*3/UL (ref 0–0.1)
BASOPHILS NFR BLD AUTO: 0.6 %
BUN SERPL-MCNC: 87 MG/DL (ref 6–23)
CALCIUM SERPL-MCNC: 9.3 MG/DL (ref 8.6–10.3)
CHLORIDE SERPL-SCNC: 113 MMOL/L (ref 98–107)
CO2 SERPL-SCNC: 16 MMOL/L (ref 21–32)
CREAT SERPL-MCNC: 10.32 MG/DL (ref 0.5–1.3)
EGFRCR SERPLBLD CKD-EPI 2021: 5 ML/MIN/1.73M*2
EOSINOPHIL # BLD AUTO: 0.73 X10*3/UL (ref 0–0.7)
EOSINOPHIL NFR BLD AUTO: 14.7 %
ERYTHROCYTE [DISTWIDTH] IN BLOOD BY AUTOMATED COUNT: 15.8 % (ref 11.5–14.5)
EST. AVERAGE GLUCOSE BLD GHB EST-MCNC: 105 MG/DL
GLUCOSE BLD MANUAL STRIP-MCNC: 144 MG/DL (ref 74–99)
GLUCOSE BLD MANUAL STRIP-MCNC: 89 MG/DL (ref 74–99)
GLUCOSE SERPL-MCNC: 104 MG/DL (ref 74–99)
HBA1C MFR BLD: 5.3 %
HCT VFR BLD AUTO: 34.3 % (ref 41–52)
HGB BLD-MCNC: 10.7 G/DL (ref 13.5–17.5)
IMM GRANULOCYTES # BLD AUTO: 0.02 X10*3/UL (ref 0–0.7)
IMM GRANULOCYTES NFR BLD AUTO: 0.4 % (ref 0–0.9)
LYMPHOCYTES # BLD AUTO: 0.82 X10*3/UL (ref 1.2–4.8)
LYMPHOCYTES NFR BLD AUTO: 16.6 %
MAGNESIUM SERPL-MCNC: 2.2 MG/DL (ref 1.6–2.4)
MCH RBC QN AUTO: 27.9 PG (ref 26–34)
MCHC RBC AUTO-ENTMCNC: 31.2 G/DL (ref 32–36)
MCV RBC AUTO: 90 FL (ref 80–100)
MONOCYTES # BLD AUTO: 0.37 X10*3/UL (ref 0.1–1)
MONOCYTES NFR BLD AUTO: 7.5 %
NEUTROPHILS # BLD AUTO: 2.98 X10*3/UL (ref 1.2–7.7)
NEUTROPHILS NFR BLD AUTO: 60.2 %
NRBC BLD-RTO: 0 /100 WBCS (ref 0–0)
PLATELET # BLD AUTO: 191 X10*3/UL (ref 150–450)
POTASSIUM SERPL-SCNC: 4.8 MMOL/L (ref 3.5–5.3)
RBC # BLD AUTO: 3.83 X10*6/UL (ref 4.5–5.9)
SODIUM SERPL-SCNC: 142 MMOL/L (ref 136–145)
WBC # BLD AUTO: 5 X10*3/UL (ref 4.4–11.3)

## 2024-04-28 PROCEDURE — 2500000006 HC RX 250 W HCPCS SELF ADMINISTERED DRUGS (ALT 637 FOR ALL PAYERS): Performed by: HOSPITALIST

## 2024-04-28 PROCEDURE — 85025 COMPLETE CBC W/AUTO DIFF WBC: CPT | Performed by: HOSPITALIST

## 2024-04-28 PROCEDURE — 5A1D70Z PERFORMANCE OF URINARY FILTRATION, INTERMITTENT, LESS THAN 6 HOURS PER DAY: ICD-10-PCS | Performed by: INTERNAL MEDICINE

## 2024-04-28 PROCEDURE — 80048 BASIC METABOLIC PNL TOTAL CA: CPT | Performed by: HOSPITALIST

## 2024-04-28 PROCEDURE — 2500000004 HC RX 250 GENERAL PHARMACY W/ HCPCS (ALT 636 FOR OP/ED): Performed by: EMERGENCY MEDICINE

## 2024-04-28 PROCEDURE — 1200000002 HC GENERAL ROOM WITH TELEMETRY DAILY

## 2024-04-28 PROCEDURE — 36415 COLL VENOUS BLD VENIPUNCTURE: CPT | Performed by: HOSPITALIST

## 2024-04-28 PROCEDURE — 8010000001 HC DIALYSIS - HEMODIALYSIS PER DAY

## 2024-04-28 PROCEDURE — 2500000005 HC RX 250 GENERAL PHARMACY W/O HCPCS: Performed by: HOSPITALIST

## 2024-04-28 PROCEDURE — 99223 1ST HOSP IP/OBS HIGH 75: CPT | Performed by: HOSPITALIST

## 2024-04-28 PROCEDURE — 2500000001 HC RX 250 WO HCPCS SELF ADMINISTERED DRUGS (ALT 637 FOR MEDICARE OP): Performed by: HOSPITALIST

## 2024-04-28 PROCEDURE — 82947 ASSAY GLUCOSE BLOOD QUANT: CPT

## 2024-04-28 PROCEDURE — 83735 ASSAY OF MAGNESIUM: CPT | Performed by: HOSPITALIST

## 2024-04-28 PROCEDURE — 96374 THER/PROPH/DIAG INJ IV PUSH: CPT

## 2024-04-28 RX ORDER — ASPIRIN 81 MG/1
81 TABLET ORAL DAILY
Status: DISCONTINUED | OUTPATIENT
Start: 2024-04-28 | End: 2024-04-30

## 2024-04-28 RX ORDER — HEPARIN SODIUM 5000 [USP'U]/ML
5000 INJECTION, SOLUTION INTRAVENOUS; SUBCUTANEOUS EVERY 8 HOURS SCHEDULED
Status: CANCELLED | OUTPATIENT
Start: 2024-04-28

## 2024-04-28 RX ORDER — ROSUVASTATIN CALCIUM 10 MG/1
20 TABLET, COATED ORAL NIGHTLY
Status: CANCELLED | OUTPATIENT
Start: 2024-04-28

## 2024-04-28 RX ORDER — FINASTERIDE 5 MG/1
5 TABLET, FILM COATED ORAL DAILY
Status: DISCONTINUED | OUTPATIENT
Start: 2024-04-28 | End: 2024-05-02 | Stop reason: HOSPADM

## 2024-04-28 RX ORDER — ACETAMINOPHEN 325 MG/1
650 TABLET ORAL EVERY 4 HOURS PRN
Status: DISCONTINUED | OUTPATIENT
Start: 2024-04-28 | End: 2024-05-02 | Stop reason: HOSPADM

## 2024-04-28 RX ORDER — DEXTROSE 50 % IN WATER (D50W) INTRAVENOUS SYRINGE
25
Status: DISCONTINUED | OUTPATIENT
Start: 2024-04-28 | End: 2024-05-02 | Stop reason: HOSPADM

## 2024-04-28 RX ORDER — ONDANSETRON 4 MG/1
4 TABLET, ORALLY DISINTEGRATING ORAL EVERY 8 HOURS PRN
Status: DISCONTINUED | OUTPATIENT
Start: 2024-04-28 | End: 2024-05-02 | Stop reason: HOSPADM

## 2024-04-28 RX ORDER — MIDODRINE HYDROCHLORIDE 5 MG/1
10 TABLET ORAL AS NEEDED
Status: DISCONTINUED | OUTPATIENT
Start: 2024-04-28 | End: 2024-05-02 | Stop reason: HOSPADM

## 2024-04-28 RX ORDER — TAMSULOSIN HYDROCHLORIDE 0.4 MG/1
0.4 CAPSULE ORAL DAILY
Status: DISCONTINUED | OUTPATIENT
Start: 2024-04-28 | End: 2024-05-02 | Stop reason: HOSPADM

## 2024-04-28 RX ORDER — DEXTROSE 50 % IN WATER (D50W) INTRAVENOUS SYRINGE
12.5
Status: DISCONTINUED | OUTPATIENT
Start: 2024-04-28 | End: 2024-05-02 | Stop reason: HOSPADM

## 2024-04-28 RX ORDER — TALC
3 POWDER (GRAM) TOPICAL NIGHTLY PRN
Status: DISCONTINUED | OUTPATIENT
Start: 2024-04-28 | End: 2024-05-02 | Stop reason: HOSPADM

## 2024-04-28 RX ORDER — METOPROLOL SUCCINATE 25 MG/1
25 TABLET, EXTENDED RELEASE ORAL DAILY
Status: DISCONTINUED | OUTPATIENT
Start: 2024-04-28 | End: 2024-04-30

## 2024-04-28 RX ORDER — OLANZAPINE 5 MG/1
5 TABLET ORAL NIGHTLY
Status: DISCONTINUED | OUTPATIENT
Start: 2024-04-28 | End: 2024-05-02 | Stop reason: HOSPADM

## 2024-04-28 RX ORDER — ONDANSETRON HYDROCHLORIDE 2 MG/ML
4 INJECTION, SOLUTION INTRAVENOUS EVERY 8 HOURS PRN
Status: DISCONTINUED | OUTPATIENT
Start: 2024-04-28 | End: 2024-05-02 | Stop reason: HOSPADM

## 2024-04-28 RX ORDER — INSULIN LISPRO 100 [IU]/ML
0-10 INJECTION, SOLUTION INTRAVENOUS; SUBCUTANEOUS
Status: DISCONTINUED | OUTPATIENT
Start: 2024-04-28 | End: 2024-05-02 | Stop reason: HOSPADM

## 2024-04-28 RX ORDER — POLYETHYLENE GLYCOL 3350 17 G/17G
17 POWDER, FOR SOLUTION ORAL DAILY
Status: DISCONTINUED | OUTPATIENT
Start: 2024-04-28 | End: 2024-05-02 | Stop reason: HOSPADM

## 2024-04-28 RX ADMIN — ACETAMINOPHEN 650 MG: 325 TABLET ORAL at 13:01

## 2024-04-28 RX ADMIN — APIXABAN 2.5 MG: 2.5 TABLET, FILM COATED ORAL at 20:25

## 2024-04-28 RX ADMIN — ASPIRIN 81 MG: 81 TABLET, COATED ORAL at 10:06

## 2024-04-28 RX ADMIN — OLANZAPINE 5 MG: 5 TABLET, FILM COATED ORAL at 20:25

## 2024-04-28 RX ADMIN — TAMSULOSIN HYDROCHLORIDE 0.4 MG: 0.4 CAPSULE ORAL at 10:06

## 2024-04-28 RX ADMIN — ONDANSETRON 4 MG: 4 TABLET, ORALLY DISINTEGRATING ORAL at 20:31

## 2024-04-28 RX ADMIN — APIXABAN 2.5 MG: 2.5 TABLET, FILM COATED ORAL at 10:06

## 2024-04-28 RX ADMIN — ACETAMINOPHEN 650 MG: 325 TABLET ORAL at 20:25

## 2024-04-28 RX ADMIN — Medication 3 MG: at 20:25

## 2024-04-28 RX ADMIN — HYDROMORPHONE HYDROCHLORIDE 0.5 MG: 1 INJECTION, SOLUTION INTRAMUSCULAR; INTRAVENOUS; SUBCUTANEOUS at 01:01

## 2024-04-28 RX ADMIN — FINASTERIDE 5 MG: 5 TABLET, FILM COATED ORAL at 10:06

## 2024-04-28 SDOH — SOCIAL STABILITY: SOCIAL INSECURITY: ABUSE: ADULT

## 2024-04-28 SDOH — SOCIAL STABILITY: SOCIAL INSECURITY: HAVE YOU HAD THOUGHTS OF HARMING ANYONE ELSE?: NO

## 2024-04-28 SDOH — SOCIAL STABILITY: SOCIAL INSECURITY: DO YOU FEEL UNSAFE GOING BACK TO THE PLACE WHERE YOU ARE LIVING?: NO

## 2024-04-28 SDOH — SOCIAL STABILITY: SOCIAL INSECURITY: ARE THERE ANY APPARENT SIGNS OF INJURIES/BEHAVIORS THAT COULD BE RELATED TO ABUSE/NEGLECT?: NO

## 2024-04-28 SDOH — SOCIAL STABILITY: SOCIAL INSECURITY: DOES ANYONE TRY TO KEEP YOU FROM HAVING/CONTACTING OTHER FRIENDS OR DOING THINGS OUTSIDE YOUR HOME?: NO

## 2024-04-28 SDOH — SOCIAL STABILITY: SOCIAL INSECURITY: HAS ANYONE EVER THREATENED TO HURT YOUR FAMILY OR YOUR PETS?: NO

## 2024-04-28 SDOH — SOCIAL STABILITY: SOCIAL INSECURITY: ARE YOU OR HAVE YOU BEEN THREATENED OR ABUSED PHYSICALLY, EMOTIONALLY, OR SEXUALLY BY ANYONE?: NO

## 2024-04-28 SDOH — SOCIAL STABILITY: SOCIAL INSECURITY: HAVE YOU HAD ANY THOUGHTS OF HARMING ANYONE ELSE?: NO

## 2024-04-28 SDOH — SOCIAL STABILITY: SOCIAL INSECURITY: WERE YOU ABLE TO COMPLETE ALL THE BEHAVIORAL HEALTH SCREENINGS?: YES

## 2024-04-28 SDOH — SOCIAL STABILITY: SOCIAL INSECURITY: DO YOU FEEL ANYONE HAS EXPLOITED OR TAKEN ADVANTAGE OF YOU FINANCIALLY OR OF YOUR PERSONAL PROPERTY?: NO

## 2024-04-28 ASSESSMENT — LIFESTYLE VARIABLES
HOW MANY STANDARD DRINKS CONTAINING ALCOHOL DO YOU HAVE ON A TYPICAL DAY: PATIENT DOES NOT DRINK
SKIP TO QUESTIONS 9-10: 1
AUDIT-C TOTAL SCORE: 0
HOW OFTEN DO YOU HAVE 6 OR MORE DRINKS ON ONE OCCASION: NEVER
AUDIT-C TOTAL SCORE: 0
HOW OFTEN DO YOU HAVE A DRINK CONTAINING ALCOHOL: NEVER

## 2024-04-28 ASSESSMENT — ENCOUNTER SYMPTOMS
DYSURIA: 0
CHILLS: 0
DIARRHEA: 1
PSYCHIATRIC NEGATIVE: 1
VOMITING: 1
MUSCULOSKELETAL NEGATIVE: 1
FEVER: 0
SHORTNESS OF BREATH: 1
NAUSEA: 1
ABDOMINAL PAIN: 1
CARDIOVASCULAR NEGATIVE: 1
HEMATOLOGIC/LYMPHATIC NEGATIVE: 1
NEUROLOGICAL NEGATIVE: 1
EYES NEGATIVE: 1
ALLERGIC/IMMUNOLOGIC NEGATIVE: 1
CONSTITUTIONAL NEGATIVE: 1

## 2024-04-28 ASSESSMENT — PAIN DESCRIPTION - DESCRIPTORS: DESCRIPTORS: ACHING;DISCOMFORT

## 2024-04-28 ASSESSMENT — COGNITIVE AND FUNCTIONAL STATUS - GENERAL
EATING MEALS: A LITTLE
DRESSING REGULAR UPPER BODY CLOTHING: A LITTLE
PATIENT BASELINE BEDBOUND: NO
MOVING FROM LYING ON BACK TO SITTING ON SIDE OF FLAT BED WITH BEDRAILS: A LITTLE
TURNING FROM BACK TO SIDE WHILE IN FLAT BAD: A LITTLE
DAILY ACTIVITIY SCORE: 18
CLIMB 3 TO 5 STEPS WITH RAILING: A LITTLE
HELP NEEDED FOR BATHING: A LITTLE
WALKING IN HOSPITAL ROOM: A LITTLE
DRESSING REGULAR LOWER BODY CLOTHING: A LITTLE
PERSONAL GROOMING: A LITTLE
MOBILITY SCORE: 18
TOILETING: A LITTLE
MOVING TO AND FROM BED TO CHAIR: A LITTLE
STANDING UP FROM CHAIR USING ARMS: A LITTLE

## 2024-04-28 ASSESSMENT — PAIN SCALES - GENERAL
PAINLEVEL_OUTOF10: 10 - WORST POSSIBLE PAIN
PAINLEVEL_OUTOF10: 9
PAINLEVEL_OUTOF10: 0 - NO PAIN
PAINLEVEL_OUTOF10: 8
PAINLEVEL_OUTOF10: 0 - NO PAIN

## 2024-04-28 ASSESSMENT — ACTIVITIES OF DAILY LIVING (ADL)
GROOMING: INDEPENDENT
JUDGMENT_ADEQUATE_SAFELY_COMPLETE_DAILY_ACTIVITIES: YES
PATIENT'S MEMORY ADEQUATE TO SAFELY COMPLETE DAILY ACTIVITIES?: YES
HEARING - LEFT EAR: FUNCTIONAL
ADEQUATE_TO_COMPLETE_ADL: YES
ASSISTIVE_DEVICE: CANE;WALKER
TOILETING: INDEPENDENT
FEEDING YOURSELF: INDEPENDENT
DRESSING YOURSELF: INDEPENDENT
BATHING: INDEPENDENT
LACK_OF_TRANSPORTATION: PATIENT DECLINED
WALKS IN HOME: INDEPENDENT
HEARING - RIGHT EAR: FUNCTIONAL

## 2024-04-28 ASSESSMENT — PAIN - FUNCTIONAL ASSESSMENT
PAIN_FUNCTIONAL_ASSESSMENT: 0-10
PAIN_FUNCTIONAL_ASSESSMENT: 0-10
PAIN_FUNCTIONAL_ASSESSMENT: NO/DENIES PAIN
PAIN_FUNCTIONAL_ASSESSMENT: 0-10

## 2024-04-28 ASSESSMENT — PAIN SCALES - WONG BAKER
WONGBAKER_NUMERICALRESPONSE: HURTS WORST
WONGBAKER_NUMERICALRESPONSE: HURTS LITTLE MORE

## 2024-04-28 NOTE — PRE-PROCEDURE NOTE
Report from Sending RN:    Report From: Navya CHAPMAN  Recent Surgery of Procedure: No  Baseline Level of Consciousness (LOC): x4  Oxygen Use: No  Type: na  Diabetic: No  Last BP Med Given Day of Dialysis: see emar   Last Pain Med Given: see emar   Lab Tests to be Obtained with Dialysis: No  Blood Transfusion to be Given During Dialysis: No  Available IV Access: Yes  Medications to be Administered During Dialysis: No  Continuous IV Infusion Running: No  Restraints on Currently or in the Last 24 Hours: N/A  Hand-Off Communication: stable for hd at bedside no complaints  Dialysis Catheter Dressing: avf na    Last Dressing Change: na

## 2024-04-28 NOTE — ED PROVIDER NOTES
"CC: Flank Pain     HPI: Kj Colmenares is a 53-year-old male with history including hypertension, CVA with residual left-sided weakness, paroxysmal atrial fibrillation on Eliquis, hyperlipidemia, diabetes, anemia, ESRD on hemodialysis who presents the emergency department for nausea, flank pain and concern for having missed dialysis for the past 3 weeks.  States his father recently passed away, resulting in him \"not been taking care of (himself).\" His family was concerned and convinced him to come to the hospital today.  He denies any obvious inciting event.  States has been present for the last couple weeks.  Does still make urine but denies history of kidney stones.  Denies fever, chills, shortness of breath, vomiting, chest pain, hematuria, dysuria, new weakness or paresthesia.     Limitations to History: none  Additional History Obtained from: none    PMHx/PSHx:  Per HPI.   - has a past medical history of Diabetes mellitus (Multi), Hypertension, and Stroke (Multi).  - has a past surgical history that includes CT angio head w and wo IV contrast (2/4/2014) and CT angio head w and wo IV contrast (2/13/2014).    Social History:  - Tobacco:  reports that he has never smoked. He has never used smokeless tobacco.   - Alcohol:  reports no history of alcohol use.   - Drugs:  reports no history of drug use.     Medications: Reviewed in EMR.     Allergies:  Patient has no known allergies.    ???????????????????????????????????????????????????????????????  Triage Vitals:  T 37.3 °C (99.1 °F)  HR 97  BP (!) 133/98  RR 18  O2 97 % None (Room air)    Physical Exam   Gen: awake, anxious, well-appearing, no distress  Eyes: no conjunctival injection or scleral icterus, pupils equal, extraocular movements grossly intact  ENT: nares patent, moist mucous membranes  Neck: supple, trachea midline, no masses  Heart: regular rate and rhythm, audible heart sounds  Lungs: clear to auscultation bilaterally, no increased work of " breathing  Abdomen: soft, nondistended, nontender, no guarding or rebound  Back: right flank tenderness without focality at CVA region, no spinal tenderness  Extremities: well-perfused, no edema. Fistula distal LUE with palpable thrill  Neuro: alert, conversant, no facial asymmetry, speech fluent, moving all extremities against gravity    ???????????????????????????????????????????????????????????????  ED Course/Medical Decision Makin-year-old male with history including hypertension, CVA with residual left-sided weakness, paroxysmal atrial fibrillation on Eliquis, hyperlipidemia, diabetes, anemia, ESRD on hemodialysis who presents the emergency department for nausea, flank pain and concern for having missed dialysis for the past 3 weeks in the setting of his father having passed away.  He is hemodynamically stable, well-appearing, no distress.  His exam is notable for mild right flank tenderness.     ED Course as of 24 1653   Sat 2024   2030 EKG per my interpretation reveals atrial fibrillation, though some P waves present, rate 94, normal axis and intervals, no ST deviation or significant T wave abnormalities  [LM]   Sun 2024   0430 His labs are notable for metabolic acidosis, presumably from his renal failure Lactate is normal, he's not in DKA, and no other apparent causes. I reviewed CT imaging which is unremarkable. He remains overall stable but would benefit from dialysis. Not indicated emergently. Will admit for further management.     [LM]      ED Course User Index  [LM] Yaritza Cross MD         Diagnoses as of 24 1653   ESRD on dialysis (Multi)   Flank pain   Metabolic acidosis       External records reviewed: recent inpatient, clinic, and prior ED notes  Diagnostic imaging independently reviewed/interpreted by me (as reflected in MDM) includes: EKG, labs, imaging  Social Determinants Affecting Care: Financial difficulties, Transportation difficulties, and  Trauma  Discussion of management with other providers: hospitalist Dr. Sabillon  Escalation of Care: admission    Disposition: admission      Procedures ? SmartLinks last updated 4/28/2024 4:53 PM        Yaritza Cross MD  04/28/24 4149

## 2024-04-28 NOTE — H&P
"History Of Present Illness  Kj Colmenares is a 53 y.o. male with a PMH of ESRD on HD (states he only goes on Tues and Sat), hx of non-compliance with medication and HD, bipolar disorder, DM2, HTN, GERD, PAF, presenting with \"missing HD for 4 weeks\".    States his mother  one week ago, so he missed HD. When asked why he missed 4 weeks of HD, he states \"I had too much going on\".   His speech is rapid, typical of haily.   He c/o back pain, abdominal pain, watery diarrhea, Nausea, vomiting, SOB with ambulation.   Denies dysuria. . Still produces some urine.     He is here for HD.   Work up shows normal K, Cr 10.56, BUN 88.   WBC ct normal  UA negative   PH 7.24.      Past Medical History  Past Medical History:   Diagnosis Date    Diabetes mellitus (Multi)     Hypertension     Stroke (Multi)        Surgical History  Past Surgical History:   Procedure Laterality Date    CT HEAD ANGIO W AND WO IV CONTRAST  2014    CT HEAD ANGIO W AND WO IV CONTRAST 2014 [facility]    CT HEAD ANGIO W AND WO IV CONTRAST  2014    CT HEAD ANGIO W AND WO IV CONTRAST 2014 [facility] CLINICAL LEGAC[facility]        Social History  He reports that he has never smoked. He has never used smokeless tobacco. He reports that he does not drink alcohol and does not use drugs.    Family History  No family history on file.     Allergies  Patient has no known allergies.    Review of Systems   Constitutional: Negative.  Negative for chills and fever.   HENT: Negative.     Eyes: Negative.    Respiratory:  Positive for shortness of breath.    Cardiovascular: Negative.    Gastrointestinal:  Positive for abdominal pain, diarrhea, nausea and vomiting.   Genitourinary: Negative.  Negative for dysuria.   Musculoskeletal: Negative.    Skin: Negative.    Allergic/Immunologic: Negative.    Neurological: Negative.    Hematological: Negative.    Psychiatric/Behavioral: Negative.          Physical Exam  Vitals reviewed.   Constitutional:       " "Appearance: Normal appearance.      Comments: Alert, no distress, rapid speech.    HENT:      Head: Normocephalic and atraumatic.      Mouth/Throat:      Mouth: Mucous membranes are moist.   Eyes:      Extraocular Movements: Extraocular movements intact.      Conjunctiva/sclera: Conjunctivae normal.      Pupils: Pupils are equal, round, and reactive to light.   Cardiovascular:      Rate and Rhythm: Normal rate and regular rhythm.      Pulses: Normal pulses.      Heart sounds: Normal heart sounds.   Pulmonary:      Effort: Pulmonary effort is normal.      Breath sounds: Normal breath sounds.      Comments: Lungs are clear throughout.   Abdominal:      General: Abdomen is flat. Bowel sounds are normal.      Palpations: Abdomen is soft.   Musculoskeletal:         General: Normal range of motion.      Comments: No appreciable LE edema.    Skin:     General: Skin is warm and dry.   Neurological:      General: No focal deficit present.      Mental Status: He is alert and oriented to person, place, and time.   Psychiatric:      Comments: Rapid pressured speech.           Last Recorded Vitals  Blood pressure 127/90, pulse 90, temperature 37.3 °C (99.1 °F), temperature source Temporal, resp. rate 16, height 1.803 m (5' 11\"), weight 111 kg (245 lb), SpO2 100%.    Relevant Results      Results for orders placed or performed during the hospital encounter of 04/27/24 (from the past 24 hour(s))   CBC and Auto Differential   Result Value Ref Range    WBC 5.5 4.4 - 11.3 x10*3/uL    nRBC 0.0 0.0 - 0.0 /100 WBCs    RBC 4.06 (L) 4.50 - 5.90 x10*6/uL    Hemoglobin 11.8 (L) 13.5 - 17.5 g/dL    Hematocrit 37.5 (L) 41.0 - 52.0 %    MCV 92 80 - 100 fL    MCH 29.1 26.0 - 34.0 pg    MCHC 31.5 (L) 32.0 - 36.0 g/dL    RDW 15.6 (H) 11.5 - 14.5 %    Platelets 219 150 - 450 x10*3/uL    Neutrophils % 62.0 40.0 - 80.0 %    Immature Granulocytes %, Automated 0.2 0.0 - 0.9 %    Lymphocytes % 19.2 13.0 - 44.0 %    Monocytes % 6.7 2.0 - 10.0 %    " Eosinophils % 11.2 0.0 - 6.0 %    Basophils % 0.7 0.0 - 2.0 %    Neutrophils Absolute 3.42 1.20 - 7.70 x10*3/uL    Immature Granulocytes Absolute, Automated 0.01 0.00 - 0.70 x10*3/uL    Lymphocytes Absolute 1.06 (L) 1.20 - 4.80 x10*3/uL    Monocytes Absolute 0.37 0.10 - 1.00 x10*3/uL    Eosinophils Absolute 0.62 0.00 - 0.70 x10*3/uL    Basophils Absolute 0.04 0.00 - 0.10 x10*3/uL   Magnesium   Result Value Ref Range    Magnesium 2.10 1.60 - 2.40 mg/dL   Comprehensive metabolic panel   Result Value Ref Range    Glucose 85 74 - 99 mg/dL    Sodium 142 136 - 145 mmol/L    Potassium 5.1 3.5 - 5.3 mmol/L    Chloride 112 (H) 98 - 107 mmol/L    Bicarbonate 16 (L) 21 - 32 mmol/L    Anion Gap 19 10 - 20 mmol/L    Urea Nitrogen 88 (H) 6 - 23 mg/dL    Creatinine 10.56 (H) 0.50 - 1.30 mg/dL    eGFR 5 (L) >60 mL/min/1.73m*2    Calcium 9.8 8.6 - 10.3 mg/dL    Albumin 4.0 3.4 - 5.0 g/dL    Alkaline Phosphatase 258 (H) 33 - 120 U/L    Total Protein 6.6 6.4 - 8.2 g/dL    AST 11 9 - 39 U/L    Bilirubin, Total 0.5 0.0 - 1.2 mg/dL    ALT 8 (L) 10 - 52 U/L   Blood Gas Venous Full Panel   Result Value Ref Range    POCT pH, Venous 7.24 (LL) 7.33 - 7.43 pH    POCT pCO2, Venous 35 (L) 41 - 51 mm Hg    POCT pO2, Venous 61 (H) 35 - 45 mm Hg    POCT SO2, Venous 91 (H) 45 - 75 %    POCT Oxy Hemoglobin, Venous 88.2 (H) 45.0 - 75.0 %    POCT Hematocrit Calculated, Venous 37.0 (L) 41.0 - 52.0 %    POCT Sodium, Venous 140 136 - 145 mmol/L    POCT Potassium, Venous 5.2 3.5 - 5.3 mmol/L    POCT Chloride, Venous 112 (H) 98 - 107 mmol/L    POCT Ionized Calicum, Venous 1.37 (H) 1.10 - 1.33 mmol/L    POCT Glucose, Venous 84 74 - 99 mg/dL    POCT Lactate, Venous 1.0 0.4 - 2.0 mmol/L    POCT Base Excess, Venous -11.5 (L) -2.0 - 3.0 mmol/L    POCT HCO3 Calculated, Venous 15.0 (L) 22.0 - 26.0 mmol/L    POCT Hemoglobin, Venous 12.2 (L) 13.5 - 17.5 g/dL    POCT Anion Gap, Venous 18.0 10.0 - 25.0 mmol/L    Patient Temperature 37.0 degrees Celsius    FiO2 21 %    Urinalysis with Reflex Microscopic   Result Value Ref Range    Color, Urine Light-Yellow Light-Yellow, Yellow, Dark-Yellow    Appearance, Urine Clear Clear    Specific Gravity, Urine 1.017 1.005 - 1.035    pH, Urine 6.0 5.0, 5.5, 6.0, 6.5, 7.0, 7.5, 8.0    Protein, Urine 70 (1+) (A) NEGATIVE, 10 (TRACE), 20 (TRACE) mg/dL    Glucose, Urine Normal Normal mg/dL    Blood, Urine 0.03 (TRACE) (A) NEGATIVE    Ketones, Urine NEGATIVE NEGATIVE mg/dL    Bilirubin, Urine NEGATIVE NEGATIVE    Urobilinogen, Urine Normal Normal mg/dL    Nitrite, Urine NEGATIVE NEGATIVE    Leukocyte Esterase, Urine NEGATIVE NEGATIVE   Microscopic Only, Urine   Result Value Ref Range    WBC, Urine 1-5 1-5, NONE /HPF    RBC, Urine 1-2 NONE, 1-2, 3-5 /HPF     CT abdomen pelvis wo IV contrast    Result Date: 4/27/2024  Interpreted By:  Marino Klein, STUDY: CT ABDOMEN PELVIS WO IV CONTRAST;  4/27/2024 10:18 pm   INDICATION: Signs/Symptoms:right flank pain, h/o ESRD on dialysis.   COMPARISON: CT abdomen and pelvis 12/02/2023 and 11/17/2023   ACCESSION NUMBER(S): [id number]   ORDERING CLINICIAN: ROSAMARIA ADRIAN   TECHNIQUE: CT of the abdomen and pelvis was performed. Contiguous axial images were obtained at 3 mm slice thickness through the abdomen and pelvis. Coronal and sagittal reconstructions at 3 mm slice thickness were performed.  No intravenous contrast was administered.   FINDINGS: Please note that the evaluation of vessels, lymph nodes and organs is limited without intravenous contrast.   LOWER CHEST: Within normal limits   ABDOMEN:   LIVER: The liver demonstrates homogeneous attenuation without evidence of focal liver lesions.   BILE DUCTS: The intrahepatic and extrahepatic ducts are not dilated.   GALLBLADDER: The gallbladder contains multiple radiopaque stones.   PANCREAS: The pancreas appears unremarkable without evidence of ductal dilatation or masses.   SPLEEN: Within normal limits.   ADRENAL GLANDS: Bilateral adrenal glands  "appear normal.   KIDNEYS AND URETERS: The kidneys are normal in size and unremarkable in appearance.  No hydroureteronephrosis or nephroureterolithiasis is identified.   PELVIS:   BLADDER: The urinary bladder appears normal without abnormal wall thickening.   REPRODUCTIVE ORGANS: No pelvic masses.   BOWEL: The stomach is unremarkable.  The small and large bowel are normal in caliber and demonstrate no wall thickening.  The appendix is not definitely visualized. There is however no pericecal stranding or fluid.   VESSELS: Mild atherosclerotic changes are noted to the aorta and branching vessels. There is no aneurysmal dilatation.   PERITONEUM/RETROPERITONEUM/LYMPH NODES: No ascites or free air, no fluid collection.  No enlarged mesenteric lymph nodes.   ABDOMINAL WALL: Within normal limits.   BONES: Findings compatible with renal osteodystrophy.       No acute findings in the abdomen and pelvis.   Signed by: Marino Klein 4/27/2024 11:06 PM Dictation workstation:   [id number]        Assessment/Plan   Principal Problem:    Dialysis patient, noncompliant (CMS-[facility])  - Nephrology consult for HD today  - Tells me he sees \"Dr Long\" and \"she\" told him he needed HD only twice a week  - Potassium normal  - midodrine prn    Afib  - pt denies a hx of this, however, present on review of the EMR  - seen on ECG  - continue Eliquis (uncertain if pt is taking this)  - continue Metoprolol for rate control  - telemetry    HTN  - continue metoprolol    DM2  - No meds on his med list for this  - check HbA1c  - ISS    Bipolar disorder  - not certain if he is taking medication for this.   Currently seems to be in the manic phase    GERD  - PPI.          I spent 50 minutes in the professional and overall care of this patient.      Kristine Sabillon MD    "

## 2024-04-28 NOTE — CONSULTS
"Reason For Consult  Severe noncompliance with dialysis    History Of Present Illness  Kj Colmenares is a 53 y.o. male presenting with generalized weakness and shortness of breath after missing 1 whole month of hemodialysis..  She has ESKD supposed to go on Tuesday, Thursday Saturday schedule as outpatient facility which is [facility] on [place], patient blames his father's death as being part of his noncompliance.  He missed for 4 weeks, has past medical history of bipolar disorder, diabetes mellitus and hypertension, with GERD and PAF.  Past Medical History  He has a past medical history of Diabetes mellitus (Multi), Hypertension, and Stroke (Multi).    Surgical History  He has a past surgical history that includes CT angio head w and wo IV contrast (2/4/2014) and CT angio head w and wo IV contrast (2/13/2014).     Social History  He reports that he has never smoked. He has never used smokeless tobacco. He reports that he does not drink alcohol and does not use drugs.    Family History  No family history on file.     Allergies  Patient has no known allergies.    Review of Systems  All systems reviewed     Physical Exam  GEN appearance: Awake .  Ill-appearing with shortness of breath  Head and ENT: Normocephalic/atraumatic/supple neck/noted ED  Lungs: Bilateral lower lung fields rhonchi and crackles  Heart; RRR  Abdomen; soft no tenderness  Extremities; positive bruit and thrill over the left arm AV fistula,  Edema noted in lower extremities  Neurologic; physiologic       I&O 24HR    Intake/Output Summary (Last 24 hours) at 4/28/2024 1511  Last data filed at 4/28/2024 1410  Gross per 24 hour   Intake 400 ml   Output 2400 ml   Net -2000 ml       Vitals 24HR  Heart Rate:  []   Temp:  [36.6 °C (97.9 °F)-37.3 °C (99.1 °F)]   Resp:  [16-18]   BP: (118-156)/()   Height:  [180.3 cm (5' 11\")]   Weight:  [111 kg (245 lb)-112 kg (247 lb 9.6 oz)]   SpO2:  [97 %-100 %]         Relevant Results  Results from last 7 " days   Lab Units 04/28/24  1031 04/27/24  2111   SODIUM mmol/L 142 142   POTASSIUM mmol/L 4.8 5.1   CHLORIDE mmol/L 113* 112*   CO2 mmol/L 16* 16*   BUN mg/dL 87* 88*   CREATININE mg/dL 10.32* 10.56*   GLUCOSE mg/dL 104* 85   CALCIUM mg/dL 9.3 9.8      Results from last 7 days   Lab Units 04/28/24  1031 04/27/24  2111   WBC AUTO x10*3/uL 5.0 5.5   HEMOGLOBIN g/dL 10.7* 11.8*   HEMATOCRIT % 34.3* 37.5*   PLATELETS AUTO x10*3/uL 191 219    CT abdomen pelvis wo IV contrast    Result Date: 4/27/2024  Interpreted By:  Marino Klein, STUDY: CT ABDOMEN PELVIS WO IV CONTRAST;  4/27/2024 10:18 pm   INDICATION: Signs/Symptoms:right flank pain, h/o ESRD on dialysis.   COMPARISON: CT abdomen and pelvis 12/02/2023 and 11/17/2023   ACCESSION NUMBER(S): [id number]   ORDERING CLINICIAN: ROSAMARIA ADRIAN   TECHNIQUE: CT of the abdomen and pelvis was performed. Contiguous axial images were obtained at 3 mm slice thickness through the abdomen and pelvis. Coronal and sagittal reconstructions at 3 mm slice thickness were performed.  No intravenous contrast was administered.   FINDINGS: Please note that the evaluation of vessels, lymph nodes and organs is limited without intravenous contrast.   LOWER CHEST: Within normal limits   ABDOMEN:   LIVER: The liver demonstrates homogeneous attenuation without evidence of focal liver lesions.   BILE DUCTS: The intrahepatic and extrahepatic ducts are not dilated.   GALLBLADDER: The gallbladder contains multiple radiopaque stones.   PANCREAS: The pancreas appears unremarkable without evidence of ductal dilatation or masses.   SPLEEN: Within normal limits.   ADRENAL GLANDS: Bilateral adrenal glands appear normal.   KIDNEYS AND URETERS: The kidneys are normal in size and unremarkable in appearance.  No hydroureteronephrosis or nephroureterolithiasis is identified.   PELVIS:   BLADDER: The urinary bladder appears normal without abnormal wall thickening.   REPRODUCTIVE ORGANS: No pelvic masses.    BOWEL: The stomach is unremarkable.  The small and large bowel are normal in caliber and demonstrate no wall thickening.  The appendix is not definitely visualized. There is however no pericecal stranding or fluid.   VESSELS: Mild atherosclerotic changes are noted to the aorta and branching vessels. There is no aneurysmal dilatation.   PERITONEUM/RETROPERITONEUM/LYMPH NODES: No ascites or free air, no fluid collection.  No enlarged mesenteric lymph nodes.   ABDOMINAL WALL: Within normal limits.   BONES: Findings compatible with renal osteodystrophy.       No acute findings in the abdomen and pelvis.   Signed by: Marino Klein 4/27/2024 11:06 PM Dictation workstation:   [id number]       Assessment/Plan   1.  ESKD on hemodialysis at [facility] in [place] on Tuesday/Thursday/sat today  Patient missed for 4 weeks prior to presentation to the ED  Will have stat hemodialysis today for 2 hours,  Will repeat dialysis for 3 hours tomorrow.  2.  History of diabetes mellitus continue current medications  3.  Hypertension continue current medications  4.  Bipolar disorder continue current meds.  5.  Anemia of CKD we will continue VEDA and iron supplementation as needed  6.  Metabolic bone disease due to CKD, will continue phosphate binders and vitamin D products as scheduled    Will continue daily dialysis reviewing labs and chemistry profile and all consultants input.        Principal Problem:    Dialysis patient, noncompliant (CMS-HCC)      I spent 54 minutes in the professional and overall care of this patient.      Dale Patel MD

## 2024-04-28 NOTE — POST-PROCEDURE NOTE
Report to Receiving RN:    Report To: Navya CHAPMAN  Time Report Called: [id number]  Hand-Off Communication: stable, 2 liters net removed did good no issues.   Complications During Treatment: No  Ultrafiltration Treatment: No  Medications Administered During Dialysis: No  Blood Products Administered During Dialysis: No  Labs Sent During Dialysis: No  Heparin Drip Rate Changes: No  Dialysis Catheter Dressing: na avf  Last Dressing Change: na    Electronic Signatures:  Mina Allred RN (Signed )   Authored:    (Signed )   Authored:     Last Updated: 2:21 PM by MINA ALLRED

## 2024-04-29 ENCOUNTER — APPOINTMENT (OUTPATIENT)
Dept: CARDIOLOGY | Facility: HOSPITAL | Age: 54
DRG: 682 | End: 2024-04-29
Payer: COMMERCIAL

## 2024-04-29 ENCOUNTER — APPOINTMENT (OUTPATIENT)
Dept: DIALYSIS | Facility: HOSPITAL | Age: 54
End: 2024-04-29
Payer: COMMERCIAL

## 2024-04-29 LAB
ANION GAP SERPL CALC-SCNC: 16 MMOL/L (ref 10–20)
BUN SERPL-MCNC: 54 MG/DL (ref 6–23)
CALCIUM SERPL-MCNC: 9.4 MG/DL (ref 8.6–10.3)
CHLORIDE SERPL-SCNC: 108 MMOL/L (ref 98–107)
CO2 SERPL-SCNC: 23 MMOL/L (ref 21–32)
CREAT SERPL-MCNC: 7.89 MG/DL (ref 0.5–1.3)
EGFRCR SERPLBLD CKD-EPI 2021: 8 ML/MIN/1.73M*2
ERYTHROCYTE [DISTWIDTH] IN BLOOD BY AUTOMATED COUNT: 15.6 % (ref 11.5–14.5)
GLUCOSE BLD MANUAL STRIP-MCNC: 108 MG/DL (ref 74–99)
GLUCOSE BLD MANUAL STRIP-MCNC: 113 MG/DL (ref 74–99)
GLUCOSE SERPL-MCNC: 86 MG/DL (ref 74–99)
HCT VFR BLD AUTO: 37.6 % (ref 41–52)
HGB BLD-MCNC: 11.3 G/DL (ref 13.5–17.5)
MCH RBC QN AUTO: 27.8 PG (ref 26–34)
MCHC RBC AUTO-ENTMCNC: 30.1 G/DL (ref 32–36)
MCV RBC AUTO: 92 FL (ref 80–100)
NRBC BLD-RTO: 0 /100 WBCS (ref 0–0)
PLATELET # BLD AUTO: 191 X10*3/UL (ref 150–450)
POTASSIUM SERPL-SCNC: 4.2 MMOL/L (ref 3.5–5.3)
Q ONSET: 222 MS
QRS COUNT: 15 BEATS
QRS DURATION: 80 MS
QT INTERVAL: 336 MS
QTC CALCULATION(BAZETT): 420 MS
QTC FREDERICIA: 390 MS
R AXIS: 68 DEGREES
RBC # BLD AUTO: 4.07 X10*6/UL (ref 4.5–5.9)
SODIUM SERPL-SCNC: 143 MMOL/L (ref 136–145)
T AXIS: 39 DEGREES
T OFFSET: 390 MS
VENTRICULAR RATE: 94 BPM
WBC # BLD AUTO: 4.3 X10*3/UL (ref 4.4–11.3)

## 2024-04-29 PROCEDURE — 8010000001 HC DIALYSIS - HEMODIALYSIS PER DAY

## 2024-04-29 PROCEDURE — 80048 BASIC METABOLIC PNL TOTAL CA: CPT | Performed by: INTERNAL MEDICINE

## 2024-04-29 PROCEDURE — 2500000006 HC RX 250 W HCPCS SELF ADMINISTERED DRUGS (ALT 637 FOR ALL PAYERS): Performed by: HOSPITALIST

## 2024-04-29 PROCEDURE — 2500000001 HC RX 250 WO HCPCS SELF ADMINISTERED DRUGS (ALT 637 FOR MEDICARE OP): Performed by: INTERNAL MEDICINE

## 2024-04-29 PROCEDURE — 85027 COMPLETE CBC AUTOMATED: CPT | Performed by: INTERNAL MEDICINE

## 2024-04-29 PROCEDURE — 90935 HEMODIALYSIS ONE EVALUATION: CPT | Performed by: INTERNAL MEDICINE

## 2024-04-29 PROCEDURE — 1200000002 HC GENERAL ROOM WITH TELEMETRY DAILY

## 2024-04-29 PROCEDURE — 2500000001 HC RX 250 WO HCPCS SELF ADMINISTERED DRUGS (ALT 637 FOR MEDICARE OP): Performed by: HOSPITALIST

## 2024-04-29 PROCEDURE — 36415 COLL VENOUS BLD VENIPUNCTURE: CPT | Performed by: INTERNAL MEDICINE

## 2024-04-29 PROCEDURE — 82947 ASSAY GLUCOSE BLOOD QUANT: CPT

## 2024-04-29 PROCEDURE — 93005 ELECTROCARDIOGRAM TRACING: CPT

## 2024-04-29 PROCEDURE — 99233 SBSQ HOSP IP/OBS HIGH 50: CPT | Performed by: INTERNAL MEDICINE

## 2024-04-29 RX ORDER — CYCLOBENZAPRINE HCL 5 MG
5 TABLET ORAL 3 TIMES DAILY PRN
Status: DISCONTINUED | OUTPATIENT
Start: 2024-04-29 | End: 2024-05-02 | Stop reason: HOSPADM

## 2024-04-29 RX ADMIN — ACETAMINOPHEN 650 MG: 325 TABLET ORAL at 10:30

## 2024-04-29 RX ADMIN — FINASTERIDE 5 MG: 5 TABLET, FILM COATED ORAL at 13:58

## 2024-04-29 RX ADMIN — OLANZAPINE 5 MG: 5 TABLET, FILM COATED ORAL at 20:28

## 2024-04-29 RX ADMIN — APIXABAN 2.5 MG: 2.5 TABLET, FILM COATED ORAL at 20:28

## 2024-04-29 RX ADMIN — ASPIRIN 81 MG: 81 TABLET, COATED ORAL at 13:58

## 2024-04-29 RX ADMIN — CYCLOBENZAPRINE HYDROCHLORIDE 5 MG: 5 TABLET, FILM COATED ORAL at 16:08

## 2024-04-29 RX ADMIN — ACETAMINOPHEN 650 MG: 325 TABLET ORAL at 13:58

## 2024-04-29 RX ADMIN — APIXABAN 2.5 MG: 2.5 TABLET, FILM COATED ORAL at 14:01

## 2024-04-29 RX ADMIN — TAMSULOSIN HYDROCHLORIDE 0.4 MG: 0.4 CAPSULE ORAL at 13:58

## 2024-04-29 ASSESSMENT — COGNITIVE AND FUNCTIONAL STATUS - GENERAL
DAILY ACTIVITIY SCORE: 24
MOBILITY SCORE: 24

## 2024-04-29 ASSESSMENT — PAIN SCALES - GENERAL
PAINLEVEL_OUTOF10: 0 - NO PAIN
PAINLEVEL_OUTOF10: 8
PAINLEVEL_OUTOF10: 5 - MODERATE PAIN
PAINLEVEL_OUTOF10: 0 - NO PAIN

## 2024-04-29 ASSESSMENT — PAIN - FUNCTIONAL ASSESSMENT
PAIN_FUNCTIONAL_ASSESSMENT: 0-10
PAIN_FUNCTIONAL_ASSESSMENT: NO/DENIES PAIN
PAIN_FUNCTIONAL_ASSESSMENT: NO/DENIES PAIN

## 2024-04-29 ASSESSMENT — PAIN DESCRIPTION - LOCATION: LOCATION: HEAD

## 2024-04-29 NOTE — PROGRESS NOTES
Report to Receiving RN:    Report To: Hunter Winslow  Time Report Called: 1204  Hand-Off Communication: pt stable took off 2 L of fluidpost /97  states feeling fine  Complications During Treatment: No  Ultrafiltration Treatment: No  Medications Administered During Dialysis: No  Blood Products Administered During Dialysis: No  Labs Sent During Dialysis: No  Heparin Drip Rate Changes: No  Dialysis Catheter Dressing: NA  Last Dressing Change: NA    Electronic Signatures:  Mannie Nair RN (Signed )   Authored:    (Signed )   Authored:     Last Updated: 1:12 PM by MANNIE NAIR

## 2024-04-29 NOTE — CARE PLAN
Problem: Pain - Adult  Goal: Verbalizes/displays adequate comfort level or baseline comfort level  Outcome: Progressing     Problem: Safety - Adult  Goal: Free from fall injury  Outcome: Progressing     Problem: Fall/Injury  Goal: Not fall by end of shift  Outcome: Progressing

## 2024-04-29 NOTE — PROGRESS NOTES
"Kj Colmenares is a 53 y.o. male on day 1 of admission presenting with Dialysis patient, noncompliant (CMS-[facility]).    Subjective   No acute events overnight. Got dialysis today.        Objective     VITALS  Blood pressure 119/80, pulse 64, temperature 35.9 °C (96.7 °F), temperature source Oral, resp. rate 17, height 1.803 m (5' 11\"), weight 112 kg (247 lb 9.6 oz), SpO2 96%.  Physical Exam  Vitals and nursing note reviewed.   Constitutional:       General: He is not in acute distress.     Appearance: Normal appearance. He is not ill-appearing.   HENT:      Head: Normocephalic and atraumatic.      Mouth/Throat:      Mouth: Mucous membranes are moist.      Pharynx: Oropharynx is clear.   Eyes:      Extraocular Movements: Extraocular movements intact.      Conjunctiva/sclera: Conjunctivae normal.   Cardiovascular:      Rate and Rhythm: Normal rate and regular rhythm.      Heart sounds: Normal heart sounds. No murmur heard.     No friction rub. No gallop.   Pulmonary:      Effort: Pulmonary effort is normal.      Breath sounds: Normal breath sounds. No wheezing or rales.   Abdominal:      General: Bowel sounds are normal. There is no distension.      Palpations: Abdomen is soft.      Tenderness: There is no abdominal tenderness. There is no guarding.   Musculoskeletal:         General: No swelling or tenderness.      Right lower leg: No edema.      Left lower leg: No edema.   Skin:     General: Skin is warm and dry.      Capillary Refill: Capillary refill takes less than 2 seconds.   Neurological:      General: No focal deficit present.      Mental Status: He is alert and oriented to person, place, and time.   Psychiatric:         Mood and Affect: Mood normal.         Speech: Speech is rapid and pressured.           Intake/Output last 3 Shifts:  I/O last 3 completed shifts:  In: 400 (3.6 mL/kg) [Other:400]  Out: 2675 (23.8 mL/kg) [Urine:275 (0.1 mL/kg/hr); Other:2400]  Weight: 112.3 kg     Relevant Results  Results for " orders placed or performed during the hospital encounter of 04/27/24 (from the past 24 hour(s))   Basic Metabolic Panel   Result Value Ref Range    Glucose 86 74 - 99 mg/dL    Sodium 143 136 - 145 mmol/L    Potassium 4.2 3.5 - 5.3 mmol/L    Chloride 108 (H) 98 - 107 mmol/L    Bicarbonate 23 21 - 32 mmol/L    Anion Gap 16 10 - 20 mmol/L    Urea Nitrogen 54 (H) 6 - 23 mg/dL    Creatinine 7.89 (H) 0.50 - 1.30 mg/dL    eGFR 8 (L) >60 mL/min/1.73m*2    Calcium 9.4 8.6 - 10.3 mg/dL   CBC   Result Value Ref Range    WBC 4.3 (L) 4.4 - 11.3 x10*3/uL    nRBC 0.0 0.0 - 0.0 /100 WBCs    RBC 4.07 (L) 4.50 - 5.90 x10*6/uL    Hemoglobin 11.3 (L) 13.5 - 17.5 g/dL    Hematocrit 37.6 (L) 41.0 - 52.0 %    MCV 92 80 - 100 fL    MCH 27.8 26.0 - 34.0 pg    MCHC 30.1 (L) 32.0 - 36.0 g/dL    RDW 15.6 (H) 11.5 - 14.5 %    Platelets 191 150 - 450 x10*3/uL   POCT GLUCOSE   Result Value Ref Range    POCT Glucose 113 (H) 74 - 99 mg/dL       Imaging Results  CT abdomen pelvis wo IV contrast   Final Result   No acute findings in the abdomen and pelvis.        Signed by: Marino Klein 4/27/2024 11:06 PM   Dictation workstation:   [id number]          Medications:  apixaban, 2.5 mg, oral, BID  aspirin, 81 mg, oral, Daily  finasteride, 5 mg, oral, Daily  insulin lispro, 0-10 Units, subcutaneous, TID with meals  metoprolol succinate XL, 25 mg, oral, Daily  OLANZapine, 5 mg, oral, Nightly  polyethylene glycol, 17 g, oral, Daily  tamsulosin, 0.4 mg, oral, Daily       PRN medications: acetaminophen, cyclobenzaprine, dextrose, dextrose, glucagon, glucagon, melatonin, midodrine, ondansetron ODT **OR** ondansetron        Assessment/Plan   Principal Problem:    Dialysis patient, noncompliant (CMS-HCC)    Dialysis patient, noncompliant (CMS-[facility])  - Nephrology consult for HD today again   - Potassium normal  - midodrine prn     Afib  - pt denies a hx of this, however, present on review of the EMR  - seen on ECG  - continue Eliquis (uncertain if pt is  taking this)  - continue Metoprolol for rate control  - telemetry     HTN  - continue metoprolol     DM2  - No meds on his med list for this  - check HbA1c  - ISS     Bipolar disorder  - not certain if he is taking medication for this.   Currently seems to be in the manic phase  -May need to ask psych to see      GERD  - PPI.     DVT Prophylaxis:  Eliquis    Disposition:  Hope to DC in the next day or so.     Elliot Quinones, [facility]

## 2024-04-29 NOTE — PROGRESS NOTES
Report from Sending RN:    Report From: Hunter Winslow  Recent Surgery of Procedure: No  Baseline Level of Consciousness (LOC): AOx4  Oxygen Use: No  Type: NA  Diabetic: Yes  Last BP Med Given Day of Dialysis: See EMAR  Last Pain Med Given: See EMAR  Lab Tests to be Obtained with Dialysis: No  Blood Transfusion to be Given During Dialysis: No  Available IV Access: Yes  Medications to be Administered During Dialysis: No  Continuous IV Infusion Running: No  Restraints on Currently or in the Last 24 Hours: No  Hand-Off Communication: Pt stable and ready to come to PACU for tx this morning  Dialysis Catheter Dressing: NA  Last Dressing Change: NA    TENDERNESS/PAIN

## 2024-04-29 NOTE — PROCEDURES
Hemodialysis Note    Patient seen and examined while on dialysis, recent events, labs, medications reviewed.   Tolerating well, 2.75h/1.5L UF, had HD yesterday  Missing OP HD, last session on 4/16, [facility], Dr Fonseca, TTS schedule  Euvolemic  Using LUE AVF  Another HD session tomorrow      Will continue to follow, overall management per primary team, and continue regular dialysis.      Jason Ann MD  Division of Nephrology and Hypertension

## 2024-04-30 ENCOUNTER — APPOINTMENT (OUTPATIENT)
Dept: DIALYSIS | Facility: HOSPITAL | Age: 54
End: 2024-04-30
Payer: COMMERCIAL

## 2024-04-30 LAB
ANION GAP SERPL CALC-SCNC: 17 MMOL/L (ref 10–20)
BUN SERPL-MCNC: 45 MG/DL (ref 6–23)
CALCIUM SERPL-MCNC: 10.1 MG/DL (ref 8.6–10.3)
CHLORIDE SERPL-SCNC: 104 MMOL/L (ref 98–107)
CO2 SERPL-SCNC: 24 MMOL/L (ref 21–32)
CREAT SERPL-MCNC: 7.44 MG/DL (ref 0.5–1.3)
EGFRCR SERPLBLD CKD-EPI 2021: 8 ML/MIN/1.73M*2
GLUCOSE BLD MANUAL STRIP-MCNC: 117 MG/DL (ref 74–99)
GLUCOSE BLD MANUAL STRIP-MCNC: 82 MG/DL (ref 74–99)
GLUCOSE BLD MANUAL STRIP-MCNC: 87 MG/DL (ref 74–99)
GLUCOSE BLD MANUAL STRIP-MCNC: 97 MG/DL (ref 74–99)
GLUCOSE SERPL-MCNC: 79 MG/DL (ref 74–99)
HOLD SPECIMEN: NORMAL
POTASSIUM SERPL-SCNC: 3.8 MMOL/L (ref 3.5–5.3)
SODIUM SERPL-SCNC: 141 MMOL/L (ref 136–145)

## 2024-04-30 PROCEDURE — 99233 SBSQ HOSP IP/OBS HIGH 50: CPT | Performed by: INTERNAL MEDICINE

## 2024-04-30 PROCEDURE — 80048 BASIC METABOLIC PNL TOTAL CA: CPT | Performed by: INTERNAL MEDICINE

## 2024-04-30 PROCEDURE — 2500000001 HC RX 250 WO HCPCS SELF ADMINISTERED DRUGS (ALT 637 FOR MEDICARE OP): Performed by: PHARMACIST

## 2024-04-30 PROCEDURE — 1200000002 HC GENERAL ROOM WITH TELEMETRY DAILY

## 2024-04-30 PROCEDURE — 2500000004 HC RX 250 GENERAL PHARMACY W/ HCPCS (ALT 636 FOR OP/ED): Performed by: INTERNAL MEDICINE

## 2024-04-30 PROCEDURE — 2500000001 HC RX 250 WO HCPCS SELF ADMINISTERED DRUGS (ALT 637 FOR MEDICARE OP): Performed by: HOSPITALIST

## 2024-04-30 PROCEDURE — 82947 ASSAY GLUCOSE BLOOD QUANT: CPT

## 2024-04-30 PROCEDURE — 2500000006 HC RX 250 W HCPCS SELF ADMINISTERED DRUGS (ALT 637 FOR ALL PAYERS): Performed by: HOSPITALIST

## 2024-04-30 PROCEDURE — 2500000001 HC RX 250 WO HCPCS SELF ADMINISTERED DRUGS (ALT 637 FOR MEDICARE OP): Performed by: INTERNAL MEDICINE

## 2024-04-30 PROCEDURE — 99232 SBSQ HOSP IP/OBS MODERATE 35: CPT | Performed by: INTERNAL MEDICINE

## 2024-04-30 PROCEDURE — 99222 1ST HOSP IP/OBS MODERATE 55: CPT | Performed by: PSYCHIATRY & NEUROLOGY

## 2024-04-30 RX ORDER — METOPROLOL SUCCINATE 50 MG/1
50 TABLET, EXTENDED RELEASE ORAL DAILY
Status: DISCONTINUED | OUTPATIENT
Start: 2024-05-01 | End: 2024-05-02 | Stop reason: HOSPADM

## 2024-04-30 RX ORDER — METOPROLOL SUCCINATE 25 MG/1
25 TABLET, EXTENDED RELEASE ORAL ONCE
Status: DISCONTINUED | OUTPATIENT
Start: 2024-04-30 | End: 2024-05-02 | Stop reason: HOSPADM

## 2024-04-30 RX ORDER — METOPROLOL SUCCINATE 25 MG/1
25 TABLET, EXTENDED RELEASE ORAL ONCE
Qty: 1 TABLET | Refills: 0 | Status: COMPLETED | OUTPATIENT
Start: 2024-04-30 | End: 2024-04-30

## 2024-04-30 RX ORDER — OXYCODONE HYDROCHLORIDE 5 MG/1
10 TABLET ORAL EVERY 4 HOURS PRN
Status: DISCONTINUED | OUTPATIENT
Start: 2024-04-30 | End: 2024-05-02 | Stop reason: HOSPADM

## 2024-04-30 RX ORDER — OXYCODONE HYDROCHLORIDE 5 MG/1
5 TABLET ORAL EVERY 4 HOURS PRN
Status: DISCONTINUED | OUTPATIENT
Start: 2024-04-30 | End: 2024-05-02 | Stop reason: HOSPADM

## 2024-04-30 RX ADMIN — METOPROLOL SUCCINATE 25 MG: 25 TABLET, EXTENDED RELEASE ORAL at 15:19

## 2024-04-30 RX ADMIN — SODIUM CHLORIDE 500 ML: 9 INJECTION, SOLUTION INTRAVENOUS at 18:23

## 2024-04-30 RX ADMIN — ASPIRIN 81 MG: 81 TABLET, COATED ORAL at 09:12

## 2024-04-30 RX ADMIN — TAMSULOSIN HYDROCHLORIDE 0.4 MG: 0.4 CAPSULE ORAL at 09:12

## 2024-04-30 RX ADMIN — APIXABAN 2.5 MG: 2.5 TABLET, FILM COATED ORAL at 09:12

## 2024-04-30 RX ADMIN — APIXABAN 2.5 MG: 2.5 TABLET, FILM COATED ORAL at 21:31

## 2024-04-30 RX ADMIN — OLANZAPINE 5 MG: 5 TABLET, FILM COATED ORAL at 21:31

## 2024-04-30 RX ADMIN — OXYCODONE HYDROCHLORIDE 5 MG: 5 TABLET ORAL at 21:33

## 2024-04-30 RX ADMIN — FINASTERIDE 5 MG: 5 TABLET, FILM COATED ORAL at 09:12

## 2024-04-30 RX ADMIN — Medication 3 MG: at 21:31

## 2024-04-30 RX ADMIN — OXYCODONE HYDROCHLORIDE 5 MG: 5 TABLET ORAL at 13:51

## 2024-04-30 RX ADMIN — METOPROLOL SUCCINATE 25 MG: 25 TABLET, EXTENDED RELEASE ORAL at 09:12

## 2024-04-30 ASSESSMENT — PAIN DESCRIPTION - DESCRIPTORS
DESCRIPTORS: ACHING
DESCRIPTORS: ACHING

## 2024-04-30 ASSESSMENT — PAIN SCALES - GENERAL
PAINLEVEL_OUTOF10: 0 - NO PAIN
PAINLEVEL_OUTOF10: 3
PAINLEVEL_OUTOF10: 0 - NO PAIN
PAINLEVEL_OUTOF10: 6
PAINLEVEL_OUTOF10: 0 - NO PAIN
PAINLEVEL_OUTOF10: 6

## 2024-04-30 ASSESSMENT — PAIN - FUNCTIONAL ASSESSMENT
PAIN_FUNCTIONAL_ASSESSMENT: 0-10

## 2024-04-30 ASSESSMENT — COGNITIVE AND FUNCTIONAL STATUS - GENERAL
MOBILITY SCORE: 24
DAILY ACTIVITIY SCORE: 24

## 2024-04-30 ASSESSMENT — PAIN DESCRIPTION - LOCATION: LOCATION: KNEE

## 2024-04-30 ASSESSMENT — PAIN DESCRIPTION - ORIENTATION: ORIENTATION: LEFT

## 2024-04-30 NOTE — PROGRESS NOTES
04/30/24 1337   Discharge Planning   Living Arrangements Spouse/significant other   Support Systems Spouse/significant other   Assistance Needed independent with all care   Type of Residence Private residence   Who is requesting discharge planning? Provider   Home or Post Acute Services None   Patient expects to be discharged to: home with new HD chair time at [place]   Does the patient need discharge transport arranged? Yes   RoundTrip coordination needed? Yes   Housing Stability   In the last 12 months, was there a time when you were not able to pay the mortgage or rent on time? N   In the last 12 months, how many places have you lived? 1   In the last 12 months, was there a time when you did not have a steady place to sleep or slept in a shelter (including now)? N     Met with patient at bedside  Explained role of TCC  Patient admitted for missed HD sessions    Patient states he does not drive, has transport provided to him by [facility] for HD, [facility], they do not have an active chair time for him, info submitted to [facility] Rebecca with intake, she states patient has active chair at [facility] TT 1030a, patient states he does not like this location, he is requesting new chair time at [facility], time was offered of 6a TTHS and patient HAS ACCEPTED. Clarifying with [facility] intake for start date 5/2 or 5/4.     ADOD tomorrow after HD  BARRIERS HD  DISPO home with need ride at dc    1352- HD start date 5/2/24 per Rebecca @ [facility] intake

## 2024-04-30 NOTE — CARE PLAN
Problem: Pain - Adult  Goal: Verbalizes/displays adequate comfort level or baseline comfort level  Outcome: Progressing     Problem: Safety - Adult  Goal: Free from fall injury  Outcome: Progressing     Problem: Chronic Conditions and Co-morbidities  Goal: Patient's chronic conditions and co-morbidity symptoms are monitored and maintained or improved  Outcome: Progressing     Problem: Fall/Injury  Goal: Not fall by end of shift  Outcome: Progressing  Goal: Be free from injury by end of the shift  Outcome: Progressing  Goal: Verbalize understanding of personal risk factors for fall in the hospital  Outcome: Progressing  Goal: Verbalize understanding of risk factor reduction measures to prevent injury from fall in the home  Outcome: Progressing  Goal: Use assistive devices by end of the shift  Outcome: Progressing  Goal: Pace activities to prevent fatigue by end of the shift  Outcome: Progressing     Problem: Diabetes  Goal: Achieve decreasing blood glucose levels by end of shift  Outcome: Progressing  Goal: Increase stability of blood glucose readings by end of shift  Outcome: Progressing  Goal: Decrease in ketones present in urine by end of shift  Outcome: Progressing  Goal: Maintain electrolyte levels within acceptable range throughout shift  Outcome: Progressing  Goal: Maintain glucose levels >70mg/dl to <250mg/dl throughout shift  Outcome: Progressing  Goal: No changes in neurological exam by end of shift  Outcome: Progressing  Goal: Learn about and adhere to nutrition recommendations by end of shift  Outcome: Progressing  Goal: Vital signs within normal range for age by end of shift  Outcome: Progressing  Goal: Increase self care and/or family involovement by end of shift  Outcome: Progressing  Goal: Receive DSME education by end of shift  Outcome: Progressing     Problem: Pain  Goal: Takes deep breaths with improved pain control throughout the shift  Outcome: Progressing  Goal: Turns in bed with improved pain  control throughout the shift  Outcome: Progressing  Goal: Walks with improved pain control throughout the shift  Outcome: Progressing  Goal: Performs ADL's with improved pain control throughout shift  Outcome: Progressing  Goal: Participates in PT with improved pain control throughout the shift  Outcome: Progressing  Goal: Free from opioid side effects throughout the shift  Outcome: Progressing  Goal: Free from acute confusion related to pain meds throughout the shift  Outcome: Progressing   The patient's goals for the shift include      The clinical goals for the shift include remain HDS

## 2024-04-30 NOTE — PROGRESS NOTES
"Kj Colmenares is a 53 y.o. male on day 2 of admission presenting with Dialysis patient, noncompliant (CMS-HCC).    Subjective   No acute events overnight. To get another round of dialysis tomorrow. Feeling well. Still with rapid speech. Notes he is not sure if he has bipolar.        Objective     VITALS  Blood pressure 118/83, pulse 103, temperature 36.7 °C (98.1 °F), temperature source Temporal, resp. rate 18, height 1.803 m (5' 11\"), weight 112 kg (247 lb 9.6 oz), SpO2 98%.  Physical Exam  Vitals and nursing note reviewed.   Constitutional:       General: He is not in acute distress.     Appearance: Normal appearance. He is not ill-appearing.   HENT:      Head: Normocephalic and atraumatic.      Mouth/Throat:      Mouth: Mucous membranes are moist.      Pharynx: Oropharynx is clear.   Eyes:      Extraocular Movements: Extraocular movements intact.      Conjunctiva/sclera: Conjunctivae normal.   Cardiovascular:      Rate and Rhythm: Normal rate and regular rhythm.      Heart sounds: Normal heart sounds. No murmur heard.     No friction rub. No gallop.   Pulmonary:      Effort: Pulmonary effort is normal.      Breath sounds: Normal breath sounds. No wheezing or rales.   Abdominal:      General: Bowel sounds are normal. There is no distension.      Palpations: Abdomen is soft.      Tenderness: There is no abdominal tenderness. There is no guarding.   Musculoskeletal:         General: No swelling or tenderness.      Right lower leg: No edema.      Left lower leg: No edema.   Skin:     General: Skin is warm and dry.      Capillary Refill: Capillary refill takes less than 2 seconds.   Neurological:      General: No focal deficit present.      Mental Status: He is alert and oriented to person, place, and time.   Psychiatric:         Mood and Affect: Mood normal.         Speech: Speech is rapid and pressured.           Intake/Output last 3 Shifts:  I/O last 3 completed shifts:  In: 1040 (9.3 mL/kg) [P.O.:240; I.V.:800 " (7.1 mL/kg)]  Out: 475 (4.2 mL/kg) [Urine:475 (0.1 mL/kg/hr)]  Weight: 112.3 kg     Relevant Results  Results for orders placed or performed during the hospital encounter of 04/27/24 (from the past 24 hour(s))   POCT GLUCOSE   Result Value Ref Range    POCT Glucose 113 (H) 74 - 99 mg/dL   POCT GLUCOSE   Result Value Ref Range    POCT Glucose 108 (H) 74 - 99 mg/dL   Basic Metabolic Panel   Result Value Ref Range    Glucose 79 74 - 99 mg/dL    Sodium 141 136 - 145 mmol/L    Potassium 3.8 3.5 - 5.3 mmol/L    Chloride 104 98 - 107 mmol/L    Bicarbonate 24 21 - 32 mmol/L    Anion Gap 17 10 - 20 mmol/L    Urea Nitrogen 45 (H) 6 - 23 mg/dL    Creatinine 7.44 (H) 0.50 - 1.30 mg/dL    eGFR 8 (L) >60 mL/min/1.73m*2    Calcium 10.1 8.6 - 10.3 mg/dL   Lavender Top   Result Value Ref Range    Extra Tube Hold for add-ons.    POCT GLUCOSE   Result Value Ref Range    POCT Glucose 82 74 - 99 mg/dL   POCT GLUCOSE   Result Value Ref Range    POCT Glucose 117 (H) 74 - 99 mg/dL       Imaging Results  CT abdomen pelvis wo IV contrast   Final Result   No acute findings in the abdomen and pelvis.        Signed by: Marino Klein 4/27/2024 11:06 PM   Dictation workstation:   [id number]          Medications:  apixaban, 2.5 mg, oral, BID  aspirin, 81 mg, oral, Daily  finasteride, 5 mg, oral, Daily  insulin lispro, 0-10 Units, subcutaneous, TID with meals  metoprolol succinate XL, 25 mg, oral, Daily  OLANZapine, 5 mg, oral, Nightly  polyethylene glycol, 17 g, oral, Daily  tamsulosin, 0.4 mg, oral, Daily       PRN medications: acetaminophen, cyclobenzaprine, dextrose, dextrose, glucagon, glucagon, melatonin, midodrine, ondansetron ODT **OR** ondansetron        Assessment/Plan   Principal Problem:    Dialysis patient, noncompliant (CMS-[facility])    Dialysis patient, noncompliant (CMS-[facility])  - Nephrology consult for HD again tomorrow   - Potassium normal  - midodrine prn     Afib  - pt denies a hx of this, however, present on review of the EMR  -  seen on ECG  - continue Eliquis (uncertain if pt is taking this)  - continue Metoprolol for rate control  - telemetry     HTN  - continue metoprolol     DM2  - No meds on his med list for this  - check HbA1c  - ISS     Bipolar disorder  - not certain if he is taking medication for this.   Currently seems to be in the manic phase  -May need to ask psych to see      GERD  - PPI.     DVT Prophylaxis:  Eliquis    Disposition:  Hope to DC in the next day or so. Will need new HD chair prior to DC     Elliot Quinones, [facility]

## 2024-04-30 NOTE — CONSULTS
Reason For Consult  manic    History Of Present Illness  Kj Colmenares is a 53 y.o. male since father , lives with mom, Bipolar by history (on Zyprexa 5 mg hs), ESRD on HD, h.o. DM, HTN, stroke, admitted 24 with flank and back pain after missing 3 wks of dialysis. Per nephrology missed 4 weeks. In ED reported his father recently  and pt was not taking care of himself. Family convinced him to come to hospital. In H&P reports his mother  one week ago.     Multiple incorrect answers discovered including where he gets his HD - suspect this is due to uremic encephalopathy.  BUN on admission 88 - in past has been up 107. Creat 10.56   Head CT     Pt reports his dad  2 weeks ago, fell down and hit head on table. Then talking about all the tubes he had. Mom still alive.    was in [place]  Thinks he missed 4-5 wks of HD  Sister wants to give pt one of his kidneys.  Mother was going to give pt one of his fathers kidneys but it was too late.     I'm tired of going though this, one time they took me off dialysis for a whole year - then back on, then off x 8 months. Not suicidal. Just not convinced he has to do the dialysis all the time.     Restless in bed, can't sleep, keeps moving his legs. Can't walk on his legs they hurt so much.   Can't breathe when bed is flat, better when head is up    Thinks his nerves are bad, doesn't usually take anything.   HAS NOT TAKEN ANY ZYPREXA FOR MONTHS - doesn't have any at home  Doesn't mind it's been restarted    Past Psych History:   PCP prescribing  Zyprexa 5 mg hs for years.   H.o. ETOH abuse, not current    Past Medical History  He has a past medical history of Diabetes mellitus (Multi), Hypertension, and Stroke (Multi).    Surgical History  He has a past surgical history that includes CT angio head w and wo IV contrast (2014) and CT angio head w and wo IV contrast (2014).     Social History  He reports that he has never smoked. He has  "never used smokeless tobacco. He reports that he does not drink alcohol and does not use drugs.    Family History  No family history on file.     Allergies  Patient has no known allergies.    Physical Exam  Physical Exam  Psychiatric:         Attention and Perception: Attention normal. He does not perceive auditory or visual hallucinations.         Mood and Affect: Mood is anxious.         Speech: Speech is rapid and pressured.         Behavior: Behavior is cooperative.         Thought Content: Thought content is not paranoid. Thought content does not include homicidal or suicidal ideation.         Cognition and Memory: Cognition and memory normal.      Comments: Pleasant male, speaks rapidly, mildly tangential. Fully oriented. No suicidal. No hallucinations or paranoia. Insight limited       Last Recorded Vitals  Blood pressure 117/73, pulse (!) 120, temperature 36.9 °C (98.4 °F), temperature source Temporal, resp. rate 18, height 1.803 m (5' 11\"), weight 112 kg (247 lb 9.6 oz), SpO2 98%.    Relevant Results  Scheduled medications  apixaban, 2.5 mg, oral, BID  finasteride, 5 mg, oral, Daily  insulin lispro, 0-10 Units, subcutaneous, TID with meals  metoprolol succinate XL, 25 mg, oral, Once  [START ON 5/1/2024] metoprolol succinate XL, 50 mg, oral, Daily  OLANZapine, 5 mg, oral, Nightly  polyethylene glycol, 17 g, oral, Daily  tamsulosin, 0.4 mg, oral, Daily      PRN medications: acetaminophen, cyclobenzaprine, dextrose, dextrose, glucagon, glucagon, melatonin, midodrine, ondansetron ODT **OR** ondansetron, oxyCODONE, oxyCODONE      Results for orders placed or performed during the hospital encounter of 04/27/24 (from the past 96 hour(s))   ECG 12 lead   Result Value Ref Range    Ventricular Rate 94 BPM    QRS Duration 80 ms    QT Interval 336 ms    QTC Calculation(Bazett) 420 ms    R Axis 68 degrees    T Axis 39 degrees    QRS Count 15 beats    Q Onset 222 ms    T Offset 390 ms    QTC Fredericia 390 ms   CBC and " Auto Differential   Result Value Ref Range    WBC 5.5 4.4 - 11.3 x10*3/uL    nRBC 0.0 0.0 - 0.0 /100 WBCs    RBC 4.06 (L) 4.50 - 5.90 x10*6/uL    Hemoglobin 11.8 (L) 13.5 - 17.5 g/dL    Hematocrit 37.5 (L) 41.0 - 52.0 %    MCV 92 80 - 100 fL    MCH 29.1 26.0 - 34.0 pg    MCHC 31.5 (L) 32.0 - 36.0 g/dL    RDW 15.6 (H) 11.5 - 14.5 %    Platelets 219 150 - 450 x10*3/uL    Neutrophils % 62.0 40.0 - 80.0 %    Immature Granulocytes %, Automated 0.2 0.0 - 0.9 %    Lymphocytes % 19.2 13.0 - 44.0 %    Monocytes % 6.7 2.0 - 10.0 %    Eosinophils % 11.2 0.0 - 6.0 %    Basophils % 0.7 0.0 - 2.0 %    Neutrophils Absolute 3.42 1.20 - 7.70 x10*3/uL    Immature Granulocytes Absolute, Automated 0.01 0.00 - 0.70 x10*3/uL    Lymphocytes Absolute 1.06 (L) 1.20 - 4.80 x10*3/uL    Monocytes Absolute 0.37 0.10 - 1.00 x10*3/uL    Eosinophils Absolute 0.62 0.00 - 0.70 x10*3/uL    Basophils Absolute 0.04 0.00 - 0.10 x10*3/uL   Magnesium   Result Value Ref Range    Magnesium 2.10 1.60 - 2.40 mg/dL   Comprehensive metabolic panel   Result Value Ref Range    Glucose 85 74 - 99 mg/dL    Sodium 142 136 - 145 mmol/L    Potassium 5.1 3.5 - 5.3 mmol/L    Chloride 112 (H) 98 - 107 mmol/L    Bicarbonate 16 (L) 21 - 32 mmol/L    Anion Gap 19 10 - 20 mmol/L    Urea Nitrogen 88 (H) 6 - 23 mg/dL    Creatinine 10.56 (H) 0.50 - 1.30 mg/dL    eGFR 5 (L) >60 mL/min/1.73m*2    Calcium 9.8 8.6 - 10.3 mg/dL    Albumin 4.0 3.4 - 5.0 g/dL    Alkaline Phosphatase 258 (H) 33 - 120 U/L    Total Protein 6.6 6.4 - 8.2 g/dL    AST 11 9 - 39 U/L    Bilirubin, Total 0.5 0.0 - 1.2 mg/dL    ALT 8 (L) 10 - 52 U/L   Blood Gas Venous Full Panel   Result Value Ref Range    POCT pH, Venous 7.24 (LL) 7.33 - 7.43 pH    POCT pCO2, Venous 35 (L) 41 - 51 mm Hg    POCT pO2, Venous 61 (H) 35 - 45 mm Hg    POCT SO2, Venous 91 (H) 45 - 75 %    POCT Oxy Hemoglobin, Venous 88.2 (H) 45.0 - 75.0 %    POCT Hematocrit Calculated, Venous 37.0 (L) 41.0 - 52.0 %    POCT Sodium, Venous 140 136 -  145 mmol/L    POCT Potassium, Venous 5.2 3.5 - 5.3 mmol/L    POCT Chloride, Venous 112 (H) 98 - 107 mmol/L    POCT Ionized Calicum, Venous 1.37 (H) 1.10 - 1.33 mmol/L    POCT Glucose, Venous 84 74 - 99 mg/dL    POCT Lactate, Venous 1.0 0.4 - 2.0 mmol/L    POCT Base Excess, Venous -11.5 (L) -2.0 - 3.0 mmol/L    POCT HCO3 Calculated, Venous 15.0 (L) 22.0 - 26.0 mmol/L    POCT Hemoglobin, Venous 12.2 (L) 13.5 - 17.5 g/dL    POCT Anion Gap, Venous 18.0 10.0 - 25.0 mmol/L    Patient Temperature 37.0 degrees Celsius    FiO2 21 %   Hemoglobin A1C   Result Value Ref Range    Hemoglobin A1C 5.3 see below %    Estimated Average Glucose 105 Not Established mg/dL   Urinalysis with Reflex Microscopic   Result Value Ref Range    Color, Urine Light-Yellow Light-Yellow, Yellow, Dark-Yellow    Appearance, Urine Clear Clear    Specific Gravity, Urine 1.017 1.005 - 1.035    pH, Urine 6.0 5.0, 5.5, 6.0, 6.5, 7.0, 7.5, 8.0    Protein, Urine 70 (1+) (A) NEGATIVE, 10 (TRACE), 20 (TRACE) mg/dL    Glucose, Urine Normal Normal mg/dL    Blood, Urine 0.03 (TRACE) (A) NEGATIVE    Ketones, Urine NEGATIVE NEGATIVE mg/dL    Bilirubin, Urine NEGATIVE NEGATIVE    Urobilinogen, Urine Normal Normal mg/dL    Nitrite, Urine NEGATIVE NEGATIVE    Leukocyte Esterase, Urine NEGATIVE NEGATIVE   Microscopic Only, Urine   Result Value Ref Range    WBC, Urine 1-5 1-5, NONE /HPF    RBC, Urine 1-2 NONE, 1-2, 3-5 /HPF   CBC and Auto Differential   Result Value Ref Range    WBC 5.0 4.4 - 11.3 x10*3/uL    nRBC 0.0 0.0 - 0.0 /100 WBCs    RBC 3.83 (L) 4.50 - 5.90 x10*6/uL    Hemoglobin 10.7 (L) 13.5 - 17.5 g/dL    Hematocrit 34.3 (L) 41.0 - 52.0 %    MCV 90 80 - 100 fL    MCH 27.9 26.0 - 34.0 pg    MCHC 31.2 (L) 32.0 - 36.0 g/dL    RDW 15.8 (H) 11.5 - 14.5 %    Platelets 191 150 - 450 x10*3/uL    Neutrophils % 60.2 40.0 - 80.0 %    Immature Granulocytes %, Automated 0.4 0.0 - 0.9 %    Lymphocytes % 16.6 13.0 - 44.0 %    Monocytes % 7.5 2.0 - 10.0 %    Eosinophils %  14.7 0.0 - 6.0 %    Basophils % 0.6 0.0 - 2.0 %    Neutrophils Absolute 2.98 1.20 - 7.70 x10*3/uL    Immature Granulocytes Absolute, Automated 0.02 0.00 - 0.70 x10*3/uL    Lymphocytes Absolute 0.82 (L) 1.20 - 4.80 x10*3/uL    Monocytes Absolute 0.37 0.10 - 1.00 x10*3/uL    Eosinophils Absolute 0.73 (H) 0.00 - 0.70 x10*3/uL    Basophils Absolute 0.03 0.00 - 0.10 x10*3/uL   Basic metabolic panel   Result Value Ref Range    Glucose 104 (H) 74 - 99 mg/dL    Sodium 142 136 - 145 mmol/L    Potassium 4.8 3.5 - 5.3 mmol/L    Chloride 113 (H) 98 - 107 mmol/L    Bicarbonate 16 (L) 21 - 32 mmol/L    Anion Gap 18 10 - 20 mmol/L    Urea Nitrogen 87 (H) 6 - 23 mg/dL    Creatinine 10.32 (H) 0.50 - 1.30 mg/dL    eGFR 5 (L) >60 mL/min/1.73m*2    Calcium 9.3 8.6 - 10.3 mg/dL   Magnesium   Result Value Ref Range    Magnesium 2.20 1.60 - 2.40 mg/dL   POCT GLUCOSE   Result Value Ref Range    POCT Glucose 89 74 - 99 mg/dL   POCT GLUCOSE   Result Value Ref Range    POCT Glucose 144 (H) 74 - 99 mg/dL   Basic Metabolic Panel   Result Value Ref Range    Glucose 86 74 - 99 mg/dL    Sodium 143 136 - 145 mmol/L    Potassium 4.2 3.5 - 5.3 mmol/L    Chloride 108 (H) 98 - 107 mmol/L    Bicarbonate 23 21 - 32 mmol/L    Anion Gap 16 10 - 20 mmol/L    Urea Nitrogen 54 (H) 6 - 23 mg/dL    Creatinine 7.89 (H) 0.50 - 1.30 mg/dL    eGFR 8 (L) >60 mL/min/1.73m*2    Calcium 9.4 8.6 - 10.3 mg/dL   CBC   Result Value Ref Range    WBC 4.3 (L) 4.4 - 11.3 x10*3/uL    nRBC 0.0 0.0 - 0.0 /100 WBCs    RBC 4.07 (L) 4.50 - 5.90 x10*6/uL    Hemoglobin 11.3 (L) 13.5 - 17.5 g/dL    Hematocrit 37.6 (L) 41.0 - 52.0 %    MCV 92 80 - 100 fL    MCH 27.8 26.0 - 34.0 pg    MCHC 30.1 (L) 32.0 - 36.0 g/dL    RDW 15.6 (H) 11.5 - 14.5 %    Platelets 191 150 - 450 x10*3/uL   POCT GLUCOSE   Result Value Ref Range    POCT Glucose 113 (H) 74 - 99 mg/dL   POCT GLUCOSE   Result Value Ref Range    POCT Glucose 108 (H) 74 - 99 mg/dL   Basic Metabolic Panel   Result Value Ref Range     Glucose 79 74 - 99 mg/dL    Sodium 141 136 - 145 mmol/L    Potassium 3.8 3.5 - 5.3 mmol/L    Chloride 104 98 - 107 mmol/L    Bicarbonate 24 21 - 32 mmol/L    Anion Gap 17 10 - 20 mmol/L    Urea Nitrogen 45 (H) 6 - 23 mg/dL    Creatinine 7.44 (H) 0.50 - 1.30 mg/dL    eGFR 8 (L) >60 mL/min/1.73m*2    Calcium 10.1 8.6 - 10.3 mg/dL   Lavender Top   Result Value Ref Range    Extra Tube Hold for add-ons.    POCT GLUCOSE   Result Value Ref Range    POCT Glucose 82 74 - 99 mg/dL   POCT GLUCOSE   Result Value Ref Range    POCT Glucose 117 (H) 74 - 99 mg/dL   POCT GLUCOSE   Result Value Ref Range    POCT Glucose 97 74 - 99 mg/dL         Assessment/Plan     IMP:  Bipolar I hypomanic  Complicated bereavement  Mild uremic encephalopathy resolved    PLAN:  Needs more education about skipping dialysis  Mild uremic encephalopathy - appears resolved  Pt has just been restarted on zyprexa this admission - no indication for additional medication - it should start to work.   Add melatonin and trazodone for sleep    I spent 60 minutes in the professional and overall care of this patient.      Lynne Naik MD

## 2024-04-30 NOTE — DISCHARGE INSTR - APPOINTMENTS
[facility]  [address] [place] [id number]  [phone]    PLEASE ARRIVE AT 6AM FOR YOU FIRST SESSION THIS SATURDAY 5/4/24

## 2024-04-30 NOTE — PROGRESS NOTES
Nephrology Consult Progress Note    Admit Date: 4/27/2024    Interval history:  Seems agitated     CURRENT MEDICATIONS:    Current Facility-Administered Medications:     acetaminophen (Tylenol) tablet 650 mg, 650 mg, oral, q4h PRN, Kristine Sabillon MD, 650 mg at 04/29/24 1358    apixaban (Eliquis) tablet 2.5 mg, 2.5 mg, oral, BID, Kristine Sabillon MD, 2.5 mg at 04/30/24 0912    aspirin EC tablet 81 mg, 81 mg, oral, Daily, Kristine Sabillon MD, 81 mg at 04/30/24 0912    cyclobenzaprine (Flexeril) tablet 5 mg, 5 mg, oral, TID PRN, Elliot Quinones DO, 5 mg at 04/29/24 1608    dextrose 50 % injection 12.5 g, 12.5 g, intravenous, q15 min PRN, Kristine Sabillon MD    dextrose 50 % injection 25 g, 25 g, intravenous, q15 min PRN, Kristine Sabillon MD    finasteride (Proscar) tablet 5 mg, 5 mg, oral, Daily, Kristine Sabillon MD, 5 mg at 04/30/24 0912    glucagon (Glucagen) injection 1 mg, 1 mg, intramuscular, q15 min PRN, Kristine Sabillon MD    glucagon (Glucagen) injection 1 mg, 1 mg, intramuscular, q15 min PRN, Kristine Sabillon MD    insulin lispro (HumaLOG) injection 0-10 Units, 0-10 Units, subcutaneous, TID with meals, Kristine Sabillon MD    melatonin tablet 3 mg, 3 mg, oral, Nightly PRN, Kristine Sabillon MD, 3 mg at 04/28/24 2025    [START ON 5/1/2024] metoprolol succinate XL (Toprol-XL) 24 hr tablet 50 mg, 50 mg, oral, Daily, Elliot Quinones DO    midodrine (Proamatine) tablet 10 mg, 10 mg, oral, PRN, Kristine Sabillon MD    OLANZapine (ZyPREXA) tablet 5 mg, 5 mg, oral, Nightly, Kristine Sabillon MD, 5 mg at 04/29/24 2028    ondansetron ODT (Zofran-ODT) disintegrating tablet 4 mg, 4 mg, oral, q8h PRN, 4 mg at 04/28/24 2031 **OR** ondansetron (Zofran) injection 4 mg, 4 mg, intravenous, q8h PRN, Kristine Sabillon MD    polyethylene glycol (Glycolax, Miralax) packet 17 g, 17 g, oral, Daily, Kristine Sabillon MD    tamsulosin (Flomax) 24 hr capsule 0.4 mg, 0.4 mg, oral, Daily, Kristine Sabillon MD, 0.4 mg at 04/30/24 0912       Intake/Output Summary (Last 24  "hours) at 4/30/2024 1227  Last data filed at 4/30/2024 0354  Gross per 24 hour   Intake 440 ml   Output 200 ml   Net 240 ml       PHYSICAL EXAM:  /74 (BP Location: Right arm, Patient Position: Lying)   Pulse 104   Temp 36.9 °C (98.4 °F) (Temporal)   Resp 18   Ht 1.803 m (5' 11\")   Wt 112 kg (247 lb 9.6 oz)   SpO2 100%   BMI 34.53 kg/m²     Intake/Output Summary (Last 24 hours) at 4/30/2024 1227  Last data filed at 4/30/2024 0354  Gross per 24 hour   Intake 440 ml   Output 200 ml   Net 240 ml     Gen: AAO, NAD  Neck: No JVD  Cardiac: RRR  Resp: clear BS  Abd: Soft, non tender, +BS, non distended   Ext: No edema   Access: LUE AVF  Neuro: moves 4 ext  Peripheral Pulses: Capillary refill <2secs, strong peripheral pulses.  Skin: Skin color, texture, turgor normal, no suspicious rashes or lesions.    Labs:  Results for orders placed or performed during the hospital encounter of 04/27/24 (from the past 24 hour(s))   POCT GLUCOSE   Result Value Ref Range    POCT Glucose 113 (H) 74 - 99 mg/dL   POCT GLUCOSE   Result Value Ref Range    POCT Glucose 108 (H) 74 - 99 mg/dL   Basic Metabolic Panel   Result Value Ref Range    Glucose 79 74 - 99 mg/dL    Sodium 141 136 - 145 mmol/L    Potassium 3.8 3.5 - 5.3 mmol/L    Chloride 104 98 - 107 mmol/L    Bicarbonate 24 21 - 32 mmol/L    Anion Gap 17 10 - 20 mmol/L    Urea Nitrogen 45 (H) 6 - 23 mg/dL    Creatinine 7.44 (H) 0.50 - 1.30 mg/dL    eGFR 8 (L) >60 mL/min/1.73m*2    Calcium 10.1 8.6 - 10.3 mg/dL   Lavender Top   Result Value Ref Range    Extra Tube Hold for add-ons.    POCT GLUCOSE   Result Value Ref Range    POCT Glucose 82 74 - 99 mg/dL   POCT GLUCOSE   Result Value Ref Range    POCT Glucose 117 (H) 74 - 99 mg/dL        DATA:   Diagnostic tests reviewed for today's visit:    New labs and imaging     Assessment and Plan:  Pt came with missing dialysis, AF  - ESKD on HD: very non compliant, likely behavioral issues  Had HD yesterday, refusing today, will plan for " tomorrow if he agrees  Lost HD unit chair, needs to look for another unit   BP: controlled  Lytes: acceptable     Will continue to follow.   Time spent on consult progress: 35 min    Signature: Jason Ann MD

## 2024-05-01 ENCOUNTER — APPOINTMENT (OUTPATIENT)
Dept: DIALYSIS | Facility: HOSPITAL | Age: 54
End: 2024-05-01
Payer: COMMERCIAL

## 2024-05-01 LAB
ANION GAP SERPL CALC-SCNC: 15 MMOL/L (ref 10–20)
BUN SERPL-MCNC: 58 MG/DL (ref 6–23)
CALCIUM SERPL-MCNC: 10.3 MG/DL (ref 8.6–10.3)
CHLORIDE SERPL-SCNC: 105 MMOL/L (ref 98–107)
CO2 SERPL-SCNC: 24 MMOL/L (ref 21–32)
CREAT SERPL-MCNC: 8.07 MG/DL (ref 0.5–1.3)
EGFRCR SERPLBLD CKD-EPI 2021: 7 ML/MIN/1.73M*2
GLUCOSE BLD MANUAL STRIP-MCNC: 110 MG/DL (ref 74–99)
GLUCOSE BLD MANUAL STRIP-MCNC: 80 MG/DL (ref 74–99)
GLUCOSE BLD MANUAL STRIP-MCNC: 96 MG/DL (ref 74–99)
GLUCOSE SERPL-MCNC: 62 MG/DL (ref 74–99)
HOLD SPECIMEN: NORMAL
POTASSIUM SERPL-SCNC: 3.9 MMOL/L (ref 3.5–5.3)
SODIUM SERPL-SCNC: 140 MMOL/L (ref 136–145)

## 2024-05-01 PROCEDURE — 8010000001 HC DIALYSIS - HEMODIALYSIS PER DAY

## 2024-05-01 PROCEDURE — 2500000006 HC RX 250 W HCPCS SELF ADMINISTERED DRUGS (ALT 637 FOR ALL PAYERS): Performed by: HOSPITALIST

## 2024-05-01 PROCEDURE — 1100000001 HC PRIVATE ROOM DAILY

## 2024-05-01 PROCEDURE — 82947 ASSAY GLUCOSE BLOOD QUANT: CPT

## 2024-05-01 PROCEDURE — 2500000001 HC RX 250 WO HCPCS SELF ADMINISTERED DRUGS (ALT 637 FOR MEDICARE OP): Performed by: HOSPITALIST

## 2024-05-01 PROCEDURE — 82374 ASSAY BLOOD CARBON DIOXIDE: CPT | Performed by: INTERNAL MEDICINE

## 2024-05-01 PROCEDURE — 36415 COLL VENOUS BLD VENIPUNCTURE: CPT | Performed by: INTERNAL MEDICINE

## 2024-05-01 PROCEDURE — 2500000004 HC RX 250 GENERAL PHARMACY W/ HCPCS (ALT 636 FOR OP/ED): Performed by: HOSPITALIST

## 2024-05-01 PROCEDURE — 90935 HEMODIALYSIS ONE EVALUATION: CPT | Performed by: INTERNAL MEDICINE

## 2024-05-01 PROCEDURE — 2500000001 HC RX 250 WO HCPCS SELF ADMINISTERED DRUGS (ALT 637 FOR MEDICARE OP): Performed by: INTERNAL MEDICINE

## 2024-05-01 PROCEDURE — 99233 SBSQ HOSP IP/OBS HIGH 50: CPT | Performed by: INTERNAL MEDICINE

## 2024-05-01 RX ORDER — ASPIRIN 81 MG/1
81 TABLET ORAL DAILY
Status: DISCONTINUED | OUTPATIENT
Start: 2024-05-02 | End: 2024-05-02 | Stop reason: HOSPADM

## 2024-05-01 RX ADMIN — FINASTERIDE 5 MG: 5 TABLET, FILM COATED ORAL at 11:15

## 2024-05-01 RX ADMIN — TAMSULOSIN HYDROCHLORIDE 0.4 MG: 0.4 CAPSULE ORAL at 11:15

## 2024-05-01 RX ADMIN — POLYETHYLENE GLYCOL 3350 17 G: 17 POWDER, FOR SOLUTION ORAL at 11:16

## 2024-05-01 RX ADMIN — OXYCODONE HYDROCHLORIDE 10 MG: 5 TABLET ORAL at 11:16

## 2024-05-01 RX ADMIN — METOPROLOL SUCCINATE 50 MG: 50 TABLET, EXTENDED RELEASE ORAL at 11:15

## 2024-05-01 RX ADMIN — APIXABAN 2.5 MG: 2.5 TABLET, FILM COATED ORAL at 11:15

## 2024-05-01 RX ADMIN — CYCLOBENZAPRINE HYDROCHLORIDE 5 MG: 5 TABLET, FILM COATED ORAL at 11:14

## 2024-05-01 RX ADMIN — OLANZAPINE 5 MG: 5 TABLET, FILM COATED ORAL at 21:02

## 2024-05-01 RX ADMIN — ONDANSETRON 4 MG: 2 INJECTION INTRAMUSCULAR; INTRAVENOUS at 13:19

## 2024-05-01 ASSESSMENT — COGNITIVE AND FUNCTIONAL STATUS - GENERAL
STANDING UP FROM CHAIR USING ARMS: A LITTLE
MOBILITY SCORE: 24
MOVING TO AND FROM BED TO CHAIR: A LITTLE
DAILY ACTIVITIY SCORE: 18
DAILY ACTIVITIY SCORE: 18
STANDING UP FROM CHAIR USING ARMS: A LITTLE
DRESSING REGULAR LOWER BODY CLOTHING: A LITTLE
TURNING FROM BACK TO SIDE WHILE IN FLAT BAD: A LITTLE
PERSONAL GROOMING: A LITTLE
WALKING IN HOSPITAL ROOM: A LITTLE
PERSONAL GROOMING: A LITTLE
DAILY ACTIVITIY SCORE: 24
DRESSING REGULAR LOWER BODY CLOTHING: A LITTLE
HELP NEEDED FOR BATHING: A LITTLE
MOBILITY SCORE: 18
MOVING FROM LYING ON BACK TO SITTING ON SIDE OF FLAT BED WITH BEDRAILS: A LITTLE
DRESSING REGULAR UPPER BODY CLOTHING: A LITTLE
TOILETING: A LITTLE
CLIMB 3 TO 5 STEPS WITH RAILING: A LITTLE
MOBILITY SCORE: 18
DRESSING REGULAR UPPER BODY CLOTHING: A LITTLE
MOVING TO AND FROM BED TO CHAIR: A LITTLE
EATING MEALS: A LITTLE
MOVING FROM LYING ON BACK TO SITTING ON SIDE OF FLAT BED WITH BEDRAILS: A LITTLE
EATING MEALS: A LITTLE
HELP NEEDED FOR BATHING: A LITTLE
TURNING FROM BACK TO SIDE WHILE IN FLAT BAD: A LITTLE
WALKING IN HOSPITAL ROOM: A LITTLE
TOILETING: A LITTLE
CLIMB 3 TO 5 STEPS WITH RAILING: A LITTLE

## 2024-05-01 ASSESSMENT — PAIN SCALES - WONG BAKER: WONGBAKER_NUMERICALRESPONSE: NO HURT

## 2024-05-01 ASSESSMENT — PAIN DESCRIPTION - LOCATION: LOCATION: BACK

## 2024-05-01 ASSESSMENT — PAIN SCALES - GENERAL
PAINLEVEL_OUTOF10: 0 - NO PAIN
PAINLEVEL_OUTOF10: 9
PAINLEVEL_OUTOF10: 0 - NO PAIN

## 2024-05-01 ASSESSMENT — PAIN - FUNCTIONAL ASSESSMENT: PAIN_FUNCTIONAL_ASSESSMENT: 0-10

## 2024-05-01 NOTE — NURSING NOTE
Report to Receiving RN:    Report To: Mandi Estrada  Time Report Called: 1030  Hand-Off Communication: Patient refused to stay for prescribed 3.5 tx stating he was only staying 2 hours.  Dr. Ann aware and was not happy.  Informing patient he is taking his life in his own hands.  Removed 2 liters of fluid and last /89.  Did appear and voiced complaints of depression and not wanting to live   Complications During Treatment: No  Ultrafiltration Treatment: No  Medications Administered During Dialysis: No  Blood Products Administered During Dialysis: No,   Labs Sent During Dialysis: No  Heparin Drip Rate Changes: No  Dialysis Catheter Dressing: N/A  Last Dressing Change: N/A    Electronic Signatures:  Mahesh Holman RN       Last Updated: 10:32 AM by MAHESH HOLMAN

## 2024-05-01 NOTE — PROGRESS NOTES
05/01/24 1341   Discharge Planning   Patient expects to be discharged to: home with new HD chair time at [facility] held today d/t patient not feeling well after HD, [facility] made aware that patient will not be there for SOC tomorrow and new SOC will be 5/4/24.

## 2024-05-01 NOTE — PROCEDURES
"Hemodialysis Note    Patient seen and examined while on dialysis, recent events, labs, medications reviewed.   Tolerating well, target HD 3.5h/2L UF but pt is refusing to stay more than 2h assuming all risk of under-dialysis, he already refused treatment yesterday  Euvolemic  Using LUE AVF  BP (!) 129/94 (BP Location: Right arm, Patient Position: Lying) Comment: rn noted  Pulse 77   Temp 36.7 °C (98.1 °F) (Oral)   Resp 18   Ht 1.803 m (5' 11\")   Wt 110 kg (241 lb 10 oz)   SpO2 99%   BMI 33.70 kg/m²      Next planned session Friday  Looking for out-pt unit       Will continue to follow, overall management per primary team, and continue regular dialysis.      Jason Ann MD  Division of Nephrology and Hypertension    "

## 2024-05-01 NOTE — PROGRESS NOTES
"Kj Colmenares is a 53 y.o. male on day 3 of admission presenting with Dialysis patient, noncompliant (CMS-HCC).    Subjective   No acute events overnight. Only got 2 hours of dialysis this morning, notes he wasn't feeling well and wanted to get off the machine. Also stated to several people he just wanted to be done. When I clarified with him he noted that he does want dialysis he is just very tired of it.         Objective     VITALS  Blood pressure (!) 129/94, pulse 77, temperature 36.7 °C (98.1 °F), temperature source Oral, resp. rate 18, height 1.803 m (5' 11\"), weight 110 kg (241 lb 10 oz), SpO2 99%.  Physical Exam  Vitals and nursing note reviewed.   Constitutional:       General: He is not in acute distress.     Appearance: Normal appearance. He is not ill-appearing.   HENT:      Head: Normocephalic and atraumatic.      Mouth/Throat:      Mouth: Mucous membranes are moist.      Pharynx: Oropharynx is clear.   Eyes:      Extraocular Movements: Extraocular movements intact.      Conjunctiva/sclera: Conjunctivae normal.   Cardiovascular:      Rate and Rhythm: Normal rate and regular rhythm.      Heart sounds: Normal heart sounds. No murmur heard.     No friction rub. No gallop.   Pulmonary:      Effort: Pulmonary effort is normal.      Breath sounds: Normal breath sounds. No wheezing or rales.   Abdominal:      General: Bowel sounds are normal. There is no distension.      Palpations: Abdomen is soft.      Tenderness: There is no abdominal tenderness. There is no guarding.   Musculoskeletal:         General: No swelling or tenderness.      Right lower leg: No edema.      Left lower leg: No edema.   Skin:     General: Skin is warm and dry.      Capillary Refill: Capillary refill takes less than 2 seconds.   Neurological:      General: No focal deficit present.      Mental Status: He is alert and oriented to person, place, and time.   Psychiatric:         Mood and Affect: Mood normal.         Speech: Speech is " rapid and pressured.           Intake/Output last 3 Shifts:  I/O last 3 completed shifts:  In: 240 (2.1 mL/kg) [P.O.:240]  Out: 851 (7.6 mL/kg) [Urine:851 (0.2 mL/kg/hr)]  Weight: 112.3 kg     Relevant Results  Results for orders placed or performed during the hospital encounter of 04/27/24 (from the past 24 hour(s))   POCT GLUCOSE   Result Value Ref Range    POCT Glucose 97 74 - 99 mg/dL   POCT GLUCOSE   Result Value Ref Range    POCT Glucose 87 74 - 99 mg/dL   Basic Metabolic Panel   Result Value Ref Range    Glucose 62 (L) 74 - 99 mg/dL    Sodium 140 136 - 145 mmol/L    Potassium 3.9 3.5 - 5.3 mmol/L    Chloride 105 98 - 107 mmol/L    Bicarbonate 24 21 - 32 mmol/L    Anion Gap 15 10 - 20 mmol/L    Urea Nitrogen 58 (H) 6 - 23 mg/dL    Creatinine 8.07 (H) 0.50 - 1.30 mg/dL    eGFR 7 (L) >60 mL/min/1.73m*2    Calcium 10.3 8.6 - 10.3 mg/dL   Lavender Top   Result Value Ref Range    Extra Tube Hold for add-ons.    POCT GLUCOSE   Result Value Ref Range    POCT Glucose 80 74 - 99 mg/dL   POCT GLUCOSE   Result Value Ref Range    POCT Glucose 110 (H) 74 - 99 mg/dL       Imaging Results  CT abdomen pelvis wo IV contrast   Final Result   No acute findings in the abdomen and pelvis.        Signed by: Marino Klein 4/27/2024 11:06 PM   Dictation workstation:   [id number]          Medications:  [START ON 5/2/2024] aspirin, 81 mg, oral, Daily  finasteride, 5 mg, oral, Daily  insulin lispro, 0-10 Units, subcutaneous, TID with meals  metoprolol succinate XL, 25 mg, oral, Once  metoprolol succinate XL, 50 mg, oral, Daily  OLANZapine, 5 mg, oral, Nightly  polyethylene glycol, 17 g, oral, Daily  tamsulosin, 0.4 mg, oral, Daily       PRN medications: acetaminophen, cyclobenzaprine, dextrose, dextrose, glucagon, glucagon, melatonin, midodrine, ondansetron ODT **OR** ondansetron, oxyCODONE, oxyCODONE        Assessment/Plan   Principal Problem:    Dialysis patient, noncompliant (CMS-[facility])    Dialysis patient, noncompliant  (CMS-[facility])  - Nephrology consult for HD   - Potassium normal  - midodrine prn     Afib  - pt denies a hx of this, however, present on review of the EMR  - seen on ECG  - continue Eliquis (uncertain if pt is taking this)  - continue Metoprolol for rate control  - telemetry     HTN  - continue metoprolol     DM2  - No meds on his med list for this  - check HbA1c  - ISS     Bipolar disorder  - not certain if he is taking medication for this.   Currently seems to be in the manic phase  -Psych evaled, apparently he has been out of his meds for some time. Will improve as the meds build up in his system. \     GERD  - PPI.     DVT Prophylaxis:  Eliquis    Disposition:  Hope to DC in the next day or so. Will need new HD chair prior to DC     Elliot Quinones, [facility] Medicine

## 2024-05-02 ENCOUNTER — PHARMACY VISIT (OUTPATIENT)
Dept: PHARMACY | Facility: CLINIC | Age: 54
End: 2024-05-02
Payer: MEDICARE

## 2024-05-02 VITALS
DIASTOLIC BLOOD PRESSURE: 72 MMHG | RESPIRATION RATE: 18 BRPM | OXYGEN SATURATION: 98 % | BODY MASS INDEX: 33.83 KG/M2 | SYSTOLIC BLOOD PRESSURE: 118 MMHG | TEMPERATURE: 98.1 F | HEART RATE: 96 BPM | HEIGHT: 71 IN | WEIGHT: 241.62 LBS

## 2024-05-02 LAB
ALBUMIN SERPL BCP-MCNC: 3.6 G/DL (ref 3.4–5)
ANION GAP SERPL CALC-SCNC: 18 MMOL/L (ref 10–20)
BUN SERPL-MCNC: 59 MG/DL (ref 6–23)
CALCIUM SERPL-MCNC: 9.7 MG/DL (ref 8.6–10.3)
CHLORIDE SERPL-SCNC: 101 MMOL/L (ref 98–107)
CO2 SERPL-SCNC: 24 MMOL/L (ref 21–32)
CREAT SERPL-MCNC: 8.31 MG/DL (ref 0.5–1.3)
EGFRCR SERPLBLD CKD-EPI 2021: 7 ML/MIN/1.73M*2
GLUCOSE BLD MANUAL STRIP-MCNC: 77 MG/DL (ref 74–99)
GLUCOSE SERPL-MCNC: 83 MG/DL (ref 74–99)
PHOSPHATE SERPL-MCNC: 7.2 MG/DL (ref 2.5–4.9)
POTASSIUM SERPL-SCNC: 4.1 MMOL/L (ref 3.5–5.3)
SODIUM SERPL-SCNC: 139 MMOL/L (ref 136–145)

## 2024-05-02 PROCEDURE — RXMED WILLOW AMBULATORY MEDICATION CHARGE

## 2024-05-02 PROCEDURE — 2500000001 HC RX 250 WO HCPCS SELF ADMINISTERED DRUGS (ALT 637 FOR MEDICARE OP): Performed by: HOSPITALIST

## 2024-05-02 PROCEDURE — 99233 SBSQ HOSP IP/OBS HIGH 50: CPT | Performed by: INTERNAL MEDICINE

## 2024-05-02 PROCEDURE — 99239 HOSP IP/OBS DSCHRG MGMT >30: CPT | Performed by: INTERNAL MEDICINE

## 2024-05-02 PROCEDURE — 2500000006 HC RX 250 W HCPCS SELF ADMINISTERED DRUGS (ALT 637 FOR ALL PAYERS): Performed by: HOSPITALIST

## 2024-05-02 PROCEDURE — 82947 ASSAY GLUCOSE BLOOD QUANT: CPT

## 2024-05-02 PROCEDURE — 2500000001 HC RX 250 WO HCPCS SELF ADMINISTERED DRUGS (ALT 637 FOR MEDICARE OP): Performed by: INTERNAL MEDICINE

## 2024-05-02 PROCEDURE — 36415 COLL VENOUS BLD VENIPUNCTURE: CPT | Performed by: INTERNAL MEDICINE

## 2024-05-02 PROCEDURE — 80069 RENAL FUNCTION PANEL: CPT | Performed by: INTERNAL MEDICINE

## 2024-05-02 RX ORDER — METOPROLOL SUCCINATE 25 MG/1
50 TABLET, EXTENDED RELEASE ORAL DAILY
Qty: 60 TABLET | Refills: 0 | Status: SHIPPED | OUTPATIENT
Start: 2024-05-02 | End: 2024-06-01

## 2024-05-02 RX ORDER — OLANZAPINE 5 MG/1
5 TABLET ORAL NIGHTLY
Qty: 30 TABLET | Refills: 0 | Status: SHIPPED | OUTPATIENT
Start: 2024-05-02 | End: 2024-06-01

## 2024-05-02 RX ADMIN — ASPIRIN 81 MG: 81 TABLET, COATED ORAL at 09:04

## 2024-05-02 RX ADMIN — FINASTERIDE 5 MG: 5 TABLET, FILM COATED ORAL at 09:04

## 2024-05-02 RX ADMIN — TAMSULOSIN HYDROCHLORIDE 0.4 MG: 0.4 CAPSULE ORAL at 09:04

## 2024-05-02 RX ADMIN — METOPROLOL SUCCINATE 50 MG: 50 TABLET, EXTENDED RELEASE ORAL at 09:04

## 2024-05-02 ASSESSMENT — PAIN SCALES - GENERAL: PAINLEVEL_OUTOF10: 0 - NO PAIN

## 2024-05-02 ASSESSMENT — COGNITIVE AND FUNCTIONAL STATUS - GENERAL
DAILY ACTIVITIY SCORE: 24
MOBILITY SCORE: 24

## 2024-05-02 ASSESSMENT — PAIN - FUNCTIONAL ASSESSMENT: PAIN_FUNCTIONAL_ASSESSMENT: 0-10

## 2024-05-02 NOTE — DISCHARGE SUMMARY
Discharge Diagnosis  Dialysis patient, noncompliant (CMS-HCC)    Issues Requiring Follow-Up  PCP follow-up  Dialysis  Psych    Discharge Meds     Your medication list        CHANGE how you take these medications        Instructions Last Dose Given Next Dose Due   metoprolol succinate XL 25 mg 24 hr tablet  Commonly known as: Toprol-XL  What changed: how much to take      Take 2 tablets (50 mg) by mouth once daily. Do not crush or chew.              CONTINUE taking these medications        Instructions Last Dose Given Next Dose Due   acetaminophen 500 mg tablet  Commonly known as: Tylenol           aspirin 81 mg EC tablet           B complex-vitamin C-folic acid 1 mg capsule  Commonly known as: Nephrocaps           calcium acetate 667 mg capsule  Commonly known as: Phoslo      Take 2 capsules (1,334 mg) by mouth 3 times a day with meals.       Eliquis 2.5 mg tablet  Generic drug: apixaban           ergocalciferol 1.25 MG (89460 UT) capsule  Commonly known as: Vitamin D-2           finasteride 5 mg tablet  Commonly known as: Proscar           Flomax 0.4 mg 24 hr capsule  Generic drug: tamsulosin           melatonin 3 mg capsule           midodrine 10 mg tablet  Commonly known as: Proamatine      Take 1 tablet (10 mg) by mouth if needed (for HD with SBP <90).       OLANZapine 5 mg tablet  Commonly known as: ZyPREXA      Take 1 tablet (5 mg) by mouth once daily at bedtime.       ondansetron 4 mg tablet  Commonly known as: Zofran           rosuvastatin 20 mg tablet  Commonly known as: Crestor                     Where to Get Your Medications        These medications were sent to [facility]  [address], [place] [address]      Hours: 8 AM to 6 PM Mon-Fri, 9 AM to 1 PM Saturday Phone: [phone]   metoprolol succinate XL 25 mg 24 hr tablet  OLANZapine 5 mg tablet         Test Results Pending At Discharge  Pending Labs       No current pending labs.            Procedures       Hospital  "Gregoria Underwood was admitted to hospital after missing several rounds of dialysis.  He was evaluated by the nephrology team and did go through several rounds of dialysis.  He tolerated this well.  He was also found to have pressured speech and evaluated by the psychiatry team.  Apparently patient has been out of some of his medications for some time.  They expected his improved mental status to slowly improve.  He does seem fairly labile with regards to his overall treatment.  The TCC's worked with him as well as his wife on making sure that he is able to make it to his appointments.  At this time he is otherwise hemodynamically stable and appropriate for discharge.  He will need to follow-up closely with his primary care physician continue with dialysis as usual schedule Tuesday Thursday Saturday.  I did have several long conversations with him about his health.  At times he seemed to be very knowledgeable but other times he seemed to be fairly rash.  I do suspect this will be a problem for him going forward though he does have a fairly good support system.    Blood pressure 118/72, pulse 96, temperature 36.7 °C (98.1 °F), temperature source Temporal, resp. rate 18, height 1.803 m (5' 11\"), weight 110 kg (241 lb 10 oz), SpO2 98%.  Pertinent Physical Exam At Time of Discharge  Physical Exam  Vitals and nursing note reviewed.   Constitutional:       General: He is not in acute distress.     Appearance: Normal appearance. He is not ill-appearing.   HENT:      Head: Normocephalic and atraumatic.      Mouth/Throat:      Mouth: Mucous membranes are moist.      Pharynx: Oropharynx is clear.   Eyes:      Extraocular Movements: Extraocular movements intact.      Conjunctiva/sclera: Conjunctivae normal.   Cardiovascular:      Rate and Rhythm: Normal rate and regular rhythm.      Heart sounds: Normal heart sounds. No murmur heard.     No friction rub. No gallop.   Pulmonary:      Effort: Pulmonary effort is normal.      " Breath sounds: Normal breath sounds. No wheezing or rales.   Abdominal:      General: Bowel sounds are normal. There is no distension.      Palpations: Abdomen is soft.      Tenderness: There is no abdominal tenderness. There is no guarding.   Musculoskeletal:         General: No swelling or tenderness.      Right lower leg: No edema.      Left lower leg: No edema.   Skin:     General: Skin is warm and dry.      Capillary Refill: Capillary refill takes less than 2 seconds.   Neurological:      General: No focal deficit present.      Mental Status: He is alert and oriented to person, place, and time.   Psychiatric:         Mood and Affect: Mood normal.         Speech: Speech is rapid and pressured.         Outpatient Follow-Up  No future appointments.      Elliot Quinones DO FACZHANG     Time spent during this discharge: >30 min

## 2024-05-02 NOTE — PROGRESS NOTES
Nephrology Consult Progress Note    Admit Date: 4/27/2024    Interval history:  Less agitated     CURRENT MEDICATIONS:    Current Facility-Administered Medications:     acetaminophen (Tylenol) tablet 650 mg, 650 mg, oral, q4h PRN, Kristine Sabillon MD, 650 mg at 04/29/24 1358    aspirin EC tablet 81 mg, 81 mg, oral, Daily, Elliot Quinones DO, 81 mg at 05/02/24 0904    cyclobenzaprine (Flexeril) tablet 5 mg, 5 mg, oral, TID PRN, Elliot Quinones DO, 5 mg at 05/01/24 1114    dextrose 50 % injection 12.5 g, 12.5 g, intravenous, q15 min PRN, Kristine Sabillon MD    dextrose 50 % injection 25 g, 25 g, intravenous, q15 min PRN, Kristine Sabillon MD    finasteride (Proscar) tablet 5 mg, 5 mg, oral, Daily, Kristine Sabillon MD, 5 mg at 05/02/24 0904    glucagon (Glucagen) injection 1 mg, 1 mg, intramuscular, q15 min PRN, Kristine Sabillon MD    glucagon (Glucagen) injection 1 mg, 1 mg, intramuscular, q15 min PRN, Kristine Sabillon MD    insulin lispro (HumaLOG) injection 0-10 Units, 0-10 Units, subcutaneous, TID with meals, Kristine Sabillon MD    melatonin tablet 3 mg, 3 mg, oral, Nightly PRN, Kristine Sabillon MD, 3 mg at 04/30/24 2131    metoprolol succinate XL (Toprol-XL) 24 hr tablet 25 mg, 25 mg, oral, Once, Elliot Quinones DO    metoprolol succinate XL (Toprol-XL) 24 hr tablet 50 mg, 50 mg, oral, Daily, Elliot Quinones DO, 50 mg at 05/02/24 0904    midodrine (Proamatine) tablet 10 mg, 10 mg, oral, PRN, Kristine Sabillon MD    OLANZapine (ZyPREXA) tablet 5 mg, 5 mg, oral, Nightly, Kristine Sabillon MD, 5 mg at 05/01/24 2102    ondansetron ODT (Zofran-ODT) disintegrating tablet 4 mg, 4 mg, oral, q8h PRN, 4 mg at 04/28/24 2031 **OR** ondansetron (Zofran) injection 4 mg, 4 mg, intravenous, q8h PRN, Kristine Sabillon MD, 4 mg at 05/01/24 1319    oxyCODONE (Roxicodone) immediate release tablet 10 mg, 10 mg, oral, q4h PRN, Elliot Quinones DO, 10 mg at 05/01/24 1116    oxyCODONE (Roxicodone) immediate release tablet 5 mg, 5 mg, oral, q4h PRN, Elliot Quinones DO, 5  "mg at 04/30/24 2133    polyethylene glycol (Glycolax, Miralax) packet 17 g, 17 g, oral, Daily, Kristine Sabillon MD, 17 g at 05/01/24 1116    tamsulosin (Flomax) 24 hr capsule 0.4 mg, 0.4 mg, oral, Daily, Kristine Sabillon MD, 0.4 mg at 05/02/24 0904       Intake/Output Summary (Last 24 hours) at 5/2/2024 1112  Last data filed at 5/1/2024 1505  Gross per 24 hour   Intake 520 ml   Output 250 ml   Net 270 ml       PHYSICAL EXAM:  /72 (BP Location: Right arm, Patient Position: Sitting)   Pulse 96   Temp 36.7 °C (98.1 °F) (Temporal)   Resp 18   Ht 1.803 m (5' 11\")   Wt 110 kg (241 lb 10 oz)   SpO2 98%   BMI 33.70 kg/m²     Intake/Output Summary (Last 24 hours) at 5/2/2024 1112  Last data filed at 5/1/2024 1505  Gross per 24 hour   Intake 520 ml   Output 250 ml   Net 270 ml     Gen: AAO, NAD  Neck: No JVD  Cardiac: RRR  Resp: clear BS  Abd: Soft, non tender, +BS, non distended   Ext: No edema   Access: LUE AVF  Neuro: moves 4 ext  Peripheral Pulses: Capillary refill <2secs, strong peripheral pulses.  Skin: Skin color, texture, turgor normal, no suspicious rashes or lesions.    Labs:  Results for orders placed or performed during the hospital encounter of 04/27/24 (from the past 24 hour(s))   POCT GLUCOSE   Result Value Ref Range    POCT Glucose 110 (H) 74 - 99 mg/dL   POCT GLUCOSE   Result Value Ref Range    POCT Glucose 96 74 - 99 mg/dL   Renal function panel   Result Value Ref Range    Glucose 83 74 - 99 mg/dL    Sodium 139 136 - 145 mmol/L    Potassium 4.1 3.5 - 5.3 mmol/L    Chloride 101 98 - 107 mmol/L    Bicarbonate 24 21 - 32 mmol/L    Anion Gap 18 10 - 20 mmol/L    Urea Nitrogen 59 (H) 6 - 23 mg/dL    Creatinine 8.31 (H) 0.50 - 1.30 mg/dL    eGFR 7 (L) >60 mL/min/1.73m*2    Calcium 9.7 8.6 - 10.3 mg/dL    Phosphorus 7.2 (H) 2.5 - 4.9 mg/dL    Albumin 3.6 3.4 - 5.0 g/dL        DATA:   Diagnostic tests reviewed for today's visit:    New labs and imaging     Assessment and Plan:  Pt came with missing " dialysis, AF  - ESKD on HD: very non compliant, likely behavioral issues  Only had 2h of dialysis yesterday after pts request  Refusing another session today to bring him back to his TTS schedule and be discharge today   Will plan for dialysis tomorrow, but he is euvolemic and his labs are fine, Ok to discharge from my stand point and go to his unit this coming Saturday  at his [facility] unit, [facility]  BP: controlled  Lytes: acceptable     Will continue to follow.     Signature: Jason Ann MD

## 2024-05-02 NOTE — PROGRESS NOTES
Kj Colmenares is a 53 y.o. male on day 4 of admission presenting with Dialysis patient, noncompliant (CMS-HCC).     05/02/24 1102   Discharge Planning   Patient expects to be discharged to: Home// [facility] TRS @ 6am. Start 5/4     Plan: per MD, patient stable for DC home once ride is set up for his dialysis treatment Saturday. He can then follow up with SW at [facility] to continue to assist with transportation.  Disposition: Home  Barrier: ride set up  ADOD:  today    1400pm  Patient states he uses provide a ride to set up transportation and plans to use that for dialysis transportation. SW scheduling Saturday's dialysis sessions. Left VM for Rebecca from [facility] to inform her that patient will start his dialysis Saturday.    Paige Browne RN

## 2024-05-10 ENCOUNTER — HOSPITAL ENCOUNTER (OUTPATIENT)
Dept: RADIOLOGY | Facility: HOSPITAL | Age: 54
Discharge: HOME | End: 2024-05-10
Payer: COMMERCIAL

## 2024-05-10 ENCOUNTER — OFFICE VISIT (OUTPATIENT)
Dept: ORTHOPEDIC SURGERY | Facility: HOSPITAL | Age: 54
End: 2024-05-10
Payer: COMMERCIAL

## 2024-05-10 DIAGNOSIS — M25.462 EFFUSION OF LEFT KNEE: ICD-10-CM

## 2024-05-10 DIAGNOSIS — M25.562 CHRONIC PAIN OF LEFT KNEE: ICD-10-CM

## 2024-05-10 DIAGNOSIS — M17.12 PRIMARY OSTEOARTHRITIS OF LEFT KNEE: Primary | ICD-10-CM

## 2024-05-10 DIAGNOSIS — G89.29 CHRONIC PAIN OF LEFT KNEE: ICD-10-CM

## 2024-05-10 PROCEDURE — 73564 X-RAY EXAM KNEE 4 OR MORE: CPT | Mod: LEFT SIDE | Performed by: RADIOLOGY

## 2024-05-10 PROCEDURE — 73564 X-RAY EXAM KNEE 4 OR MORE: CPT | Mod: LT

## 2024-05-10 PROCEDURE — 3044F HG A1C LEVEL LT 7.0%: CPT | Performed by: PHYSICIAN ASSISTANT

## 2024-05-10 PROCEDURE — 99212 OFFICE O/P EST SF 10 MIN: CPT | Performed by: PHYSICIAN ASSISTANT

## 2024-05-10 PROCEDURE — 99202 OFFICE O/P NEW SF 15 MIN: CPT | Performed by: PHYSICIAN ASSISTANT

## 2024-05-10 RX ORDER — PREDNISONE 20 MG/1
20 TABLET ORAL DAILY
Qty: 5 TABLET | Refills: 0 | Status: SHIPPED | OUTPATIENT
Start: 2024-05-10 | End: 2024-05-15

## 2024-05-10 ASSESSMENT — PAIN SCALES - GENERAL: PAINLEVEL_OUTOF10: 10 - WORST POSSIBLE PAIN

## 2024-05-10 ASSESSMENT — PAIN - FUNCTIONAL ASSESSMENT: PAIN_FUNCTIONAL_ASSESSMENT: 0-10

## 2024-05-14 NOTE — PROGRESS NOTES
"HPI:  Kj Colmenares is a 53 y.o. male who presents to the orthopedic injury clinic for evaluation of acute on chronic left knee pain.     History of DM, ESRD on dialysis, paroxsymal A-Fib on Eliquis, and prior ICH with mild left hemiplegia.     Reports \"years\" of left knee pain. No recent injury. Reports pain increased over the last several days with associated swelling. Rates pain 10/10. Pain increased with standing, ambulation, and knee flexion. Ambulates with single point cane.     He was hoping he could have his knee drained today.     Has been taking Tylenol prn.     ROS:  Reviewed on EMR and patient intake sheet.    PMH/SH:  Reviewed on EMR and patient intake sheet.    Exam:  Knee Musculoskeletal Exam  Gait    Antalgic: left    Assistive device: cane    Inspection    Left      Erythema: none        Effusion: moderate        Edema: none        Ecchymosis: none        Deformity: none        Alignment: normal      Palpation    Left      Increased warmth: none        Masses: none        Crepitus: medial and lateral        Tenderness: present        Tenderness comment: diffuse    Range of Motion    Left      Active extension: 0      Passive extension: 0      Active flexion: 90      Passive flexion: 110    Strength    Left      Left knee strength is normal.      Instability    Left      Medial Yash test: positive    Neurovascular    Left      Left knee neurovascular exam is normal.      Special Signs    Left      Straight leg raise: normal      Patellar compression: none      General      Constitutional: mildly obese    Psychiatric: normal mood and affect and no acute distress    Neurological: alert and oriented x3    Skin: intact        Radiology:     Xrays left knee done in the office today 05/10/24 personally reviewed. Severe tricompartmental osteoarthritis, worst in the medial compartment. No fracture or dislocation. Moderate effusion.        Assessment and Plan:  1. Chronic pain of left knee  - XR knee " left 4+ views; Future  - predniSONE (Deltasone) 20 mg tablet; Take 1 tablet (20 mg) by mouth once daily for 5 days.  Dispense: 5 tablet; Refill: 0    2. Primary osteoarthritis of left knee  - predniSONE (Deltasone) 20 mg tablet; Take 1 tablet (20 mg) by mouth once daily for 5 days.  Dispense: 5 tablet; Refill: 0  - Referral to Orthopaedic Surgery; Future    3. Effusion of Left knee    I reviewed the xrays in detail with Kj today. Patient with severe degenerative changes of the left knee. Discussed unfortunately given Eliquis therapy not safe to do any sort of knee injection or drainage in the [facility].     Script for Prednisone provided today. Referral to ortho surgery for consideration of knee replacement provided.     Patient in agreement to plan of care. Of course Kj Colmenares is welcome to call at anytime with questions or concerns.     Sri Farr PA-C  Department of Orthopaedic Surgery    [address] [place] [address]    Voicemail: [phone]   Appts: [phone]  Fax: [phone]

## 2024-06-01 ENCOUNTER — APPOINTMENT (OUTPATIENT)
Dept: CARDIOLOGY | Facility: HOSPITAL | Age: 54
End: 2024-06-01
Payer: COMMERCIAL

## 2024-06-01 ENCOUNTER — HOSPITAL ENCOUNTER (EMERGENCY)
Facility: HOSPITAL | Age: 54
Discharge: HOME | End: 2024-06-02
Attending: INTERNAL MEDICINE
Payer: COMMERCIAL

## 2024-06-01 ENCOUNTER — APPOINTMENT (OUTPATIENT)
Dept: RADIOLOGY | Facility: HOSPITAL | Age: 54
End: 2024-06-01
Payer: COMMERCIAL

## 2024-06-01 DIAGNOSIS — K92.0 HEMATEMESIS, UNSPECIFIED WHETHER NAUSEA PRESENT: Primary | ICD-10-CM

## 2024-06-01 LAB
ALBUMIN SERPL BCP-MCNC: 4.1 G/DL (ref 3.4–5)
ALP SERPL-CCNC: 304 U/L (ref 33–120)
ALT SERPL W P-5'-P-CCNC: 6 U/L (ref 10–52)
ANION GAP SERPL CALC-SCNC: 17 MMOL/L (ref 10–20)
APTT PPP: 23 SECONDS (ref 27–38)
AST SERPL W P-5'-P-CCNC: 21 U/L (ref 9–39)
BASOPHILS # BLD AUTO: 0.03 X10*3/UL (ref 0–0.1)
BASOPHILS NFR BLD AUTO: 0.4 %
BILIRUB SERPL-MCNC: 0.6 MG/DL (ref 0–1.2)
BUN SERPL-MCNC: 38 MG/DL (ref 6–23)
CALCIUM SERPL-MCNC: 10.2 MG/DL (ref 8.6–10.3)
CARDIAC TROPONIN I PNL SERPL HS: 9 NG/L (ref 0–20)
CHLORIDE SERPL-SCNC: 98 MMOL/L (ref 98–107)
CO2 SERPL-SCNC: 25 MMOL/L (ref 21–32)
CREAT SERPL-MCNC: 6.21 MG/DL (ref 0.5–1.3)
EGFRCR SERPLBLD CKD-EPI 2021: 10 ML/MIN/1.73M*2
EOSINOPHIL # BLD AUTO: 0.9 X10*3/UL (ref 0–0.7)
EOSINOPHIL NFR BLD AUTO: 11.9 %
ERYTHROCYTE [DISTWIDTH] IN BLOOD BY AUTOMATED COUNT: 15.5 % (ref 11.5–14.5)
GLUCOSE SERPL-MCNC: 108 MG/DL (ref 74–99)
HCT VFR BLD AUTO: 38.5 % (ref 41–52)
HGB BLD-MCNC: 12 G/DL (ref 13.5–17.5)
IMM GRANULOCYTES # BLD AUTO: 0.03 X10*3/UL (ref 0–0.7)
IMM GRANULOCYTES NFR BLD AUTO: 0.4 % (ref 0–0.9)
INR PPP: 1.1 (ref 0.9–1.1)
LYMPHOCYTES # BLD AUTO: 1.12 X10*3/UL (ref 1.2–4.8)
LYMPHOCYTES NFR BLD AUTO: 14.8 %
MAGNESIUM SERPL-MCNC: 2.1 MG/DL (ref 1.6–2.4)
MCH RBC QN AUTO: 28.5 PG (ref 26–34)
MCHC RBC AUTO-ENTMCNC: 31.2 G/DL (ref 32–36)
MCV RBC AUTO: 91 FL (ref 80–100)
MONOCYTES # BLD AUTO: 0.55 X10*3/UL (ref 0.1–1)
MONOCYTES NFR BLD AUTO: 7.2 %
NEUTROPHILS # BLD AUTO: 4.96 X10*3/UL (ref 1.2–7.7)
NEUTROPHILS NFR BLD AUTO: 65.3 %
NRBC BLD-RTO: 0 /100 WBCS (ref 0–0)
PLATELET # BLD AUTO: 178 X10*3/UL (ref 150–450)
POTASSIUM SERPL-SCNC: 5.1 MMOL/L (ref 3.5–5.3)
PROT SERPL-MCNC: 7.3 G/DL (ref 6.4–8.2)
PROTHROMBIN TIME: 11.9 SECONDS (ref 9.8–12.8)
RBC # BLD AUTO: 4.21 X10*6/UL (ref 4.5–5.9)
SODIUM SERPL-SCNC: 135 MMOL/L (ref 136–145)
WBC # BLD AUTO: 7.6 X10*3/UL (ref 4.4–11.3)

## 2024-06-01 PROCEDURE — 80053 COMPREHEN METABOLIC PANEL: CPT

## 2024-06-01 PROCEDURE — 36415 COLL VENOUS BLD VENIPUNCTURE: CPT

## 2024-06-01 PROCEDURE — 85610 PROTHROMBIN TIME: CPT

## 2024-06-01 PROCEDURE — C9113 INJ PANTOPRAZOLE SODIUM, VIA: HCPCS

## 2024-06-01 PROCEDURE — 99284 EMERGENCY DEPT VISIT MOD MDM: CPT | Mod: 25

## 2024-06-01 PROCEDURE — 85025 COMPLETE CBC W/AUTO DIFF WBC: CPT

## 2024-06-01 PROCEDURE — 2500000004 HC RX 250 GENERAL PHARMACY W/ HCPCS (ALT 636 FOR OP/ED)

## 2024-06-01 PROCEDURE — 93005 ELECTROCARDIOGRAM TRACING: CPT

## 2024-06-01 PROCEDURE — 71045 X-RAY EXAM CHEST 1 VIEW: CPT | Mod: FOREIGN READ | Performed by: RADIOLOGY

## 2024-06-01 PROCEDURE — 96374 THER/PROPH/DIAG INJ IV PUSH: CPT

## 2024-06-01 PROCEDURE — 71045 X-RAY EXAM CHEST 1 VIEW: CPT

## 2024-06-01 PROCEDURE — 84484 ASSAY OF TROPONIN QUANT: CPT

## 2024-06-01 PROCEDURE — 2500000005 HC RX 250 GENERAL PHARMACY W/O HCPCS

## 2024-06-01 PROCEDURE — 83735 ASSAY OF MAGNESIUM: CPT

## 2024-06-01 RX ORDER — PANTOPRAZOLE SODIUM 40 MG/10ML
40 INJECTION, POWDER, LYOPHILIZED, FOR SOLUTION INTRAVENOUS ONCE
Status: COMPLETED | OUTPATIENT
Start: 2024-06-01 | End: 2024-06-01

## 2024-06-01 RX ADMIN — DIPHENHYDRAMINE HYDROCHLORIDE AND LIDOCAINE HYDROCHLORIDE AND ALUMINUM HYDROXIDE AND MAGNESIUM HYDRO 10 ML: KIT at 23:24

## 2024-06-01 RX ADMIN — PANTOPRAZOLE SODIUM 40 MG: 40 INJECTION, POWDER, FOR SOLUTION INTRAVENOUS at 22:04

## 2024-06-01 ASSESSMENT — COLUMBIA-SUICIDE SEVERITY RATING SCALE - C-SSRS
6. HAVE YOU EVER DONE ANYTHING, STARTED TO DO ANYTHING, OR PREPARED TO DO ANYTHING TO END YOUR LIFE?: NO
6. HAVE YOU EVER DONE ANYTHING, STARTED TO DO ANYTHING, OR PREPARED TO DO ANYTHING TO END YOUR LIFE?: NO
1. IN THE PAST MONTH, HAVE YOU WISHED YOU WERE DEAD OR WISHED YOU COULD GO TO SLEEP AND NOT WAKE UP?: NO
1. IN THE PAST MONTH, HAVE YOU WISHED YOU WERE DEAD OR WISHED YOU COULD GO TO SLEEP AND NOT WAKE UP?: NO
2. HAVE YOU ACTUALLY HAD ANY THOUGHTS OF KILLING YOURSELF?: NO
2. HAVE YOU ACTUALLY HAD ANY THOUGHTS OF KILLING YOURSELF?: NO

## 2024-06-01 ASSESSMENT — PAIN SCALES - GENERAL: PAINLEVEL_OUTOF10: 3

## 2024-06-01 ASSESSMENT — PAIN - FUNCTIONAL ASSESSMENT: PAIN_FUNCTIONAL_ASSESSMENT: 0-10

## 2024-06-02 VITALS
SYSTOLIC BLOOD PRESSURE: 111 MMHG | TEMPERATURE: 98.5 F | DIASTOLIC BLOOD PRESSURE: 83 MMHG | BODY MASS INDEX: 34.3 KG/M2 | RESPIRATION RATE: 18 BRPM | HEART RATE: 96 BPM | OXYGEN SATURATION: 98 % | WEIGHT: 245 LBS | HEIGHT: 71 IN

## 2024-06-02 LAB — CARDIAC TROPONIN I PNL SERPL HS: 9 NG/L (ref 0–20)

## 2024-06-02 NOTE — ED TRIAGE NOTES
Patient arrived via ambulance. The patient stated approximately 4 days previously. He had been treated at [facility] and admitted for treatment for 4 days. The patient returned because he started to produce blood in his vomit again. The patient states his chest had begun to hurt from the vomiting. Gcs 15, no injury.

## 2024-06-02 NOTE — ED PROVIDER NOTES
HPI: 53-year-old male history of hypertension, HLD, paroxysmal A-fib, prior DVT (no longer on anticoagulation), prior CVA, GERD, hypothyroidism, ESRD on HD (T/TH/sat) presenting to the ED for evaluation of hematemesis. Patient presenting with small amount of blood in his emesis, stating that he was discharged from the hospital this morning and was continuing to have small episodes hematemesis at time of discharge.  Has since had 3 episodes of blood tinged vomitus with some chest pain while vomiting.  Blood is dark brown to red in nature.  Denies any large volume grossly bloody vomit.  Denies cough or blood sputum.  He denies any significant shortness of breath, abdominal pain.  Endorses loose stools without blood or melena. Patient does state that when he is vomiting his chest is tender from the vomiting, denies any chest pain at rest or exertional chest pain.  Denies history of heavy alcohol use or varices.    ------------------------------------------------------------------------------------------------------------------------------------------  Physical Exam:    VS: As documented in the triage note and EMR flowsheet from this visit were reviewed.  General: Well appearing. No acute distress.   Eyes: Pupils round and reactive. No scleral icterus.   HENT: Atraumatic. Normocephalic. Moist mucous membranes.   CV: Irregular rhythm, regular rate. No pedal edema appreciated.  No murmurs rubs or gallops.  No crepitus of chest wall.  Resp: Clear to auscultation bilaterally. Non-labored.  On room air.  GI: Soft, nontender to palpation. Nondistended.  No rebound or guarding.  Skin: Warm, dry, intact. No systemic rashes or lesions appreciated.  Neuro: Alert. No focal neuro deficits observed. Speech fluent. Answers questions appropriately.   Psych: Appropriate. Kavon.    ------------------------------------------------------------------------------------------------------------------------------------------    Medical  Decision Making:  ED Course as of 06/02/24 1512   Sat Jun 01, 2024   2216 ECG 12 lead  A-fib RVR rate 104, normal axis.  No acute ST segment elevation or depression, T wave inversions in lead to 3 and aVF new when compared to most recent ECG, formerly present on ECGs from 2020.  QRS 88, QTc 405. [KR]   2239 Troponin I, High Sensitivity: 9 [KR]   2239 HEMOGLOBIN(!): 12.0  CBC stable from recent labs during hospitalization [KR]      ED Course User Index  [KR] Cynthia Graham    53-year-old male history of hypertension, HLD, paroxysmal A-fib not on anticoagulation, prior CVA, ESRD on HD presenting to the ED for evaluation of small volume hematemesis x 4 days.  Patient was discharged from [facility] this morning after an admission for hyperkalemia, additionally was seen by GI during his hospitalization for hematemesis with stable hemoglobin, no bright red blood per rectum.  Patient continues to endorse small blood-tinged vomitus however has had no witnessed episodes in the department, no large-volume.  Hemoglobin is stable if not somewhat improved from prior.  Consider secondary to gastritis/GERD versus ulcer, treated with IV Protonix and BMX solution.  Also considered atypical chest pain presentation with ECG nonischemic, troponin with delta negative.  No emergent indications for dialysis, no hyperkalemia, no signs of volume overload clinically.  Patient was dialyzed during his hospitalization.  Did obtain chest x-ray to evaluate for evidence of free air with consideration of esophageal rupture however patient clinically is well-appearing with no crepitus or findings concerning, no free air on chest x-ray, no focal consolidations or pleural effusions.  Patient monitored in the department with no further episodes of hematemesis, given stable hemoglobin and otherwise reassuring workup patient does not require emergent endoscopy, encouraged to follow-up with GI outpatient as previously recommended.  Encouraged  to take his prescribed PPI twice daily per GI recs from hospitalization.  On reevaluation after interventions patient was noting clinical improvement in symptoms.  Discharged with return precautions discussed.    Differential Diagnoses Considered:  See MDM     Chronic Medical Conditions Significantly Affecting Care:  See MDM     External Records Reviewed:  I reviewed recent and relevant outside records as noted in HPI above and MDM.      Social Determinants of Health Significantly Affecting Care:  See HPI/MDM     Prescription Drug Consideration:  See MDM     Diagnostic testing considered:  See MDM and relevant sections      Cynthia Graham DO  EM PGY-2     Cynthia Graham DO  Resident  06/02/24 8059

## 2024-06-02 NOTE — DISCHARGE INSTRUCTIONS
You presented for evaluation of blood in your vomit.  You did not experience any additional episodes in the department, we obtained basic labs which did not show a drop in your hemoglobin blood level, your labs did not indicate a need for emergent dialysis.  Treated with the medication and had some improvement.  You should follow-up with your primary care doctor, we additionally provided you with referral to gastroenterology for consideration of an outpatient endoscopy.  Please feel free to return if you experience any worsening chest pain, bleeding episodes, abdominal pain.  You should follow-up at your scheduled dialysis times.  Additionally you should continue to take your acid reducing medication as prescribed twice a day.

## 2024-06-09 LAB
Q ONSET: 219 MS
QRS COUNT: 17 BEATS
QRS DURATION: 88 MS
QT INTERVAL: 308 MS
QTC CALCULATION(BAZETT): 405 MS
QTC FREDERICIA: 370 MS
R AXIS: 53 DEGREES
T AXIS: -15 DEGREES
T OFFSET: 373 MS
VENTRICULAR RATE: 104 BPM

## 2024-07-17 ENCOUNTER — HOSPITAL ENCOUNTER (EMERGENCY)
Facility: HOSPITAL | Age: 54
Discharge: HOME | End: 2024-07-18
Attending: EMERGENCY MEDICINE
Payer: COMMERCIAL

## 2024-07-17 ENCOUNTER — APPOINTMENT (OUTPATIENT)
Dept: RADIOLOGY | Facility: HOSPITAL | Age: 54
End: 2024-07-17
Payer: COMMERCIAL

## 2024-07-17 DIAGNOSIS — M25.462 EFFUSION OF LEFT KNEE: ICD-10-CM

## 2024-07-17 DIAGNOSIS — M17.12 ARTHRITIS OF LEFT KNEE: Primary | ICD-10-CM

## 2024-07-17 LAB
BASOPHILS # BLD AUTO: 0.03 X10*3/UL (ref 0–0.1)
BASOPHILS NFR BLD AUTO: 0.4 %
EOSINOPHIL # BLD AUTO: 0.77 X10*3/UL (ref 0–0.7)
EOSINOPHIL NFR BLD AUTO: 10.4 %
ERYTHROCYTE [DISTWIDTH] IN BLOOD BY AUTOMATED COUNT: 18.1 % (ref 11.5–14.5)
HCT VFR BLD AUTO: 31.3 % (ref 41–52)
HGB BLD-MCNC: 9.7 G/DL (ref 13.5–17.5)
IMM GRANULOCYTES # BLD AUTO: 0.02 X10*3/UL (ref 0–0.7)
IMM GRANULOCYTES NFR BLD AUTO: 0.3 % (ref 0–0.9)
LYMPHOCYTES # BLD AUTO: 1.31 X10*3/UL (ref 1.2–4.8)
LYMPHOCYTES NFR BLD AUTO: 17.7 %
MCH RBC QN AUTO: 30 PG (ref 26–34)
MCHC RBC AUTO-ENTMCNC: 31 G/DL (ref 32–36)
MCV RBC AUTO: 97 FL (ref 80–100)
MONOCYTES # BLD AUTO: 0.5 X10*3/UL (ref 0.1–1)
MONOCYTES NFR BLD AUTO: 6.8 %
NEUTROPHILS # BLD AUTO: 4.76 X10*3/UL (ref 1.2–7.7)
NEUTROPHILS NFR BLD AUTO: 64.4 %
NRBC BLD-RTO: 0 /100 WBCS (ref 0–0)
PLATELET # BLD AUTO: 221 X10*3/UL (ref 150–450)
RBC # BLD AUTO: 3.23 X10*6/UL (ref 4.5–5.9)
WBC # BLD AUTO: 7.4 X10*3/UL (ref 4.4–11.3)

## 2024-07-17 PROCEDURE — 99283 EMERGENCY DEPT VISIT LOW MDM: CPT

## 2024-07-17 PROCEDURE — 83735 ASSAY OF MAGNESIUM: CPT | Performed by: SURGERY

## 2024-07-17 PROCEDURE — 36415 COLL VENOUS BLD VENIPUNCTURE: CPT | Performed by: SURGERY

## 2024-07-17 PROCEDURE — 84075 ASSAY ALKALINE PHOSPHATASE: CPT | Performed by: SURGERY

## 2024-07-17 PROCEDURE — 85025 COMPLETE CBC W/AUTO DIFF WBC: CPT | Performed by: SURGERY

## 2024-07-17 PROCEDURE — 73562 X-RAY EXAM OF KNEE 3: CPT | Mod: LEFT SIDE | Performed by: RADIOLOGY

## 2024-07-17 PROCEDURE — 73562 X-RAY EXAM OF KNEE 3: CPT | Mod: LT

## 2024-07-17 RX ORDER — TRAMADOL HYDROCHLORIDE 50 MG/1
50 TABLET ORAL EVERY 6 HOURS PRN
Status: DISCONTINUED | OUTPATIENT
Start: 2024-07-17 | End: 2024-07-18 | Stop reason: HOSPADM

## 2024-07-17 ASSESSMENT — COLUMBIA-SUICIDE SEVERITY RATING SCALE - C-SSRS
6. HAVE YOU EVER DONE ANYTHING, STARTED TO DO ANYTHING, OR PREPARED TO DO ANYTHING TO END YOUR LIFE?: NO
1. IN THE PAST MONTH, HAVE YOU WISHED YOU WERE DEAD OR WISHED YOU COULD GO TO SLEEP AND NOT WAKE UP?: NO
2. HAVE YOU ACTUALLY HAD ANY THOUGHTS OF KILLING YOURSELF?: NO

## 2024-07-17 ASSESSMENT — PAIN SCALES - GENERAL: PAINLEVEL_OUTOF10: 10 - WORST POSSIBLE PAIN

## 2024-07-17 ASSESSMENT — PAIN - FUNCTIONAL ASSESSMENT: PAIN_FUNCTIONAL_ASSESSMENT: 0-10

## 2024-07-17 ASSESSMENT — PAIN DESCRIPTION - LOCATION: LOCATION: KNEE

## 2024-07-17 ASSESSMENT — PAIN DESCRIPTION - DESCRIPTORS: DESCRIPTORS: STABBING

## 2024-07-17 ASSESSMENT — PAIN DESCRIPTION - PAIN TYPE: TYPE: ACUTE PAIN

## 2024-07-18 VITALS
TEMPERATURE: 98.4 F | OXYGEN SATURATION: 100 % | WEIGHT: 245 LBS | DIASTOLIC BLOOD PRESSURE: 85 MMHG | BODY MASS INDEX: 34.3 KG/M2 | RESPIRATION RATE: 17 BRPM | HEIGHT: 71 IN | SYSTOLIC BLOOD PRESSURE: 159 MMHG | HEART RATE: 80 BPM

## 2024-07-18 LAB
ALBUMIN SERPL BCP-MCNC: 3.9 G/DL (ref 3.4–5)
ALP SERPL-CCNC: 374 U/L (ref 33–120)
ALT SERPL W P-5'-P-CCNC: 7 U/L (ref 10–52)
ANION GAP SERPL CALC-SCNC: 17 MMOL/L (ref 10–20)
AST SERPL W P-5'-P-CCNC: 12 U/L (ref 9–39)
BILIRUB SERPL-MCNC: 0.4 MG/DL (ref 0–1.2)
BUN SERPL-MCNC: 79 MG/DL (ref 6–23)
CALCIUM SERPL-MCNC: 9.8 MG/DL (ref 8.6–10.3)
CHLORIDE SERPL-SCNC: 117 MMOL/L (ref 98–107)
CO2 SERPL-SCNC: 14 MMOL/L (ref 21–32)
CREAT SERPL-MCNC: 11.09 MG/DL (ref 0.5–1.3)
EGFRCR SERPLBLD CKD-EPI 2021: 5 ML/MIN/1.73M*2
GLUCOSE SERPL-MCNC: 81 MG/DL (ref 74–99)
MAGNESIUM SERPL-MCNC: 2 MG/DL (ref 1.6–2.4)
POTASSIUM SERPL-SCNC: 5.1 MMOL/L (ref 3.5–5.3)
PROT SERPL-MCNC: 6.9 G/DL (ref 6.4–8.2)
SODIUM SERPL-SCNC: 143 MMOL/L (ref 136–145)

## 2024-07-18 NOTE — ED TRIAGE NOTES
Pt presents from girlfriend house with complaints of Left Knee Pain. Pt states he has been have Left knee pain since 2018. Pt states when he walks his knee swells and he feels like he will fall down. Pt states he has taken Tylenol with no relief. Pt denies CP/SOB/N/V/D. Left knee is swollen

## 2024-07-18 NOTE — ED PROVIDER NOTES
Chief Complaint   Patient presents with    Joint Swelling     LEFT KNEE     HPI:   Kj Colmenares is an 53 y.o. male with a past medical history of ESRD on HD, HTN, gout, type 2 diabetes presenting to the ED with chief complaint of left knee pain and swelling.  He explains that this has been going on since 2018.  He explains the pain is worse when he is up and walking around and active and this will cause the knee to swell up.  He explains that he has been very active today and the knee has increased swelling.  He denies injury.  Denies numbness or tingling down the leg.  Denies pain in the hip or back.  Denies fever chills or sweats.  He does have a appointment with orthopedics in 6 days with Olman Prescott.   Patient also reports that he has not been to dialysis in over a week.  He explains that he has not gone because he has been hanging out with his girlfriend.  Denies SI HI he explains that dialysis would not take him back until he.  Gets evaluated in the ED. he denies any symptoms no headache abdominal pain nausea vomiting.     No Known Allergies:  Past Medical History:   Diagnosis Date    Diabetes mellitus (Multi)     Hypertension     Stroke (Multi)      Past Surgical History:   Procedure Laterality Date    CT HEAD ANGIO W AND WO IV CONTRAST  2/4/2014    CT HEAD ANGIO W AND WO IV CONTRAST 2/4/2014 [facility] CL[facility]ICAL LEGACY    CT HEAD ANGIO W AND WO IV CONTRAST  2/13/2014    CT HEAD ANGIO W AND WO IV CONTRAST 2/13/2014 [facility] CLINICAL LEGACY     No family history on file.     Physical Exam  Vitals and nursing note reviewed.   Constitutional:       General: He is not in acute distress.     Appearance: He is well-developed.   HENT:      Head: Normocephalic and atraumatic.      Right Ear: Tympanic membrane normal.      Left Ear: Tympanic membrane normal.      Nose: Nose normal.      Mouth/Throat:      Mouth: Mucous membranes are moist.      Pharynx: Oropharynx is clear.   Eyes:      Extraocular Movements: Extraocular  movements intact.      Conjunctiva/sclera: Conjunctivae normal.      Pupils: Pupils are equal, round, and reactive to light.   Cardiovascular:      Rate and Rhythm: Normal rate and regular rhythm.      Heart sounds: No murmur heard.  Pulmonary:      Effort: Pulmonary effort is normal. No respiratory distress.      Breath sounds: Normal breath sounds.   Abdominal:      Palpations: Abdomen is soft.      Tenderness: There is no abdominal tenderness. There is no guarding or rebound.   Musculoskeletal:         General: No swelling.      Cervical back: Neck supple.      Comments: Exam of the left knee does show moderate effusion.  Knee does have some mid flexion laxity.  Stable to AP stresses.  Not significantly red hot or tender.  Very mild distal edema neurovascular intact good distal pulses skin is intact   Skin:     General: Skin is warm and dry.      Capillary Refill: Capillary refill takes less than 2 seconds.   Neurological:      Mental Status: He is alert.   Psychiatric:         Mood and Affect: Mood normal.        VS: As documented in the triage note and EMR flowsheet from this visit were reviewed.    Medical Decision Making: This is a 53-year-old male with a history of HTN, ESRD on HD, type 2 diabetes and gout presenting to the ED with chronic left knee pain for the last 6 years.  He explains that the more active he is the knee will swell up and cause him pain.  He denies other symptoms.  On exam he is afebrile.  The left knee is not significantly red hot swollen or tender.  He does have full range of motion of the knee.  There is a moderate effusion.  X-rays obtained today showed significant arthritis throughout the entire knee but most significant in the medial compartment.  Discussed risks versus benefits of arthrocentesis patient is agreeable to waiting to have the knee drained.  Discussed the risks of draining the knee.  We will wrap the knee with an Ace bandage to promote reabsorption.  He is seeing an  orthopedic on the 23rd. we discussed that he missed his last dialysis however he feels okay does not have any complaints of weakness or pain otherwise.  Patient explains he has dialysis tomorrow and wants to go to his appointment.  Patient was then triaged to a higher level upon discharge because he reports that he needs blood work taken to return to dialysis tomorrow.  He explains that he has not had dialysis in over a week.  He explains dialysis will not take him back until he gets evaluated.  Patient is very argumentative.  His lab work showed normocytic anemia and his potassium was normal so he was discharged to follow-up and go to dialysis tomorrow  Differential includes sprain, strain, fracture, arthritis, meniscal injury, radicular pain, ligamentous rupture, tendon rupture  Diagnoses as of 07/17/24 2140   Arthritis of left knee   Effusion of left knee     Counseling: Spoke with the patient and discussed today´s findings, in addition to providing specific details for the plan of care and expected course.  Patient was given the opportunity to ask questions.    Discussed return precautions and importance of follow-up.  Advised to follow-up with Orthopedics.  I specifically advised to return to the ED for changing or worsening symptoms, new symptoms, complaint specific precautions, and precautions listed on the discharge paperwork.  Educated on the common potential side effects of medications prescribed.    I advised the patient that the emergency evaluation and treatment provided today doesn't end their need for medical care. It is very important that they follow-up with their primary care provider or other specialist as instructed.    The plan of care was mutually agreed upon with the patient. The patient and/or family were given the opportunity to ask questions. All questions asked today in the ED were answered to the best of my ability with today's information.    This patient was cared for in the setting of  nationwide stress on resources and staffing.    This report was transcribed using voice recognition software.  Every effort was made to ensure accuracy, however, inadvertently computerized transcription errors may be present.       Samuel Barfield PA-C  07/17/24 2234       Samuel Barfield PA-C  07/18/24 0032

## 2024-07-23 ENCOUNTER — OFFICE VISIT (OUTPATIENT)
Dept: ORTHOPEDIC SURGERY | Facility: CLINIC | Age: 54
End: 2024-07-23
Payer: COMMERCIAL

## 2024-07-23 DIAGNOSIS — M17.12 PRIMARY OSTEOARTHRITIS OF LEFT KNEE: ICD-10-CM

## 2024-07-23 DIAGNOSIS — N25.0 RENAL OSTEODYSTROPHY: Primary | ICD-10-CM

## 2024-07-23 DIAGNOSIS — M00.9 PYOGENIC ARTHRITIS OF LEFT KNEE JOINT, DUE TO UNSPECIFIED ORGANISM (MULTI): ICD-10-CM

## 2024-07-23 DIAGNOSIS — B99.9 INFECTION: ICD-10-CM

## 2024-07-23 LAB
BASOPHILS NFR FLD MANUAL: 0 %
BLASTS NFR FLD MANUAL: 0 %
CLARITY FLD: ABNORMAL
COLOR FLD: ABNORMAL
EOSINOPHIL NFR FLD MANUAL: 0 %
IMMATURE GRANULOCYTES IN FLUID: 0 %
LYMPHOCYTES NFR FLD MANUAL: 36 %
MONOS+MACROS NFR FLD MANUAL: 21 %
NEUTROPHILS NFR FLD MANUAL: 43 %
OTHER CELLS NFR FLD MANUAL: 0 %
PLASMA CELLS NFR FLD MANUAL: 0 %
RBC # FLD AUTO: ABNORMAL /UL
TOTAL CELLS COUNTED FLD: 100
WBC # FLD AUTO: 237 /UL

## 2024-07-23 PROCEDURE — 20610 DRAIN/INJ JOINT/BURSA W/O US: CPT | Mod: LT | Performed by: STUDENT IN AN ORGANIZED HEALTH CARE EDUCATION/TRAINING PROGRAM

## 2024-07-23 PROCEDURE — 87070 CULTURE OTHR SPECIMN AEROBIC: CPT | Mod: AHULAB | Performed by: STUDENT IN AN ORGANIZED HEALTH CARE EDUCATION/TRAINING PROGRAM

## 2024-07-23 PROCEDURE — 3044F HG A1C LEVEL LT 7.0%: CPT | Performed by: STUDENT IN AN ORGANIZED HEALTH CARE EDUCATION/TRAINING PROGRAM

## 2024-07-23 PROCEDURE — 1036F TOBACCO NON-USER: CPT | Performed by: STUDENT IN AN ORGANIZED HEALTH CARE EDUCATION/TRAINING PROGRAM

## 2024-07-23 PROCEDURE — 89051 BODY FLUID CELL COUNT: CPT | Performed by: STUDENT IN AN ORGANIZED HEALTH CARE EDUCATION/TRAINING PROGRAM

## 2024-07-23 PROCEDURE — 99214 OFFICE O/P EST MOD 30 MIN: CPT | Performed by: STUDENT IN AN ORGANIZED HEALTH CARE EDUCATION/TRAINING PROGRAM

## 2024-07-23 PROCEDURE — 99214 OFFICE O/P EST MOD 30 MIN: CPT | Mod: 25 | Performed by: STUDENT IN AN ORGANIZED HEALTH CARE EDUCATION/TRAINING PROGRAM

## 2024-07-23 NOTE — PROGRESS NOTES
PRIMARY CARE PHYSICIAN: New Frye MD  REFERRING PROVIDER: Sri Farr PA-C  [address] [place] [address]       SUBJECTIVE  CHIEF COMPLAINT:   Chief Complaint   Patient presents with    Left Knee - New Patient Visit        HPI: Kj Colmenares is a pleasant 53 y.o. year-old male who is seen today for evaluation of left knee pain.  The patient has had longstanding knee pain.  He was seen previously by 1 my partners who is now retired.  He received an intra-articular injection twice into the knee.  Patient is using a cane for ambulation.  He feels disabled by his knee pain.  He has persistence of his effusion.  He is using over-the-counter medications which are insufficient to alleviate his symptoms.  He has not had any recent fall or trauma.    The patient is on dialysis.  He was recently admitted to [facility].  He denies any history of infection in the knee.  No recent systemic infection.    REVIEW OF SYSTEMS  The patient denies any fever, chills, chest pain, shortness of breath or difficulty breathing.  Patient denies any numbness, tingling, or radicular symptoms.  Adult patient history sheet was filled out by the patient today in clinic and will be scanned into the EMR.  I personally reviewed this form which will be scanned into the EMR.  This includes Past Medical History, Past Surgical History, Medications, Allergies, Social History, Family History and 12 point review of systems.    Past Medical History:   Diagnosis Date    Diabetes mellitus (Multi)     Hypertension     Stroke (Multi)         No Known Allergies     Past Surgical History:   Procedure Laterality Date    CT HEAD ANGIO W AND WO IV CONTRAST  2/4/2014    CT HEAD ANGIO W AND WO IV CONTRAST 2/4/2014 [facility] CLINICAL LEGACY    CT HEAD ANGIO W AND WO IV CONTRAST  2/13/2014    CT HEAD ANGIO W AND WO IV CONTRAST 2/13/2014 [facility] CLINICAL LEGACY        No family history on file.     Social History     Socioeconomic History    Marital  status:      Spouse name: Not on file    Number of children: Not on file    Years of education: Not on file    Highest education level: Not on file   Occupational History    Not on file   Tobacco Use    Smoking status: Never    Smokeless tobacco: Never   Substance and Sexual Activity    Alcohol use: Never    Drug use: Never    Sexual activity: Defer   Other Topics Concern    Not on file   Social History Narrative    Not on file     Social Determinants of Health     Financial Resource Strain: Patient Declined (4/28/2024)    Overall Financial Resource Strain (CARDIA)     Difficulty of Paying Living Expenses: Patient declined   Food Insecurity: Patient Declined (11/11/2023)    Hunger Vital Sign     Worried About Running Out of Food in the Last Year: Patient declined     Ran Out of Food in the Last Year: Patient declined   Transportation Needs: Patient Declined (4/28/2024)    PRAPARE - Transportation     Lack of Transportation (Medical): Patient declined     Lack of Transportation (Non-Medical): Patient declined   Physical Activity: Inactive (11/17/2023)    Exercise Vital Sign     Days of Exercise per Week: 0 days     Minutes of Exercise per Session: 0 min   Stress: No Stress Concern Present (11/11/2023)    [employer] - Occupational Stress Questionnaire     Feeling of Stress : Not at all   Social Connections: Moderately Integrated (11/11/2023)    Social Connection and Isolation Panel [NHANES]     Frequency of Communication with Friends and Family: More than three times a week     Frequency of Social Gatherings with Friends and Family: Once a week     Attends Yazdanism Services: 1 to 4 times per year     Active Member of Clubs or Organizations: Yes     Attends Club or Organization Meetings: 1 to 4 times per year     Marital Status: Never    Intimate Partner Violence: Not At Risk (11/11/2023)    Humiliation, Afraid, Rape, and Kick questionnaire     Fear of Current or Ex-Partner:  No     Emotionally Abused: No     Physically Abused: No     Sexually Abused: No   Housing Stability: Low Risk  (4/30/2024)    Housing Stability Vital Sign     Unable to Pay for Housing in the Last Year: No     Number of Places Lived in the Last Year: 1     Unstable Housing in the Last Year: No        CURRENT MEDICATIONS:   Current Outpatient Medications   Medication Sig Dispense Refill    acetaminophen (Tylenol) 500 mg tablet Take 1 tablet (500 mg) by mouth once daily as needed for mild pain (1 - 3).      apixaban (Eliquis) 2.5 mg tablet Take 1 tablet (2.5 mg) by mouth 2 times a day.      aspirin 81 mg EC tablet Take 1 tablet (81 mg) by mouth once daily.      B complex-vitamin C-folic acid (Nephrocaps) 1 mg capsule Take 1 capsule by mouth once daily.      calcium acetate (Phoslo) 667 mg capsule Take 2 capsules (1,334 mg) by mouth 3 times a day with meals. 180 capsule 0    ergocalciferol (Vitamin D-2) 1.25 MG (36871 UT) capsule Take 1 capsule (1,250 mcg) by mouth 1 (one) time per week. MONDAY NEED AN REFILL      finasteride (Proscar) 5 mg tablet Take 1 tablet (5 mg) by mouth once daily. Do not crush, chew, or split.      melatonin 3 mg capsule Take 1 capsule by mouth once daily at bedtime.      metoprolol succinate XL (Toprol-XL) 25 mg 24 hr tablet Take 2 tablets (50 mg) by mouth once daily. Do not crush or chew. 60 tablet 0    midodrine (Proamatine) 10 mg tablet Take 1 tablet (10 mg) by mouth if needed (for HD with SBP <90).      OLANZapine (ZyPREXA) 5 mg tablet Take 1 tablet (5 mg) by mouth once daily at bedtime. 30 tablet 0    ondansetron (Zofran) 4 mg tablet Take 1 tablet (4 mg) by mouth every 12 hours if needed for nausea or vomiting.      rosuvastatin (Crestor) 20 mg tablet Take 1 tablet (20 mg) by mouth once daily at bedtime.      tamsulosin (Flomax) 0.4 mg 24 hr capsule Take 1 capsule (0.4 mg) by mouth once daily.       No current facility-administered medications for this visit.        OBJECTIVE    PHYSICAL  EXAM  There is no height or weight on file to calculate BMI.    General: Well-appearing male in no acute distress.  Awake, alert and oriented.  Pleasant and cooperative.  Respiratory: Non-labored breathing  Mood: Euthymic   Gait: Antalgic  Assistive Device: Single cane    Affected Left Knee  Limb Alignment: About 15 degrees of varus  ROM: 5-100  Stable to varus and valgus stress at full extension and 30 degrees of flexion  Skin: Intact, no abrasions or draining sinuses  Effusion: None  Quad Strength: 5/5  Hamstring Strength: 5/5  Patella Crepitus: None  Patella Grind: Negative  Tenderness: Medial and lateral joint line  Sensation: Intact to light touch distally  Motor function: Able to fire TA, EHL, G/S  Pulses: Foot warm well-perfused.  No palpable pulses    IMAGING:  AP / lateral/ mid-flexion/sunrise views: Independent review of left knee x-rays was performed. The findings were reviewed with the patient.  The patient has significant erosive changes in the medial tibial plateau.  He also has significant erosive changes in the medial femoral condyle.  The common duration are concerning for septic arthritis.  Alternatively, the patient may have some bone loss due to his dialysis.    ASSESSMENT & PLAN    IMPRESSION:  Kj Colmenares is a 53 y.o. male with degenerative changes of the left knee concerning for chronic septic arthritis versus renal osteodystrophy.    PLAN:  I recommended an aspiration of his knee.  I do not believe that it is safe to perform an injection of corticosteroid into his knee until we have ruled out infection.  I explained to the patient that we would be able to reinject his knee with corticosteroid once the aspiration results rule out infection.    I further explained to the patient that he is not a candidate for joint replacement surgery until he is off of hemodialysis.  The risk of infection precludes him from total joint replacement.  He can be managed conservatively with intra-articular  injections until he receives his kidney transplant.  He is on the transplant list.    Patient ID: Kj Colmenares is a 53 y.o. male.    L Inj/Asp: L knee on 7/23/2024 2:40 PM  Indications: diagnostic evaluation and joint swelling  Details: 18 G needle, superolateral approach  Aspirate: 7 mL yellow; sent for lab analysis  Outcome: tolerated well, no immediate complications  Procedure, treatment alternatives, risks and benefits explained, specific risks discussed. Immediately prior to procedure a time out was called to verify the correct patient, procedure, equipment, support staff and site/side marked as required. Patient was prepped and draped in the usual sterile fashion.         I will call the patient with his results.    *This note was created using voice recognition software and was not corrected for typographical or grammatical errors.*

## 2024-07-26 LAB
BACTERIA FLD CULT: NORMAL
GRAM STN SPEC: NORMAL
GRAM STN SPEC: NORMAL

## 2024-07-30 ENCOUNTER — APPOINTMENT (OUTPATIENT)
Dept: ORTHOPEDIC SURGERY | Facility: CLINIC | Age: 54
End: 2024-07-30
Payer: COMMERCIAL

## 2024-08-05 ENCOUNTER — APPOINTMENT (OUTPATIENT)
Dept: GASTROENTEROLOGY | Facility: CLINIC | Age: 54
End: 2024-08-05
Payer: COMMERCIAL

## 2024-08-26 ENCOUNTER — APPOINTMENT (OUTPATIENT)
Dept: ORTHOPEDIC SURGERY | Facility: CLINIC | Age: 54
End: 2024-08-26
Payer: MEDICARE

## 2025-03-04 ENCOUNTER — APPOINTMENT (OUTPATIENT)
Dept: CARDIOLOGY | Facility: HOSPITAL | Age: 55
DRG: 193 | End: 2025-03-04
Payer: COMMERCIAL

## 2025-03-04 ENCOUNTER — HOSPITAL ENCOUNTER (INPATIENT)
Facility: HOSPITAL | Age: 55
End: 2025-03-04
Attending: EMERGENCY MEDICINE | Admitting: INTERNAL MEDICINE
Payer: COMMERCIAL

## 2025-03-04 ENCOUNTER — APPOINTMENT (OUTPATIENT)
Dept: RADIOLOGY | Facility: HOSPITAL | Age: 55
End: 2025-03-04
Payer: COMMERCIAL

## 2025-03-04 DIAGNOSIS — M17.12 ARTHRITIS OF LEFT KNEE: ICD-10-CM

## 2025-03-04 DIAGNOSIS — D64.9 ANEMIA, UNSPECIFIED TYPE: ICD-10-CM

## 2025-03-04 DIAGNOSIS — J10.1 INFLUENZA A: Primary | ICD-10-CM

## 2025-03-04 DIAGNOSIS — I50.20 SYSTOLIC HEART FAILURE, UNSPECIFIED HF CHRONICITY: ICD-10-CM

## 2025-03-04 DIAGNOSIS — I48.0 PAROXYSMAL ATRIAL FIBRILLATION WITH RVR (MULTI): ICD-10-CM

## 2025-03-04 DIAGNOSIS — G47.00 INSOMNIA, UNSPECIFIED TYPE: ICD-10-CM

## 2025-03-04 DIAGNOSIS — N18.6 END-STAGE RENAL DISEASE ON HEMODIALYSIS (MULTI): ICD-10-CM

## 2025-03-04 DIAGNOSIS — E78.5 DYSLIPIDEMIA: ICD-10-CM

## 2025-03-04 DIAGNOSIS — Z99.2 END-STAGE RENAL DISEASE ON HEMODIALYSIS (MULTI): ICD-10-CM

## 2025-03-04 DIAGNOSIS — E87.20 NORMAL ANION GAP METABOLIC ACIDOSIS: ICD-10-CM

## 2025-03-04 LAB
ALBUMIN SERPL BCP-MCNC: 3.9 G/DL (ref 3.4–5)
ALP SERPL-CCNC: 333 U/L (ref 33–120)
ALT SERPL W P-5'-P-CCNC: 13 U/L (ref 10–52)
ANION GAP BLDV CALCULATED.4IONS-SCNC: 16 MMOL/L (ref 10–25)
ANION GAP SERPL CALC-SCNC: 15 MMOL/L (ref 10–20)
AST SERPL W P-5'-P-CCNC: 15 U/L (ref 9–39)
BASE EXCESS BLDV CALC-SCNC: -12.4 MMOL/L (ref -2–3)
BASOPHILS # BLD AUTO: 0.02 X10*3/UL (ref 0–0.1)
BASOPHILS NFR BLD AUTO: 0.2 %
BILIRUB SERPL-MCNC: 0.5 MG/DL (ref 0–1.2)
BODY TEMPERATURE: 37 DEGREES CELSIUS
BUN SERPL-MCNC: 102 MG/DL (ref 6–23)
CA-I BLDV-SCNC: 1.32 MMOL/L (ref 1.1–1.33)
CALCIUM SERPL-MCNC: 9.2 MG/DL (ref 8.6–10.3)
CARDIAC TROPONIN I PNL SERPL HS: 14 NG/L (ref 0–20)
CARDIAC TROPONIN I PNL SERPL HS: 14 NG/L (ref 0–20)
CHLORIDE BLDV-SCNC: 115 MMOL/L (ref 98–107)
CHLORIDE SERPL-SCNC: 117 MMOL/L (ref 98–107)
CO2 SERPL-SCNC: 16 MMOL/L (ref 21–32)
CREAT SERPL-MCNC: 11.03 MG/DL (ref 0.5–1.3)
EGFRCR SERPLBLD CKD-EPI 2021: 5 ML/MIN/1.73M*2
EOSINOPHIL # BLD AUTO: 0.99 X10*3/UL (ref 0–0.7)
EOSINOPHIL NFR BLD AUTO: 12.2 %
ERYTHROCYTE [DISTWIDTH] IN BLOOD BY AUTOMATED COUNT: 17.3 % (ref 11.5–14.5)
FLUAV RNA RESP QL NAA+PROBE: DETECTED
FLUBV RNA RESP QL NAA+PROBE: NOT DETECTED
GLUCOSE BLDV-MCNC: 86 MG/DL (ref 74–99)
GLUCOSE SERPL-MCNC: 89 MG/DL (ref 74–99)
HCO3 BLDV-SCNC: 14.9 MMOL/L (ref 22–26)
HCT VFR BLD AUTO: 27.9 % (ref 41–52)
HCT VFR BLD EST: 27 % (ref 41–52)
HGB BLD-MCNC: 8.7 G/DL (ref 13.5–17.5)
HGB BLDV-MCNC: 8.9 G/DL (ref 13.5–17.5)
HOLD SPECIMEN: NORMAL
HOLD SPECIMEN: NORMAL
IMM GRANULOCYTES # BLD AUTO: 0.03 X10*3/UL (ref 0–0.7)
IMM GRANULOCYTES NFR BLD AUTO: 0.4 % (ref 0–0.9)
INHALED O2 CONCENTRATION: 21 %
LACTATE BLDV-SCNC: 0.8 MMOL/L (ref 0.4–2)
LYMPHOCYTES # BLD AUTO: 1.07 X10*3/UL (ref 1.2–4.8)
LYMPHOCYTES NFR BLD AUTO: 13.2 %
MCH RBC QN AUTO: 30 PG (ref 26–34)
MCHC RBC AUTO-ENTMCNC: 31.2 G/DL (ref 32–36)
MCV RBC AUTO: 96 FL (ref 80–100)
MONOCYTES # BLD AUTO: 0.6 X10*3/UL (ref 0.1–1)
MONOCYTES NFR BLD AUTO: 7.4 %
NEUTROPHILS # BLD AUTO: 5.38 X10*3/UL (ref 1.2–7.7)
NEUTROPHILS NFR BLD AUTO: 66.6 %
NRBC BLD-RTO: 0 /100 WBCS (ref 0–0)
OXYHGB MFR BLDV: 52 % (ref 45–75)
PCO2 BLDV: 39 MM HG (ref 41–51)
PH BLDV: 7.19 PH (ref 7.33–7.43)
PLATELET # BLD AUTO: 151 X10*3/UL (ref 150–450)
PO2 BLDV: 34 MM HG (ref 35–45)
POTASSIUM BLDV-SCNC: 5.3 MMOL/L (ref 3.5–5.3)
POTASSIUM SERPL-SCNC: 5.2 MMOL/L (ref 3.5–5.3)
PROT SERPL-MCNC: 6.7 G/DL (ref 6.4–8.2)
RBC # BLD AUTO: 2.9 X10*6/UL (ref 4.5–5.9)
RSV RNA RESP QL NAA+PROBE: NOT DETECTED
SAO2 % BLDV: 53 % (ref 45–75)
SARS-COV-2 RNA RESP QL NAA+PROBE: NOT DETECTED
SODIUM BLDV-SCNC: 141 MMOL/L (ref 136–145)
SODIUM SERPL-SCNC: 143 MMOL/L (ref 136–145)
WBC # BLD AUTO: 8.1 X10*3/UL (ref 4.4–11.3)

## 2025-03-04 PROCEDURE — 71046 X-RAY EXAM CHEST 2 VIEWS: CPT

## 2025-03-04 PROCEDURE — 2500000002 HC RX 250 W HCPCS SELF ADMINISTERED DRUGS (ALT 637 FOR MEDICARE OP, ALT 636 FOR OP/ED): Performed by: EMERGENCY MEDICINE

## 2025-03-04 PROCEDURE — 82330 ASSAY OF CALCIUM: CPT | Performed by: EMERGENCY MEDICINE

## 2025-03-04 PROCEDURE — 84132 ASSAY OF SERUM POTASSIUM: CPT | Performed by: EMERGENCY MEDICINE

## 2025-03-04 PROCEDURE — 99285 EMERGENCY DEPT VISIT HI MDM: CPT | Mod: 25 | Performed by: EMERGENCY MEDICINE

## 2025-03-04 PROCEDURE — 36415 COLL VENOUS BLD VENIPUNCTURE: CPT | Performed by: EMERGENCY MEDICINE

## 2025-03-04 PROCEDURE — 2500000001 HC RX 250 WO HCPCS SELF ADMINISTERED DRUGS (ALT 637 FOR MEDICARE OP): Performed by: EMERGENCY MEDICINE

## 2025-03-04 PROCEDURE — 84484 ASSAY OF TROPONIN QUANT: CPT | Performed by: EMERGENCY MEDICINE

## 2025-03-04 PROCEDURE — 87637 SARSCOV2&INF A&B&RSV AMP PRB: CPT | Performed by: EMERGENCY MEDICINE

## 2025-03-04 PROCEDURE — 93005 ELECTROCARDIOGRAM TRACING: CPT

## 2025-03-04 PROCEDURE — 82435 ASSAY OF BLOOD CHLORIDE: CPT | Performed by: EMERGENCY MEDICINE

## 2025-03-04 PROCEDURE — 85025 COMPLETE CBC W/AUTO DIFF WBC: CPT | Performed by: EMERGENCY MEDICINE

## 2025-03-04 PROCEDURE — 71046 X-RAY EXAM CHEST 2 VIEWS: CPT | Mod: FOREIGN READ | Performed by: RADIOLOGY

## 2025-03-04 RX ORDER — ACETAMINOPHEN 325 MG/1
975 TABLET ORAL ONCE
Status: COMPLETED | OUTPATIENT
Start: 2025-03-04 | End: 2025-03-04

## 2025-03-04 RX ORDER — OSELTAMIVIR PHOSPHATE 30 MG/1
30 CAPSULE ORAL ONCE
Status: COMPLETED | OUTPATIENT
Start: 2025-03-04 | End: 2025-03-04

## 2025-03-04 RX ADMIN — ACETAMINOPHEN 975 MG: 325 TABLET, FILM COATED ORAL at 22:44

## 2025-03-04 RX ADMIN — OSELTAMIVIR PHOSPHATE 30 MG: 30 CAPSULE ORAL at 22:44

## 2025-03-04 ASSESSMENT — PAIN DESCRIPTION - PAIN TYPE: TYPE: ACUTE PAIN

## 2025-03-04 ASSESSMENT — PAIN SCALES - GENERAL
PAINLEVEL_OUTOF10: 8
PAINLEVEL_OUTOF10: 8
PAINLEVEL_OUTOF10: 9

## 2025-03-04 ASSESSMENT — PAIN - FUNCTIONAL ASSESSMENT: PAIN_FUNCTIONAL_ASSESSMENT: 0-10

## 2025-03-04 ASSESSMENT — PAIN DESCRIPTION - LOCATION: LOCATION: CHEST

## 2025-03-04 ASSESSMENT — PAIN DESCRIPTION - DESCRIPTORS: DESCRIPTORS: SHARP

## 2025-03-05 ENCOUNTER — APPOINTMENT (OUTPATIENT)
Dept: DIALYSIS | Facility: HOSPITAL | Age: 55
End: 2025-03-05
Payer: COMMERCIAL

## 2025-03-05 ENCOUNTER — APPOINTMENT (OUTPATIENT)
Dept: CARDIOLOGY | Facility: HOSPITAL | Age: 55
DRG: 193 | End: 2025-03-05
Payer: COMMERCIAL

## 2025-03-05 PROBLEM — J10.1 INFLUENZA A: Status: ACTIVE | Noted: 2025-03-05

## 2025-03-05 LAB
ANION GAP SERPL CALC-SCNC: 16 MMOL/L (ref 10–20)
BUN SERPL-MCNC: 104 MG/DL (ref 6–23)
CALCIUM SERPL-MCNC: 9.1 MG/DL (ref 8.6–10.3)
CHLORIDE SERPL-SCNC: 119 MMOL/L (ref 98–107)
CO2 SERPL-SCNC: 15 MMOL/L (ref 21–32)
CREAT SERPL-MCNC: 10.96 MG/DL (ref 0.5–1.3)
EGFRCR SERPLBLD CKD-EPI 2021: 5 ML/MIN/1.73M*2
ERYTHROCYTE [DISTWIDTH] IN BLOOD BY AUTOMATED COUNT: 17.2 % (ref 11.5–14.5)
GLUCOSE BLD MANUAL STRIP-MCNC: 113 MG/DL (ref 74–99)
GLUCOSE BLD MANUAL STRIP-MCNC: 87 MG/DL (ref 74–99)
GLUCOSE BLD MANUAL STRIP-MCNC: 92 MG/DL (ref 74–99)
GLUCOSE SERPL-MCNC: 110 MG/DL (ref 74–99)
HBV SURFACE AB SER-ACNC: >1000 MIU/ML
HBV SURFACE AG SERPL QL IA: NONREACTIVE
HCT VFR BLD AUTO: 27.6 % (ref 41–52)
HGB BLD-MCNC: 8.2 G/DL (ref 13.5–17.5)
MCH RBC QN AUTO: 28.8 PG (ref 26–34)
MCHC RBC AUTO-ENTMCNC: 29.7 G/DL (ref 32–36)
MCV RBC AUTO: 97 FL (ref 80–100)
NRBC BLD-RTO: 0 /100 WBCS (ref 0–0)
PLATELET # BLD AUTO: 140 X10*3/UL (ref 150–450)
POTASSIUM SERPL-SCNC: 4.8 MMOL/L (ref 3.5–5.3)
Q ONSET: 218 MS
QRS COUNT: 17 BEATS
QRS DURATION: 96 MS
QT INTERVAL: 338 MS
QTC CALCULATION(BAZETT): 436 MS
QTC FREDERICIA: 401 MS
R AXIS: -6 DEGREES
RBC # BLD AUTO: 2.85 X10*6/UL (ref 4.5–5.9)
SODIUM SERPL-SCNC: 145 MMOL/L (ref 136–145)
T AXIS: -18 DEGREES
T OFFSET: 387 MS
VENTRICULAR RATE: 100 BPM
WBC # BLD AUTO: 7.7 X10*3/UL (ref 4.4–11.3)

## 2025-03-05 PROCEDURE — 93010 ELECTROCARDIOGRAM REPORT: CPT | Performed by: STUDENT IN AN ORGANIZED HEALTH CARE EDUCATION/TRAINING PROGRAM

## 2025-03-05 PROCEDURE — 36415 COLL VENOUS BLD VENIPUNCTURE: CPT | Performed by: INTERNAL MEDICINE

## 2025-03-05 PROCEDURE — 80048 BASIC METABOLIC PNL TOTAL CA: CPT | Performed by: INTERNAL MEDICINE

## 2025-03-05 PROCEDURE — 99222 1ST HOSP IP/OBS MODERATE 55: CPT | Performed by: INTERNAL MEDICINE

## 2025-03-05 PROCEDURE — 99255 IP/OBS CONSLTJ NEW/EST HI 80: CPT | Performed by: STUDENT IN AN ORGANIZED HEALTH CARE EDUCATION/TRAINING PROGRAM

## 2025-03-05 PROCEDURE — 2060000001 HC INTERMEDIATE ICU ROOM DAILY

## 2025-03-05 PROCEDURE — 86706 HEP B SURFACE ANTIBODY: CPT | Mod: AHULAB | Performed by: INTERNAL MEDICINE

## 2025-03-05 PROCEDURE — 2500000001 HC RX 250 WO HCPCS SELF ADMINISTERED DRUGS (ALT 637 FOR MEDICARE OP): Performed by: INTERNAL MEDICINE

## 2025-03-05 PROCEDURE — 87340 HEPATITIS B SURFACE AG IA: CPT | Mod: AHULAB | Performed by: INTERNAL MEDICINE

## 2025-03-05 PROCEDURE — 93005 ELECTROCARDIOGRAM TRACING: CPT

## 2025-03-05 PROCEDURE — 5A1D70Z PERFORMANCE OF URINARY FILTRATION, INTERMITTENT, LESS THAN 6 HOURS PER DAY: ICD-10-PCS | Performed by: INTERNAL MEDICINE

## 2025-03-05 PROCEDURE — 82947 ASSAY GLUCOSE BLOOD QUANT: CPT

## 2025-03-05 PROCEDURE — 2500000002 HC RX 250 W HCPCS SELF ADMINISTERED DRUGS (ALT 637 FOR MEDICARE OP, ALT 636 FOR OP/ED): Performed by: INTERNAL MEDICINE

## 2025-03-05 PROCEDURE — 2500000001 HC RX 250 WO HCPCS SELF ADMINISTERED DRUGS (ALT 637 FOR MEDICARE OP): Performed by: HOSPITALIST

## 2025-03-05 PROCEDURE — 8010000001 HC DIALYSIS - HEMODIALYSIS PER DAY

## 2025-03-05 PROCEDURE — 85027 COMPLETE CBC AUTOMATED: CPT | Performed by: INTERNAL MEDICINE

## 2025-03-05 RX ORDER — PANTOPRAZOLE SODIUM 40 MG/10ML
40 INJECTION, POWDER, LYOPHILIZED, FOR SOLUTION INTRAVENOUS
Status: ACTIVE | OUTPATIENT
Start: 2025-03-05

## 2025-03-05 RX ORDER — PANTOPRAZOLE SODIUM 40 MG/1
40 TABLET, DELAYED RELEASE ORAL
Status: ON HOLD | COMMUNITY

## 2025-03-05 RX ORDER — OSELTAMIVIR PHOSPHATE 30 MG/1
30 CAPSULE ORAL
Status: COMPLETED | OUTPATIENT
Start: 2025-03-05 | End: 2025-03-07

## 2025-03-05 RX ORDER — DEXTROSE 50 % IN WATER (D50W) INTRAVENOUS SYRINGE
25
Status: ACTIVE | OUTPATIENT
Start: 2025-03-05

## 2025-03-05 RX ORDER — OLANZAPINE 5 MG/1
5 TABLET ORAL NIGHTLY
Status: ON HOLD | COMMUNITY

## 2025-03-05 RX ORDER — INSULIN LISPRO 100 [IU]/ML
0-5 INJECTION, SOLUTION INTRAVENOUS; SUBCUTANEOUS
Status: ACTIVE | OUTPATIENT
Start: 2025-03-05

## 2025-03-05 RX ORDER — METOPROLOL SUCCINATE 25 MG/1
25 TABLET, EXTENDED RELEASE ORAL DAILY
Status: ON HOLD | COMMUNITY

## 2025-03-05 RX ORDER — METOPROLOL TARTRATE 25 MG/1
25 TABLET, FILM COATED ORAL 2 TIMES DAILY
Status: DISPENSED | OUTPATIENT
Start: 2025-03-05

## 2025-03-05 RX ORDER — DEXTROSE 50 % IN WATER (D50W) INTRAVENOUS SYRINGE
12.5
Status: ACTIVE | OUTPATIENT
Start: 2025-03-05

## 2025-03-05 RX ORDER — CALCITRIOL 0.25 UG/1
0.25 CAPSULE ORAL DAILY
Status: ON HOLD | COMMUNITY

## 2025-03-05 RX ORDER — GUAIFENESIN 100 MG/5ML
200 SOLUTION ORAL 4 TIMES DAILY
Status: DISCONTINUED | OUTPATIENT
Start: 2025-03-05 | End: 2025-03-06

## 2025-03-05 RX ORDER — CALCIUM ACETATE 667 MG/1
1334 CAPSULE ORAL
Status: DISPENSED | OUTPATIENT
Start: 2025-03-05

## 2025-03-05 RX ORDER — METOPROLOL SUCCINATE 50 MG/1
50 TABLET, EXTENDED RELEASE ORAL DAILY
Status: DISCONTINUED | OUTPATIENT
Start: 2025-03-05 | End: 2025-03-05

## 2025-03-05 RX ORDER — ASPIRIN 81 MG/1
81 TABLET ORAL DAILY
Status: DISPENSED | OUTPATIENT
Start: 2025-03-05

## 2025-03-05 RX ORDER — ROSUVASTATIN CALCIUM 10 MG/1
20 TABLET, COATED ORAL NIGHTLY
Status: DISCONTINUED | OUTPATIENT
Start: 2025-03-05 | End: 2025-03-05

## 2025-03-05 RX ORDER — ACETAMINOPHEN 160 MG/5ML
650 SOLUTION ORAL EVERY 4 HOURS PRN
Status: DISCONTINUED | OUTPATIENT
Start: 2025-03-05 | End: 2025-03-05

## 2025-03-05 RX ORDER — QUETIAPINE FUMARATE 25 MG/1
25 TABLET, FILM COATED ORAL
Status: ON HOLD | COMMUNITY

## 2025-03-05 RX ORDER — FINASTERIDE 5 MG/1
5 TABLET, FILM COATED ORAL DAILY
Status: DISPENSED | OUTPATIENT
Start: 2025-03-05

## 2025-03-05 RX ORDER — ONDANSETRON HYDROCHLORIDE 2 MG/ML
4 INJECTION, SOLUTION INTRAVENOUS EVERY 8 HOURS PRN
Status: DISPENSED | OUTPATIENT
Start: 2025-03-05

## 2025-03-05 RX ORDER — PANTOPRAZOLE SODIUM 40 MG/1
40 TABLET, DELAYED RELEASE ORAL
Status: DISPENSED | OUTPATIENT
Start: 2025-03-05

## 2025-03-05 RX ORDER — ONDANSETRON 4 MG/1
4 TABLET, FILM COATED ORAL EVERY 8 HOURS PRN
Status: ACTIVE | OUTPATIENT
Start: 2025-03-05

## 2025-03-05 RX ORDER — CALCIUM ACETATE 667 MG/1
1334 CAPSULE ORAL 3 TIMES DAILY
Status: ON HOLD | COMMUNITY

## 2025-03-05 RX ORDER — MIDODRINE HYDROCHLORIDE 5 MG/1
10 TABLET ORAL AS NEEDED
Status: ACTIVE | OUTPATIENT
Start: 2025-03-05

## 2025-03-05 RX ORDER — ACETAMINOPHEN 325 MG/1
975 TABLET ORAL 3 TIMES DAILY
Status: DISPENSED | OUTPATIENT
Start: 2025-03-05

## 2025-03-05 RX ORDER — ATORVASTATIN CALCIUM 40 MG/1
40 TABLET, FILM COATED ORAL NIGHTLY
Status: DISPENSED | OUTPATIENT
Start: 2025-03-05

## 2025-03-05 RX ORDER — ACETAMINOPHEN 650 MG/1
650 SUPPOSITORY RECTAL EVERY 4 HOURS PRN
Status: DISCONTINUED | OUTPATIENT
Start: 2025-03-05 | End: 2025-03-05

## 2025-03-05 RX ORDER — ROSUVASTATIN CALCIUM 10 MG/1
10 TABLET, COATED ORAL NIGHTLY
Status: DISCONTINUED | OUTPATIENT
Start: 2025-03-05 | End: 2025-03-05

## 2025-03-05 RX ORDER — TAMSULOSIN HYDROCHLORIDE 0.4 MG/1
0.4 CAPSULE ORAL DAILY
Status: DISPENSED | OUTPATIENT
Start: 2025-03-05

## 2025-03-05 RX ORDER — ACETAMINOPHEN 325 MG/1
650 TABLET ORAL EVERY 4 HOURS PRN
Status: DISCONTINUED | OUTPATIENT
Start: 2025-03-05 | End: 2025-03-05

## 2025-03-05 RX ORDER — ESCITALOPRAM OXALATE 20 MG/1
20 TABLET ORAL DAILY
Status: ON HOLD | COMMUNITY

## 2025-03-05 RX ORDER — SEVELAMER CARBONATE 800 MG/1
800 TABLET, FILM COATED ORAL
Status: ON HOLD | COMMUNITY

## 2025-03-05 RX ADMIN — APIXABAN 2.5 MG: 5 TABLET, FILM COATED ORAL at 21:48

## 2025-03-05 RX ADMIN — BENZOCAINE AND MENTHOL 1 LOZENGE: 15; 3.6 LOZENGE ORAL at 17:34

## 2025-03-05 RX ADMIN — Medication 1 CAPSULE: at 10:54

## 2025-03-05 RX ADMIN — ACETAMINOPHEN 650 MG: 325 TABLET, FILM COATED ORAL at 16:19

## 2025-03-05 RX ADMIN — CALCIUM ACETATE 1334 MG: 667 CAPSULE ORAL at 17:34

## 2025-03-05 RX ADMIN — GUAIFENESIN 200 MG: 200 SOLUTION ORAL at 17:34

## 2025-03-05 RX ADMIN — OSELTAMAVIR PHOSPHATE 30 MG: 30 CAPSULE ORAL at 17:34

## 2025-03-05 RX ADMIN — APIXABAN 2.5 MG: 5 TABLET, FILM COATED ORAL at 10:54

## 2025-03-05 RX ADMIN — ATORVASTATIN CALCIUM 40 MG: 40 TABLET, FILM COATED ORAL at 21:48

## 2025-03-05 RX ADMIN — PANTOPRAZOLE SODIUM 40 MG: 40 TABLET, DELAYED RELEASE ORAL at 06:20

## 2025-03-05 RX ADMIN — METOPROLOL TARTRATE 25 MG: 25 TABLET, FILM COATED ORAL at 10:54

## 2025-03-05 RX ADMIN — ASPIRIN 81 MG: 81 TABLET, COATED ORAL at 10:54

## 2025-03-05 RX ADMIN — ACETAMINOPHEN 975 MG: 325 TABLET, FILM COATED ORAL at 21:49

## 2025-03-05 RX ADMIN — GUAIFENESIN 200 MG: 200 SOLUTION ORAL at 21:48

## 2025-03-05 RX ADMIN — METOPROLOL TARTRATE 25 MG: 25 TABLET, FILM COATED ORAL at 21:49

## 2025-03-05 RX ADMIN — GUAIFENESIN 200 MG: 200 SOLUTION ORAL at 10:54

## 2025-03-05 RX ADMIN — BENZOCAINE AND MENTHOL 1 LOZENGE: 15; 3.6 LOZENGE ORAL at 10:54

## 2025-03-05 SDOH — SOCIAL STABILITY: SOCIAL INSECURITY: DO YOU FEEL ANYONE HAS EXPLOITED OR TAKEN ADVANTAGE OF YOU FINANCIALLY OR OF YOUR PERSONAL PROPERTY?: NO

## 2025-03-05 SDOH — SOCIAL STABILITY: SOCIAL INSECURITY: WITHIN THE LAST YEAR, HAVE YOU BEEN HUMILIATED OR EMOTIONALLY ABUSED IN OTHER WAYS BY YOUR PARTNER OR EX-PARTNER?: NO

## 2025-03-05 SDOH — ECONOMIC STABILITY: INCOME INSECURITY: IN THE PAST 12 MONTHS HAS THE ELECTRIC, GAS, OIL, OR WATER COMPANY THREATENED TO SHUT OFF SERVICES IN YOUR HOME?: YES

## 2025-03-05 SDOH — SOCIAL STABILITY: SOCIAL INSECURITY: HAVE YOU HAD THOUGHTS OF HARMING ANYONE ELSE?: NO

## 2025-03-05 SDOH — ECONOMIC STABILITY: FOOD INSECURITY
WITHIN THE PAST 12 MONTHS, YOU WORRIED THAT YOUR FOOD WOULD RUN OUT BEFORE YOU GOT THE MONEY TO BUY MORE.: SOMETIMES TRUE

## 2025-03-05 SDOH — SOCIAL STABILITY: SOCIAL INSECURITY: WERE YOU ABLE TO COMPLETE ALL THE BEHAVIORAL HEALTH SCREENINGS?: YES

## 2025-03-05 SDOH — SOCIAL STABILITY: SOCIAL INSECURITY
WITHIN THE LAST YEAR, HAVE YOU BEEN RAPED OR FORCED TO HAVE ANY KIND OF SEXUAL ACTIVITY BY YOUR PARTNER OR EX-PARTNER?: NO

## 2025-03-05 SDOH — ECONOMIC STABILITY: FOOD INSECURITY: WITHIN THE PAST 12 MONTHS, THE FOOD YOU BOUGHT JUST DIDN'T LAST AND YOU DIDN'T HAVE MONEY TO GET MORE.: SOMETIMES TRUE

## 2025-03-05 SDOH — SOCIAL STABILITY: SOCIAL INSECURITY: WITHIN THE LAST YEAR, HAVE YOU BEEN AFRAID OF YOUR PARTNER OR EX-PARTNER?: NO

## 2025-03-05 SDOH — SOCIAL STABILITY: SOCIAL INSECURITY: HAVE YOU HAD ANY THOUGHTS OF HARMING ANYONE ELSE?: NO

## 2025-03-05 SDOH — HEALTH STABILITY: PHYSICAL HEALTH
HOW OFTEN DO YOU NEED TO HAVE SOMEONE HELP YOU WHEN YOU READ INSTRUCTIONS, PAMPHLETS, OR OTHER WRITTEN MATERIAL FROM YOUR DOCTOR OR PHARMACY?: RARELY

## 2025-03-05 SDOH — SOCIAL STABILITY: SOCIAL INSECURITY: ABUSE: ADULT

## 2025-03-05 SDOH — SOCIAL STABILITY: SOCIAL INSECURITY: DOES ANYONE TRY TO KEEP YOU FROM HAVING/CONTACTING OTHER FRIENDS OR DOING THINGS OUTSIDE YOUR HOME?: NO

## 2025-03-05 SDOH — SOCIAL STABILITY: SOCIAL INSECURITY: ARE YOU OR HAVE YOU BEEN THREATENED OR ABUSED PHYSICALLY, EMOTIONALLY, OR SEXUALLY BY ANYONE?: NO

## 2025-03-05 SDOH — HEALTH STABILITY: PHYSICAL HEALTH: ON AVERAGE, HOW MANY DAYS PER WEEK DO YOU ENGAGE IN MODERATE TO STRENUOUS EXERCISE (LIKE A BRISK WALK)?: 0 DAYS

## 2025-03-05 SDOH — SOCIAL STABILITY: SOCIAL INSECURITY: DO YOU FEEL UNSAFE GOING BACK TO THE PLACE WHERE YOU ARE LIVING?: NO

## 2025-03-05 SDOH — SOCIAL STABILITY: SOCIAL INSECURITY: ARE THERE ANY APPARENT SIGNS OF INJURIES/BEHAVIORS THAT COULD BE RELATED TO ABUSE/NEGLECT?: NO

## 2025-03-05 SDOH — SOCIAL STABILITY: SOCIAL INSECURITY: HAS ANYONE EVER THREATENED TO HURT YOUR FAMILY OR YOUR PETS?: NO

## 2025-03-05 ASSESSMENT — COGNITIVE AND FUNCTIONAL STATUS - GENERAL
CLIMB 3 TO 5 STEPS WITH RAILING: A LOT
DAILY ACTIVITIY SCORE: 23
CLIMB 3 TO 5 STEPS WITH RAILING: A LOT
PATIENT BASELINE BEDBOUND: NO
WALKING IN HOSPITAL ROOM: A LITTLE
HELP NEEDED FOR BATHING: A LITTLE
MOVING TO AND FROM BED TO CHAIR: A LITTLE
WALKING IN HOSPITAL ROOM: A LITTLE
MOBILITY SCORE: 19
MOVING TO AND FROM BED TO CHAIR: A LITTLE
MOBILITY SCORE: 19
DAILY ACTIVITIY SCORE: 24
STANDING UP FROM CHAIR USING ARMS: A LITTLE
STANDING UP FROM CHAIR USING ARMS: A LITTLE

## 2025-03-05 ASSESSMENT — ACTIVITIES OF DAILY LIVING (ADL)
GROOMING: INDEPENDENT
FEEDING YOURSELF: INDEPENDENT
WALKS IN HOME: NEEDS ASSISTANCE
PATIENT'S MEMORY ADEQUATE TO SAFELY COMPLETE DAILY ACTIVITIES?: YES
LACK_OF_TRANSPORTATION: NO
DRESSING YOURSELF: INDEPENDENT
BATHING: INDEPENDENT
JUDGMENT_ADEQUATE_SAFELY_COMPLETE_DAILY_ACTIVITIES: YES
ADEQUATE_TO_COMPLETE_ADL: YES
LACK_OF_TRANSPORTATION: NO
HEARING - RIGHT EAR: FUNCTIONAL
HEARING - LEFT EAR: FUNCTIONAL
TOILETING: INDEPENDENT

## 2025-03-05 ASSESSMENT — ENCOUNTER SYMPTOMS
MYALGIAS: 1
APPETITE CHANGE: 1
SHORTNESS OF BREATH: 1
ACTIVITY CHANGE: 1
HEMATOLOGIC/LYMPHATIC NEGATIVE: 1
CHEST TIGHTNESS: 1
EYES NEGATIVE: 1
CARDIOVASCULAR NEGATIVE: 1
NEUROLOGICAL NEGATIVE: 1
ENDOCRINE NEGATIVE: 1
ALLERGIC/IMMUNOLOGIC NEGATIVE: 1
PSYCHIATRIC NEGATIVE: 1
COUGH: 1
FATIGUE: 1
GASTROINTESTINAL NEGATIVE: 1
CHILLS: 1

## 2025-03-05 ASSESSMENT — PAIN SCALES - GENERAL
PAINLEVEL_OUTOF10: 3
PAINLEVEL_OUTOF10: 3
PAINLEVEL_OUTOF10: 0 - NO PAIN
PAINLEVEL_OUTOF10: 1
PAINLEVEL_OUTOF10: 4

## 2025-03-05 ASSESSMENT — PAIN - FUNCTIONAL ASSESSMENT
PAIN_FUNCTIONAL_ASSESSMENT: 0-10

## 2025-03-05 ASSESSMENT — PATIENT HEALTH QUESTIONNAIRE - PHQ9
2. FEELING DOWN, DEPRESSED OR HOPELESS: SEVERAL DAYS
SUM OF ALL RESPONSES TO PHQ9 QUESTIONS 1 & 2: 2
1. LITTLE INTEREST OR PLEASURE IN DOING THINGS: SEVERAL DAYS

## 2025-03-05 ASSESSMENT — PAIN DESCRIPTION - ORIENTATION: ORIENTATION: LEFT

## 2025-03-05 ASSESSMENT — LIFESTYLE VARIABLES
HOW MANY STANDARD DRINKS CONTAINING ALCOHOL DO YOU HAVE ON A TYPICAL DAY: PATIENT DOES NOT DRINK
HOW OFTEN DO YOU HAVE A DRINK CONTAINING ALCOHOL: NEVER
AUDIT-C TOTAL SCORE: 0
SKIP TO QUESTIONS 9-10: 1
AUDIT-C TOTAL SCORE: 0
PRESCIPTION_ABUSE_PAST_12_MONTHS: NO
SUBSTANCE_ABUSE_PAST_12_MONTHS: NO
HOW OFTEN DO YOU HAVE 6 OR MORE DRINKS ON ONE OCCASION: NEVER

## 2025-03-05 ASSESSMENT — PAIN DESCRIPTION - LOCATION: LOCATION: KNEE

## 2025-03-05 NOTE — ED TRIAGE NOTES
Patient BIBA from home with c/o sob, chest pain and a productive cough. Pt states his sob started 3 days ago and has had no relief with using his inhaler. Cp radiates to left leg, sharp in sensation. +productive cough, chills. Pt admits to being a dialysis pt and not having access to dialysis for the past two months. A&Ox4

## 2025-03-05 NOTE — ED PROVIDER NOTES
Chief Complaint   Patient presents with    Chest Pain    Shortness of Breath    Flu Symptoms     History of Present Illness   Kj Colmenares   MRN [id number]    54-year-old presents for evaluation of multiple symptoms.  He reports feeling unwell for the past 3 or 4 days with symptoms including cough, congestion, chest pain only when coughing, shortness of breath, body aches.  Reports that he does make urine.  Has not received hemodialysis for the past 2 months.  States that he lost a chair at 2 hemodialysis centers which prevented him from receiving hemodialysis.  Did not measure his temperature.  Unsure if he has had a fever.  No abdominal pain, nausea, vomiting.    Reviewed most recent discharge summary from 1/10/2025.  Patient was admitted at that time for treatment of RSV.  Chronic medical conditions including hypothyroidism, diabetes, hypertension, GERD, hyperlipidemia, paroxysmal atrial fibrillation, end-stage renal disease on hemodialysis.    Physical Exam   T 37 °C (98.6 °F)  HR (!) 105  BP (!) 159/108  RR 20  O2 98 % None (Room air)    General: Appears comfortable in between coughing fits.  Head: Atraumatic.  Eyes: No conjunctival injection.   Ears Nose Throat: No epistaxis. Oral mucosa moist.  Neck: Supple.  Cardiovascular: Extremities warm.  Irregularly irregular tachycardia.  Left forearm dialysis fistula with palpable thrill.  Pulmonary: No stridor. Normal respiratory effort.  Frequent coughing.  Coarse breath sounds bilaterally.  Abdominal: No distention.  Soft and nontender.  Musculoskeletal: No deformity or joint swelling.  Skin: No rash.  Psychiatric: Does not appear to respond to internal stimuli.  Neurologic: Alert. Follows directions. Face symmetric. No aphasia or dysarthria. Symmetric movement of upper and lower extremities.    ED Course & Medical Decision Making   Triage vital signs notable for elevated blood pressure 139/108 and heart rate 105.  At the time of my assessment in examination  room patient appeared uncomfortable but nontoxic with frequent coughing fits and heart rate elevation to as high as 150 bpm.  EKG demonstrated atrial fibrillation with RVR.  He reported several days of congestion and productive cough and viral PCR testing was positive for influenza A.  Given numerous comorbidities recommended Tamiflu.  Labs demonstrated normal anion gap metabolic acidosis consistent with end-stage renal disease and anemia hemoglobin 8.7 similar to recent.  Both initial and repeat high-sensitivity troponin within normal limits.  Do not suspect acute coronary syndrome, myocarditis, pulmonary emboli, or acute aortic syndrome.  On reassessment rhythm remained atrial fibrillation however heart rate varied from  therefore did not order medication for rate control.  Consult to nephrology for end-stage renal disease and missed hemodialysis.  Do not believe patient needs urgent dialysis overnight as he does not appear significantly volume overloaded and potassium is not elevated.  Discussed with hospitalist.    ED Course as of 03/05/25 0129   Tue Mar 04, 2025   2114 ECG 12 lead  Interpreted by me.  3/4/2025 at 2057.  Atrial fibrillation 100 bpm.  QRS 96, QTc 436.  No ST elevation.  Atrial fibrillation was also present on previous EKG from 6/1/2024. [MC]      ED Course User Index  [MC] Pavan Garner MD         Diagnoses as of 03/05/25 0129   Influenza A   End-stage renal disease on hemodialysis (Multi)   Anemia, unspecified type   Normal anion gap metabolic acidosis   Paroxysmal atrial fibrillation with RVR (Multi)            Pavan Garner MD  03/05/25 0246

## 2025-03-05 NOTE — POST-PROCEDURE NOTE
Report to Receiving RN:    Report To: Deena Bearden RN  Time Report Called: 1600  Hand-Off Communication: 2L of fluid removed with dialysis. Post BP was 130/97 p88. He urinated twice, 300ml's (I charted in I&O's). Hemostasis achieved with his AVF, clean gauze and tape applied. Pt still c/o pain in his L Knee.  Complications During Treatment: No  Ultrafiltration Treatment: No  Medications Administered During Dialysis: No  Blood Products Administered During Dialysis: No  Labs Sent During Dialysis: Yes  Heparin Drip Rate Changes: N/A      Electronic Signatures:   (Signed Suad Maldonado)     Last Updated: 4:00 PM by SUAD MALDONADO

## 2025-03-05 NOTE — PROGRESS NOTES
03/05/25 1434   Discharge Planning   Living Arrangements Parent   Support Systems Family members;Parent   Assistance Needed relies on others for some assistance   Type of Residence Private residence   Who is requesting discharge planning? Provider   Home or Post Acute Services In home services   Type of Home Care Services Home nursing visits;Home OT;Home PT   Expected Discharge Disposition Home H   Does the patient need discharge transport arranged? Yes   RoundTrip coordination needed? Yes   Transportation Needs   In the past 12 months, has lack of transportation kept you from medical appointments or from getting medications? no   In the past 12 months, has lack of transportation kept you from meetings, work, or from getting things needed for daily living? No   Patient Choice   Provider Choice list and CMS website (https://medicare.gov/care-compare#search) for post-acute Quality and Resource Measure Data were provided and reviewed with: Patient     Met with patient at bedside  Explained role of TCC  Patient admitted for SOB, chest pain, FLU +    Patient states pcp is Dr Samuel  Uses the [facility] on [address]  Lives with his mom  Uses a cane as needed (states knee will swell and be painful and he needs cane for support, sometimes has trouble getting up stairs d/t this knee pain, states dx of arthritis)  Also reports SOB, non compliant with HD  Per [facility] patient was most recently active with [facility] and last HD session there was in July 2024.  [facility] states he has also had chair times at [place] and [facility]  He will be a new HD chair request.  Would like [facility] at discharge, is agreeable to any agency avail    Cards following for new afib RVR.  Avail documents faxed to [facility] Rebecca in intake    ADOD few days  BARRIERS need HD chair time, card clearance  DISPO home with [facility]

## 2025-03-05 NOTE — CONSULTS
Reason For Consult  ESKD on hemodialysis Monday/Wednesday/Friday at [facility] and [facility] via left arm AV fistula positive bruit and thrill  Patient tested positive for influenza A    History Of Present Illness  Kj Colmenares is a 54 y.o. male presenting with productive cough, chest pain with congestion, and shortness of breath and bodyaches feeling chills and warm but no reported fever himself for the last 4 days.  Symptoms became progressively worse denies history of abdominal pain nausea vomiting or diarrhea patient was admitted to [facility] with RSV and discharged on January 10, 2025.  Past medical history possible persistent atrial fibrillation, hypertension, hyperlipidemia, diabetes mellitus type 2, remote history of stroke with mixed disturbance of emotions.  Past Medical History  He has a past medical history of Diabetes mellitus (Multi), Hypertension, and Stroke (Multi).    Surgical History  He has a past surgical history that includes CT angio head w and wo IV contrast (2/4/2014) and CT angio head w and wo IV contrast (2/13/2014).     Social History  He reports that he has never smoked. He has never used smokeless tobacco. He reports that he does not drink alcohol and does not use drugs.    Family History  No family history on file.     Allergies  Patient has no known allergies.    Review of Systems  All systems were reviewed     Physical Exam  General appearance: Awake and alert in no acute distress complaining of cough with chest discomfort  Head and ENT: Normocephalic/atraumatic/supple neck/no JVD  Lungs; bilateral crackles and rhonchi both lung fields  Heart; irregular/irregular rhythm  Abdomen; soft no tenderness  Extremities; no edema  Neurologic; physiologic    Dialysis access= left upper arm AV fistula with positive bruit and thrill           I&O 24HR    Intake/Output Summary (Last 24 hours) at 3/5/2025 1234  Last data filed at 3/5/2025 1200  Gross per 24 hour   Intake 880 ml   Output 750 ml   Net 130 ml  "      Vitals 24HR  Heart Rate:  []   Temp:  [36.6 °C (97.9 °F)-37.1 °C (98.8 °F)]   Resp:  [16-24]   BP: (134-163)/()   Height:  [175.3 cm (5' 9\")]   Weight:  [111 kg (245 lb)]   SpO2:  [98 %-100 %]         Relevant Results  Results from last 7 days   Lab Units 03/05/25  0552 03/04/25  2119   WBC AUTO x10*3/uL 7.7 8.1   HEMOGLOBIN g/dL 8.2* 8.7*   HEMATOCRIT % 27.6* 27.9*   PLATELETS AUTO x10*3/uL 140* 151      Results from last 7 days   Lab Units 03/05/25  0552 03/04/25  2119   SODIUM mmol/L 145 143   POTASSIUM mmol/L 4.8 5.2   CHLORIDE mmol/L 119* 117*   CO2 mmol/L 15* 16*   BUN mg/dL 104* 102*   CREATININE mg/dL 10.96* 11.03*   GLUCOSE mg/dL 110* 89   CALCIUM mg/dL 9.1 9.2    ECG 12 lead    Result Date: 3/5/2025  Atrial fibrillation Inferior infarct , age undetermined Abnormal ECG When compared with ECG of 01-JUN-2024 21:25, Inferior infarct is now Present Nonspecific T wave abnormality no longer evident in Lateral leads See ED provider note for full interpretation and clinical correlation Confirmed by Ken Uriarte ([id number]) on 3/5/2025 8:44:54 AM    XR chest 2 views    Result Date: 3/4/2025  STUDY: Chest Radiographs;  3/4/2025 at 9:58 p.m. INDICATION: Chest pain and shortness of breath for 3 days. COMPARISON: One-view CXR 6/1/2024. ACCESSION NUMBER(S): [id number] ORDERING CLINICIAN: GWEN BRITT TECHNIQUE:  Frontal and lateral chest. FINDINGS: CARDIOMEDIASTINAL SILHOUETTE: Cardiomediastinal silhouette is enlarged and unchanged.  On the lateral view there is some prominence of the hilar structures. Consider follow-up CT of chest with contrast to exclude any adenopathy..  LUNGS: Lungs do not reveal any definitive consolidations..  ABDOMEN: No remarkable upper abdominal findings.  BONES: No acute osseous changes.    Prominence of the hilar structures on the lateral view. Consider follow-up CT of chest with contrast to exclude any adenopathy. Signed by Ross Smith, DO       Assessment/Plan "   1.  ESKD on hemodialysis on Monday/Wednesday/Friday at [facility] via left upper arm AV fistula with positive bruit and thrill  Will schedule patient for hemodialysis today on Wednesday  2.  Diabetes mellitus continue current management  3.  Hypertension continue current management  4.  Anemia due to CKD we will continue VEDA and iron supplementation as needed  5.  Metabolic bone disease due to CKD continue phosphate binders and vitamin D supplementation as needed  6.  Influenza A will follow with ID      Assessment & Plan  Influenza A      I spent 54 minutes in the professional and overall care of this patient.      Dale Patel MD

## 2025-03-05 NOTE — PRE-PROCEDURE NOTE
Report from Sending RN:    Report From: Deena Bearden RN  Recent Surgery of Procedure: No  Baseline Level of Consciousness (LOC): A&Ox4  Oxygen Use: No  Type: RA  Diabetic: Yes  Last BP Med Given Day of Dialysis: See MAR  Last Pain Med Given: See MAR  Lab Tests to be Obtained with Dialysis: Yes  Blood Transfusion to be Given During Dialysis: No  Available IV Access: Yes  Medications to be Administered During Dialysis: No  Continuous IV Infusion Running: No  Restraints on Currently or in the Last 24 Hours: No  Hand-Off Communication: Full Code

## 2025-03-05 NOTE — CONSULTS
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 P/w influenza found to be in AF with RVR for which Cardiology is consulted.     He presents with  flu-like symptoms: cough, congestion, chills and body aches, progressive over the past few days and tested positive for influenza. He is ESRD but has missed HD for the past 2 months, makes some urine but labs with significant uremia, acidosis. Troponin negative x2 in the ED. States he has not been taking his meds for the past few months, he states he has not gotten them from the pharmacy. Missed HD because he moved and lost his seat.     Patient has been in AF since 2023, prior EKGs show sinus rhythm, he had a PCI previously though cannot locate records for this. His EF was preserved in 2022, now appears mildly reduced on most recent echo in 2024.     Home cardiac meds:  Metoprolol 50mg daily  Eliquis 2.5 BID  Midodrine 10mg PRN     All system reviewed and negative unless mentioned above.     Cardiac Studies:   EKG 3/4/25 - AF,     TTE 8/2024:  CONCLUSIONS:   - Exam indication: atrial fibrillation   - The left ventricle is normal in size. There is mild left ventricular   hypertrophy. Left ventricular systolic function is mildly decreased. EF = 45 ± 5%   (visual est.) Left ventricular diastolic function was not evaluated due to AF.   - The right ventricle is normal in size. Right ventricular systolic function is   low normal.   - The left atrial cavity is dilated.   - The right atrial cavity is mildly dilated.   - Exam was compared with the prior  echocardiographic exam performed on 9/22/23     Nuclear stress test 8/2024 (pharmacologic):  CONCLUSIONS:    1. SPECT Perfusion Study:  Normal.    2. There is no scintigraphic evidence for inducible ischemia.    3. No evidence of scarred myocardium.    4. Left ventricle is normal in size. The left ventricle systolic   function is normal.    5. This is a low risk scan.   LVEF 61%      Current Facility-Administered Medications:     acetaminophen (Tylenol) tablet 650 mg, 650 mg, oral, q4h PRN **OR** acetaminophen (Tylenol) oral liquid 650 mg, 650 mg, oral, q4h PRN **OR** acetaminophen (Tylenol) suppository 650 mg, 650 mg, rectal, q4h PRN, Harjit Norrise, DO    apixaban (Eliquis) tablet 2.5 mg, 2.5 mg, oral, BID, Harjit Norrise, DO    aspirin EC tablet 81 mg, 81 mg, oral, Daily, Harjit Almonteomone, DO    calcium acetate (Phoslo) capsule 1,334 mg, 1,334 mg, oral, TID, Harjit Norrise, DO    dextrose 50 % injection 12.5 g, 12.5 g, intravenous, q15 min PRN, Harjit Almonteomone, DO    dextrose 50 % injection 25 g, 25 g, intravenous, q15 min PRN, Harjit Almonteomone, DO    finasteride (Proscar) tablet 5 mg, 5 mg, oral, Daily, Harjit Almonteomone, DO    glucagon (Glucagen) injection 1 mg, 1 mg, intramuscular, q15 min PRN, Harjit ELISE Salomone, DO    glucagon (Glucagen) injection 1 mg, 1 mg, intramuscular, q15 min PRN, Harjit Almonteomone, DO    insulin lispro injection 0-5 Units, 0-5 Units, subcutaneous, TID AC, Harjit ELISE Salomone, DO    metoprolol succinate XL (Toprol-XL) 24 hr tablet 50 mg, 50 mg, oral, Daily, Harjit ELISE Salomone, DO    midodrine (Proamatine) tablet 10 mg, 10 mg, oral, PRN, Harjit Almonteomone, DO    ondansetron (Zofran) tablet 4 mg, 4 mg, oral, q8h PRN **OR** ondansetron (Zofran) injection 4 mg, 4 mg, intravenous, q8h PRN, Harjit Norrise, DO    oseltamivir (Tamiflu) capsule 30 mg, 30 mg, oral, Once per day on Monday Wednesday Friday, Harjit Hernandez DO    pantoprazole (ProtoNix) EC tablet 40 mg, 40 mg, oral, Daily before breakfast, 40 mg at 03/05/25 0620 **OR** pantoprazole (ProtoNix) injection 40 mg, 40 mg, intravenous, Daily before breakfast, Harjit Hernandez DO     rosuvastatin (Crestor) tablet 20 mg, 20 mg, oral, Nightly, Micky Piper, PharmD    tamsulosin (Flomax) 24 hr capsule 0.4 mg, 0.4 mg, oral, Daily, Harjit Hernandez DO    vitamin B complex-vitamin C-folic acid (Nephrocaps) capsule 1 capsule, 1 capsule, oral, Daily, Harjit Hernandez DO    Objective:    Vitals:    03/05/25 0818   BP: (!) 134/92   Pulse: 92   Resp: 20   Temp: 37.1 °C (98.8 °F)   SpO2: 99%       Exam:  General - well appearing   Neck - no JVD   Chest - Faint rales at the bases  Cardiac - S1, S2, irregular  Lower ext - No LE edema, warm     Labs/Imaging:     Results from last 72 hours   Lab Units 03/05/25 0552 03/04/25  2213   TROPHS ng/L  --  14   SODIUM mmol/L 145  --    POTASSIUM mmol/L 4.8  --    CO2 mmol/L 15*  --    BUN mg/dL 104*  --    CREATININE mg/dL 10.96*  --       Results from last 72 hours   Lab Units 03/05/25 0552   WBC AUTO x10*3/uL 7.7   HEMOGLOBIN g/dL 8.2*   PLATELETS AUTO x10*3/uL 140*      Assessment and Plan:   52M PMHx AF on eliquis, CAD s/p PCI, 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 P/w influenza found to be in AF with RVR for which Cardiology is consulted.     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 Rates are improved now after resuming his home metoprolol.   - Continue metoprolol succinate 50mg daily for rate control  - Rate control in AF is HR <110 in the normal setting and <130 in setting of acute illness unless AF is causing hemodynamic instability or symptoms   - Continue eliquis 2.5mg BID  - Will arrange for follow up with Dr. Murphy within 1 month, at that time if patient is compliant with AC and remains in AF can consider DCCV as outpatient    Please reach out with any further questions.     Larissa Lopez  Cardiology Fellow   PGY-5    I saw and evaluated the patient. I personally obtained the key and critical portions of the history and  physical exam. I reviewed the fellow's documentation and discussed the patient with the fellow. I agree with the fellow's medical decision making as documented in the fellow's note and have edited it as necessary.  I personally reviewed the patient's recent labs, medications, orders, EKGs, and pertinent cardiac imaging/ echocardiography.     Thank you for allowing me to participate in their care.  Please feel free to call me with any further questions or concerns.        Antonio Murphy MD, FACC, ARTURO RYDER  Division of Cardiovascular Medicine  System Director, Nuclear Cardiology   Medical Director, [facility] [place], [facility]   Clinical , [employer]  [email or web address]   Office:  [phone]

## 2025-03-05 NOTE — H&P
History Of Present Illness  Kj Colmenares is a 54 y.o. male with a past medical history of persistent atrial fibrillation,Hypertension hyperlipidemia, type 2 diabetes mellitus, class II obesity BMI 36, end-stage renal disease on hemodialysis, noncompliance, anemia, remote stroke, adjustment disorder with mixed disturbance of emotions and conduct who presented to [facility] ER complaining of productive cough chest congestion congestion, chest pain with coughing, shortness of breath, body aches, chills feeling warm but not checking a temperature for the past 4 days.  His symptoms have become progressively worse over the past 4 days.  He is end-stage renal disease but makes a small amount of urine.  He endorses that he has not received hemodialysis over the past 2 months.  Denies any abdominal pain nausea or vomiting.  He was admitted at [facility] with RSV and discharged 1/10/2025.      Past Medical History  Past Medical History:   Diagnosis Date    Diabetes mellitus (Multi)     Hypertension     Stroke (Multi)         Surgical History  Past Surgical History:   Procedure Laterality Date    CT HEAD ANGIO W AND WO IV CONTRAST  2/4/2014    CT HEAD ANGIO W AND WO IV CONTRAST 2/4/2014 [facility]    CT HEAD ANGIO W AND WO IV CONTRAST  2/13/2014    CT HEAD ANGIO W AND WO IV CONTRAST 2/13/2014 [facility] CLINICAL LEGAC[facility]         Social History  He reports that he has never smoked. He has never used smokeless tobacco. He reports that he does not drink alcohol and does not use drugs.    Family History  No family history on file.     Allergies  Patient has no known allergies.    Review of Systems   Constitutional:  Positive for activity change, appetite change, chills and fatigue.   HENT: Negative.     Eyes: Negative.    Respiratory:  Positive for cough, chest tightness and shortness of breath.    Cardiovascular: Negative.    Gastrointestinal: Negative.    Endocrine: Negative.    Genitourinary: Negative.   "  Musculoskeletal:  Positive for myalgias.   Skin: Negative.    Allergic/Immunologic: Negative.    Neurological: Negative.    Hematological: Negative.    Psychiatric/Behavioral: Negative.     All other systems reviewed and are negative.       Physical Exam  Vitals and nursing note reviewed.   Constitutional:       Appearance: He is ill-appearing.   HENT:      Head: Normocephalic.      Right Ear: External ear normal.      Left Ear: External ear normal.      Nose: Nose normal.      Mouth/Throat:      Mouth: Mucous membranes are dry.      Pharynx: Oropharynx is clear.   Eyes:      Extraocular Movements: Extraocular movements intact.      Conjunctiva/sclera: Conjunctivae normal.      Pupils: Pupils are equal, round, and reactive to light.   Cardiovascular:      Rate and Rhythm: Normal rate. Rhythm irregular.   Pulmonary:      Effort: Pulmonary effort is normal.      Breath sounds: Rales present.   Abdominal:      General: Abdomen is flat. Bowel sounds are normal.      Palpations: Abdomen is soft.   Musculoskeletal:         General: Normal range of motion.      Right lower leg: Edema present.      Left lower leg: Edema present.   Skin:     General: Skin is warm and dry.   Neurological:      General: No focal deficit present.      Mental Status: He is alert. Mental status is at baseline.   Psychiatric:         Mood and Affect: Mood normal.         Behavior: Behavior normal.          Last Recorded Vitals  Blood pressure (!) 143/101, pulse 84, temperature 37 °C (98.6 °F), temperature source Oral, resp. rate (!) 21, height 1.753 m (5' 9\"), weight 111 kg (245 lb), SpO2 100%.    Relevant Results  Meds:  Scheduled medications  apixaban, 2.5 mg, oral, BID  aspirin, 81 mg, oral, Daily  calcium acetate, 1,334 mg, oral, TID  finasteride, 5 mg, oral, Daily  metoprolol succinate XL, 50 mg, oral, Daily  pantoprazole, 40 mg, oral, Daily before breakfast   Or  pantoprazole, 40 mg, intravenous, Daily before breakfast  rosuvastatin, 20 " mg, oral, Nightly  tamsulosin, 0.4 mg, oral, Daily  vitamin B complex-vitamin C-folic acid, 1 capsule, oral, Daily      Continuous medications     PRN medications  PRN medications: acetaminophen **OR** acetaminophen **OR** acetaminophen, midodrine, ondansetron **OR** ondansetron   Current Outpatient Medications   Medication Instructions    acetaminophen (TYLENOL) 500 mg, oral, Daily PRN    apixaban (ELIQUIS) 2.5 mg, oral, 2 times daily    aspirin 81 mg, oral, Daily    B complex-vitamin C-folic acid (Nephrocaps) 1 mg capsule 1 capsule, oral, Daily    calcium acetate (PHOSLO) 1,334 mg, oral, 3 times daily (morning, midday, late afternoon)    ergocalciferol (VITAMIN D-2) 1.25 mg, oral, Once Weekly, MONDAY NEED AN REFILL    finasteride (PROSCAR) 5 mg, oral, Daily, Do not crush, chew, or split.    melatonin 3 mg capsule 1 capsule, oral, Nightly    metoprolol succinate XL (TOPROL-XL) 50 mg, oral, Daily, Do not crush or chew.    midodrine (PROAMATINE) 10 mg, oral, As needed    OLANZapine (ZYPREXA) 5 mg, oral, Nightly    ondansetron (ZOFRAN) 4 mg, oral, Every 12 hours PRN    rosuvastatin (CRESTOR) 20 mg, oral, Nightly    tamsulosin (FLOMAX) 0.4 mg, oral, Daily        Labs:  Results for orders placed or performed during the hospital encounter of 03/04/25 (from the past 24 hours)   Sars-CoV-2 and Influenza A/B PCR   Result Value Ref Range    Flu A Result Detected (A) Not Detected    Flu B Result Not Detected Not Detected    Coronavirus 2019, PCR Not Detected Not Detected   RSV PCR   Result Value Ref Range    RSV PCR Not Detected Not Detected   CBC and Auto Differential   Result Value Ref Range    WBC 8.1 4.4 - 11.3 x10*3/uL    nRBC 0.0 0.0 - 0.0 /100 WBCs    RBC 2.90 (L) 4.50 - 5.90 x10*6/uL    Hemoglobin 8.7 (L) 13.5 - 17.5 g/dL    Hematocrit 27.9 (L) 41.0 - 52.0 %    MCV 96 80 - 100 fL    MCH 30.0 26.0 - 34.0 pg    MCHC 31.2 (L) 32.0 - 36.0 g/dL    RDW 17.3 (H) 11.5 - 14.5 %    Platelets 151 150 - 450 x10*3/uL     Neutrophils % 66.6 40.0 - 80.0 %    Immature Granulocytes %, Automated 0.4 0.0 - 0.9 %    Lymphocytes % 13.2 13.0 - 44.0 %    Monocytes % 7.4 2.0 - 10.0 %    Eosinophils % 12.2 0.0 - 6.0 %    Basophils % 0.2 0.0 - 2.0 %    Neutrophils Absolute 5.38 1.20 - 7.70 x10*3/uL    Immature Granulocytes Absolute, Automated 0.03 0.00 - 0.70 x10*3/uL    Lymphocytes Absolute 1.07 (L) 1.20 - 4.80 x10*3/uL    Monocytes Absolute 0.60 0.10 - 1.00 x10*3/uL    Eosinophils Absolute 0.99 (H) 0.00 - 0.70 x10*3/uL    Basophils Absolute 0.02 0.00 - 0.10 x10*3/uL   Comprehensive metabolic panel   Result Value Ref Range    Glucose 89 74 - 99 mg/dL    Sodium 143 136 - 145 mmol/L    Potassium 5.2 3.5 - 5.3 mmol/L    Chloride 117 (H) 98 - 107 mmol/L    Bicarbonate 16 (L) 21 - 32 mmol/L    Anion Gap 15 10 - 20 mmol/L    Urea Nitrogen 102 (HH) 6 - 23 mg/dL    Creatinine 11.03 (H) 0.50 - 1.30 mg/dL    eGFR 5 (L) >60 mL/min/1.73m*2    Calcium 9.2 8.6 - 10.3 mg/dL    Albumin 3.9 3.4 - 5.0 g/dL    Alkaline Phosphatase 333 (H) 33 - 120 U/L    Total Protein 6.7 6.4 - 8.2 g/dL    AST 15 9 - 39 U/L    Bilirubin, Total 0.5 0.0 - 1.2 mg/dL    ALT 13 10 - 52 U/L   Blood Gas Venous Full Panel   Result Value Ref Range    POCT pH, Venous 7.19 (LL) 7.33 - 7.43 pH    POCT pCO2, Venous 39 (L) 41 - 51 mm Hg    POCT pO2, Venous 34 (L) 35 - 45 mm Hg    POCT SO2, Venous 53 45 - 75 %    POCT Oxy Hemoglobin, Venous 52.0 45.0 - 75.0 %    POCT Hematocrit Calculated, Venous 27.0 (L) 41.0 - 52.0 %    POCT Sodium, Venous 141 136 - 145 mmol/L    POCT Potassium, Venous 5.3 3.5 - 5.3 mmol/L    POCT Chloride, Venous 115 (H) 98 - 107 mmol/L    POCT Ionized Calicum, Venous 1.32 1.10 - 1.33 mmol/L    POCT Glucose, Venous 86 74 - 99 mg/dL    POCT Lactate, Venous 0.8 0.4 - 2.0 mmol/L    POCT Base Excess, Venous -12.4 (L) -2.0 - 3.0 mmol/L    POCT HCO3 Calculated, Venous 14.9 (L) 22.0 - 26.0 mmol/L    POCT Hemoglobin, Venous 8.9 (L) 13.5 - 17.5 g/dL    POCT Anion Gap, Venous 16.0  10.0 - 25.0 mmol/L    Patient Temperature 37.0 degrees Celsius    FiO2 21 %   Troponin I, High Sensitivity, Initial   Result Value Ref Range    Troponin I, High Sensitivity 14 0 - 20 ng/L   Troponin, High Sensitivity, 1 Hour   Result Value Ref Range    Troponin I, High Sensitivity 14 0 - 20 ng/L   Light Blue Top   Result Value Ref Range    Extra Tube Hold for add-ons.    Gray Top   Result Value Ref Range    Extra Tube Hold for add-ons.       Imaging:  XR chest 2 views    Result Date: 3/4/2025  STUDY: Chest Radiographs;  3/4/2025 at 9:58 p.m. INDICATION: Chest pain and shortness of breath for 3 days. COMPARISON: One-view CXR 6/1/2024. ACCESSION NUMBER(S): [id number] ORDERING CLINICIAN: GWEN BRITT TECHNIQUE:  Frontal and lateral chest. FINDINGS: CARDIOMEDIASTINAL SILHOUETTE: Cardiomediastinal silhouette is enlarged and unchanged.  On the lateral view there is some prominence of the hilar structures. Consider follow-up CT of chest with contrast to exclude any adenopathy..  LUNGS: Lungs do not reveal any definitive consolidations..  ABDOMEN: No remarkable upper abdominal findings.  BONES: No acute osseous changes.    Prominence of the hilar structures on the lateral view. Consider follow-up CT of chest with contrast to exclude any adenopathy. Signed by Ross Smith DO      Assessment/Plan   Influenza A viremia  Plan:  Tamiflu    Metabolic acidosis secondary to missed dialysis.  End-stage renal disease  Plan:  Nephrology consultation    Persistent atrial fibrillation  Plan:  Telemetry  Metoprolol XL 50 mg daily  Apixaban 2.5 mg orally daily    Type 2 diabetes mellitus  Plan:  Lispro per corrective scale  Carbohydrate controlled diet    Hyperlipidemia  Crestor 20 mg orally daily    History of a stroke  Aspirin 81 mg orally daily  Crestor 20 mg orally daily    Class II obesity BMI 36  Lifestyle modification    Anemia of chronic kidney disease  Trend hemoglobin and hematocrit    DVT prophylaxis  Covered with  apixaban  SCDs    I spent 60 minutes in the professional and overall care of this patient.      Harjit Hernandez DO

## 2025-03-06 LAB
ALBUMIN SERPL BCP-MCNC: 3.4 G/DL (ref 3.4–5)
ANION GAP SERPL CALC-SCNC: 16 MMOL/L (ref 10–20)
ATRIAL RATE: 100 BPM
BASOPHILS # BLD AUTO: 0.01 X10*3/UL (ref 0–0.1)
BASOPHILS NFR BLD AUTO: 0.2 %
BUN SERPL-MCNC: 61 MG/DL (ref 6–23)
CALCIUM SERPL-MCNC: 9.1 MG/DL (ref 8.6–10.3)
CHLORIDE SERPL-SCNC: 107 MMOL/L (ref 98–107)
CO2 SERPL-SCNC: 23 MMOL/L (ref 21–32)
CREAT SERPL-MCNC: 6.92 MG/DL (ref 0.5–1.3)
EGFRCR SERPLBLD CKD-EPI 2021: 9 ML/MIN/1.73M*2
EOSINOPHIL # BLD AUTO: 0.96 X10*3/UL (ref 0–0.7)
EOSINOPHIL NFR BLD AUTO: 18 %
ERYTHROCYTE [DISTWIDTH] IN BLOOD BY AUTOMATED COUNT: 16.8 % (ref 11.5–14.5)
GLUCOSE BLD MANUAL STRIP-MCNC: 102 MG/DL (ref 74–99)
GLUCOSE BLD MANUAL STRIP-MCNC: 85 MG/DL (ref 74–99)
GLUCOSE BLD MANUAL STRIP-MCNC: 92 MG/DL (ref 74–99)
GLUCOSE SERPL-MCNC: 105 MG/DL (ref 74–99)
HCT VFR BLD AUTO: 26.9 % (ref 41–52)
HGB BLD-MCNC: 8.5 G/DL (ref 13.5–17.5)
IMM GRANULOCYTES # BLD AUTO: 0.04 X10*3/UL (ref 0–0.7)
IMM GRANULOCYTES NFR BLD AUTO: 0.8 % (ref 0–0.9)
LYMPHOCYTES # BLD AUTO: 1.1 X10*3/UL (ref 1.2–4.8)
LYMPHOCYTES NFR BLD AUTO: 20.6 %
MCH RBC QN AUTO: 28.8 PG (ref 26–34)
MCHC RBC AUTO-ENTMCNC: 31.6 G/DL (ref 32–36)
MCV RBC AUTO: 91 FL (ref 80–100)
MONOCYTES # BLD AUTO: 0.4 X10*3/UL (ref 0.1–1)
MONOCYTES NFR BLD AUTO: 7.5 %
NEUTROPHILS # BLD AUTO: 2.82 X10*3/UL (ref 1.2–7.7)
NEUTROPHILS NFR BLD AUTO: 52.9 %
NRBC BLD-RTO: 0 /100 WBCS (ref 0–0)
PHOSPHATE SERPL-MCNC: 5.6 MG/DL (ref 2.5–4.9)
PLATELET # BLD AUTO: 151 X10*3/UL (ref 150–450)
POTASSIUM SERPL-SCNC: 3.8 MMOL/L (ref 3.5–5.3)
Q ONSET: 212 MS
QRS COUNT: 16 BEATS
QRS DURATION: 104 MS
QT INTERVAL: 348 MS
QTC CALCULATION(BAZETT): 462 MS
QTC FREDERICIA: 420 MS
R AXIS: 10 DEGREES
RBC # BLD AUTO: 2.95 X10*6/UL (ref 4.5–5.9)
SODIUM SERPL-SCNC: 142 MMOL/L (ref 136–145)
T AXIS: 22 DEGREES
T OFFSET: 386 MS
VENTRICULAR RATE: 106 BPM
WBC # BLD AUTO: 5.3 X10*3/UL (ref 4.4–11.3)

## 2025-03-06 PROCEDURE — 80069 RENAL FUNCTION PANEL: CPT | Performed by: HOSPITALIST

## 2025-03-06 PROCEDURE — 2060000001 HC INTERMEDIATE ICU ROOM DAILY

## 2025-03-06 PROCEDURE — 85025 COMPLETE CBC W/AUTO DIFF WBC: CPT | Performed by: HOSPITALIST

## 2025-03-06 PROCEDURE — 2500000002 HC RX 250 W HCPCS SELF ADMINISTERED DRUGS (ALT 637 FOR MEDICARE OP, ALT 636 FOR OP/ED): Performed by: INTERNAL MEDICINE

## 2025-03-06 PROCEDURE — 2500000001 HC RX 250 WO HCPCS SELF ADMINISTERED DRUGS (ALT 637 FOR MEDICARE OP): Performed by: INTERNAL MEDICINE

## 2025-03-06 PROCEDURE — 2500000002 HC RX 250 W HCPCS SELF ADMINISTERED DRUGS (ALT 637 FOR MEDICARE OP, ALT 636 FOR OP/ED): Performed by: HOSPITALIST

## 2025-03-06 PROCEDURE — 82947 ASSAY GLUCOSE BLOOD QUANT: CPT

## 2025-03-06 PROCEDURE — 84100 ASSAY OF PHOSPHORUS: CPT | Performed by: HOSPITALIST

## 2025-03-06 PROCEDURE — 36415 COLL VENOUS BLD VENIPUNCTURE: CPT | Performed by: HOSPITALIST

## 2025-03-06 PROCEDURE — 2500000001 HC RX 250 WO HCPCS SELF ADMINISTERED DRUGS (ALT 637 FOR MEDICARE OP): Performed by: HOSPITALIST

## 2025-03-06 PROCEDURE — 99232 SBSQ HOSP IP/OBS MODERATE 35: CPT | Performed by: HOSPITALIST

## 2025-03-06 PROCEDURE — 2500000004 HC RX 250 GENERAL PHARMACY W/ HCPCS (ALT 636 FOR OP/ED): Performed by: INTERNAL MEDICINE

## 2025-03-06 PROCEDURE — 99232 SBSQ HOSP IP/OBS MODERATE 35: CPT | Performed by: STUDENT IN AN ORGANIZED HEALTH CARE EDUCATION/TRAINING PROGRAM

## 2025-03-06 RX ORDER — OLANZAPINE 5 MG/1
5 TABLET ORAL NIGHTLY
Status: DISPENSED | OUTPATIENT
Start: 2025-03-06

## 2025-03-06 RX ORDER — CALCITRIOL 0.25 UG/1
0.25 CAPSULE ORAL DAILY
Status: DISPENSED | OUTPATIENT
Start: 2025-03-06

## 2025-03-06 RX ORDER — GUAIFENESIN 600 MG/1
600 TABLET, EXTENDED RELEASE ORAL 2 TIMES DAILY PRN
Status: ACTIVE | OUTPATIENT
Start: 2025-03-06

## 2025-03-06 RX ORDER — QUETIAPINE FUMARATE 25 MG/1
25 TABLET, FILM COATED ORAL
Status: DISPENSED | OUTPATIENT
Start: 2025-03-07

## 2025-03-06 RX ORDER — ESCITALOPRAM OXALATE 20 MG/1
20 TABLET ORAL DAILY
Status: DISPENSED | OUTPATIENT
Start: 2025-03-07

## 2025-03-06 RX ORDER — BENZONATATE 100 MG/1
100 CAPSULE ORAL 3 TIMES DAILY PRN
Status: ACTIVE | OUTPATIENT
Start: 2025-03-06

## 2025-03-06 RX ADMIN — GUAIFENESIN 200 MG: 200 SOLUTION ORAL at 06:05

## 2025-03-06 RX ADMIN — APIXABAN 2.5 MG: 5 TABLET, FILM COATED ORAL at 09:06

## 2025-03-06 RX ADMIN — ASPIRIN 81 MG: 81 TABLET, COATED ORAL at 09:06

## 2025-03-06 RX ADMIN — APIXABAN 2.5 MG: 5 TABLET, FILM COATED ORAL at 21:30

## 2025-03-06 RX ADMIN — OLANZAPINE 5 MG: 5 TABLET, FILM COATED ORAL at 20:37

## 2025-03-06 RX ADMIN — Medication 1 CAPSULE: at 09:06

## 2025-03-06 RX ADMIN — CALCIUM ACETATE 1334 MG: 667 CAPSULE ORAL at 13:40

## 2025-03-06 RX ADMIN — METOPROLOL TARTRATE 25 MG: 25 TABLET, FILM COATED ORAL at 09:06

## 2025-03-06 RX ADMIN — TAMSULOSIN HYDROCHLORIDE 0.4 MG: 0.4 CAPSULE ORAL at 09:06

## 2025-03-06 RX ADMIN — GUAIFENESIN 200 MG: 200 SOLUTION ORAL at 13:38

## 2025-03-06 RX ADMIN — CALCIUM ACETATE 1334 MG: 667 CAPSULE ORAL at 09:09

## 2025-03-06 RX ADMIN — CALCIUM ACETATE 1334 MG: 667 CAPSULE ORAL at 16:36

## 2025-03-06 RX ADMIN — FINASTERIDE 5 MG: 5 TABLET, FILM COATED ORAL at 09:06

## 2025-03-06 RX ADMIN — ACETAMINOPHEN 975 MG: 325 TABLET, FILM COATED ORAL at 16:36

## 2025-03-06 RX ADMIN — ACETAMINOPHEN 975 MG: 325 TABLET, FILM COATED ORAL at 20:37

## 2025-03-06 RX ADMIN — PANTOPRAZOLE SODIUM 40 MG: 40 TABLET, DELAYED RELEASE ORAL at 06:05

## 2025-03-06 RX ADMIN — ATORVASTATIN CALCIUM 40 MG: 40 TABLET, FILM COATED ORAL at 20:37

## 2025-03-06 RX ADMIN — METOPROLOL TARTRATE 25 MG: 25 TABLET, FILM COATED ORAL at 20:37

## 2025-03-06 RX ADMIN — ACETAMINOPHEN 975 MG: 325 TABLET, FILM COATED ORAL at 09:06

## 2025-03-06 RX ADMIN — ONDANSETRON 4 MG: 2 INJECTION, SOLUTION INTRAMUSCULAR; INTRAVENOUS at 11:04

## 2025-03-06 RX ADMIN — GUAIFENESIN 200 MG: 200 SOLUTION ORAL at 16:36

## 2025-03-06 ASSESSMENT — PAIN SCALES - WONG BAKER: WONGBAKER_NUMERICALRESPONSE: NO HURT

## 2025-03-06 ASSESSMENT — COGNITIVE AND FUNCTIONAL STATUS - GENERAL
MOBILITY SCORE: 18
MOVING TO AND FROM BED TO CHAIR: A LITTLE
WALKING IN HOSPITAL ROOM: A LOT
STANDING UP FROM CHAIR USING ARMS: A LITTLE
STANDING UP FROM CHAIR USING ARMS: A LITTLE
CLIMB 3 TO 5 STEPS WITH RAILING: A LOT
MOBILITY SCORE: 20
WALKING IN HOSPITAL ROOM: A LITTLE
CLIMB 3 TO 5 STEPS WITH RAILING: A LITTLE
DAILY ACTIVITIY SCORE: 24
DAILY ACTIVITIY SCORE: 24
MOVING TO AND FROM BED TO CHAIR: A LITTLE

## 2025-03-06 ASSESSMENT — PAIN - FUNCTIONAL ASSESSMENT
PAIN_FUNCTIONAL_ASSESSMENT: 0-10

## 2025-03-06 ASSESSMENT — PAIN SCALES - GENERAL
PAINLEVEL_OUTOF10: 0 - NO PAIN
PAINLEVEL_OUTOF10: 8

## 2025-03-06 ASSESSMENT — PAIN SCALES - PAIN ASSESSMENT IN ADVANCED DEMENTIA (PAINAD): TOTALSCORE: MEDICATION (SEE MAR)

## 2025-03-06 ASSESSMENT — PAIN DESCRIPTION - LOCATION: LOCATION: CHEST

## 2025-03-06 NOTE — PROGRESS NOTES
Between 7AM-7PM please message me via Epic Secure Chat.  After 7PM please page Nocturnist on call.    [facility] Hospitalist Progress Note      Kj Colmenares    :  1970(54 y.o.)    MRN:  [id number]  Date: 25     Assessment and Plan:     Influenza A infection  - continue tamiflu for 5d course; continue supportive care    ESRD  Anemia of CKD  Metabolic   - no HD for 2 months prior to admission  - Nephrology consulted to manage IP HD; continue phoslo, neprocaps. EPO/IV iron per nephrology with HD.   - TCC working on OP HD    Atrial fibrillation with RVR  - improved with restarting PO lopressor; continue eliquis    T2DM  - continue ssi    HLD  - switched to atorvastatin as he is ESRD on HD    Hx of CVA  - continue asa, statin    BPH  - continue flomax, proscar    DVT Prophylaxis: full anticoagulation with eliquis    Disposition: continue to monitor inpatient, await consultant recommendations, await test results, and await clinical improvement    Electronically signed by Tyson Everett DO on 25 at 6:50 PM     Subjective:      Interval History:   Vitals and chart notes from overnight reviewed.   No acute issues overnight.   Patient seen and evaluated at bedside.   Nausea and vomiting this am. Initially refused prn zofran, now agreeable. Still having sore throat, difficulty with po intake.    Review of Systems:   Other than patient's chronic conditions and those complaints in the history above, the rest of the 10 systems review were done and were negative.     Current medications:  Scheduled Meds:acetaminophen, 975 mg, oral, TID  apixaban, 2.5 mg, oral, BID  aspirin, 81 mg, oral, Daily  atorvastatin, 40 mg, oral, Nightly  calcium acetate, 1,334 mg, oral, TID  finasteride, 5 mg, oral, Daily  guaiFENesin, 200 mg, oral, 4x daily  insulin lispro, 0-5 Units, subcutaneous, TID AC  metoprolol tartrate, 25 mg, oral, BID  oseltamivir, 30 mg, oral, Every Mon/Wed/Fri  pantoprazole, 40 mg, oral, Daily  before breakfast   Or  pantoprazole, 40 mg, intravenous, Daily before breakfast  tamsulosin, 0.4 mg, oral, Daily  vitamin B complex-vitamin C-folic acid, 1 capsule, oral, Daily      Continuous Infusions:   PRN Meds:PRN medications: benzocaine-menthol, dextrose, dextrose, glucagon, glucagon, midodrine, ondansetron **OR** ondansetron      Objective:     Heart Rate:  []   Temp:  [36.4 °C (97.6 °F)-37.2 °C (98.9 °F)]   Resp:  [14-20]   BP: (122-146)/(84-95)   SpO2:  [97 %-100 %]          Physical Exam  Vitals and nursing note reviewed.   Constitutional:       Appearance: He is obese.   HENT:      Mouth/Throat:      Mouth: Mucous membranes are moist.      Pharynx: Oropharynx is clear.   Cardiovascular:      Rate and Rhythm: Normal rate. Rhythm irregular.   Pulmonary:      Effort: Pulmonary effort is normal.   Abdominal:      General: There is no distension.      Palpations: Abdomen is soft.      Tenderness: There is no abdominal tenderness.   Neurological:      Mental Status: He is alert and oriented to person, place, and time.         Labs:   Lab Results   Component Value Date     03/06/2025    K 3.8 03/06/2025     03/06/2025    CO2 23 03/06/2025    BUN 61 (H) 03/06/2025    CREATININE 6.92 (H) 03/06/2025    GLUCOSE 105 (H) 03/06/2025    CALCIUM 9.1 03/06/2025    PROT 6.7 03/04/2025    BILITOT 0.5 03/04/2025    ALKPHOS 333 (H) 03/04/2025    AST 15 03/04/2025    ALT 13 03/04/2025       Lab Results   Component Value Date    WBC 5.3 03/06/2025    HGB 8.5 (L) 03/06/2025    HCT 26.9 (L) 03/06/2025    MCV 91 03/06/2025     03/06/2025

## 2025-03-06 NOTE — PROGRESS NOTES
Care Coordinator Note:    Plan:patient in with Flu a. ESRD on HD. Plan for HD today. [facility] called [facility] to see if has a current chair time. [phone]. Marcela states patient was to start and never showed.   Currently seems to not have a chair time at either [facility] or [facility]  Emesis today    Status: inpatient  Payor: caresourse  Disposition: Home with HHC PT OT. NEEDS NEW HD CHAIR SET UP  Barrier:  ADOD: few days  1200- [facility] spoke with [place]. Requested new chair time. Only available at [place]. Confirmed dr bianca del rosario will follow in the community.   Hepatitis panel and HD flow sheets faxed as well to [place].     Shahida Lundberg [facility]      03/06/25 1146   Discharge Planning   Expected Discharge Disposition Home H   Intensity of Service   Intensity of Service 0-30 min

## 2025-03-06 NOTE — PROGRESS NOTES
Kj Colmenares is a 54 y.o. male on day 1 of admission presenting with Influenza A.      Subjective   Overall doing better with no new complaints, scheduled hemodialysis tomorrow       Objective        Vitals 24HR  Heart Rate:  []   Temp:  [36.4 °C (97.6 °F)-37.2 °C (98.9 °F)]   Resp:  [18-20]   BP: (122-146)/(84-94)   SpO2:  [97 %-100 %]     Intake/Output last 3 Shifts:    Intake/Output Summary (Last 24 hours) at 3/6/2025 1305  Last data filed at 3/6/2025 1108  Gross per 24 hour   Intake 600 ml   Output 3900 ml   Net -3300 ml       Physical Exam      General appearance: Awake and alert in no acute distress complaining of cough with chest discomfort  Head and ENT: Normocephalic/atraumatic/supple neck/no JVD  Lungs; bilateral crackles and rhonchi both lung fields  Heart; irregular/irregular rhythm  Abdomen; soft no tenderness  Extremities; no edema  Neurologic; physiologic     Dialysis access= left upper arm AV fistula with positive bruit and thrill             Relevant Results     Results from last 7 days   Lab Units 03/06/25  0651 03/05/25  0552 03/04/25  2119   WBC AUTO x10*3/uL 5.3 7.7 8.1   HEMOGLOBIN g/dL 8.5* 8.2* 8.7*   HEMATOCRIT % 26.9* 27.6* 27.9*   PLATELETS AUTO x10*3/uL 151 140* 151      Results from last 7 days   Lab Units 03/06/25  0651 03/05/25  0552 03/04/25  2119   SODIUM mmol/L 142 145 143   POTASSIUM mmol/L 3.8 4.8 5.2   CHLORIDE mmol/L 107 119* 117*   CO2 mmol/L 23 15* 16*   BUN mg/dL 61* 104* 102*   CREATININE mg/dL 6.92* 10.96* 11.03*   GLUCOSE mg/dL 105* 110* 89   CALCIUM mg/dL 9.1 9.1 9.2        Current Facility-Administered Medications:     acetaminophen (Tylenol) tablet 975 mg, 975 mg, oral, TID, Deep Everett, DO, 975 mg at 03/06/25 0906    apixaban (Eliquis) tablet 2.5 mg, 2.5 mg, oral, BID, Harjit Hernandez DO, 2.5 mg at 03/06/25 0906    aspirin EC tablet 81 mg, 81 mg, oral, Daily, Harjit Hernandez DO, 81 mg at 03/06/25 0906    atorvastatin (Lipitor) tablet 40 mg, 40 mg, oral,  Nightly, Deep Everett, DO, 40 mg at 03/05/25 2148    benzocaine-menthol (Cepastat Sore Throat) lozenge 1 lozenge, 1 lozenge, Mouth/Throat, q2h PRN, Deep Everett, DO, 1 lozenge at 03/05/25 1734    calcium acetate (Phoslo) capsule 1,334 mg, 1,334 mg, oral, TID, Harjit GOSIA Salomone, DO, 1,334 mg at 03/06/25 0909    dextrose 50 % injection 12.5 g, 12.5 g, intravenous, q15 min PRN, Harjit ELISE Salomone, DO    dextrose 50 % injection 25 g, 25 g, intravenous, q15 min PRN, Harjit ELISE Salomone, DO    finasteride (Proscar) tablet 5 mg, 5 mg, oral, Daily, Harjit ELISE Salomone, DO, 5 mg at 03/06/25 0906    glucagon (Glucagen) injection 1 mg, 1 mg, intramuscular, q15 min PRN, Harjit ELISE Salomone, DO    glucagon (Glucagen) injection 1 mg, 1 mg, intramuscular, q15 min PRN, Harjit ELISE Salomone, DO    guaiFENesin (Robitussin) 100 mg/5 mL syrup 200 mg, 200 mg, oral, 4x daily, Deep Everett, DO, 200 mg at 03/06/25 0605    insulin lispro injection 0-5 Units, 0-5 Units, subcutaneous, TID AC, Hajrit ELISE Salomone, DO    metoprolol tartrate (Lopressor) tablet 25 mg, 25 mg, oral, BID, Deep Everett, DO, 25 mg at 03/06/25 0906    midodrine (Proamatine) tablet 10 mg, 10 mg, oral, PRN, Harjit ELISE Salomone, DO    ondansetron (Zofran) tablet 4 mg, 4 mg, oral, q8h PRN **OR** ondansetron (Zofran) injection 4 mg, 4 mg, intravenous, q8h PRN, Harjit ELISE Salomone, DO, 4 mg at 03/06/25 1104    oseltamivir (Tamiflu) capsule 30 mg, 30 mg, oral, Every Mon/Wed/Fri, Harjit ELISE Salomone, DO, 30 mg at 03/05/25 1734    pantoprazole (ProtoNix) EC tablet 40 mg, 40 mg, oral, Daily before breakfast, 40 mg at 03/06/25 0605 **OR** pantoprazole (ProtoNix) injection 40 mg, 40 mg, intravenous, Daily before breakfast, Harjit Hernandez DO    tamsulosin (Flomax) 24 hr capsule 0.4 mg, 0.4 mg, oral, Daily, Harjit Hernandez DO, 0.4 mg at 03/06/25 0906    vitamin B complex-vitamin C-folic acid (Nephrocaps) capsule 1 capsule, 1 capsule, oral, Daily, Harjit Hernandez DO, 1 capsule at 03/06/25 0906           Assessment/Plan       1.  ESKD on hemodialysis on Monday/Wednesday/Friday at [facility] via left upper arm AV fistula with positive bruit and thrill  Will schedule patient for hemodialysis tomorrow on Friday  2.  Diabetes mellitus continue current management  3.  Hypertension continue current management  4.  Anemia due to CKD we will continue VEDA and iron supplementation as needed  5.  Metabolic bone disease due to CKD continue phosphate binders and vitamin D supplementation as needed  6.  Influenza A will follow with ID              Assessment & Plan  Influenza A              I spent 35 minutes in the professional and overall care of this patient.      Dale Patel MD

## 2025-03-06 NOTE — CARE PLAN
The clinical goals for the shift include Pt to Remain HDS this shift      Problem: Pain - Adult  Goal: Verbalizes/displays adequate comfort level or baseline comfort level  Outcome: Progressing     Problem: Safety - Adult  Goal: Free from fall injury  Outcome: Progressing     Problem: Discharge Planning  Goal: Discharge to home or other facility with appropriate resources  Outcome: Progressing

## 2025-03-06 NOTE — PROGRESS NOTES
"Subjective Data:  Nauseated currently  No chest pain    Tele:  a fib     Overnight Events:    none     Objective Data:  Last Recorded Vitals:  Vitals:    25 0300 25 0800 25 1106 25 1135   BP: 130/89 137/87  126/87   BP Location: Right arm      Patient Position: Lying      Pulse: 66 73 86 83   Resp: 19 18 20 18   Temp: 36.8 °C (98.2 °F) 37.2 °C (98.9 °F)  36.4 °C (97.6 °F)   TempSrc: Oral Temporal  Temporal   SpO2: 98% 98%  97%   Weight:       Height:         Medical Gas Therapy: None (Room air)  Weight  Av kg (245 lb)  Min: 111 kg (245 lb)  Max: 111 kg (245 lb)    LABS:  CMP:  Results from last 7 days   Lab Units 25  0651 25  0552 25   SODIUM mmol/L 142 145 143   POTASSIUM mmol/L 3.8 4.8 5.2   CHLORIDE mmol/L 107 119* 117*   CO2 mmol/L 23 15* 16*   ANION GAP mmol/L 16 16 15   BUN mg/dL 61* 104* 102*   CREATININE mg/dL 6.92* 10.96* 11.03*   EGFR mL/min/1.73m*2 9* 5* 5*   ALBUMIN g/dL 3.4  --  3.9   ALT U/L  --   --  13   AST U/L  --   --  15   BILIRUBIN TOTAL mg/dL  --   --  0.5     CBC:  Results from last 7 days   Lab Units 25  0651 25  0552 25   WBC AUTO x10*3/uL 5.3 7.7 8.1   HEMOGLOBIN g/dL 8.5* 8.2* 8.7*   HEMATOCRIT % 26.9* 27.6* 27.9*   PLATELETS AUTO x10*3/uL 151 140* 151   MCV fL 91 97 96     COAG:     ABO: No results found for: \"ABO\"  HEME/ENDO:     CARDIAC:   Results from last 7 days   Lab Units 25  2119   TROPHS ng/L 14 14             Last I/O:    Intake/Output Summary (Last 24 hours) at 3/6/2025 1514  Last data filed at 3/6/2025 1108  Gross per 24 hour   Intake 600 ml   Output 3700 ml   Net -3100 ml     Net IO Since Admission: -3,170 mL [25 1514]            Past Cardiology Tests (Last 3 Years):  EKG:  Results for orders placed during the hospital encounter of 25    Electrocardiogram, 12-lead PRN ACS symptoms (Preliminary)  This result has not been signed. Information might be " incomplete.    Narrative  Atrial fibrillation with rapid ventricular response  Abnormal ECG  When compared with ECG of 04-MAR-2025 20:57,  T wave inversion no longer evident in Inferior leads      ECG 12 lead    Narrative  Atrial fibrillation  Inferior infarct , age undetermined  Abnormal ECG  When compared with ECG of 01-JUN-2024 21:25,  Inferior infarct is now Present  Nonspecific T wave abnormality no longer evident in Lateral leads  See ED provider note for full interpretation and clinical correlation  Confirmed by Ken Uriarte ([id number]) on 3/5/2025 8:44:54 AM      Results for orders placed during the hospital encounter of 06/01/24    ECG 12 lead    Narrative  Atrial fibrillation with rapid ventricular response  T wave abnormality, consider inferior ischemia  Abnormal ECG  When compared with ECG of 27-APR-2024 20:13,  T wave inversion now evident in Inferior leads  Nonspecific T wave abnormality now evident in Lateral leads  See ED provider note for full interpretation and clinical correlation  Confirmed by Bonny Bee ([id number]) on 6/9/2024 11:16:09 AM      Results for orders placed during the hospital encounter of 04/27/24    ECG 12 lead    Narrative  Atrial fibrillation  Abnormal ECG  When compared with ECG of 02-DEC-2023 20:37,  Previous ECG has undetermined rhythm, needs review  Questionable change in QRS axis  See ED provider note for full interpretation and clinical correlation  Confirmed by Mitali Chavez ([id number]) on 4/29/2024 10:18:49 AM      Results for orders placed during the hospital encounter of 11/17/23    ECG 12 Lead    Narrative  Atrial fibrillation  Abnormal ECG  When compared with ECG of 17-NOV-2023 14:14,  Questionable change in QRS axis  Confirmed by Aldair Patel ([id number]) on 11/21/2023 1:59:53 PM      ECG 12 lead    Narrative  Atrial fibrillation  Abnormal ECG  When compared with ECG of 16-NOV-2023 21:16,  Previous ECG has undetermined rhythm, needs review    See ED provider note for full  interpretation and clinical correlation  Confirmed by Diane Malik ([id number]) on 11/18/2023 12:49:40 AM      ECG 12 lead    Narrative  See ED provider note for full interpretation and clinical correlation  Confirmed by Lizz Olvera ([id number]) on 11/17/2023 2:01:11 AM      Results for orders placed in visit on 11/20/23    ECG 12 Lead      ECG 12 Lead    Echo:  TTE 8/2024:  CONCLUSIONS:   - Exam indication: atrial fibrillation   - The left ventricle is normal in size. There is mild left ventricular   hypertrophy. Left ventricular systolic function is mildly decreased. EF = 45 ± 5%   (visual est.) Left ventricular diastolic function was not evaluated due to AF.   - The right ventricle is normal in size. Right ventricular systolic function is   low normal.   - The left atrial cavity is dilated.   - The right atrial cavity is mildly dilated.   - Exam was compared with the prior  echocardiographic exam performed on 9/22/23      Nuclear stress test 8/2024 (pharmacologic):  CONCLUSIONS:    1. SPECT Perfusion Study: Normal.    2. There is no scintigraphic evidence for inducible ischemia.    3. No evidence of scarred myocardium.    4. Left ventricle is normal in size. The left ventricle systolic   function is normal.    5. This is a low risk scan.   LVEF 61%      Inpatient Medications:  Scheduled medications   Medication Dose Route Frequency    acetaminophen  975 mg oral TID    apixaban  2.5 mg oral BID    aspirin  81 mg oral Daily    atorvastatin  40 mg oral Nightly    calcium acetate  1,334 mg oral TID    finasteride  5 mg oral Daily    guaiFENesin  200 mg oral 4x daily    insulin lispro  0-5 Units subcutaneous TID AC    metoprolol tartrate  25 mg oral BID    oseltamivir  30 mg oral Every Mon/Wed/Fri    pantoprazole  40 mg oral Daily before breakfast    Or    pantoprazole  40 mg intravenous Daily before breakfast    tamsulosin  0.4 mg oral Daily    vitamin B complex-vitamin C-folic acid  1 capsule oral Daily     PRN medications    Medication    benzocaine-menthol    dextrose    dextrose    glucagon    glucagon    midodrine    ondansetron    Or    ondansetron     Continuous Medications   Medication Dose Last Rate       Physical Exam:    General:  Patient is awake, alert, and oriented.  Patient is in no acute distress.  HEENT:  Pupils equal and reactive.  Normocephalic.  Moist mucosa.    Neck:  No thyromegaly.  Normal Jugular Venous Pressure.  Cardiovascular:  Irregular rate and rhythm.   Pulmonary:  Clear to auscultation bilaterally.  Abdomen:  Soft.  Distended  Positive bowel sounds.  Lower Extremities:  2+ pedal pulses. No LE edema.  Neurologic:  Cranial nerves intact.  No focal deficit.   Skin: Skin warm and dry, normal skin turgor.   Psychiatric: Normal affect.     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 P/w influenza found to be in AF with RVR for which Cardiology is consulted.      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 Rates are improved now after resuming his home metoprolol.     Plan:  - Continue metoprolol succinate 50mg daily for rate control  - Rate control in AF is HR <110 in the normal setting and <130 in setting of acute illness unless AF is causing hemodynamic instability or symptoms   - Continue eliquis 2.5mg BID    - Will arrange for follow up with Dr. Murphy within 1 month, at that time if patient is compliant with AC and remains in AF can consider DCCV as outpatient    Code Status:  Full Code    Leticia Wilhelm, APRN-CNP      Thank you for allowing me to participate in their care.  Please feel free to call me with any further questions or concerns.    Antonio Murphy MD, FAC, ARTURO RYDER  Division of Cardiovascular Medicine  System Director, Nuclear Cardiology   Medical Director, [facility]    [facility] [place], [facility]   Clinical , [employer]  [email or web address]   Office:  [phone]          Both the ARIELLE and I have had a face to face encounter with the patient today. I have examined the patient and edited the documented physical examination as necessary.  I personally reviewed the patient's vital signs, telemetry, recent labs, medications, orders, EKGs, and pertinent cardiac imaging/ echocardiography.  I have reviewed the ARIELLE's encounter note, approve the ARIELLE's documentation and have edited the note to reflect my diagnostic and therapeutic plan.

## 2025-03-07 ENCOUNTER — APPOINTMENT (OUTPATIENT)
Dept: RADIOLOGY | Facility: HOSPITAL | Age: 55
DRG: 193 | End: 2025-03-07
Payer: COMMERCIAL

## 2025-03-07 ENCOUNTER — APPOINTMENT (OUTPATIENT)
Dept: DIALYSIS | Facility: HOSPITAL | Age: 55
End: 2025-03-07
Payer: COMMERCIAL

## 2025-03-07 ENCOUNTER — APPOINTMENT (OUTPATIENT)
Dept: RADIOLOGY | Facility: HOSPITAL | Age: 55
End: 2025-03-07
Payer: COMMERCIAL

## 2025-03-07 LAB
ALBUMIN SERPL BCP-MCNC: 3.4 G/DL (ref 3.4–5)
ANION GAP SERPL CALC-SCNC: 15 MMOL/L (ref 10–20)
BASOPHILS # BLD AUTO: 0.02 X10*3/UL (ref 0–0.1)
BASOPHILS NFR BLD AUTO: 0.3 %
BUN SERPL-MCNC: 67 MG/DL (ref 6–23)
CALCIUM SERPL-MCNC: 9.5 MG/DL (ref 8.6–10.3)
CHLORIDE SERPL-SCNC: 110 MMOL/L (ref 98–107)
CO2 SERPL-SCNC: 22 MMOL/L (ref 21–32)
CREAT SERPL-MCNC: 8.06 MG/DL (ref 0.5–1.3)
EGFRCR SERPLBLD CKD-EPI 2021: 7 ML/MIN/1.73M*2
EOSINOPHIL # BLD AUTO: 1.03 X10*3/UL (ref 0–0.7)
EOSINOPHIL NFR BLD AUTO: 18 %
ERYTHROCYTE [DISTWIDTH] IN BLOOD BY AUTOMATED COUNT: 16.4 % (ref 11.5–14.5)
GLUCOSE BLD MANUAL STRIP-MCNC: 154 MG/DL (ref 74–99)
GLUCOSE BLD MANUAL STRIP-MCNC: 86 MG/DL (ref 74–99)
GLUCOSE BLD MANUAL STRIP-MCNC: 93 MG/DL (ref 74–99)
GLUCOSE BLD MANUAL STRIP-MCNC: 96 MG/DL (ref 74–99)
GLUCOSE SERPL-MCNC: 90 MG/DL (ref 74–99)
HCT VFR BLD AUTO: 28.3 % (ref 41–52)
HGB BLD-MCNC: 8.6 G/DL (ref 13.5–17.5)
IMM GRANULOCYTES # BLD AUTO: 0.04 X10*3/UL (ref 0–0.7)
IMM GRANULOCYTES NFR BLD AUTO: 0.7 % (ref 0–0.9)
LYMPHOCYTES # BLD AUTO: 1.36 X10*3/UL (ref 1.2–4.8)
LYMPHOCYTES NFR BLD AUTO: 23.8 %
MCH RBC QN AUTO: 28.9 PG (ref 26–34)
MCHC RBC AUTO-ENTMCNC: 30.4 G/DL (ref 32–36)
MCV RBC AUTO: 95 FL (ref 80–100)
MONOCYTES # BLD AUTO: 0.4 X10*3/UL (ref 0.1–1)
MONOCYTES NFR BLD AUTO: 7 %
NEUTROPHILS # BLD AUTO: 2.87 X10*3/UL (ref 1.2–7.7)
NEUTROPHILS NFR BLD AUTO: 50.2 %
NRBC BLD-RTO: 0 /100 WBCS (ref 0–0)
PHOSPHATE SERPL-MCNC: 5.7 MG/DL (ref 2.5–4.9)
PLATELET # BLD AUTO: 146 X10*3/UL (ref 150–450)
POTASSIUM SERPL-SCNC: 3.9 MMOL/L (ref 3.5–5.3)
RBC # BLD AUTO: 2.98 X10*6/UL (ref 4.5–5.9)
SODIUM SERPL-SCNC: 143 MMOL/L (ref 136–145)
WBC # BLD AUTO: 5.7 X10*3/UL (ref 4.4–11.3)

## 2025-03-07 PROCEDURE — 99232 SBSQ HOSP IP/OBS MODERATE 35: CPT | Performed by: STUDENT IN AN ORGANIZED HEALTH CARE EDUCATION/TRAINING PROGRAM

## 2025-03-07 PROCEDURE — 2060000001 HC INTERMEDIATE ICU ROOM DAILY

## 2025-03-07 PROCEDURE — 36415 COLL VENOUS BLD VENIPUNCTURE: CPT | Performed by: HOSPITALIST

## 2025-03-07 PROCEDURE — 80069 RENAL FUNCTION PANEL: CPT | Performed by: HOSPITALIST

## 2025-03-07 PROCEDURE — 84100 ASSAY OF PHOSPHORUS: CPT | Performed by: HOSPITALIST

## 2025-03-07 PROCEDURE — 85025 COMPLETE CBC W/AUTO DIFF WBC: CPT | Performed by: HOSPITALIST

## 2025-03-07 PROCEDURE — 73700 CT LOWER EXTREMITY W/O DYE: CPT | Mod: LEFT SIDE | Performed by: RADIOLOGY

## 2025-03-07 PROCEDURE — 2500000002 HC RX 250 W HCPCS SELF ADMINISTERED DRUGS (ALT 637 FOR MEDICARE OP, ALT 636 FOR OP/ED): Performed by: HOSPITALIST

## 2025-03-07 PROCEDURE — 2500000002 HC RX 250 W HCPCS SELF ADMINISTERED DRUGS (ALT 637 FOR MEDICARE OP, ALT 636 FOR OP/ED): Performed by: INTERNAL MEDICINE

## 2025-03-07 PROCEDURE — 99232 SBSQ HOSP IP/OBS MODERATE 35: CPT | Performed by: HOSPITALIST

## 2025-03-07 PROCEDURE — 2500000001 HC RX 250 WO HCPCS SELF ADMINISTERED DRUGS (ALT 637 FOR MEDICARE OP): Performed by: HOSPITALIST

## 2025-03-07 PROCEDURE — 8010000001 HC DIALYSIS - HEMODIALYSIS PER DAY

## 2025-03-07 PROCEDURE — 2500000001 HC RX 250 WO HCPCS SELF ADMINISTERED DRUGS (ALT 637 FOR MEDICARE OP): Performed by: INTERNAL MEDICINE

## 2025-03-07 PROCEDURE — 73700 CT LOWER EXTREMITY W/O DYE: CPT | Mod: LT

## 2025-03-07 PROCEDURE — 82947 ASSAY GLUCOSE BLOOD QUANT: CPT

## 2025-03-07 RX ADMIN — ASPIRIN 81 MG: 81 TABLET, COATED ORAL at 09:23

## 2025-03-07 RX ADMIN — QUETIAPINE FUMARATE 25 MG: 25 TABLET ORAL at 17:20

## 2025-03-07 RX ADMIN — QUETIAPINE FUMARATE 25 MG: 25 TABLET ORAL at 09:34

## 2025-03-07 RX ADMIN — CALCIUM ACETATE 1334 MG: 667 CAPSULE ORAL at 17:19

## 2025-03-07 RX ADMIN — METOPROLOL TARTRATE 25 MG: 25 TABLET, FILM COATED ORAL at 09:22

## 2025-03-07 RX ADMIN — CALCITRIOL CAPSULES 0.25 MCG 0.25 MCG: 0.25 CAPSULE ORAL at 09:21

## 2025-03-07 RX ADMIN — TAMSULOSIN HYDROCHLORIDE 0.4 MG: 0.4 CAPSULE ORAL at 09:49

## 2025-03-07 RX ADMIN — METOPROLOL TARTRATE 25 MG: 25 TABLET, FILM COATED ORAL at 20:51

## 2025-03-07 RX ADMIN — FINASTERIDE 5 MG: 5 TABLET, FILM COATED ORAL at 09:21

## 2025-03-07 RX ADMIN — PANTOPRAZOLE SODIUM 40 MG: 40 TABLET, DELAYED RELEASE ORAL at 06:23

## 2025-03-07 RX ADMIN — Medication 1 CAPSULE: at 09:19

## 2025-03-07 RX ADMIN — CALCIUM ACETATE 1334 MG: 667 CAPSULE ORAL at 12:22

## 2025-03-07 RX ADMIN — OSELTAMAVIR PHOSPHATE 30 MG: 30 CAPSULE ORAL at 17:19

## 2025-03-07 RX ADMIN — ACETAMINOPHEN 975 MG: 325 TABLET, FILM COATED ORAL at 20:51

## 2025-03-07 RX ADMIN — APIXABAN 2.5 MG: 5 TABLET, FILM COATED ORAL at 20:51

## 2025-03-07 RX ADMIN — ACETAMINOPHEN 975 MG: 325 TABLET, FILM COATED ORAL at 09:19

## 2025-03-07 RX ADMIN — OLANZAPINE 5 MG: 5 TABLET, FILM COATED ORAL at 20:51

## 2025-03-07 RX ADMIN — ESCITALOPRAM OXALATE 20 MG: 20 TABLET ORAL at 09:21

## 2025-03-07 RX ADMIN — ATORVASTATIN CALCIUM 40 MG: 40 TABLET, FILM COATED ORAL at 20:51

## 2025-03-07 RX ADMIN — CALCIUM ACETATE 1334 MG: 667 CAPSULE ORAL at 09:34

## 2025-03-07 RX ADMIN — APIXABAN 2.5 MG: 5 TABLET, FILM COATED ORAL at 09:21

## 2025-03-07 ASSESSMENT — COGNITIVE AND FUNCTIONAL STATUS - GENERAL
MOBILITY SCORE: 24
CLIMB 3 TO 5 STEPS WITH RAILING: A LITTLE
MOVING TO AND FROM BED TO CHAIR: A LITTLE
STANDING UP FROM CHAIR USING ARMS: A LITTLE
WALKING IN HOSPITAL ROOM: A LITTLE
MOBILITY SCORE: 20
DAILY ACTIVITIY SCORE: 24
DAILY ACTIVITIY SCORE: 24

## 2025-03-07 ASSESSMENT — PAIN SCALES - GENERAL
PAINLEVEL_OUTOF10: 0 - NO PAIN
PAINLEVEL_OUTOF10: 10 - WORST POSSIBLE PAIN

## 2025-03-07 ASSESSMENT — PAIN DESCRIPTION - LOCATION: LOCATION: KNEE

## 2025-03-07 ASSESSMENT — PAIN DESCRIPTION - ORIENTATION: ORIENTATION: LEFT

## 2025-03-07 NOTE — PROGRESS NOTES
Care Coordinator Note:    Plan: patient in with flu A. ESRD on HD but non compliant with going to HD. Does not currently have community chair time. [facility] naveen working on chair time at [place] location with dr bianca del rosario to follow.   Cardio following for new afib- if patient is compliant with AC and remains in AF can consider DCCV as outpatient - will dc on metoprolol and eliquis.  Patient states no PCP- will need healthy at home for dr mark to follow for [facility] PT OT at dc. MD notified of these udpates.     Status: inpatient  Payor: medicare  Disposition: home with healthy at home [facility]   Barrier: NEEDS [facility] COMMUNITY CHAIR TIME CONFIRMED.  ADOD: ? Weekend/Monday pending chair time and med ready    Shahida Lundberg TCC      03/07/25 0932   Discharge Planning   Expected Discharge Disposition Home H   Patient Choice   Provider Choice list and CMS website (https://medicare.gov/care-compare#search) for post-acute Quality and Resource Measure Data were provided and reviewed with: Patient   Patient / Family choosing to utilize agency / facility established prior to hospitalization Yes   Intensity of Service   Intensity of Service 0-30 min

## 2025-03-07 NOTE — CARE PLAN
Problem: Pain - Adult  Goal: Verbalizes/displays adequate comfort level or baseline comfort level  Outcome: Progressing     Problem: Safety - Adult  Goal: Free from fall injury  Outcome: Progressing     Problem: Discharge Planning  Goal: Discharge to home or other facility with appropriate resources  Outcome: Progressing     Problem: Chronic Conditions and Co-morbidities  Goal: Patient's chronic conditions and co-morbidity symptoms are monitored and maintained or improved  Outcome: Progressing     Problem: Nutrition  Goal: Nutrient intake appropriate for maintaining nutritional needs  Outcome: Progressing   The patient's goals for the shift include      The clinical goals for the shift include Pt to remain HDS this shift    Over the shift, the patient did not make progress toward the following goals.

## 2025-03-07 NOTE — PROGRESS NOTES
Kj Colmenares is a 54 y.o. male on day 2 of admission presenting with Influenza A.      Subjective   Overall stable but complained of having nausea and vomiting overnight       Objective        Vitals 24HR  Heart Rate:  [77-96]   Temp:  [36.6 °C (97.9 °F)-37.1 °C (98.8 °F)]   Resp:  [13-18]   BP: (116-134)/(77-95)   SpO2:  [96 %-100 %]     Intake/Output last 3 Shifts:    Intake/Output Summary (Last 24 hours) at 3/7/2025 1426  Last data filed at 3/7/2025 1346  Gross per 24 hour   Intake 640 ml   Output 700 ml   Net -60 ml       Physical Exam        General appearance: Awake and alert in no acute distress complaining of cough with chest discomfort  Head and ENT: Normocephalic/atraumatic/supple neck/no JVD  Lungs; bilateral crackles and rhonchi both lung fields  Heart; irregular/irregular rhythm  Abdomen; soft no tenderness  Extremities; no edema  Neurologic; physiologic     Dialysis access= left upper arm AV fistula with positive bruit and thrill                Relevant Results     Results from last 7 days   Lab Units 03/07/25  0634 03/06/25  0651 03/05/25  0552   WBC AUTO x10*3/uL 5.7 5.3 7.7   HEMOGLOBIN g/dL 8.6* 8.5* 8.2*   HEMATOCRIT % 28.3* 26.9* 27.6*   PLATELETS AUTO x10*3/uL 146* 151 140*      Results from last 7 days   Lab Units 03/07/25  0634 03/06/25  0651 03/05/25  0552   SODIUM mmol/L 143 142 145   POTASSIUM mmol/L 3.9 3.8 4.8   CHLORIDE mmol/L 110* 107 119*   CO2 mmol/L 22 23 15*   BUN mg/dL 67* 61* 104*   CREATININE mg/dL 8.06* 6.92* 10.96*   GLUCOSE mg/dL 90 105* 110*   CALCIUM mg/dL 9.5 9.1 9.1        Current Facility-Administered Medications:     acetaminophen (Tylenol) tablet 975 mg, 975 mg, oral, TID, Deep Everett, DO, 975 mg at 03/07/25 0919    apixaban (Eliquis) tablet 2.5 mg, 2.5 mg, oral, BID, Harjit G Salomone, DO, 2.5 mg at 03/07/25 0921    aspirin EC tablet 81 mg, 81 mg, oral, Daily, Harjit Hernandez DO, 81 mg at 03/07/25 0923    atorvastatin (Lipitor) tablet 40 mg, 40 mg, oral, Nightly,  Deep Everett, DO, 40 mg at 03/06/25 2037    benzocaine-menthol (Cepastat Sore Throat) lozenge 1 lozenge, 1 lozenge, Mouth/Throat, q2h PRN, Deep Everett, DO, 1 lozenge at 03/05/25 1734    benzonatate (Tessalon) capsule 100 mg, 100 mg, oral, TID PRN, Harjit G Salomone, DO    calcitriol (Rocaltrol) capsule 0.25 mcg, 0.25 mcg, oral, Daily, Deep Everett, DO, 0.25 mcg at 03/07/25 0921    calcium acetate (Phoslo) capsule 1,334 mg, 1,334 mg, oral, TID, Harjit G Salomone, DO, 1,334 mg at 03/07/25 1222    dextrose 50 % injection 12.5 g, 12.5 g, intravenous, q15 min PRN, Deep Everett, DO    dextrose 50 % injection 25 g, 25 g, intravenous, q15 min PRN, Deep Everett, DO    escitalopram (Lexapro) tablet 20 mg, 20 mg, oral, Daily, Deep Everett, DO, 20 mg at 03/07/25 0921    finasteride (Proscar) tablet 5 mg, 5 mg, oral, Daily, Harjit G Salomone, DO, 5 mg at 03/07/25 0921    glucagon (Glucagen) injection 1 mg, 1 mg, intramuscular, q15 min PRN, Deep Everett, DO    glucagon (Glucagen) injection 1 mg, 1 mg, intramuscular, q15 min PRN, Deep Everett, DO    guaiFENesin (Mucinex) 12 hr tablet 600 mg, 600 mg, oral, BID PRN, Harjit G Salomone, DO    insulin lispro injection 0-5 Units, 0-5 Units, subcutaneous, TID AC, Deep Everett, DO    metoprolol tartrate (Lopressor) tablet 25 mg, 25 mg, oral, BID, Deep Everett, DO, 25 mg at 03/07/25 0922    midodrine (Proamatine) tablet 10 mg, 10 mg, oral, PRN, Harjit G Salomone, DO    OLANZapine (ZyPREXA) tablet 5 mg, 5 mg, oral, Nightly, Deep Everett, DO, 5 mg at 03/06/25 2037    ondansetron (Zofran) tablet 4 mg, 4 mg, oral, q8h PRN **OR** ondansetron (Zofran) injection 4 mg, 4 mg, intravenous, q8h PRN, Tyson Everett DO, 4 mg at 03/06/25 1104    oseltamivir (Tamiflu) capsule 30 mg, 30 mg, oral, Every Mon/Wed/Fri, Harjit Hernandez DO, 30 mg at 03/05/25 1734    pantoprazole (ProtoNix) EC tablet 40 mg, 40 mg, oral, Daily before breakfast, 40 mg at 03/07/25 0623 **OR** pantoprazole (ProtoNix) injection 40 mg, 40 mg, intravenous, Daily  before breakfast, Deep Everett, DO    QUEtiapine (SEROquel) tablet 25 mg, 25 mg, oral, BID, Deep Everett, DO, 25 mg at 03/07/25 0934    tamsulosin (Flomax) 24 hr capsule 0.4 mg, 0.4 mg, oral, Daily, Harjit G Salomone, DO, 0.4 mg at 03/07/25 0949    vitamin B complex-vitamin C-folic acid (Nephrocaps) capsule 1 capsule, 1 capsule, oral, Daily, Harjit G Salomone, DO, 1 capsule at 03/07/25 0919           Assessment/Plan    1.  ESKD on hemodialysis on Monday/Wednesday/Friday at [facility] via left upper arm AV fistula with positive bruit and thrill  Will schedule patient for hemodialysis tomorrow on Friday  Patient was asked to not exceed 2 hours close patient was not feeling well from nausea and vomiting overnight  2.  Diabetes mellitus continue current management  3.  Hypertension continue current management  4.  Anemia due to CKD we will continue VEDA and iron supplementation as needed  5.  Metabolic bone disease due to CKD continue phosphate binders and vitamin D supplementation as needed  6.  Influenza A will follow with ID             Assessment & Plan  Influenza A              I spent 35 minutes in the professional and overall care of this patient.      Dale Patel MD

## 2025-03-07 NOTE — PROGRESS NOTES
"Subjective Data:  Feeling better today  Room air  No chest pain    Tele:  a fib     Overnight Events:    none     Objective Data:  Last Recorded Vitals:  Vitals:    25 2031 25 0348 25 0746 25 0804   BP: 116/77 125/83  130/88   BP Location: Right arm Right arm  Right arm   Patient Position: Lying Lying  Lying   Pulse:  86 84    Resp:  16 13    Temp: 36.9 °C (98.4 °F) 37.1 °C (98.8 °F)  37 °C (98.6 °F)   TempSrc: Oral Oral  Temporal   SpO2: 97% 96%  98%   Weight:       Height:         Medical Gas Therapy: None (Room air)  Weight  Av kg (245 lb)  Min: 111 kg (245 lb)  Max: 111 kg (245 lb)    LABS:  CMP:  Results from last 7 days   Lab Units 25  0634 25  0651 25  0552 25  2119   SODIUM mmol/L 143 142 145 143   POTASSIUM mmol/L 3.9 3.8 4.8 5.2   CHLORIDE mmol/L 110* 107 119* 117*   CO2 mmol/L 22 23 15* 16*   ANION GAP mmol/L 15 16 16 15   BUN mg/dL 67* 61* 104* 102*   CREATININE mg/dL 8.06* 6.92* 10.96* 11.03*   EGFR mL/min/1.73m*2 7* 9* 5* 5*   ALBUMIN g/dL 3.4 3.4  --  3.9   ALT U/L  --   --   --  13   AST U/L  --   --   --  15   BILIRUBIN TOTAL mg/dL  --   --   --  0.5     CBC:  Results from last 7 days   Lab Units 25  0634 25  0651 25  0552 25  2119   WBC AUTO x10*3/uL 5.7 5.3 7.7 8.1   HEMOGLOBIN g/dL 8.6* 8.5* 8.2* 8.7*   HEMATOCRIT % 28.3* 26.9* 27.6* 27.9*   PLATELETS AUTO x10*3/uL 146* 151 140* 151   MCV fL 95 91 97 96     COAG:     ABO: No results found for: \"ABO\"  HEME/ENDO:     CARDIAC:   Results from last 7 days   Lab Units 25  2213 25  2119   TROPHS ng/L 14 14             Last I/O:    Intake/Output Summary (Last 24 hours) at 3/7/2025 1033  Last data filed at 3/7/2025 0900  Gross per 24 hour   Intake 240 ml   Output 500 ml   Net -260 ml     Net IO Since Admission: -3,330 mL [25 1033]            Past Cardiology Tests (Last 3 Years):  EKG:  Results for orders placed during the hospital encounter of " 03/04/25    Electrocardiogram, 12-lead PRN ACS symptoms (Preliminary)  This result has not been signed. Information might be incomplete.    Narrative  Atrial fibrillation with rapid ventricular response  Abnormal ECG  When compared with ECG of 04-MAR-2025 20:57,  T wave inversion no longer evident in Inferior leads      ECG 12 lead    Narrative  Atrial fibrillation  Inferior infarct , age undetermined  Abnormal ECG  When compared with ECG of 01-JUN-2024 21:25,  Inferior infarct is now Present  Nonspecific T wave abnormality no longer evident in Lateral leads  See ED provider note for full interpretation and clinical correlation  Confirmed by Ken Uriarte ([id number]) on 3/5/2025 8:44:54 AM      Results for orders placed during the hospital encounter of 06/01/24    ECG 12 lead    Narrative  Atrial fibrillation with rapid ventricular response  T wave abnormality, consider inferior ischemia  Abnormal ECG  When compared with ECG of 27-APR-2024 20:13,  T wave inversion now evident in Inferior leads  Nonspecific T wave abnormality now evident in Lateral leads  See ED provider note for full interpretation and clinical correlation  Confirmed by Bonny Bee ([id number]) on 6/9/2024 11:16:09 AM      Results for orders placed during the hospital encounter of 04/27/24    ECG 12 lead    Narrative  Atrial fibrillation  Abnormal ECG  When compared with ECG of 02-DEC-2023 20:37,  Previous ECG has undetermined rhythm, needs review  Questionable change in QRS axis  See ED provider note for full interpretation and clinical correlation  Confirmed by Mitali Chavez ([id number]) on 4/29/2024 10:18:49 AM      Results for orders placed during the hospital encounter of 11/17/23    ECG 12 Lead    Narrative  Atrial fibrillation  Abnormal ECG  When compared with ECG of 17-NOV-2023 14:14,  Questionable change in QRS axis  Confirmed by Aldair Patel ([id number]) on 11/21/2023 1:59:53 PM      ECG 12 lead    Narrative  Atrial fibrillation  Abnormal ECG  When  compared with ECG of 16-NOV-2023 21:16,  Previous ECG has undetermined rhythm, needs review    See ED provider note for full interpretation and clinical correlation  Confirmed by Diane Malik ([id number]) on 11/18/2023 12:49:40 AM      ECG 12 lead    Narrative  See ED provider note for full interpretation and clinical correlation  Confirmed by Lizz Olvera ([id number]) on 11/17/2023 2:01:11 AM      Results for orders placed in visit on 11/20/23    ECG 12 Lead      ECG 12 Lead    Echo:  TTE 8/2024:  CONCLUSIONS:   - Exam indication: atrial fibrillation   - The left ventricle is normal in size. There is mild left ventricular   hypertrophy. Left ventricular systolic function is mildly decreased. EF = 45 ± 5%   (visual est.) Left ventricular diastolic function was not evaluated due to AF.   - The right ventricle is normal in size. Right ventricular systolic function is   low normal.   - The left atrial cavity is dilated.   - The right atrial cavity is mildly dilated.   - Exam was compared with the prior  echocardiographic exam performed on 9/22/23      Nuclear stress test 8/2024 (pharmacologic):  CONCLUSIONS:    1. SPECT Perfusion Study: Normal.    2. There is no scintigraphic evidence for inducible ischemia.    3. No evidence of scarred myocardium.    4. Left ventricle is normal in size. The left ventricle systolic   function is normal.    5. This is a low risk scan.   LVEF 61%      Inpatient Medications:  Scheduled medications   Medication Dose Route Frequency    acetaminophen  975 mg oral TID    apixaban  2.5 mg oral BID    aspirin  81 mg oral Daily    atorvastatin  40 mg oral Nightly    calcitriol  0.25 mcg oral Daily    calcium acetate  1,334 mg oral TID    escitalopram  20 mg oral Daily    finasteride  5 mg oral Daily    insulin lispro  0-5 Units subcutaneous TID AC    metoprolol tartrate  25 mg oral BID    OLANZapine  5 mg oral Nightly    oseltamivir  30 mg oral Every Mon/Wed/Fri    pantoprazole  40 mg oral Daily before  breakfast    Or    pantoprazole  40 mg intravenous Daily before breakfast    QUEtiapine  25 mg oral BID    tamsulosin  0.4 mg oral Daily    vitamin B complex-vitamin C-folic acid  1 capsule oral Daily     PRN medications   Medication    benzocaine-menthol    benzonatate    dextrose    dextrose    glucagon    glucagon    guaiFENesin    midodrine    ondansetron    Or    ondansetron     Continuous Medications   Medication Dose Last Rate       Physical Exam:    General:  Patient is awake, alert, and oriented.  Patient is in no acute distress.  HEENT:  Pupils equal and reactive.  Normocephalic.  Moist mucosa.    Neck:  No thyromegaly.  Normal Jugular Venous Pressure.  Cardiovascular:  Irregular rate and rhythm.   Pulmonary:  Clear to auscultation bilaterally.  Abdomen:  Soft.  Distended  Positive bowel sounds.  Lower Extremities:  2+ pedal pulses. No LE edema.  Neurologic:  Cranial nerves intact.  No focal deficit.   Skin: Skin warm and dry, normal skin turgor.   Psychiatric: Normal affect.     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 P/w influenza found to be in AF with RVR for which Cardiology is consulted.      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 Rates are improved now after resuming his home metoprolol.     Plan:  - Continue metoprolol succinate 50mg daily for rate control, continue upon discharge. oms   - Continue eliquis 2.5mg BID    - Will arrange for follow up with Dr. Murphy within 1 month, at that time if patient is compliant with AC and remains in AF can consider DCCV as outpatient    Cardiology will sign off at this time, please call with any questions or concerns.     Code Status:  Full Code    Leticia Wilhelm, APRN-CNP      Thank you for allowing me to participate in their care.   Please feel free to call me with any further questions or concerns.    Antonio Murphy MD, [facility], [facility]URO [facility]YDER  Division of Cardiovascular Medicine  System Director, Nuclear Cardiology   Medical Director, [facility]   Clinical , [facility] [employer]  [email or web address]   Office:  [phone]          Both the ARIELLE and I have had a face to face encounter with the patient today. I have examined the patient and edited the documented physical examination as necessary.  I personally reviewed the patient's vital signs, telemetry, recent labs, medications, orders, EKGs, and pertinent cardiac imaging/ echocardiography.  I have reviewed the ARIELLE's encounter note, approve the ARIELLE's documentation and have edited the note to reflect my diagnostic and therapeutic plan.

## 2025-03-07 NOTE — PROGRESS NOTES
03/07/25 1617   Discharge Planning   Assistance Needed Community HD   Expected Discharge Disposition Home H     Rebecca confirmed patient has chair at [facility] 11:45-3:45. Waiting on HD orders from Dr. Crys Fonseca.

## 2025-03-07 NOTE — PRE-PROCEDURE NOTE
Report from Sending RN:    Report From: Megan Willig  Recent Surgery of Procedure: No  Baseline Level of Consciousness (LOC): A&O X's 4  Oxygen Use: No  Type: room air  Diabetic: Yes  Last BP Med Given Day of Dialysis: see EMAR  Last Pain Med Given: see EMAR  Lab Tests to be Obtained with Dialysis: No  Blood Transfusion to be Given During Dialysis: No  Available IV Access: No  Medications to be Administered During Dialysis: No  Continuous IV Infusion Running: No  Restraints on Currently or in the Last 24 Hours: No  Hand-Off Communication: He's a/o x's 4, flu a+, DM II, room air   Dialysis Catheter Dressing: n/a  Last Dressing Change: n/a

## 2025-03-07 NOTE — POST-PROCEDURE NOTE
Report to Receiving RN:    Report To: Megan Willig  Time Report Called: [phone]  Hand-Off Communication: Mr Colmenares is returning to you after a 2.0 hour treatment. He refused to stay for a 3.5 hour treatment because he said he has been so sick. Dr. Vicente is aware of his request. He removed 1000 ml of fluid. His last BP was 109/73 .   Complications During Treatment: No  Ultrafiltration Treatment: No  Medications Administered During Dialysis: No  Blood Products Administered During Dialysis: No  Labs Sent During Dialysis: No  Heparin Drip Rate Changes: No  Dialysis Catheter Dressing: n/a  Last Dressing Change: n/a    Electronic Signatures:  Mahesh Holman RN       Last Updated: 4:07 PM by MAHESH HOLMAN

## 2025-03-07 NOTE — CARE PLAN
The patient's goals for the shift include  remaining safe.    The clinical goals for the shift include Pt to remain HDS this shift      Problem: Nutrition  Goal: Nutrient intake appropriate for maintaining nutritional needs  Outcome: Progressing     Problem: Pain - Adult  Goal: Verbalizes/displays adequate comfort level or baseline comfort level  Flowsheets (Taken 3/7/2025 1826)  Verbalizes/displays adequate comfort level or baseline comfort level:   Encourage patient to monitor pain and request assistance   Administer analgesics based on type and severity of pain and evaluate response     Problem: Safety - Adult  Goal: Free from fall injury  Flowsheets (Taken 3/7/2025 1826)  Free from fall injury: Instruct family/caregiver on patient safety     Problem: Discharge Planning  Goal: Discharge to home or other facility with appropriate resources  Flowsheets (Taken 3/7/2025 1826)  Discharge to home or other facility with appropriate resources:   Identify barriers to discharge with patient and caregiver   Arrange for needed discharge resources and transportation as appropriate     Problem: Chronic Conditions and Co-morbidities  Goal: Patient's chronic conditions and co-morbidity symptoms are monitored and maintained or improved  Flowsheets (Taken 3/7/2025 1826)  Care Plan - Patient's Chronic Conditions and Co-Morbidity Symptoms are Monitored and Maintained or Improved: Monitor and assess patient's chronic conditions and comorbid symptoms for stability, deterioration, or improvement

## 2025-03-08 LAB
ALBUMIN SERPL BCP-MCNC: 3.5 G/DL (ref 3.4–5)
ANION GAP SERPL CALC-SCNC: 13 MMOL/L (ref 10–20)
BASOPHILS # BLD AUTO: 0.03 X10*3/UL (ref 0–0.1)
BASOPHILS NFR BLD AUTO: 0.4 %
BUN SERPL-MCNC: 51 MG/DL (ref 6–23)
CALCIUM SERPL-MCNC: 10.1 MG/DL (ref 8.6–10.3)
CHLORIDE SERPL-SCNC: 107 MMOL/L (ref 98–107)
CO2 SERPL-SCNC: 26 MMOL/L (ref 21–32)
CREAT SERPL-MCNC: 6.86 MG/DL (ref 0.5–1.3)
EGFRCR SERPLBLD CKD-EPI 2021: 9 ML/MIN/1.73M*2
EOSINOPHIL # BLD AUTO: 1.25 X10*3/UL (ref 0–0.7)
EOSINOPHIL NFR BLD AUTO: 18.1 %
ERYTHROCYTE [DISTWIDTH] IN BLOOD BY AUTOMATED COUNT: 16.3 % (ref 11.5–14.5)
GLUCOSE BLD MANUAL STRIP-MCNC: 108 MG/DL (ref 74–99)
GLUCOSE BLD MANUAL STRIP-MCNC: 123 MG/DL (ref 74–99)
GLUCOSE BLD MANUAL STRIP-MCNC: 75 MG/DL (ref 74–99)
GLUCOSE BLD MANUAL STRIP-MCNC: 84 MG/DL (ref 74–99)
GLUCOSE SERPL-MCNC: 103 MG/DL (ref 74–99)
HCT VFR BLD AUTO: 28.6 % (ref 41–52)
HGB BLD-MCNC: 8.9 G/DL (ref 13.5–17.5)
IMM GRANULOCYTES # BLD AUTO: 0.04 X10*3/UL (ref 0–0.7)
IMM GRANULOCYTES NFR BLD AUTO: 0.6 % (ref 0–0.9)
LYMPHOCYTES # BLD AUTO: 1.42 X10*3/UL (ref 1.2–4.8)
LYMPHOCYTES NFR BLD AUTO: 20.5 %
MCH RBC QN AUTO: 29.5 PG (ref 26–34)
MCHC RBC AUTO-ENTMCNC: 31.1 G/DL (ref 32–36)
MCV RBC AUTO: 95 FL (ref 80–100)
MONOCYTES # BLD AUTO: 0.52 X10*3/UL (ref 0.1–1)
MONOCYTES NFR BLD AUTO: 7.5 %
NEUTROPHILS # BLD AUTO: 3.65 X10*3/UL (ref 1.2–7.7)
NEUTROPHILS NFR BLD AUTO: 52.9 %
NRBC BLD-RTO: 0 /100 WBCS (ref 0–0)
PHOSPHATE SERPL-MCNC: 4.7 MG/DL (ref 2.5–4.9)
PLATELET # BLD AUTO: 168 X10*3/UL (ref 150–450)
POTASSIUM SERPL-SCNC: 4.2 MMOL/L (ref 3.5–5.3)
RBC # BLD AUTO: 3.02 X10*6/UL (ref 4.5–5.9)
SODIUM SERPL-SCNC: 142 MMOL/L (ref 136–145)
WBC # BLD AUTO: 6.9 X10*3/UL (ref 4.4–11.3)

## 2025-03-08 PROCEDURE — 2500000005 HC RX 250 GENERAL PHARMACY W/O HCPCS: Performed by: HOSPITALIST

## 2025-03-08 PROCEDURE — 82947 ASSAY GLUCOSE BLOOD QUANT: CPT

## 2025-03-08 PROCEDURE — 85025 COMPLETE CBC W/AUTO DIFF WBC: CPT | Performed by: HOSPITALIST

## 2025-03-08 PROCEDURE — 2500000001 HC RX 250 WO HCPCS SELF ADMINISTERED DRUGS (ALT 637 FOR MEDICARE OP): Performed by: INTERNAL MEDICINE

## 2025-03-08 PROCEDURE — 2500000002 HC RX 250 W HCPCS SELF ADMINISTERED DRUGS (ALT 637 FOR MEDICARE OP, ALT 636 FOR OP/ED): Performed by: INTERNAL MEDICINE

## 2025-03-08 PROCEDURE — 36415 COLL VENOUS BLD VENIPUNCTURE: CPT | Performed by: HOSPITALIST

## 2025-03-08 PROCEDURE — 2500000001 HC RX 250 WO HCPCS SELF ADMINISTERED DRUGS (ALT 637 FOR MEDICARE OP): Performed by: HOSPITALIST

## 2025-03-08 PROCEDURE — 80069 RENAL FUNCTION PANEL: CPT | Performed by: HOSPITALIST

## 2025-03-08 PROCEDURE — 99232 SBSQ HOSP IP/OBS MODERATE 35: CPT | Performed by: HOSPITALIST

## 2025-03-08 PROCEDURE — 2060000001 HC INTERMEDIATE ICU ROOM DAILY

## 2025-03-08 PROCEDURE — 2500000002 HC RX 250 W HCPCS SELF ADMINISTERED DRUGS (ALT 637 FOR MEDICARE OP, ALT 636 FOR OP/ED): Performed by: HOSPITALIST

## 2025-03-08 RX ORDER — LIDOCAINE 560 MG/1
1 PATCH PERCUTANEOUS; TOPICAL; TRANSDERMAL DAILY
Status: DISPENSED | OUTPATIENT
Start: 2025-03-08

## 2025-03-08 RX ADMIN — METOPROLOL TARTRATE 25 MG: 25 TABLET, FILM COATED ORAL at 21:22

## 2025-03-08 RX ADMIN — QUETIAPINE FUMARATE 25 MG: 25 TABLET ORAL at 16:09

## 2025-03-08 RX ADMIN — ESCITALOPRAM OXALATE 20 MG: 20 TABLET ORAL at 08:58

## 2025-03-08 RX ADMIN — PANTOPRAZOLE SODIUM 40 MG: 40 TABLET, DELAYED RELEASE ORAL at 06:23

## 2025-03-08 RX ADMIN — OLANZAPINE 5 MG: 5 TABLET, FILM COATED ORAL at 21:21

## 2025-03-08 RX ADMIN — ASPIRIN 81 MG: 81 TABLET, COATED ORAL at 08:58

## 2025-03-08 RX ADMIN — Medication 1 CAPSULE: at 08:58

## 2025-03-08 RX ADMIN — APIXABAN 2.5 MG: 5 TABLET, FILM COATED ORAL at 21:21

## 2025-03-08 RX ADMIN — CALCIUM ACETATE 1334 MG: 667 CAPSULE ORAL at 12:54

## 2025-03-08 RX ADMIN — ACETAMINOPHEN 975 MG: 325 TABLET, FILM COATED ORAL at 21:21

## 2025-03-08 RX ADMIN — CALCITRIOL CAPSULES 0.25 MCG 0.25 MCG: 0.25 CAPSULE ORAL at 08:58

## 2025-03-08 RX ADMIN — ACETAMINOPHEN 975 MG: 325 TABLET, FILM COATED ORAL at 16:09

## 2025-03-08 RX ADMIN — CALCIUM ACETATE 1334 MG: 667 CAPSULE ORAL at 08:58

## 2025-03-08 RX ADMIN — LIDOCAINE 4% 1 PATCH: 40 PATCH TOPICAL at 17:23

## 2025-03-08 RX ADMIN — ATORVASTATIN CALCIUM 40 MG: 40 TABLET, FILM COATED ORAL at 21:21

## 2025-03-08 RX ADMIN — CALCIUM ACETATE 1334 MG: 667 CAPSULE ORAL at 16:09

## 2025-03-08 RX ADMIN — ACETAMINOPHEN 975 MG: 325 TABLET, FILM COATED ORAL at 08:57

## 2025-03-08 RX ADMIN — TAMSULOSIN HYDROCHLORIDE 0.4 MG: 0.4 CAPSULE ORAL at 08:58

## 2025-03-08 RX ADMIN — QUETIAPINE FUMARATE 25 MG: 25 TABLET ORAL at 08:58

## 2025-03-08 RX ADMIN — METOPROLOL TARTRATE 25 MG: 25 TABLET, FILM COATED ORAL at 08:58

## 2025-03-08 RX ADMIN — APIXABAN 2.5 MG: 5 TABLET, FILM COATED ORAL at 08:57

## 2025-03-08 RX ADMIN — FINASTERIDE 5 MG: 5 TABLET, FILM COATED ORAL at 08:58

## 2025-03-08 ASSESSMENT — COGNITIVE AND FUNCTIONAL STATUS - GENERAL
DRESSING REGULAR LOWER BODY CLOTHING: A LITTLE
DAILY ACTIVITIY SCORE: 23
STANDING UP FROM CHAIR USING ARMS: A LITTLE
MOBILITY SCORE: 20
MOVING TO AND FROM BED TO CHAIR: A LITTLE
WALKING IN HOSPITAL ROOM: A LITTLE
CLIMB 3 TO 5 STEPS WITH RAILING: A LITTLE

## 2025-03-08 ASSESSMENT — PAIN - FUNCTIONAL ASSESSMENT: PAIN_FUNCTIONAL_ASSESSMENT: 0-10

## 2025-03-08 ASSESSMENT — PAIN SCALES - GENERAL: PAINLEVEL_OUTOF10: 8

## 2025-03-08 NOTE — CARE PLAN
The patient's goals for the shift include  comfort and safety    The clinical goals for the shift include patient safetey        Problem: Pain - Adult  Goal: Verbalizes/displays adequate comfort level or baseline comfort level  Outcome: Progressing     Problem: Safety - Adult  Goal: Free from fall injury  Outcome: Progressing     Problem: Nutrition  Goal: Nutrient intake appropriate for maintaining nutritional needs  Outcome: Progressing

## 2025-03-08 NOTE — PROGRESS NOTES
Kj Colmenares is a 54 y.o. male on day 3 of admission presenting with Influenza A.      Subjective   Overall no better no new complaints       Objective        Vitals 24HR  Heart Rate:  []   Temp:  [36.1 °C (97 °F)-37.3 °C (99.1 °F)]   Resp:  [14-23]   BP: (108-123)/(73-87)   SpO2:  [98 %-99 %]     Intake/Output last 3 Shifts:    Intake/Output Summary (Last 24 hours) at 3/8/2025 1403  Last data filed at 3/8/2025 0859  Gross per 24 hour   Intake 960 ml   Output 2200 ml   Net -1240 ml       Physical Exam      General appearance: Awake and alert in no acute distress complaining of cough with chest discomfort  Head and ENT: Normocephalic/atraumatic/supple neck/no JVD  Lungs; bilateral crackles and rhonchi both lung fields  Heart; irregular/irregular rhythm  Abdomen; soft no tenderness  Extremities; no edema  Neurologic; physiologic     Dialysis access= left upper arm AV fistula with positive bruit and thrill             Relevant Results     Results from last 7 days   Lab Units 03/08/25  0643 03/07/25  0634 03/06/25  0651   WBC AUTO x10*3/uL 6.9 5.7 5.3   HEMOGLOBIN g/dL 8.9* 8.6* 8.5*   HEMATOCRIT % 28.6* 28.3* 26.9*   PLATELETS AUTO x10*3/uL 168 146* 151      Results from last 7 days   Lab Units 03/08/25  0643 03/07/25  0634 03/06/25  0651   SODIUM mmol/L 142 143 142   POTASSIUM mmol/L 4.2 3.9 3.8   CHLORIDE mmol/L 107 110* 107   CO2 mmol/L 26 22 23   BUN mg/dL 51* 67* 61*   CREATININE mg/dL 6.86* 8.06* 6.92*   GLUCOSE mg/dL 103* 90 105*   CALCIUM mg/dL 10.1 9.5 9.1        Current Facility-Administered Medications:     acetaminophen (Tylenol) tablet 975 mg, 975 mg, oral, TID, Deep Everett, DO, 975 mg at 03/08/25 0857    apixaban (Eliquis) tablet 2.5 mg, 2.5 mg, oral, BID, Harjit G Mayr DO, 2.5 mg at 03/08/25 0857    aspirin EC tablet 81 mg, 81 mg, oral, Daily, Harjit Hernandez DO, 81 mg at 03/08/25 0858    atorvastatin (Lipitor) tablet 40 mg, 40 mg, oral, Nightly, Deep Everett, , 40 mg at 03/07/25 2051     benzocaine-menthol (Cepastat Sore Throat) lozenge 1 lozenge, 1 lozenge, Mouth/Throat, q2h PRN, Deep Everett, DO, 1 lozenge at 03/05/25 1734    benzonatate (Tessalon) capsule 100 mg, 100 mg, oral, TID PRN, Harjit G Salomone, DO    calcitriol (Rocaltrol) capsule 0.25 mcg, 0.25 mcg, oral, Daily, Deep Everett, DO, 0.25 mcg at 03/08/25 0858    calcium acetate (Phoslo) capsule 1,334 mg, 1,334 mg, oral, TID, Harjit G Salomone, DO, 1,334 mg at 03/08/25 1254    dextrose 50 % injection 12.5 g, 12.5 g, intravenous, q15 min PRN, Deep Everett, DO    dextrose 50 % injection 25 g, 25 g, intravenous, q15 min PRN, Deep Everett, DO    escitalopram (Lexapro) tablet 20 mg, 20 mg, oral, Daily, Deep Everett, DO, 20 mg at 03/08/25 0858    finasteride (Proscar) tablet 5 mg, 5 mg, oral, Daily, Harjit G Salomone, DO, 5 mg at 03/08/25 0858    glucagon (Glucagen) injection 1 mg, 1 mg, intramuscular, q15 min PRN, Deep Everett, DO    glucagon (Glucagen) injection 1 mg, 1 mg, intramuscular, q15 min PRN, Deep Everett, DO    guaiFENesin (Mucinex) 12 hr tablet 600 mg, 600 mg, oral, BID PRN, Harjit G Salomone, DO    insulin lispro injection 0-5 Units, 0-5 Units, subcutaneous, TID AC, Deep Everett, DO    metoprolol tartrate (Lopressor) tablet 25 mg, 25 mg, oral, BID, Deep Everett, DO, 25 mg at 03/08/25 0858    midodrine (Proamatine) tablet 10 mg, 10 mg, oral, PRN, Harjit G Salomone, DO    OLANZapine (ZyPREXA) tablet 5 mg, 5 mg, oral, Nightly, Deep Everett, DO, 5 mg at 03/07/25 2051    ondansetron (Zofran) tablet 4 mg, 4 mg, oral, q8h PRN **OR** ondansetron (Zofran) injection 4 mg, 4 mg, intravenous, q8h PRN, Deep Everett, DO, 4 mg at 03/06/25 1104    pantoprazole (ProtoNix) EC tablet 40 mg, 40 mg, oral, Daily before breakfast, 40 mg at 03/08/25 0623 **OR** pantoprazole (ProtoNix) injection 40 mg, 40 mg, intravenous, Daily before breakfast, Deep Everett, DO    QUEtiapine (SEROquel) tablet 25 mg, 25 mg, oral, BID, Deep Everett, DO, 25 mg at 03/08/25 0858    tamsulosin (Flomax) 24 hr  capsule 0.4 mg, 0.4 mg, oral, Daily, Harjit G Salomone, DO, 0.4 mg at 03/08/25 0858    vitamin B complex-vitamin C-folic acid (Nephrocaps) capsule 1 capsule, 1 capsule, oral, Daily, Harjit G Salomone, DO, 1 capsule at 03/08/25 0858           Assessment/Plan         1.  ESKD on hemodialysis on Monday/Wednesday/Friday at [facility] via left upper arm AV fistula with positive bruit and thrill  Will schedule patient for hemodialysis  on Monday  Patient was asked to not exceed 2 hours close patient was not feeling well from nausea and vomiting overnight  2.  Diabetes mellitus continue current management  3.  Hypertension continue current management  4.  Anemia due to CKD we will continue VEDA and iron supplementation as needed  5.  Metabolic bone disease due to CKD continue phosphate binders and vitamin D supplementation as needed  6.  Influenza A will follow with ID         Assessment & Plan  Influenza A              I spent 35 minutes in the professional and overall care of this patient.      Dale Patel MD

## 2025-03-08 NOTE — CARE PLAN
Problem: Pain - Adult  Goal: Verbalizes/displays adequate comfort level or baseline comfort level  Outcome: Progressing     Problem: Safety - Adult  Goal: Free from fall injury  3/8/2025 0609 by Alberta Canada RN  Outcome: Progressing  3/8/2025 0608 by Alberta Canada RN  Outcome: Progressing   The patient's goals for the shift include      The clinical goals for the shift include patient safetey    Over the shift, the patient did not make progress toward the following goals. Barriers to progression include . Recommendations to address these barriers include .

## 2025-03-08 NOTE — PROGRESS NOTES
Between 7AM-7PM please message me via Epic Secure Chat.  After 7PM please page Nocturnist on call.    [facility] Hospitalist Progress Note      Kj Colmenares    :  1970(54 y.o.)    MRN:  [id number]  Date: 25     Assessment and Plan:     Influenza A infection  - continue tamiflu for 5d course; continue supportive care    ESRD  Anemia of CKD  Metabolic   - no HD for 2 months prior to admission  - Nephrology consulted to manage IP HD; continue phoslo, neprocaps. EPO/IV iron per nephrology with HD.   - TCC working on OP HD; patient has chair at [facility] MWF 11:45-3:45. Waiting on HD orders from Dr. Crys Fonseca.    Atrial fibrillation with RVR  - improved with restarting PO lopressor; continue eliquis    Left knee effusion  - check CT left knee    T2DM  - continue ssi    HLD  - switched to atorvastatin as he is ESRD on HD    Hx of CVA  - continue asa, statin    BPH  - continue flomax, proscar    DVT Prophylaxis: full anticoagulation with eliquis    Disposition: continue to monitor inpatient, await consultant recommendations, await test results, and await clinical improvement    Electronically signed by Tyson Everett DO on 25 at 10:08 PM     Subjective:      Interval History:   Vitals and chart notes from overnight reviewed.   No acute issues overnight.   Patient seen and evaluated at bedside.   PO intake improved today, ate all his pancakes.   Paged in afternoon, reports pain in left knee even without moving around much. Has some swelling around his knee.     Review of Systems:   Other than patient's chronic conditions and those complaints in the history above, the rest of the 10 systems review were done and were negative.     Current medications:  Scheduled Meds:acetaminophen, 975 mg, oral, TID  apixaban, 2.5 mg, oral, BID  aspirin, 81 mg, oral, Daily  atorvastatin, 40 mg, oral, Nightly  calcitriol, 0.25 mcg, oral, Daily  calcium acetate, 1,334 mg, oral, TID  escitalopram, 20  mg, oral, Daily  finasteride, 5 mg, oral, Daily  insulin lispro, 0-5 Units, subcutaneous, TID AC  metoprolol tartrate, 25 mg, oral, BID  OLANZapine, 5 mg, oral, Nightly  pantoprazole, 40 mg, oral, Daily before breakfast   Or  pantoprazole, 40 mg, intravenous, Daily before breakfast  QUEtiapine, 25 mg, oral, BID  tamsulosin, 0.4 mg, oral, Daily  vitamin B complex-vitamin C-folic acid, 1 capsule, oral, Daily      Continuous Infusions:   PRN Meds:PRN medications: benzocaine-menthol, benzonatate, dextrose, dextrose, glucagon, glucagon, guaiFENesin, midodrine, ondansetron **OR** ondansetron      Objective:     Heart Rate:  []   Temp:  [36.1 °C (97 °F)-37.3 °C (99.1 °F)]   Resp:  [11-23]   BP: (115-130)/(75-88)   SpO2:  [96 %-100 %]          Physical Exam  Vitals and nursing note reviewed.   Constitutional:       Appearance: He is obese.   HENT:      Mouth/Throat:      Mouth: Mucous membranes are moist.      Pharynx: Oropharynx is clear.   Cardiovascular:      Rate and Rhythm: Normal rate. Rhythm irregular.   Pulmonary:      Effort: Pulmonary effort is normal.   Abdominal:      General: There is no distension.      Palpations: Abdomen is soft.      Tenderness: There is no abdominal tenderness.   Musculoskeletal:      Comments: Swelling around left knee   Neurological:      Mental Status: He is alert and oriented to person, place, and time.         Labs:   Lab Results   Component Value Date     03/07/2025    K 3.9 03/07/2025     (H) 03/07/2025    CO2 22 03/07/2025    BUN 67 (H) 03/07/2025    CREATININE 8.06 (H) 03/07/2025    GLUCOSE 90 03/07/2025    CALCIUM 9.5 03/07/2025    PROT 6.7 03/04/2025    BILITOT 0.5 03/04/2025    ALKPHOS 333 (H) 03/04/2025    AST 15 03/04/2025    ALT 13 03/04/2025       Lab Results   Component Value Date    WBC 5.7 03/07/2025    HGB 8.6 (L) 03/07/2025    HCT 28.3 (L) 03/07/2025    MCV 95 03/07/2025     (L) 03/07/2025

## 2025-03-08 NOTE — PROGRESS NOTES
Between 7AM-7PM please message me via Epic Secure Chat.  After 7PM please page Nocturnist on call.    [facility] [place] 2[facility] Hospitalist Progress Note      Kj Colmenares    :  1970(54 y.o.)    MRN:  [id number]  Date: 25     Assessment and Plan:     Influenza A infection  - continue tamiflu for 5d course; continue supportive care    ESRD  Anemia of CKD  Metabolic   - no HD for 2 months prior to admission  - Nephrology consulted to manage IP HD; continue phoslo, neprocaps. EPO/IV iron per nephrology with HD.   - TCC working on OP HD; patient has chair at [facility] MWF 11:45-3:45. Waiting on HD orders from Dr. Crys Fonseca.    Atrial fibrillation with RVR  - improved with restarting PO lopressor; continue eliquis    Left knee effusion  - CT left knee shows advanced osteoarthritis of the left knee with a large joint effusion. Plan for OP referral to orthopedic surgery to discuss options once acute issues resolved  - continue annetta tylenol, lidocaine patch    T2DM  - continue ssi    HLD  - switched to atorvastatin as he is ESRD on HD    Hx of CVA  - continue asa, statin    BPH  - continue flomax, proscar    DVT Prophylaxis: full anticoagulation with eliquis    Disposition: continue to monitor inpatient, await consultant recommendations, await test results, and await clinical improvement    Electronically signed by Tyson Everett DO on 25 at 3:59 PM     Subjective:      Interval History:   Vitals and chart notes from overnight reviewed.   No acute issues overnight.   Patient seen and evaluated at bedside.   PO intake improving. Symptoms slowly getting better. Still with knee pain limiting mobility.     Review of Systems:   Other than patient's chronic conditions and those complaints in the history above, the rest of the 10 systems review were done and were negative.     Current medications:  Scheduled Meds:acetaminophen, 975 mg, oral, TID  apixaban, 2.5 mg, oral, BID  aspirin, 81 mg, oral,  Daily  atorvastatin, 40 mg, oral, Nightly  calcitriol, 0.25 mcg, oral, Daily  calcium acetate, 1,334 mg, oral, TID  escitalopram, 20 mg, oral, Daily  finasteride, 5 mg, oral, Daily  insulin lispro, 0-5 Units, subcutaneous, TID AC  metoprolol tartrate, 25 mg, oral, BID  OLANZapine, 5 mg, oral, Nightly  pantoprazole, 40 mg, oral, Daily before breakfast   Or  pantoprazole, 40 mg, intravenous, Daily before breakfast  QUEtiapine, 25 mg, oral, BID  tamsulosin, 0.4 mg, oral, Daily  vitamin B complex-vitamin C-folic acid, 1 capsule, oral, Daily      Continuous Infusions:   PRN Meds:PRN medications: benzocaine-menthol, benzonatate, dextrose, dextrose, glucagon, glucagon, guaiFENesin, midodrine, ondansetron **OR** ondansetron      Objective:     Heart Rate:  []   Temp:  [36.6 °C (97.8 °F)-37.3 °C (99.1 °F)]   Resp:  [14-23]   BP: (108-123)/(73-87)   SpO2:  [98 %-99 %]          Physical Exam  Vitals and nursing note reviewed.   Constitutional:       Appearance: He is obese.   HENT:      Mouth/Throat:      Mouth: Mucous membranes are moist.      Pharynx: Oropharynx is clear.   Cardiovascular:      Rate and Rhythm: Normal rate. Rhythm irregular.   Pulmonary:      Effort: Pulmonary effort is normal.   Abdominal:      General: There is no distension.      Palpations: Abdomen is soft.      Tenderness: There is no abdominal tenderness.   Musculoskeletal:      Comments: Swelling around left knee   Neurological:      Mental Status: He is alert and oriented to person, place, and time.         Labs:   Lab Results   Component Value Date     03/08/2025    K 4.2 03/08/2025     03/08/2025    CO2 26 03/08/2025    BUN 51 (H) 03/08/2025    CREATININE 6.86 (H) 03/08/2025    GLUCOSE 103 (H) 03/08/2025    CALCIUM 10.1 03/08/2025    PROT 6.7 03/04/2025    BILITOT 0.5 03/04/2025    ALKPHOS 333 (H) 03/04/2025    AST 15 03/04/2025    ALT 13 03/04/2025       Lab Results   Component Value Date    WBC 6.9 03/08/2025    HGB 8.9 (L)  03/08/2025    HCT 28.6 (L) 03/08/2025    MCV 95 03/08/2025     03/08/2025

## 2025-03-09 VITALS
SYSTOLIC BLOOD PRESSURE: 112 MMHG | OXYGEN SATURATION: 98 % | TEMPERATURE: 98.6 F | RESPIRATION RATE: 19 BRPM | BODY MASS INDEX: 36.29 KG/M2 | DIASTOLIC BLOOD PRESSURE: 76 MMHG | HEART RATE: 94 BPM | WEIGHT: 245 LBS | HEIGHT: 69 IN

## 2025-03-09 LAB
ALBUMIN SERPL BCP-MCNC: 3.5 G/DL (ref 3.4–5)
ANION GAP SERPL CALC-SCNC: 15 MMOL/L (ref 10–20)
BASOPHILS # BLD AUTO: 0.01 X10*3/UL (ref 0–0.1)
BASOPHILS NFR BLD AUTO: 0.1 %
BUN SERPL-MCNC: 63 MG/DL (ref 6–23)
CALCIUM SERPL-MCNC: 10.1 MG/DL (ref 8.6–10.3)
CHLORIDE SERPL-SCNC: 108 MMOL/L (ref 98–107)
CO2 SERPL-SCNC: 25 MMOL/L (ref 21–32)
CREAT SERPL-MCNC: 8.15 MG/DL (ref 0.5–1.3)
EGFRCR SERPLBLD CKD-EPI 2021: 7 ML/MIN/1.73M*2
EOSINOPHIL # BLD AUTO: 1.2 X10*3/UL (ref 0–0.7)
EOSINOPHIL NFR BLD AUTO: 16.5 %
ERYTHROCYTE [DISTWIDTH] IN BLOOD BY AUTOMATED COUNT: 15.9 % (ref 11.5–14.5)
GLUCOSE BLD MANUAL STRIP-MCNC: 88 MG/DL (ref 74–99)
GLUCOSE BLD MANUAL STRIP-MCNC: 92 MG/DL (ref 74–99)
GLUCOSE BLD MANUAL STRIP-MCNC: 99 MG/DL (ref 74–99)
GLUCOSE SERPL-MCNC: 88 MG/DL (ref 74–99)
HCT VFR BLD AUTO: 27.3 % (ref 41–52)
HGB BLD-MCNC: 8.2 G/DL (ref 13.5–17.5)
IMM GRANULOCYTES # BLD AUTO: 0.08 X10*3/UL (ref 0–0.7)
IMM GRANULOCYTES NFR BLD AUTO: 1.1 % (ref 0–0.9)
LYMPHOCYTES # BLD AUTO: 1.19 X10*3/UL (ref 1.2–4.8)
LYMPHOCYTES NFR BLD AUTO: 16.4 %
MCH RBC QN AUTO: 28.7 PG (ref 26–34)
MCHC RBC AUTO-ENTMCNC: 30 G/DL (ref 32–36)
MCV RBC AUTO: 96 FL (ref 80–100)
MONOCYTES # BLD AUTO: 0.57 X10*3/UL (ref 0.1–1)
MONOCYTES NFR BLD AUTO: 7.9 %
NEUTROPHILS # BLD AUTO: 4.21 X10*3/UL (ref 1.2–7.7)
NEUTROPHILS NFR BLD AUTO: 58 %
NRBC BLD-RTO: 0 /100 WBCS (ref 0–0)
PHOSPHATE SERPL-MCNC: 5 MG/DL (ref 2.5–4.9)
PLATELET # BLD AUTO: 178 X10*3/UL (ref 150–450)
POTASSIUM SERPL-SCNC: 4.6 MMOL/L (ref 3.5–5.3)
RBC # BLD AUTO: 2.86 X10*6/UL (ref 4.5–5.9)
SODIUM SERPL-SCNC: 143 MMOL/L (ref 136–145)
WBC # BLD AUTO: 7.3 X10*3/UL (ref 4.4–11.3)

## 2025-03-09 PROCEDURE — 99232 SBSQ HOSP IP/OBS MODERATE 35: CPT | Performed by: HOSPITALIST

## 2025-03-09 PROCEDURE — 2500000002 HC RX 250 W HCPCS SELF ADMINISTERED DRUGS (ALT 637 FOR MEDICARE OP, ALT 636 FOR OP/ED): Performed by: INTERNAL MEDICINE

## 2025-03-09 PROCEDURE — 85025 COMPLETE CBC W/AUTO DIFF WBC: CPT | Performed by: HOSPITALIST

## 2025-03-09 PROCEDURE — 2060000001 HC INTERMEDIATE ICU ROOM DAILY

## 2025-03-09 PROCEDURE — 2500000005 HC RX 250 GENERAL PHARMACY W/O HCPCS: Performed by: HOSPITALIST

## 2025-03-09 PROCEDURE — 2500000001 HC RX 250 WO HCPCS SELF ADMINISTERED DRUGS (ALT 637 FOR MEDICARE OP): Performed by: HOSPITALIST

## 2025-03-09 PROCEDURE — 36415 COLL VENOUS BLD VENIPUNCTURE: CPT | Performed by: HOSPITALIST

## 2025-03-09 PROCEDURE — 84100 ASSAY OF PHOSPHORUS: CPT | Performed by: HOSPITALIST

## 2025-03-09 PROCEDURE — 2500000001 HC RX 250 WO HCPCS SELF ADMINISTERED DRUGS (ALT 637 FOR MEDICARE OP): Performed by: INTERNAL MEDICINE

## 2025-03-09 PROCEDURE — 82947 ASSAY GLUCOSE BLOOD QUANT: CPT

## 2025-03-09 PROCEDURE — 2500000002 HC RX 250 W HCPCS SELF ADMINISTERED DRUGS (ALT 637 FOR MEDICARE OP, ALT 636 FOR OP/ED): Performed by: HOSPITALIST

## 2025-03-09 PROCEDURE — 97161 PT EVAL LOW COMPLEX 20 MIN: CPT | Mod: GP

## 2025-03-09 RX ORDER — OXYCODONE HYDROCHLORIDE 5 MG/1
5 TABLET ORAL EVERY 6 HOURS PRN
Status: DISPENSED | OUTPATIENT
Start: 2025-03-09

## 2025-03-09 RX ADMIN — ACETAMINOPHEN 975 MG: 325 TABLET, FILM COATED ORAL at 09:48

## 2025-03-09 RX ADMIN — FINASTERIDE 5 MG: 5 TABLET, FILM COATED ORAL at 09:48

## 2025-03-09 RX ADMIN — APIXABAN 2.5 MG: 5 TABLET, FILM COATED ORAL at 20:27

## 2025-03-09 RX ADMIN — QUETIAPINE FUMARATE 25 MG: 25 TABLET ORAL at 09:50

## 2025-03-09 RX ADMIN — APIXABAN 2.5 MG: 5 TABLET, FILM COATED ORAL at 09:47

## 2025-03-09 RX ADMIN — OXYCODONE HYDROCHLORIDE 5 MG: 5 TABLET ORAL at 09:57

## 2025-03-09 RX ADMIN — Medication 1 CAPSULE: at 09:48

## 2025-03-09 RX ADMIN — CALCIUM ACETATE 1334 MG: 667 CAPSULE ORAL at 09:57

## 2025-03-09 RX ADMIN — QUETIAPINE FUMARATE 25 MG: 25 TABLET ORAL at 16:55

## 2025-03-09 RX ADMIN — PANTOPRAZOLE SODIUM 40 MG: 40 TABLET, DELAYED RELEASE ORAL at 06:31

## 2025-03-09 RX ADMIN — TAMSULOSIN HYDROCHLORIDE 0.4 MG: 0.4 CAPSULE ORAL at 09:48

## 2025-03-09 RX ADMIN — CALCITRIOL CAPSULES 0.25 MCG 0.25 MCG: 0.25 CAPSULE ORAL at 09:47

## 2025-03-09 RX ADMIN — OLANZAPINE 5 MG: 5 TABLET, FILM COATED ORAL at 20:27

## 2025-03-09 RX ADMIN — METOPROLOL TARTRATE 25 MG: 25 TABLET, FILM COATED ORAL at 20:27

## 2025-03-09 RX ADMIN — ACETAMINOPHEN 975 MG: 325 TABLET, FILM COATED ORAL at 14:33

## 2025-03-09 RX ADMIN — METOPROLOL TARTRATE 25 MG: 25 TABLET, FILM COATED ORAL at 09:48

## 2025-03-09 RX ADMIN — LIDOCAINE 4% 1 PATCH: 40 PATCH TOPICAL at 09:49

## 2025-03-09 RX ADMIN — CALCIUM ACETATE 1334 MG: 667 CAPSULE ORAL at 16:54

## 2025-03-09 RX ADMIN — OXYCODONE HYDROCHLORIDE 5 MG: 5 TABLET ORAL at 16:55

## 2025-03-09 RX ADMIN — ACETAMINOPHEN 975 MG: 325 TABLET, FILM COATED ORAL at 20:27

## 2025-03-09 RX ADMIN — ASPIRIN 81 MG: 81 TABLET, COATED ORAL at 09:48

## 2025-03-09 RX ADMIN — ATORVASTATIN CALCIUM 40 MG: 40 TABLET, FILM COATED ORAL at 20:27

## 2025-03-09 RX ADMIN — ESCITALOPRAM OXALATE 20 MG: 20 TABLET ORAL at 09:47

## 2025-03-09 RX ADMIN — CALCIUM ACETATE 1334 MG: 667 CAPSULE ORAL at 11:51

## 2025-03-09 ASSESSMENT — ACTIVITIES OF DAILY LIVING (ADL): ADL_ASSISTANCE: INDEPENDENT

## 2025-03-09 ASSESSMENT — COGNITIVE AND FUNCTIONAL STATUS - GENERAL
CLIMB 3 TO 5 STEPS WITH RAILING: A LOT
DRESSING REGULAR LOWER BODY CLOTHING: A LITTLE
STANDING UP FROM CHAIR USING ARMS: A LITTLE
MOBILITY SCORE: 15
MOVING FROM LYING ON BACK TO SITTING ON SIDE OF FLAT BED WITH BEDRAILS: A LITTLE
TOILETING: A LITTLE
DAILY ACTIVITIY SCORE: 21
HELP NEEDED FOR BATHING: A LITTLE
WALKING IN HOSPITAL ROOM: A LOT
MOVING TO AND FROM BED TO CHAIR: A LITTLE
CLIMB 3 TO 5 STEPS WITH RAILING: TOTAL
TURNING FROM BACK TO SIDE WHILE IN FLAT BAD: A LITTLE
STANDING UP FROM CHAIR USING ARMS: A LITTLE
MOVING TO AND FROM BED TO CHAIR: A LITTLE
WALKING IN HOSPITAL ROOM: A LITTLE
MOBILITY SCORE: 19

## 2025-03-09 ASSESSMENT — PAIN DESCRIPTION - ORIENTATION
ORIENTATION: LEFT
ORIENTATION: LEFT

## 2025-03-09 ASSESSMENT — PAIN DESCRIPTION - LOCATION
LOCATION: KNEE

## 2025-03-09 ASSESSMENT — PAIN - FUNCTIONAL ASSESSMENT
PAIN_FUNCTIONAL_ASSESSMENT: 0-10

## 2025-03-09 ASSESSMENT — PAIN SCALES - GENERAL
PAINLEVEL_OUTOF10: 5 - MODERATE PAIN
PAINLEVEL_OUTOF10: 0 - NO PAIN
PAINLEVEL_OUTOF10: 5 - MODERATE PAIN
PAINLEVEL_OUTOF10: 7
PAINLEVEL_OUTOF10: 9
PAINLEVEL_OUTOF10: 4

## 2025-03-09 NOTE — PROGRESS NOTES
03/09/25 0821   Discharge Planning   Expected Discharge Disposition Home H     Received email on late Friday that [facility] has arranged for HD community chair time at [facility], [id number]. This information has been placed on the AVS and provider and bedside RN have been made aware.    Waiting on ADOD from provider regarding DC, patient will dc home with C also.    UPDATES- per Dr Everett, patient not med ready for dc today, increased pain to knee 9/10, pt/ot pending, may need SNF at discharge, will need HD onsite at SNF most likely

## 2025-03-09 NOTE — PROGRESS NOTES
Kj Colmenares is a 54 y.o. male on day 4 of admission presenting with Influenza A.      Subjective   Overall stable no new change       Objective        Vitals 24HR  Heart Rate:  [79-99]   Temp:  [36.5 °C (97.7 °F)-37.1 °C (98.7 °F)]   Resp:  [12-19]   BP: (107-135)/(64-90)   SpO2:  [97 %-99 %]     Intake/Output last 3 Shifts:  No intake or output data in the 24 hours ending 03/09/25 1408    Physical Exam        General appearance: Awake and alert in no acute distress complaining of cough with chest discomfort  Head and ENT: Normocephalic/atraumatic/supple neck/no JVD  Lungs; bilateral crackles and rhonchi both lung fields  Heart; irregular/irregular rhythm  Abdomen; soft no tenderness  Extremities; no edema  Neurologic; physiologic     Dialysis access= left upper arm AV fistula with positive bruit and thrill            Relevant Results     Results from last 7 days   Lab Units 03/09/25  0703 03/08/25  0643 03/07/25  0634   WBC AUTO x10*3/uL 7.3 6.9 5.7   HEMOGLOBIN g/dL 8.2* 8.9* 8.6*   HEMATOCRIT % 27.3* 28.6* 28.3*   PLATELETS AUTO x10*3/uL 178 168 146*      Results from last 7 days   Lab Units 03/09/25  0703 03/08/25  0643 03/07/25  0634   SODIUM mmol/L 143 142 143   POTASSIUM mmol/L 4.6 4.2 3.9   CHLORIDE mmol/L 108* 107 110*   CO2 mmol/L 25 26 22   BUN mg/dL 63* 51* 67*   CREATININE mg/dL 8.15* 6.86* 8.06*   GLUCOSE mg/dL 88 103* 90   CALCIUM mg/dL 10.1 10.1 9.5        Current Facility-Administered Medications:     acetaminophen (Tylenol) tablet 975 mg, 975 mg, oral, TID, Deep Everett, DO, 975 mg at 03/09/25 0948    apixaban (Eliquis) tablet 2.5 mg, 2.5 mg, oral, BID, Harjit G Salomone, DO, 2.5 mg at 03/09/25 0947    aspirin EC tablet 81 mg, 81 mg, oral, Daily, Harjit G Salomone, DO, 81 mg at 03/09/25 0948    atorvastatin (Lipitor) tablet 40 mg, 40 mg, oral, Nightly, Deep Everett, DO, 40 mg at 03/08/25 2121    benzocaine-menthol (Cepastat Sore Throat) lozenge 1 lozenge, 1 lozenge, Mouth/Throat, q2h PRN, Deep Everett,  DO, 1 lozenge at 03/05/25 1734    benzonatate (Tessalon) capsule 100 mg, 100 mg, oral, TID PRN, Harjit G Salomone, DO    calcitriol (Rocaltrol) capsule 0.25 mcg, 0.25 mcg, oral, Daily, Deep Everett, DO, 0.25 mcg at 03/09/25 0947    calcium acetate (Phoslo) capsule 1,334 mg, 1,334 mg, oral, TID, Harjit G Salomone, DO, 1,334 mg at 03/09/25 1151    dextrose 50 % injection 12.5 g, 12.5 g, intravenous, q15 min PRN, Deep Everett, DO    dextrose 50 % injection 25 g, 25 g, intravenous, q15 min PRN, Deep Everett, DO    escitalopram (Lexapro) tablet 20 mg, 20 mg, oral, Daily, Deep Everett, DO, 20 mg at 03/09/25 0947    finasteride (Proscar) tablet 5 mg, 5 mg, oral, Daily, Harjit G Salomone, DO, 5 mg at 03/09/25 0948    glucagon (Glucagen) injection 1 mg, 1 mg, intramuscular, q15 min PRN, Deep Everett, DO    glucagon (Glucagen) injection 1 mg, 1 mg, intramuscular, q15 min PRN, Deep Everett, DO    guaiFENesin (Mucinex) 12 hr tablet 600 mg, 600 mg, oral, BID PRN, Harjit G Salomone, DO    insulin lispro injection 0-5 Units, 0-5 Units, subcutaneous, TID AC, Deep Everett, DO    lidocaine 4 % patch 1 patch, 1 patch, transdermal, Daily, Deep Everett, DO, 1 patch at 03/09/25 0949    metoprolol tartrate (Lopressor) tablet 25 mg, 25 mg, oral, BID, Deep Everett, DO, 25 mg at 03/09/25 0948    midodrine (Proamatine) tablet 10 mg, 10 mg, oral, PRN, Harjit G Salomone, DO    OLANZapine (ZyPREXA) tablet 5 mg, 5 mg, oral, Nightly, Deep Everett, DO, 5 mg at 03/08/25 2121    ondansetron (Zofran) tablet 4 mg, 4 mg, oral, q8h PRN **OR** ondansetron (Zofran) injection 4 mg, 4 mg, intravenous, q8h PRN, Deep Everett, DO, 4 mg at 03/06/25 1104    oxyCODONE (Roxicodone) immediate release tablet 5 mg, 5 mg, oral, q6h PRN, Deep Everett, DO, 5 mg at 03/09/25 0957    pantoprazole (ProtoNix) EC tablet 40 mg, 40 mg, oral, Daily before breakfast, 40 mg at 03/09/25 0631 **OR** pantoprazole (ProtoNix) injection 40 mg, 40 mg, intravenous, Daily before breakfast, Deep Everett, DO    QUEtiapine  (SEROquel) tablet 25 mg, 25 mg, oral, BID, Deep Everett, DO, 25 mg at 03/09/25 0950    tamsulosin (Flomax) 24 hr capsule 0.4 mg, 0.4 mg, oral, Daily, Harjit G Salomone, DO, 0.4 mg at 03/09/25 0948    vitamin B complex-vitamin C-folic acid (Nephrocaps) capsule 1 capsule, 1 capsule, oral, Daily, Harjit G Salomone, DO, 1 capsule at 03/09/25 0948           Assessment/Plan        1.  ESKD on hemodialysis on Monday/Wednesday/Friday at [facility] via left upper arm AV fistula with positive bruit and thrill  Will schedule patient for hemodialysis  on Monday  Patient was asked to not exceed 2 hours close patient was not feeling well from nausea and vomiting overnight  2.  Diabetes mellitus continue current management  3.  Hypertension continue current management  4.  Anemia due to CKD we will continue VEDA and iron supplementation as needed  5.  Metabolic bone disease due to CKD continue phosphate binders and vitamin D supplementation as needed  6.  Influenza A will follow with ID         Assessment & Plan  Influenza A              I spent 3.5 minutes in the professional and overall care of this patient.      Dale Patel MD

## 2025-03-09 NOTE — CARE PLAN
The patient's goals for the shift include      The clinical goals for the shift include remain safe and free of falls and injuries    Problem: Pain - Adult  Goal: Verbalizes/displays adequate comfort level or baseline comfort level  Outcome: Progressing     Problem: Safety - Adult  Goal: Free from fall injury  Outcome: Progressing     Problem: Chronic Conditions and Co-morbidities  Goal: Patient's chronic conditions and co-morbidity symptoms are monitored and maintained or improved  Outcome: Progressing

## 2025-03-09 NOTE — CARE PLAN
The patient's goals for the shift include  safety    The clinical goals for the shift include Pt will remain HDS throughout the shift    Over the shift, the patient did not make progress toward the following goals. Barriers to progression include  Recommendations to address these barriers include    Problem: Pain - Adult  Goal: Verbalizes/displays adequate comfort level or baseline comfort level  Outcome: Progressing     Problem: Safety - Adult  Goal: Free from fall injury  Outcome: Progressing     Problem: Discharge Planning  Goal: Discharge to home or other facility with appropriate resources  Outcome: Progressing     Problem: Chronic Conditions and Co-morbidities  Goal: Patient's chronic conditions and co-morbidity symptoms are monitored and maintained or improved  Outcome: Progressing     Problem: Nutrition  Goal: Nutrient intake appropriate for maintaining nutritional needs  Outcome: Progressing

## 2025-03-09 NOTE — PROGRESS NOTES
Physical Therapy    Physical Therapy Evaluation    Patient Name: Kj Colmenares  MRN: [id number]  Department: [place] [address]  Room: [address] [place] [address]  Today's Date: 3/9/2025   Time Calculation  Start Time: 1000  Stop Time: 1015  Time Calculation (min): 15 min    Assessment/Plan   PT Assessment  PT Assessment Results: Decreased strength, Decreased range of motion, Decreased endurance, Impaired balance, Decreased mobility, Pain  Rehab Prognosis: Good  Barriers to Discharge Home: Caregiver assistance, Physical needs  Caregiver Assistance: Caregiver assistance needed per identified barriers - however, level of patient's required assistance exceeds assistance available at home  Physical Needs: Stair navigation into home limited by function/safety, Stair navigation to access bed limited by function/safety, In-home setup navigation limited by function/safety, Ambulating household distances limited by function/safety, 24hr mobility assistance needed, 24hr ADL assistance needed, High falls risk due to function or environment  End of Session Communication: Bedside nurse  Assessment Comment: PT Evaluation Completed. The patient presented with decreased mobility, balance, endurance, strength, safety awareness, and increased pain\ and fatigue. These impairments are negatively impacting his ability to perform at baseline functional level, in home environment. The patient is at risk of falls & injury. This patient would benefit from skilled therapy intervention to address limitations and progress towards the PT goals.  Anticipate moderate frequency PT needs at discharge  End of Session Patient Position: Up in chair, Alarm on  IP OR SWING BED PT PLAN  Inpatient or Swing Bed: Inpatient  PT Plan  Treatment/Interventions: Bed mobility, Transfer training, Gait training, Stair training, Balance training, Range of motion, Endurance training, Strengthening, Therapeutic exercise, Therapeutic activity, Home exercise program  PT Plan: Ongoing PT  PT  Frequency: 3 times per week  PT Discharge Recommendations: Moderate intensity level of continued care  PT Recommended Transfer Status: Assist x1  PT - OK to Discharge: Yes (PT POC established)    Subjective   General Visit Information:  General  Reason for Referral: To ED with SOB, chest pain, and cough. Pt found to be in afib RVR and Flu (+)  Referred By: Tyson Everett DO  Past Medical History Relevant to Rehab:   Past Medical History:   Diagnosis Date    Diabetes mellitus (Multi)     Hypertension     Stroke (Multi)        Prior to Session Communication: Bedside nurse  Patient Position Received: Bed, 3 rail up, Alarm off, not on at start of session  General Comment: Pt pleasant and agreeable to PT eval.  Home Living:  Home Living  Type of Home: House  Lives With:  (Mother)  Home Adaptive Equipment: Walker rolling or standard, Cane  Home Layout: Two level, Stairs to alternate level with rails, Bed/bath upstairs  Alternate Level Stairs-Number of Steps: 12  Home Access: Stairs to enter with rails  Entrance Stairs-Number of Steps: 3  Prior Level of Function:  Prior Function Per Pt/Caregiver Report  Level of [place]: Independent with ADLs and functional transfers  Receives Help From: Family (son will come over to help with IADLs.)  ADL Assistance: Independent  Homemaking Assistance: Needs assistance  Ambulatory Assistance: Independent (using cane)  Prior Function Comments: Pt reports 2 falls in the past 6 months. Reports  he has been having difficulty navigating the stairs up to his bedroom for awhile due to his chronic L knee pain.  Precautions:  Precautions  Medical Precautions: Fall precautions  Precautions Comment: Droplet precautions       Vital Signs Comment: Pt's HR elevating to >160 with ambulation. Decreased to 99 with seated rest break. Further ambulation deferred due to vitals.     Objective   Pain:  Pain Assessment  Pain Assessment: 0-10  0-10 (Numeric) Pain Score: 9  Pain Type: Chronic pain  Pain  Location:  (L knee)  Cognition:  Cognition  Overall Cognitive Status: Within Functional Limits  Orientation Level: Oriented X4    General Assessments:  Activity Tolerance  Endurance: Tolerates less than 10 min exercise with changes in vital signs  Activity Tolerance Comments: See comments above regarding HR    Sensation  Light Touch: No apparent deficits         Postural Control  Postural Control: Within Functional Limits    Static Sitting Balance  Static Sitting-Balance Support: Feet supported, Bilateral upper extremity supported  Static Sitting-Level of Assistance: Close supervision  Dynamic Sitting Balance  Dynamic Sitting-Balance Support: Feet supported, Bilateral upper extremity supported  Dynamic Sitting-Level of Assistance: Close supervision    Static Standing Balance  Static Standing-Balance Support: Bilateral upper extremity supported  Static Standing-Level of Assistance: Contact guard  Dynamic Standing Balance  Dynamic Standing-Balance Support: Bilateral upper extremity supported  Dynamic Standing-Level of Assistance: Contact guard  Functional Assessments:  Bed Mobility  Bed Mobility: Yes  Bed Mobility 1  Bed Mobility 1: Supine to sitting  Level of Assistance 1: Close supervision    Transfers  Transfer: Yes  Transfer 1  Technique 1: Sit to stand, Stand to sit  Transfer Device 1: Cane  Transfer Level of Assistance 1: Contact guard  Trials/Comments 1: Multiple attempts to complete. Cues for hand placement and scooting to the EOB.    Ambulation/Gait Training  Ambulation/Gait Training Performed: Yes  Ambulation/Gait Training 1  Surface 1: Level tile  Device 1: Single point cane  Assistance 1: Minimum assistance  Comments/Distance (ft) 1: Pt ambulates 5 ft with cane however demonstrates significant instability and antalgic gait. Decreased WB on the LLE.  Ambulation/Gait Training 2  Surface 2: Level tile  Device 2: Rolling walker  Assistance 2: Contact guard  Comments/Distance (ft) 2: Pt ambulates 25 ft with RW  demonstrating improved stability. Forward flexed posture with mildly antalgic gait. Pt becomes very SOB with HR elevating to >160 BPM. Further ambulation deferred. HR recovering to 99 BPM with 2-3 min seated rest break.  Extremity/Trunk Assessments:  RUE   RUE : Within Functional Limits  LUE   LUE: Within Functional Limits  RLE   RLE :  (Strength >3/5 based on observation of functional mobility. ROM WFL.)  LLE   LLE :  (Strength grossly 3/5 with increased knee pain with extension. ROM WFL.)  Outcome Measures:  [facility] Basic Mobility  Turning from your back to your side while in a flat bed without using bedrails: A little  Moving from lying on your back to sitting on the side of a flat bed without using bedrails: A little  Moving to and from bed to chair (including a wheelchair): A little  Standing up from a chair using your arms (e.g. wheelchair or bedside chair): A little  To walk in hospital room: A lot  Climbing 3-5 steps with railing: Total  Basic Mobility - Total Score: 15    Encounter Problems       Encounter Problems (Active)       Balance       complete all mobility with normal balance while dual tasking, negotiating in a dynamic environment, carrying items, etc., with proactive and reactive static and dynamic standing and sitting tasks, with mod I and LRAD, >15 minutes.         Start:  03/09/25    Expected End:  03/23/25               Mobility       STG - Patient will ambulate 75 ft mod I with LRAD and stable vitals.        Start:  03/09/25    Expected End:  03/23/25            Pt will tolerate 15 reps of each LE exercise in order to improve strength and endurance.         Start:  03/09/25    Expected End:  03/23/25               PT Transfers       STG - Patient will perform bed mobility independently.       Start:  03/09/25    Expected End:  03/23/25            STG - Patient will transfer sit to and from stand mod I using LRAD       Start:  03/09/25    Expected End:  03/23/25               Pain - Adult               Education Documentation  Precautions, taught by Sri Infante, PT at 3/9/2025 11:17 AM.  Learner: Patient  Readiness: Acceptance  Method: Explanation  Response: Verbalizes Understanding    Body Mechanics, taught by Sri Infante, PT at 3/9/2025 11:17 AM.  Learner: Patient  Readiness: Acceptance  Method: Explanation  Response: Verbalizes Understanding    Mobility Training, taught by Sri Infante, PT at 3/9/2025 11:17 AM.  Learner: Patient  Readiness: Acceptance  Method: Explanation  Response: Verbalizes Understanding    Education Comments  No comments found.

## 2025-03-09 NOTE — PROGRESS NOTES
03/09/25 1503   Discharge Planning   Expected Discharge Disposition SNF     SNF list given to patient for zip code in the system and also zip code [address] which is patients preference.    Patient agreeable to SNF at discharge, new rec of MOD by pt/ot  Patient reviewing SNF list.    Patient asked for referrals to be placed to [facility] and [facility].  DSC aware, still need to confirm FOC.

## 2025-03-09 NOTE — DISCHARGE INSTRUCTIONS
Your new HD chair is @ [facility] [address] [facility] [place] [id number] [phone]  Please arrive @ 11:15am for your first session, after that your time will be 11:45-3:45p every Monday, Wednesday and Friday

## 2025-03-10 ENCOUNTER — APPOINTMENT (OUTPATIENT)
Dept: DIALYSIS | Facility: HOSPITAL | Age: 55
End: 2025-03-10
Payer: COMMERCIAL

## 2025-03-10 LAB
ALBUMIN SERPL BCP-MCNC: 3.6 G/DL (ref 3.4–5)
ANION GAP SERPL CALC-SCNC: 15 MMOL/L (ref 10–20)
BASOPHILS # BLD AUTO: 0.02 X10*3/UL (ref 0–0.1)
BASOPHILS NFR BLD AUTO: 0.3 %
BUN SERPL-MCNC: 71 MG/DL (ref 6–23)
CALCIUM SERPL-MCNC: 10.1 MG/DL (ref 8.6–10.3)
CHLORIDE SERPL-SCNC: 108 MMOL/L (ref 98–107)
CO2 SERPL-SCNC: 25 MMOL/L (ref 21–32)
CREAT SERPL-MCNC: 9.3 MG/DL (ref 0.5–1.3)
EGFRCR SERPLBLD CKD-EPI 2021: 6 ML/MIN/1.73M*2
EOSINOPHIL # BLD AUTO: 0.96 X10*3/UL (ref 0–0.7)
EOSINOPHIL NFR BLD AUTO: 15.4 %
ERYTHROCYTE [DISTWIDTH] IN BLOOD BY AUTOMATED COUNT: 15.7 % (ref 11.5–14.5)
GLUCOSE BLD MANUAL STRIP-MCNC: 111 MG/DL (ref 74–99)
GLUCOSE BLD MANUAL STRIP-MCNC: 155 MG/DL (ref 74–99)
GLUCOSE BLD MANUAL STRIP-MCNC: 81 MG/DL (ref 74–99)
GLUCOSE BLD MANUAL STRIP-MCNC: 86 MG/DL (ref 74–99)
GLUCOSE SERPL-MCNC: 84 MG/DL (ref 74–99)
HCT VFR BLD AUTO: 28.5 % (ref 41–52)
HGB BLD-MCNC: 8.6 G/DL (ref 13.5–17.5)
HOLD SPECIMEN: NORMAL
IMM GRANULOCYTES # BLD AUTO: 0.03 X10*3/UL (ref 0–0.7)
IMM GRANULOCYTES NFR BLD AUTO: 0.5 % (ref 0–0.9)
LYMPHOCYTES # BLD AUTO: 1.51 X10*3/UL (ref 1.2–4.8)
LYMPHOCYTES NFR BLD AUTO: 24.3 %
MCH RBC QN AUTO: 29.4 PG (ref 26–34)
MCHC RBC AUTO-ENTMCNC: 30.2 G/DL (ref 32–36)
MCV RBC AUTO: 97 FL (ref 80–100)
MONOCYTES # BLD AUTO: 0.52 X10*3/UL (ref 0.1–1)
MONOCYTES NFR BLD AUTO: 8.4 %
NEUTROPHILS # BLD AUTO: 3.18 X10*3/UL (ref 1.2–7.7)
NEUTROPHILS NFR BLD AUTO: 51.1 %
NRBC BLD-RTO: 0 /100 WBCS (ref 0–0)
PHOSPHATE SERPL-MCNC: 5 MG/DL (ref 2.5–4.9)
PLATELET # BLD AUTO: 187 X10*3/UL (ref 150–450)
POTASSIUM SERPL-SCNC: 5.1 MMOL/L (ref 3.5–5.3)
RBC # BLD AUTO: 2.93 X10*6/UL (ref 4.5–5.9)
SODIUM SERPL-SCNC: 143 MMOL/L (ref 136–145)
WBC # BLD AUTO: 6.2 X10*3/UL (ref 4.4–11.3)

## 2025-03-10 PROCEDURE — 2500000001 HC RX 250 WO HCPCS SELF ADMINISTERED DRUGS (ALT 637 FOR MEDICARE OP): Performed by: HOSPITALIST

## 2025-03-10 PROCEDURE — 84100 ASSAY OF PHOSPHORUS: CPT | Performed by: HOSPITALIST

## 2025-03-10 PROCEDURE — 2500000002 HC RX 250 W HCPCS SELF ADMINISTERED DRUGS (ALT 637 FOR MEDICARE OP, ALT 636 FOR OP/ED): Performed by: HOSPITALIST

## 2025-03-10 PROCEDURE — 85025 COMPLETE CBC W/AUTO DIFF WBC: CPT | Performed by: HOSPITALIST

## 2025-03-10 PROCEDURE — 97165 OT EVAL LOW COMPLEX 30 MIN: CPT | Mod: GO | Performed by: OCCUPATIONAL THERAPIST

## 2025-03-10 PROCEDURE — 2060000001 HC INTERMEDIATE ICU ROOM DAILY

## 2025-03-10 PROCEDURE — 2500000001 HC RX 250 WO HCPCS SELF ADMINISTERED DRUGS (ALT 637 FOR MEDICARE OP): Performed by: INTERNAL MEDICINE

## 2025-03-10 PROCEDURE — 99232 SBSQ HOSP IP/OBS MODERATE 35: CPT | Performed by: HOSPITALIST

## 2025-03-10 PROCEDURE — 8010000001 HC DIALYSIS - HEMODIALYSIS PER DAY

## 2025-03-10 PROCEDURE — 36415 COLL VENOUS BLD VENIPUNCTURE: CPT | Performed by: HOSPITALIST

## 2025-03-10 PROCEDURE — 82947 ASSAY GLUCOSE BLOOD QUANT: CPT

## 2025-03-10 PROCEDURE — 2500000002 HC RX 250 W HCPCS SELF ADMINISTERED DRUGS (ALT 637 FOR MEDICARE OP, ALT 636 FOR OP/ED): Performed by: INTERNAL MEDICINE

## 2025-03-10 PROCEDURE — 2500000005 HC RX 250 GENERAL PHARMACY W/O HCPCS: Performed by: HOSPITALIST

## 2025-03-10 RX ORDER — METOPROLOL SUCCINATE 50 MG/1
50 TABLET, EXTENDED RELEASE ORAL NIGHTLY
Status: DISCONTINUED | OUTPATIENT
Start: 2025-03-10 | End: 2025-03-19 | Stop reason: HOSPADM

## 2025-03-10 RX ADMIN — LIDOCAINE 4% 1 PATCH: 40 PATCH TOPICAL at 13:23

## 2025-03-10 RX ADMIN — METOPROLOL TARTRATE 25 MG: 25 TABLET, FILM COATED ORAL at 13:23

## 2025-03-10 RX ADMIN — TAMSULOSIN HYDROCHLORIDE 0.4 MG: 0.4 CAPSULE ORAL at 13:39

## 2025-03-10 RX ADMIN — CALCITRIOL CAPSULES 0.25 MCG 0.25 MCG: 0.25 CAPSULE ORAL at 13:24

## 2025-03-10 RX ADMIN — CALCIUM ACETATE 1334 MG: 667 CAPSULE ORAL at 17:07

## 2025-03-10 RX ADMIN — INSULIN LISPRO 1 UNITS: 100 INJECTION, SOLUTION INTRAVENOUS; SUBCUTANEOUS at 17:08

## 2025-03-10 RX ADMIN — FINASTERIDE 5 MG: 5 TABLET, FILM COATED ORAL at 13:23

## 2025-03-10 RX ADMIN — CALCIUM ACETATE 1334 MG: 667 CAPSULE ORAL at 13:24

## 2025-03-10 RX ADMIN — ACETAMINOPHEN 975 MG: 325 TABLET, FILM COATED ORAL at 15:52

## 2025-03-10 RX ADMIN — OXYCODONE HYDROCHLORIDE 5 MG: 5 TABLET ORAL at 06:01

## 2025-03-10 RX ADMIN — ATORVASTATIN CALCIUM 40 MG: 40 TABLET, FILM COATED ORAL at 21:02

## 2025-03-10 RX ADMIN — ASPIRIN 81 MG: 81 TABLET, COATED ORAL at 13:23

## 2025-03-10 RX ADMIN — OLANZAPINE 5 MG: 5 TABLET, FILM COATED ORAL at 21:02

## 2025-03-10 RX ADMIN — QUETIAPINE FUMARATE 25 MG: 25 TABLET ORAL at 17:07

## 2025-03-10 RX ADMIN — METOPROLOL SUCCINATE 50 MG: 50 TABLET, EXTENDED RELEASE ORAL at 21:09

## 2025-03-10 RX ADMIN — ESCITALOPRAM OXALATE 20 MG: 20 TABLET ORAL at 11:54

## 2025-03-10 RX ADMIN — OXYCODONE HYDROCHLORIDE 5 MG: 5 TABLET ORAL at 21:07

## 2025-03-10 RX ADMIN — OXYCODONE HYDROCHLORIDE 5 MG: 5 TABLET ORAL at 13:28

## 2025-03-10 RX ADMIN — APIXABAN 2.5 MG: 5 TABLET, FILM COATED ORAL at 22:49

## 2025-03-10 RX ADMIN — ACETAMINOPHEN 975 MG: 325 TABLET, FILM COATED ORAL at 21:02

## 2025-03-10 RX ADMIN — PANTOPRAZOLE SODIUM 40 MG: 40 TABLET, DELAYED RELEASE ORAL at 06:01

## 2025-03-10 RX ADMIN — APIXABAN 2.5 MG: 5 TABLET, FILM COATED ORAL at 13:24

## 2025-03-10 ASSESSMENT — COGNITIVE AND FUNCTIONAL STATUS - GENERAL
DAILY ACTIVITIY SCORE: 19
WALKING IN HOSPITAL ROOM: A LITTLE
TOILETING: A LITTLE
DRESSING REGULAR LOWER BODY CLOTHING: A LITTLE
HELP NEEDED FOR BATHING: A LITTLE
TOILETING: A LITTLE
CLIMB 3 TO 5 STEPS WITH RAILING: A LITTLE
DRESSING REGULAR UPPER BODY CLOTHING: A LITTLE
DRESSING REGULAR LOWER BODY CLOTHING: A LITTLE
HELP NEEDED FOR BATHING: A LITTLE
STANDING UP FROM CHAIR USING ARMS: A LITTLE
DAILY ACTIVITIY SCORE: 19
PERSONAL GROOMING: A LITTLE
MOVING TO AND FROM BED TO CHAIR: A LITTLE
MOBILITY SCORE: 20
PERSONAL GROOMING: A LITTLE
DRESSING REGULAR UPPER BODY CLOTHING: A LITTLE

## 2025-03-10 ASSESSMENT — PAIN SCALES - GENERAL
PAINLEVEL_OUTOF10: 9
PAINLEVEL_OUTOF10: 0 - NO PAIN
PAINLEVEL_OUTOF10: 9
PAINLEVEL_OUTOF10: 2
PAINLEVEL_OUTOF10: 9
PAINLEVEL_OUTOF10: 10 - WORST POSSIBLE PAIN

## 2025-03-10 ASSESSMENT — PAIN DESCRIPTION - LOCATION
LOCATION: CHEST
LOCATION: KNEE
LOCATION: CHEST

## 2025-03-10 ASSESSMENT — ACTIVITIES OF DAILY LIVING (ADL): ADL_ASSISTANCE: INDEPENDENT

## 2025-03-10 NOTE — CARE PLAN
The patient's goals for the shift include      The clinical goals for the shift include Pt will remain HDS throughout the shift      Problem: Pain - Adult  Goal: Verbalizes/displays adequate comfort level or baseline comfort level  Outcome: Progressing     Problem: Safety - Adult  Goal: Free from fall injury  Outcome: Progressing     Problem: Chronic Conditions and Co-morbidities  Goal: Patient's chronic conditions and co-morbidity symptoms are monitored and maintained or improved  Outcome: Progressing

## 2025-03-10 NOTE — PROGRESS NOTES
Occupational Therapy                 Therapy Communication Note    Patient Name: Kj Colmenares  MRN: [id number]  Department: [facility] DIALYSIS  Room: [facility]23-A  Today's Date: 3/10/2025     Discipline: Occupational Therapy    OT Missed Visit: Yes     Missed Visit Reason: Missed Visit Reason: Patient in a medical procedure (Pt currently off floor at dialysis.)    Missed Time: Attempt       Primary Defect Length In Cm (Final Defect Size - Required For Flaps/Grafts): 1.9

## 2025-03-10 NOTE — PROGRESS NOTES
3/10/2025 4:14 PM  Patient said FOC is [facility]. Mona BENÍTEZ   decreased step length/decreased stride length

## 2025-03-10 NOTE — PROCEDURES
"Patient seen on dialysis.  F180 x 3.5 hours, BFR//500 mL/minute, 2K bath, 2.5 calcium with a target UF set for 2 L as tolerated.  Erythropoietin and phosphorus binder ordered.  Tolerating dialysis, continue per submitted orders.    /82 (BP Location: Right arm, Patient Position: Lying)   Pulse 92   Temp 36.9 °C (98.5 °F) (Temporal)   Resp 19   Ht 1.753 m (5' 9\")   Wt 106 kg (233 lb 6.4 oz)   SpO2 98%   BMI 34.47 kg/m²       No current facility-administered medications on file prior to encounter.     Current Outpatient Medications on File Prior to Encounter   Medication Sig Dispense Refill    acetaminophen (Tylenol) 325 mg tablet Take 2 tablets (650 mg) by mouth every 8 hours if needed for mild pain (1 - 3).      albuterol 90 mcg/actuation aerosol powdr breath activated inhaler Inhale 2 puffs every 6 hours if needed for wheezing or shortness of breath.      apixaban (Eliquis) 2.5 mg tablet Take 1 tablet (2.5 mg) by mouth 2 times a day.      aspirin 81 mg EC tablet Take 1 tablet (81 mg) by mouth once daily.      B complex-vitamin C-folic acid (Nephrocaps) 1 mg capsule Take 1 capsule by mouth once daily.      calcitriol (Rocaltrol) 0.25 mcg capsule Take 1 capsule (0.25 mcg) by mouth once daily.      calcium acetate (Phoslo) 667 mg capsule Take 2 capsules (1,334 mg) by mouth 3 times a day with meals. 180 capsule 0    calcium acetate (Phoslo) 667 mg capsule Take 2 capsules (1,334 mg) by mouth 3 times a day. WITH MEALS.      ergocalciferol (Vitamin D-2) 1.25 MG (08409 UT) capsule Take 1 capsule (1,250 mcg) by mouth 1 (one) time per week. MONDAY NEED AN REFILL      escitalopram (Lexapro) 20 mg tablet Take 1 tablet (20 mg) by mouth once daily.      finasteride (Proscar) 5 mg tablet Take 1 tablet (5 mg) by mouth once daily. Do not crush, chew, or split.      melatonin 3 mg capsule Take 1 capsule by mouth once daily at bedtime.      metoprolol succinate XL (Toprol-XL) 25 mg 24 hr tablet Take 2 tablets (50 " mg) by mouth once daily. Do not crush or chew. (Patient taking differently: Take 1 tablet (25 mg) by mouth once daily. Do not crush or chew.) 60 tablet 0    metoprolol succinate XL (Toprol-XL) 25 mg 24 hr tablet Take 1 tablet (25 mg) by mouth once daily. Do not crush or chew.      midodrine (Proamatine) 10 mg tablet Take 1 tablet (10 mg) by mouth if needed (for HD with SBP <90). (Patient taking differently: Take 0.5 tablets (5 mg) by mouth 3 times daily (morning, midday, late afternoon).)      OLANZapine (ZyPREXA) 5 mg tablet Take 1 tablet (5 mg) by mouth once daily at bedtime. (Patient not taking: Reported on 3/5/2025) 30 tablet 0    OLANZapine (ZyPREXA) 5 mg tablet Take 1 tablet (5 mg) by mouth once daily at bedtime.      ondansetron (Zofran) 4 mg tablet Take 1 tablet (4 mg) by mouth every 12 hours if needed for nausea or vomiting.      pantoprazole (ProtoNix) 40 mg EC tablet Take 1 tablet (40 mg) by mouth once daily in the morning. Take before meals. Do not crush, chew, or split.      QUEtiapine (SEROquel) 25 mg tablet Take 1 tablet (25 mg) by mouth 2 times daily (morning and late afternoon).      rosuvastatin (Crestor) 20 mg tablet Take 1 tablet (20 mg) by mouth once daily at bedtime.      sevelamer carbonate (Renvela) 800 mg tablet Take 1 tablet (800 mg) by mouth 3 times daily (morning, midday, late afternoon). Swallow tablet whole; do not crush, break, or chew.      tamsulosin (Flomax) 0.4 mg 24 hr capsule Take 1 capsule (0.4 mg) by mouth once daily.

## 2025-03-10 NOTE — PRE-PROCEDURE NOTE
Report from Sending RN:    Report From: Tamiko RUBIO  Recent Surgery of Procedure: No  Baseline Level of Consciousness (LOC): A&O X's 3  Oxygen Use: No  Type: room air  Diabetic: No  Last BP Med Given Day of Dialysis: see EMAR  Last Pain Med Given: see EMAR  Lab Tests to be Obtained with Dialysis: No  Blood Transfusion to be Given During Dialysis: No  Available IV Access: Yes  Medications to be Administered During Dialysis: No  Continuous IV Infusion Running: No  Restraints on Currently or in the Last 24 Hours: No  Hand-Off Communication: no drips, no o2 iso for flu   Dialysis Catheter Dressing: will assess upon arrival  Last Dressing Change: will assess upon arrival

## 2025-03-10 NOTE — POST-PROCEDURE NOTE
Report to Receiving RN:    Report To: Darlyn  Time Report Called: [id number]  Hand-Off Communication: tx completed; pt tolerated tx well; vss post tx; pt discharged stable back to nursing floor  Complications During Treatment: No  Ultrafiltration Treatment: Yes, 2 liters  Medications Administered During Dialysis: No  Blood Products Administered During Dialysis: No  Labs Sent During Dialysis: No  Heparin Drip Rate Changes: No  Dialysis Catheter Dressing: N/A  Last Dressing Change: N/A    Electronic Signatures:  Leila Phillip RN (Signed CE)       Last Updated: 12:35 PM by LEILA PHILLIP

## 2025-03-10 NOTE — PROGRESS NOTES
Occupational Therapy    Evaluation    Patient Name: Kj Colmenares  MRN: [id number]  Department: [place] [address]  Room: [address] [place] [address]  Today's Date: 3/10/2025  Time Calculation  Start Time: 1554  Stop Time: 1612  Time Calculation (min): 18 min        Assessment:  OT Assessment: Pt presents to OT and demos decreased balance, strength, endurance and safety awareness resulting in decreased safety and independence with ADLs and functional transfers/mobility. Pt requires skilled OT services to address above defcits to safely return to OF.  Prognosis: Good  Barriers to Discharge Home: Physical needs, Caregiver assistance  Caregiver Assistance: Caregiver assistance needed per identified barriers - however, level of patient's required assistance exceeds assistance available at home  Physical Needs: 24hr mobility assistance needed, Intermittent ADL assistance needed, Ambulating household distances limited by function/safety, High falls risk due to function or environment  Evaluation/Treatment Tolerance: Patient limited by fatigue, Patient limited by pain  Medical Staff Made Aware: Yes  End of Session Communication: Bedside nurse  End of Session Patient Position: Bed, 3 rail up, Alarm on  OT Assessment Results: Decreased ADL status, Decreased upper extremity strength, Decreased safe judgment during ADL, Decreased endurance, Decreased functional mobility, Decreased trunk control for functional activities  Prognosis: Good  Barriers to Discharge: Inaccessible home environment  Evaluation/Treatment Tolerance: Patient limited by fatigue, Patient limited by pain  Medical Staff Made Aware: Yes  Strengths: Premorbid level of function, Support and attitude of living partners, Attitude of self, Ability to acquire knowledge  Barriers to Participation: Comorbidities  Plan:  Treatment Interventions: ADL retraining, Functional transfer training, UE strengthening/ROM, Endurance training, Patient/family training, Equipment evaluation/education,  Compensatory technique education  OT Frequency: 3 times per week  OT Discharge Recommendations: Moderate intensity level of continued care  Equipment Recommended upon Discharge:  (TBD)  OT Recommended Transfer Status: Assist of 1  OT - OK to Discharge: Yes (OT POC established this date)  Treatment Interventions: ADL retraining, Functional transfer training, UE strengthening/ROM, Endurance training, Patient/family training, Equipment evaluation/education, Compensatory technique education    Subjective     General:  General  Reason for Referral: To ED with SOB, chest pain, and cough. Pt found to be in afib RVR and Flu (+)  Referred By: Tyson Everett DO  Past Medical History Relevant to Rehab:   Past Medical History:   Diagnosis Date    Diabetes mellitus (Multi)     Hypertension     Stroke (Multi)       OT Missed Visit: Yes  Missed Visit Reason: Patient in a medical procedure (Pt currently off floor at dialysis.)  Family/Caregiver Present: No  Prior to Session Communication: Bedside nurse  Patient Position Received: Bed, 3 rail up, Alarm on  Preferred Learning Style: auditory, visual, verbal  General Comment: Pt supine in bed upon arrival and agreeable to OT Eval. Pt fully participatory. Pt with elevated HR with functional mobility, RN & MD notified.  Precautions:  Medical Precautions: Fall precautions  Precautions Comment: Droplet precautions d/t Influenza    /98 seated in chair, pt reports dizziness, returned supine in bed for max safety;  HR ranged from 80s to 132 with bed>chair transfer, pt recovers within ~1 minute seated rest break, RN and MD notified         Pain:  Pain Assessment  Pain Assessment: 0-10  0-10 (Numeric) Pain Score: 9  Pain Type: Acute pain, Chronic pain  Pain Location: Knee  Pain Orientation: Left  Pain Interventions: Repositioned, Ambulation/increased activity  Response to Interventions: Resting quietly    Objective   Cognition:  Overall Cognitive Status: Within Functional  Limits  Orientation Level: Oriented X4  Insight: Mild  Impulsive: Mildly           Home Living:  Type of Home: House  Lives With:  (mother)  Home Adaptive Equipment: Cane  Home Layout: Two level, Stairs to alternate level with rails, Bed/bath upstairs  Alternate Level Stairs-Rails:  (x1 HR)  Alternate Level Stairs-Number of Steps: 12  Home Access: Stairs to enter with rails  Entrance Stairs-Rails: Both  Entrance Stairs-Number of Steps: 3  Bathroom Shower/Tub: Tub/shower unit  Bathroom Toilet: Standard  Bathroom Equipment: None  Prior Function:  Level of [place]: Independent with ADLs and functional transfers, Needs assistance with homemaking  Receives Help From: Family (son will come over to help with IADLs.)  ADL Assistance: Independent  Homemaking Assistance: Needs assistance (family assists with all IADLs)  Ambulatory Assistance: Independent (using cane)  Prior Function Comments: pt reports x2 falls in past 6 months.       ADL:  Eating Assistance: Independent  Grooming Deficit: Setup, Wash/dry face  UE Dressing Assistance: Minimal  UE Dressing Deficit:  (to don front opening garment)  LE Dressing Assistance: Stand by  LE Dressing Deficit: Don/doff R sock, Don/doff L sock  Activity Tolerance:  Endurance: Tolerates less than 10 min exercise with changes in vital signs  Activity Tolerance Comments: pt demos SOBOE (SPO2 99%) and elevated HR with activity (up to 132 with bed>chair transfer, deferred further functional mobility d/t safety concerns)  Bed Mobility/Transfers: Bed Mobility  Bed Mobility: Yes  Bed Mobility 1  Bed Mobility 1: Supine to sitting, Sitting to supine  Level of Assistance 1: Distant supervision    Transfers  Transfer: Yes  Transfer 1  Technique 1: Sit to stand, Stand to sit  Transfer Device 1: Walker  Transfer Level of Assistance 1: Contact guard, Minimal verbal cues  Trials/Comments 1: cues for sequencing and safe hand placement  Transfers 2  Transfer From 2: Bed to, Chair with arms  "to  Transfer to 2: Bed  Technique 2: Stand pivot  Transfer Device 2: Walker  Transfer Level of Assistance 2: Contact guard, Minimal verbal cues  Trials/Comments 2: cues for walker safety and alignment to sitting surface; pt with elevated HR & dizziness following bed>Chair transfer, deferred any further functional mobility d/t safety concerns, pt returned supine in bed after seated rest break         Sitting Balance:  Static Sitting Balance  Static Sitting-Balance Support: Bilateral upper extremity supported, Feet supported  Static Sitting-Level of Assistance: Distant supervision  Static Sitting-Comment/Number of Minutes: at EOB  Dynamic Sitting Balance  Dynamic Sitting-Comments: SUP at EOB  Standing Balance:  Static Standing Balance  Static Standing-Balance Support: Bilateral upper extremity supported  Static Standing-Level of Assistance: Contact guard  Static Standing-Comment/Number of Minutes: FWW  Dynamic Standing Balance  Dynamic Standing-Comments: CGA     Sensation:  Light Touch: No apparent deficits  Strength:  Strength Comments: B UEs grossly 3/5 based on function       Coordination:  Movements are Fluid and Coordinated: Yes        Extremities: RUE   RUE : Within Functional Limits and LUE   LUE: Within Functional Limits      Outcome Measures:[facility] Daily Activity  Putting on and taking off regular lower body clothing: A little  Bathing (including washing, rinsing, drying): A little  Putting on and taking off regular upper body clothing: A little  Toileting, which includes using toilet, bedpan or urinal: A little  Taking care of personal grooming such as brushing teeth: A little  Eating Meals: None  Daily Activity - Total Score: 19         and OT Adult Other Outcome Measures  Other Outcome Measures: Modified Barthel Index: 59/100 indicating \"severe dependence.\" Pt is currently requiring assistance with functional transfers/mobility and ADLs, demos significant endurance deficits as well as balance/strength " deficits, causing safety concerns with discharging home at this time.    Education Documentation  Body Mechanics, taught by Karyna Hooks OT at 3/10/2025  5:11 PM.  Learner: Patient  Readiness: Acceptance  Method: Explanation  Response: Verbalizes Understanding, Needs Reinforcement    Precautions, taught by Karyna Hooks OT at 3/10/2025  5:11 PM.  Learner: Patient  Readiness: Acceptance  Method: Explanation  Response: Verbalizes Understanding, Needs Reinforcement    ADL Training, taught by Karyna Hooks OT at 3/10/2025  5:11 PM.  Learner: Patient  Readiness: Acceptance  Method: Explanation  Response: Verbalizes Understanding, Needs Reinforcement    Education Comments  No comments found.             Goals:  Encounter Problems       Encounter Problems (Active)       ADLs       Patient will perform UB and LB bathing with modified independent level of assistance and grab bars, shower chair, and long-handled sponge.       Start:  03/10/25    Expected End:  03/24/25            Patient with complete upper body dressing with modified independent level of assistance donning and doffing all UE clothes with PRN adaptive equipment.       Start:  03/10/25    Expected End:  03/24/25            Patient with complete lower body dressing with modified independent level of assistance donning and doffing all LE clothes  with PRN adaptive equipment.       Start:  03/10/25    Expected End:  03/24/25            Patient will complete daily grooming tasks with modified independent level of assistance and PRN adaptive equipment.       Start:  03/10/25    Expected End:  03/24/25            Patient will complete toileting including hygiene clothing management/hygiene with modified independent level of assistance and raised toilet seat and grab bars.       Start:  03/10/25    Expected End:  03/24/25               MOBILITY       Patient will perform Functional mobility x Household distances/Community Distances with modified independent  level of assistance and least restrictive device in order to improve safety and functional mobility.       Start:  03/10/25    Expected End:  03/24/25               TRANSFERS       Patient will perform bed mobility independent level of assistance in order to improve safety and independence with mobility       Start:  03/10/25    Expected End:  03/24/25            Patient will complete functional transfers with least restrictive device with modified independent level of assistance.       Start:  03/10/25    Expected End:  03/24/25

## 2025-03-10 NOTE — PROGRESS NOTES
Between 7AM-7PM please message me via Epic Secure Chat.  After 7PM please page Nocturnist on call.    [facility] Hospitalist Progress Note      Kj Colmenares    :  1970(54 y.o.)    MRN:  [id number]  Date: 25     Assessment and Plan:     Influenza A infection  - finished tamiflu 5d course; continue supportive care    ESRD  Anemia of CKD  Metabolic   - no HD for 2 months prior to admission  - Nephrology consulted to manage IP HD; continue phoslo, neprocaps. EPO/IV iron per nephrology with HD.     Atrial fibrillation with RVR  - improved with restarting PO lopressor; continue eliquis    Left knee effusion  - CT left knee shows advanced osteoarthritis of the left knee with a large joint effusion. Plan for OP referral to orthopedic surgery to discuss options once acute issues resolved  - continue annetta tylenol, lidocaine patch    T2DM  - continue ssi    HLD  - switched to atorvastatin as he is ESRD on HD    Hx of CVA  - continue asa, statin    BPH  - continue flomax, proscar    DVT Prophylaxis: full anticoagulation with eliquis    Disposition: PT rec mod, patient agreeable to SNF due to limited mobility. TCC notified.    Electronically signed by Tyson Everett DO on 25 at 9:32 PM     Subjective:      Interval History:   Vitals and chart notes from overnight reviewed.   No acute issues overnight.   Patient seen and evaluated at bedside.   Flu symptoms slowly improving. Still with troubles ambulating and agreeable to SNF.     Review of Systems:   Other than patient's chronic conditions and those complaints in the history above, the rest of the 10 systems review were done and were negative.     Current medications:  Scheduled Meds:acetaminophen, 975 mg, oral, TID  apixaban, 2.5 mg, oral, BID  aspirin, 81 mg, oral, Daily  atorvastatin, 40 mg, oral, Nightly  calcitriol, 0.25 mcg, oral, Daily  calcium acetate, 1,334 mg, oral, TID  escitalopram, 20 mg, oral, Daily  finasteride, 5 mg, oral,  Daily  insulin lispro, 0-5 Units, subcutaneous, TID AC  lidocaine, 1 patch, transdermal, Daily  metoprolol tartrate, 25 mg, oral, BID  OLANZapine, 5 mg, oral, Nightly  pantoprazole, 40 mg, oral, Daily before breakfast   Or  pantoprazole, 40 mg, intravenous, Daily before breakfast  QUEtiapine, 25 mg, oral, BID  tamsulosin, 0.4 mg, oral, Daily  vitamin B complex-vitamin C-folic acid, 1 capsule, oral, Daily      Continuous Infusions:   PRN Meds:PRN medications: benzocaine-menthol, benzonatate, dextrose, dextrose, glucagon, glucagon, guaiFENesin, midodrine, ondansetron **OR** ondansetron, oxyCODONE      Objective:     Heart Rate:  [79-94]   Temp:  [36.5 °C (97.7 °F)-37.1 °C (98.8 °F)]   Resp:  [16-19]   BP: (107-135)/(69-90)   SpO2:  [97 %-99 %]          Physical Exam  Vitals and nursing note reviewed.   Constitutional:       Appearance: He is obese.   HENT:      Mouth/Throat:      Mouth: Mucous membranes are moist.      Pharynx: Oropharynx is clear.   Cardiovascular:      Rate and Rhythm: Normal rate. Rhythm irregular.   Pulmonary:      Effort: Pulmonary effort is normal.   Abdominal:      General: There is no distension.      Palpations: Abdomen is soft.      Tenderness: There is no abdominal tenderness.   Musculoskeletal:      Comments: Swelling around left knee   Neurological:      Mental Status: He is alert and oriented to person, place, and time.         Labs:   Lab Results   Component Value Date     03/09/2025    K 4.6 03/09/2025     (H) 03/09/2025    CO2 25 03/09/2025    BUN 63 (H) 03/09/2025    CREATININE 8.15 (H) 03/09/2025    GLUCOSE 88 03/09/2025    CALCIUM 10.1 03/09/2025    PROT 6.7 03/04/2025    BILITOT 0.5 03/04/2025    ALKPHOS 333 (H) 03/04/2025    AST 15 03/04/2025    ALT 13 03/04/2025       Lab Results   Component Value Date    WBC 7.3 03/09/2025    HGB 8.2 (L) 03/09/2025    HCT 27.3 (L) 03/09/2025    MCV 96 03/09/2025     03/09/2025

## 2025-03-10 NOTE — PROGRESS NOTES
3/10/2025 1:40 PM I met with patient. He prefers a SNF with onsite dialysis. He agrees to  mass referrals for onsite dialysis facilities. Mona BENÍTEZ

## 2025-03-11 LAB
GLUCOSE BLD MANUAL STRIP-MCNC: 106 MG/DL (ref 74–99)
GLUCOSE BLD MANUAL STRIP-MCNC: 109 MG/DL (ref 74–99)
GLUCOSE BLD MANUAL STRIP-MCNC: 110 MG/DL (ref 74–99)
GLUCOSE BLD MANUAL STRIP-MCNC: 127 MG/DL (ref 74–99)

## 2025-03-11 PROCEDURE — 82947 ASSAY GLUCOSE BLOOD QUANT: CPT

## 2025-03-11 PROCEDURE — 97116 GAIT TRAINING THERAPY: CPT | Mod: GP,CQ | Performed by: PHYSICAL THERAPY ASSISTANT

## 2025-03-11 PROCEDURE — 2060000001 HC INTERMEDIATE ICU ROOM DAILY

## 2025-03-11 PROCEDURE — 2500000004 HC RX 250 GENERAL PHARMACY W/ HCPCS (ALT 636 FOR OP/ED): Performed by: HOSPITALIST

## 2025-03-11 PROCEDURE — 99232 SBSQ HOSP IP/OBS MODERATE 35: CPT | Performed by: HOSPITALIST

## 2025-03-11 PROCEDURE — 2500000001 HC RX 250 WO HCPCS SELF ADMINISTERED DRUGS (ALT 637 FOR MEDICARE OP): Performed by: INTERNAL MEDICINE

## 2025-03-11 PROCEDURE — 2500000002 HC RX 250 W HCPCS SELF ADMINISTERED DRUGS (ALT 637 FOR MEDICARE OP, ALT 636 FOR OP/ED): Performed by: INTERNAL MEDICINE

## 2025-03-11 PROCEDURE — 2500000001 HC RX 250 WO HCPCS SELF ADMINISTERED DRUGS (ALT 637 FOR MEDICARE OP): Performed by: HOSPITALIST

## 2025-03-11 PROCEDURE — 2500000002 HC RX 250 W HCPCS SELF ADMINISTERED DRUGS (ALT 637 FOR MEDICARE OP, ALT 636 FOR OP/ED): Performed by: HOSPITALIST

## 2025-03-11 RX ORDER — ACETAMINOPHEN 325 MG/1
1000 TABLET ORAL EVERY 8 HOURS PRN
Start: 2025-03-11

## 2025-03-11 RX ORDER — ATORVASTATIN CALCIUM 40 MG/1
40 TABLET, FILM COATED ORAL NIGHTLY
Start: 2025-03-11 | End: 2025-04-10

## 2025-03-11 RX ORDER — LIDOCAINE 560 MG/1
1 PATCH PERCUTANEOUS; TOPICAL; TRANSDERMAL DAILY
Start: 2025-03-12

## 2025-03-11 RX ORDER — GUAIFENESIN 600 MG/1
600 TABLET, EXTENDED RELEASE ORAL 2 TIMES DAILY PRN
Start: 2025-03-11

## 2025-03-11 RX ORDER — OXYCODONE HYDROCHLORIDE 5 MG/1
5 TABLET ORAL EVERY 6 HOURS PRN
Qty: 15 TABLET | Refills: 0 | Status: SHIPPED | OUTPATIENT
Start: 2025-03-11

## 2025-03-11 RX ORDER — METOPROLOL SUCCINATE 50 MG/1
50 TABLET, EXTENDED RELEASE ORAL NIGHTLY
Start: 2025-03-11 | End: 2025-04-10

## 2025-03-11 RX ADMIN — OLANZAPINE 5 MG: 5 TABLET, FILM COATED ORAL at 20:36

## 2025-03-11 RX ADMIN — PANTOPRAZOLE SODIUM 40 MG: 40 INJECTION, POWDER, FOR SOLUTION INTRAVENOUS at 05:58

## 2025-03-11 RX ADMIN — ACETAMINOPHEN 975 MG: 325 TABLET, FILM COATED ORAL at 09:08

## 2025-03-11 RX ADMIN — OXYCODONE HYDROCHLORIDE 5 MG: 5 TABLET ORAL at 15:16

## 2025-03-11 RX ADMIN — ASPIRIN 81 MG: 81 TABLET, COATED ORAL at 09:08

## 2025-03-11 RX ADMIN — APIXABAN 2.5 MG: 5 TABLET, FILM COATED ORAL at 20:36

## 2025-03-11 RX ADMIN — ESCITALOPRAM OXALATE 20 MG: 20 TABLET ORAL at 09:08

## 2025-03-11 RX ADMIN — Medication 1 CAPSULE: at 09:08

## 2025-03-11 RX ADMIN — TAMSULOSIN HYDROCHLORIDE 0.4 MG: 0.4 CAPSULE ORAL at 09:08

## 2025-03-11 RX ADMIN — OXYCODONE HYDROCHLORIDE 5 MG: 5 TABLET ORAL at 23:12

## 2025-03-11 RX ADMIN — ATORVASTATIN CALCIUM 40 MG: 40 TABLET, FILM COATED ORAL at 20:36

## 2025-03-11 RX ADMIN — QUETIAPINE FUMARATE 25 MG: 25 TABLET ORAL at 09:08

## 2025-03-11 RX ADMIN — APIXABAN 2.5 MG: 5 TABLET, FILM COATED ORAL at 09:08

## 2025-03-11 RX ADMIN — OXYCODONE HYDROCHLORIDE 5 MG: 5 TABLET ORAL at 09:12

## 2025-03-11 RX ADMIN — CALCITRIOL CAPSULES 0.25 MCG 0.25 MCG: 0.25 CAPSULE ORAL at 09:08

## 2025-03-11 RX ADMIN — ACETAMINOPHEN 975 MG: 325 TABLET, FILM COATED ORAL at 15:16

## 2025-03-11 RX ADMIN — METOPROLOL SUCCINATE 50 MG: 50 TABLET, EXTENDED RELEASE ORAL at 20:36

## 2025-03-11 RX ADMIN — QUETIAPINE FUMARATE 25 MG: 25 TABLET ORAL at 17:16

## 2025-03-11 RX ADMIN — FINASTERIDE 5 MG: 5 TABLET, FILM COATED ORAL at 09:08

## 2025-03-11 RX ADMIN — CALCIUM ACETATE 1334 MG: 667 CAPSULE ORAL at 12:47

## 2025-03-11 RX ADMIN — CALCIUM ACETATE 1334 MG: 667 CAPSULE ORAL at 17:16

## 2025-03-11 RX ADMIN — ACETAMINOPHEN 975 MG: 325 TABLET, FILM COATED ORAL at 20:36

## 2025-03-11 RX ADMIN — CALCIUM ACETATE 1334 MG: 667 CAPSULE ORAL at 09:08

## 2025-03-11 ASSESSMENT — COGNITIVE AND FUNCTIONAL STATUS - GENERAL
DAILY ACTIVITIY SCORE: 24
MOBILITY SCORE: 24
MOBILITY SCORE: 24
CLIMB 3 TO 5 STEPS WITH RAILING: TOTAL
MOVING TO AND FROM BED TO CHAIR: A LITTLE
MOBILITY SCORE: 16
TURNING FROM BACK TO SIDE WHILE IN FLAT BAD: A LITTLE
MOVING FROM LYING ON BACK TO SITTING ON SIDE OF FLAT BED WITH BEDRAILS: A LITTLE
WALKING IN HOSPITAL ROOM: A LITTLE
STANDING UP FROM CHAIR USING ARMS: A LITTLE
DAILY ACTIVITIY SCORE: 24

## 2025-03-11 ASSESSMENT — PAIN - FUNCTIONAL ASSESSMENT
PAIN_FUNCTIONAL_ASSESSMENT: 0-10

## 2025-03-11 ASSESSMENT — PAIN SCALES - GENERAL
PAINLEVEL_OUTOF10: 8
PAINLEVEL_OUTOF10: 9
PAINLEVEL_OUTOF10: 2
PAINLEVEL_OUTOF10: 9
PAINLEVEL_OUTOF10: 0 - NO PAIN
PAINLEVEL_OUTOF10: 9
PAINLEVEL_OUTOF10: 8
PAINLEVEL_OUTOF10: 7
PAINLEVEL_OUTOF10: 9

## 2025-03-11 ASSESSMENT — PAIN DESCRIPTION - ORIENTATION
ORIENTATION: LEFT

## 2025-03-11 ASSESSMENT — PAIN DESCRIPTION - LOCATION
LOCATION: KNEE

## 2025-03-11 ASSESSMENT — PAIN SCALES - PAIN ASSESSMENT IN ADVANCED DEMENTIA (PAINAD)
TOTALSCORE: MEDICATION (SEE MAR)
TOTALSCORE: MEDICATION (SEE MAR)

## 2025-03-11 NOTE — PROGRESS NOTES
Between 7AM-7PM please message me via Epic Secure Chat.  After 7PM please page Nocturnist on call.    [facility] Hospitalist Progress Note      Kj Colmenares    :  1970(54 y.o.)    MRN:  [id number]  Date: 03/10/25     Assessment and Plan:     Influenza A infection  - finished tamiflu 5d course; continue supportive care    ESRD  Anemia of CKD  Metabolic   - no HD for 2 months prior to admission  - Nephrology consulted to manage IP HD; continue phoslo, neprocaps. EPO/IV iron per nephrology with HD.     Atrial fibrillation with RVR  - didn't get dose of metoprolol prior to HD as he was off unit before RN able to complete med pass; back in RVR upon return to medical floor. HR improved with PO metoprolol. Will switch to Toprol XL 50 mg nightly and monitor on tele. If HR still elevated may need to increase dose a bit.   - Continue po eliquis    Left knee effusion  - CT left knee shows advanced osteoarthritis of the left knee with a large joint effusion. Plan for OP referral to orthopedic surgery to discuss options once acute issues resolved  - continue annetta tylenol, lidocaine patch    T2DM  - continue ssi    HLD  - switched to atorvastatin as he is ESRD on HD    Hx of CVA  - continue asa, statin    BPH  - continue flomax, proscar    DVT Prophylaxis: full anticoagulation with eliquis    Disposition: Was medically stable for SNF but now with RVR on return to unit. Switching to Toprol xl tonight, if HR stable on tele (including with ambulation) ok to dc to SNF tomorrow if one is located and auth is obtained.     Electronically signed by Tyson Everett DO on 03/10/25 at 9:22 PM     Subjective:      Interval History:   Vitals and chart notes from overnight reviewed.   No acute issues overnight.   Patient seen and evaluated at bedside.   Flu symptoms continue to improve. Still with left knee pain.     Review of Systems:   Other than patient's chronic conditions and those complaints in the history above, the  rest of the 10 systems review were done and were negative.     Current medications:  Scheduled Meds:acetaminophen, 975 mg, oral, TID  apixaban, 2.5 mg, oral, BID  aspirin, 81 mg, oral, Daily  atorvastatin, 40 mg, oral, Nightly  calcitriol, 0.25 mcg, oral, Daily  calcium acetate, 1,334 mg, oral, TID  [START ON 3/12/2025] epoetin sadie or biosimilar, 100 Units/kg, subcutaneous, Every Mon/Wed/Fri  escitalopram, 20 mg, oral, Daily  finasteride, 5 mg, oral, Daily  insulin lispro, 0-5 Units, subcutaneous, TID AC  lidocaine, 1 patch, transdermal, Daily  metoprolol succinate XL, 50 mg, oral, Nightly  OLANZapine, 5 mg, oral, Nightly  pantoprazole, 40 mg, oral, Daily before breakfast   Or  pantoprazole, 40 mg, intravenous, Daily before breakfast  QUEtiapine, 25 mg, oral, BID  tamsulosin, 0.4 mg, oral, Daily  vitamin B complex-vitamin C-folic acid, 1 capsule, oral, Daily      Continuous Infusions:   PRN Meds:PRN medications: benzocaine-menthol, benzonatate, dextrose, dextrose, glucagon, glucagon, guaiFENesin, midodrine, ondansetron **OR** ondansetron, oxyCODONE      Objective:     Heart Rate:  [88-92]   Temp:  [36.1 °C (97 °F)-36.9 °C (98.5 °F)]   Resp:  [16]   BP: (108-132)/(71-98)   Weight:  [106 kg (233 lb 6.4 oz)]   SpO2:  [98 %-99 %]          Physical Exam  Vitals and nursing note reviewed.   Constitutional:       Appearance: He is obese.   HENT:      Mouth/Throat:      Mouth: Mucous membranes are moist.      Pharynx: Oropharynx is clear.   Cardiovascular:      Rate and Rhythm: Normal rate. Rhythm irregular.   Pulmonary:      Effort: Pulmonary effort is normal.   Abdominal:      General: There is no distension.      Palpations: Abdomen is soft.      Tenderness: There is no abdominal tenderness.   Musculoskeletal:      Comments: Swelling around left knee   Neurological:      Mental Status: He is alert and oriented to person, place, and time.         Labs:   Lab Results   Component Value Date     03/10/2025    K 5.1  03/10/2025     (H) 03/10/2025    CO2 25 03/10/2025    BUN 71 (H) 03/10/2025    CREATININE 9.30 (H) 03/10/2025    GLUCOSE 84 03/10/2025    CALCIUM 10.1 03/10/2025    PROT 6.7 03/04/2025    BILITOT 0.5 03/04/2025    ALKPHOS 333 (H) 03/04/2025    AST 15 03/04/2025    ALT 13 03/04/2025       Lab Results   Component Value Date    WBC 6.2 03/10/2025    HGB 8.6 (L) 03/10/2025    HCT 28.5 (L) 03/10/2025    MCV 97 03/10/2025     03/10/2025

## 2025-03-11 NOTE — PROGRESS NOTES
Between 7AM-7PM please message me via Epic Secure Chat.  After 7PM please page Nocturnist on call.    [facility] Hospitalist Progress Note      Kj Colmenares    :  1970(54 y.o.)    MRN:  [id number]  Date: 25     Assessment and Plan:     Influenza A infection  - finished tamiflu 5d course; continue supportive care    ESRD  Anemia of CKD  Metabolic   - no HD for 2 months prior to admission  - Nephrology consulted to manage IP HD; continue phoslo, neprocaps. EPO/IV iron per nephrology with HD.     Atrial fibrillation with RVR  - HR control improved with Toprol XL 50 mg nightly; Goal HR <130 without symptoms for now as he is recovering from Flu. Long term goal is <110.   - Continue po eliquis  - Will have OP follow up with cardiology, if remains in afib may need DCCV as OP    Left knee effusion  - CT left knee shows advanced osteoarthritis of the left knee with a large joint effusion. Plan for OP referral to orthopedic surgery to discuss options once acute issues resolved  - continue annetta tylenol, lidocaine patch    T2DM  - continue ssi    HLD  - switched to atorvastatin as he is ESRD on HD    Hx of CVA  - continue asa, statin    BPH  - continue flomax, proscar    DVT Prophylaxis: full anticoagulation with eliquis    Disposition: SNF when auth obtained    Electronically signed by Tyson Everett DO on 25 at 2:24 PM     Subjective:      Interval History:   Vitals and chart notes from overnight reviewed.   No acute issues overnight.   Patient seen and evaluated at bedside.   No chest pain or shortness of breath. Po intake better. Intermittent diarrhea.     Review of Systems:   Other than patient's chronic conditions and those complaints in the history above, the rest of the 10 systems review were done and were negative.     Current medications:  Scheduled Meds:acetaminophen, 975 mg, oral, TID  apixaban, 2.5 mg, oral, BID  aspirin, 81 mg, oral, Daily  atorvastatin, 40 mg, oral,  Nightly  calcitriol, 0.25 mcg, oral, Daily  calcium acetate, 1,334 mg, oral, TID  [START ON 3/12/2025] epoetin sadie or biosimilar, 100 Units/kg, subcutaneous, Every Mon/Wed/Fri  escitalopram, 20 mg, oral, Daily  finasteride, 5 mg, oral, Daily  insulin lispro, 0-5 Units, subcutaneous, TID AC  lidocaine, 1 patch, transdermal, Daily  metoprolol succinate XL, 50 mg, oral, Nightly  OLANZapine, 5 mg, oral, Nightly  pantoprazole, 40 mg, oral, Daily before breakfast   Or  pantoprazole, 40 mg, intravenous, Daily before breakfast  QUEtiapine, 25 mg, oral, BID  tamsulosin, 0.4 mg, oral, Daily  vitamin B complex-vitamin C-folic acid, 1 capsule, oral, Daily      Continuous Infusions:   PRN Meds:PRN medications: benzocaine-menthol, benzonatate, dextrose, dextrose, glucagon, glucagon, guaiFENesin, midodrine, ondansetron **OR** ondansetron, oxyCODONE      Objective:     Heart Rate:  [86-99]   Temp:  [36.1 °C (97 °F)-36.3 °C (97.3 °F)]   Resp:  [14-34]   BP: ()/(57-98)   SpO2:  [96 %-99 %]          Physical Exam  Vitals and nursing note reviewed.   Constitutional:       Appearance: He is obese.   HENT:      Mouth/Throat:      Mouth: Mucous membranes are moist.      Pharynx: Oropharynx is clear.   Cardiovascular:      Rate and Rhythm: Normal rate. Rhythm irregular.   Pulmonary:      Effort: Pulmonary effort is normal.   Abdominal:      General: There is no distension.      Palpations: Abdomen is soft.      Tenderness: There is no abdominal tenderness.   Musculoskeletal:      Comments: Swelling around left knee   Neurological:      Mental Status: He is alert and oriented to person, place, and time.         Labs:   Lab Results   Component Value Date     03/10/2025    K 5.1 03/10/2025     (H) 03/10/2025    CO2 25 03/10/2025    BUN 71 (H) 03/10/2025    CREATININE 9.30 (H) 03/10/2025    GLUCOSE 84 03/10/2025    CALCIUM 10.1 03/10/2025    PROT 6.7 03/04/2025    BILITOT 0.5 03/04/2025    ALKPHOS 333 (H) 03/04/2025    AST  15 03/04/2025    ALT 13 03/04/2025       Lab Results   Component Value Date    WBC 6.2 03/10/2025    HGB 8.6 (L) 03/10/2025    HCT 28.5 (L) 03/10/2025    MCV 97 03/10/2025     03/10/2025

## 2025-03-11 NOTE — PROGRESS NOTES
Kj Colmenares is a 54 y.o. male on day 6 of admission presenting with Influenza A.      Subjective   Seen and examined.   Uneventful dialysis yesterday. Breathing comfortably on room air.   In influenza isolation.        Objective          Vitals 24HR  Heart Rate:  [86-99]   Temp:  [36.1 °C (97 °F)-36.3 °C (97.3 °F)]   Resp:  [14-34]   BP: ()/(57-98)   SpO2:  [96 %-99 %]     Intake/Output last 3 Shifts:    Intake/Output Summary (Last 24 hours) at 3/11/2025 1350  Last data filed at 3/11/2025 1242  Gross per 24 hour   Intake 240 ml   Output 225 ml   Net 15 ml       Physical Exam  Constitutional:       Appearance: Normal appearance.   HENT:      Mouth/Throat:      Mouth: Mucous membranes are moist.   Eyes:      Extraocular Movements: Extraocular movements intact.      Pupils: Pupils are equal, round, and reactive to light.   Cardiovascular:      Rate and Rhythm: Regular rhythm.      Heart sounds: S1 normal and S2 normal.   Pulmonary:      Breath sounds: Normal breath sounds.   Abdominal:      Comments: Soft, NT/ND, no masses, normal bowel sounds   Genitourinary:     Comments: No mazariegos  Musculoskeletal:      Right lower leg: No edema.      Left lower leg: No edema.   Skin:     General: Skin is warm and dry.   Neurological:      General: No focal deficit present.      Mental Status: She is alert and oriented to person, place, and time.   Psychiatric:         Behavior: Behavior normal.    Left upper extremity thrill and bruit, AV fistula    Scheduled Medications  acetaminophen, 975 mg, oral, TID  apixaban, 2.5 mg, oral, BID  aspirin, 81 mg, oral, Daily  atorvastatin, 40 mg, oral, Nightly  calcitriol, 0.25 mcg, oral, Daily  calcium acetate, 1,334 mg, oral, TID  [START ON 3/12/2025] epoetin sadie or biosimilar, 100 Units/kg, subcutaneous, Every Mon/Wed/Fri  escitalopram, 20 mg, oral, Daily  finasteride, 5 mg, oral, Daily  insulin lispro, 0-5 Units, subcutaneous, TID AC  lidocaine, 1 patch, transdermal,  Daily  metoprolol succinate XL, 50 mg, oral, Nightly  OLANZapine, 5 mg, oral, Nightly  pantoprazole, 40 mg, oral, Daily before breakfast   Or  pantoprazole, 40 mg, intravenous, Daily before breakfast  QUEtiapine, 25 mg, oral, BID  tamsulosin, 0.4 mg, oral, Daily  vitamin B complex-vitamin C-folic acid, 1 capsule, oral, Daily      Continuous medications       PRN medications: benzocaine-menthol, benzonatate, dextrose, dextrose, glucagon, glucagon, guaiFENesin, midodrine, ondansetron **OR** ondansetron, oxyCODONE     Relevant Results  Results from last 7 days   Lab Units 03/10/25  0658 03/09/25  0703 03/08/25  0643   WBC AUTO x10*3/uL 6.2 7.3 6.9   HEMOGLOBIN g/dL 8.6* 8.2* 8.9*   HEMATOCRIT % 28.5* 27.3* 28.6*   PLATELETS AUTO x10*3/uL 187 178 168   NEUTROS PCT AUTO % 51.1 58.0 52.9   LYMPHS PCT AUTO % 24.3 16.4 20.5   MONOS PCT AUTO % 8.4 7.9 7.5   EOS PCT AUTO % 15.4 16.5 18.1     Results from last 7 days   Lab Units 03/10/25  0658 03/09/25  0703 03/08/25  0643   SODIUM mmol/L 143 143 142   POTASSIUM mmol/L 5.1 4.6 4.2   CHLORIDE mmol/L 108* 108* 107   CO2 mmol/L 25 25 26   BUN mg/dL 71* 63* 51*   CREATININE mg/dL 9.30* 8.15* 6.86*   GLUCOSE mg/dL 84 88 103*   CALCIUM mg/dL 10.1 10.1 10.1       CT knee left wo IV contrast   Final Result   Advanced osteoarthritis of the left knee with a large joint effusion.   This is grossly similar to the previous examination of July 17, 2024        No evidence fracture.             MACRO:   None        Signed by: Robert Zapata 3/8/2025 7:53 AM   Dictation workstation:   [id number]      XR chest 2 views   Final Result   Prominence of the hilar structures on the lateral view. Consider   follow-up CT of chest with contrast to exclude any adenopathy.   Signed by Ross Smith, DO               Assessment/Plan      FILMORE, MILES is a 54 year old Male with a past medical history of end-stage renal disease on hemodialysis via left arm AVF, diabetes, heart failure, CAD, atrial  fibrillation, gout, hypertension, neuropathy, history of trach/PEG status post removal, manic behavior, SAH s/p aneurysm clipping, DVT on Eliquis who presented with a productive cough, chest congestion, shortness of breath, body aches, and chills who was found to be influenza A positive.  Upon arrival, ECG demonstrated atrial fibrillation with RVR.  Nephrology is consulted for renal care.  Mr. Underwood was dialyzed yesterday uneventfully.  He has stable respiratory parameters.  There is no acute indication for dialysis today.  I will place orders for dialysis again tomorrow.  He will get erythropoietin and is on a phosphorus binder.  Discharge planning to SNF is noted.  Nephrology will follow with his care while in house.        Assessment & Plan  Influenza A         I spent 40 minutes in the professional and overall care of this patient.      Pj Dixon, DO

## 2025-03-11 NOTE — PROGRESS NOTES
03/11/25 1146   Discharge Planning   Assistance Needed requested documents sent to [facility] for dialysis approval   Expected Discharge Disposition SNF   Does the patient need discharge transport arranged? Yes   RoundTrip coordination needed? Yes   Has discharge transport been arranged? No     Updated by facility patient has  approval  for HD onsite. . Facility will start auth d/t insurance    POC reviewed this shift and is on going. Patient is calm, cooperative, anxious, irritable, and manic. Denies Suicidal Ideation, Homicidal Ideation, Auditory Hallucinations, Visual Hallucinations, Tactile Hallucinations, Gustatory Hallucinations, and Delusions. Patient was very Oriental orthodox this morning. Patient also had some tearful moments. Patient was out on the floor all day and participated in groups. Pt continues to be medication compliant and staff will continue to monitor pt closely while on the unit.

## 2025-03-11 NOTE — CARE PLAN
The patient's goals for the shift include  feel better    The clinical goals for the shift include patient will remain stable    Problem: Pain - Adult  Goal: Verbalizes/displays adequate comfort level or baseline comfort level  Outcome: Progressing     Problem: Safety - Adult  Goal: Free from fall injury  Outcome: Progressing     Problem: Discharge Planning  Goal: Discharge to home or other facility with appropriate resources  Outcome: Progressing     Problem: Chronic Conditions and Co-morbidities  Goal: Patient's chronic conditions and co-morbidity symptoms are monitored and maintained or improved  Outcome: Progressing     Problem: Nutrition  Goal: Nutrient intake appropriate for maintaining nutritional needs  Outcome: Progressing

## 2025-03-11 NOTE — CARE PLAN
The patient's goals for the shift include      The clinical goals for the shift include patient will remain stable    Over the shift, the patient did not make progress toward the following goals. Barriers to progression include . Recommendations to address these barriers include .  Problem: Pain - Adult  Goal: Verbalizes/displays adequate comfort level or baseline comfort level  Outcome: Progressing     Problem: Safety - Adult  Goal: Free from fall injury  Outcome: Progressing

## 2025-03-11 NOTE — PROGRESS NOTES
Physical Therapy    Physical Therapy Treatment    Patient Name: Kj Colmenares  MRN: [id number]  Department: [place] [address]  Room: [address] [place] [address]  Today's Date: 3/11/2025  Time Calculation  Start Time: 1338  Stop Time: 1353  Time Calculation (min): 15 min    Treatment, chart review, and documentation was done under the supervision of Senior Sonya PTA.       Assessment/Plan   PT Assessment  Rehab Prognosis: Good  Barriers to Discharge Home: Caregiver assistance, Physical needs  End of Session Communication: Bedside nurse  Assessment Comment: Pt had fair tolerance to tx however was limited due to increase in HR.  End of Session Patient Position: Bed, 3 rail up, Alarm on  PT Plan  Inpatient/Swing Bed or Outpatient: Inpatient  PT Plan  Treatment/Interventions: Bed mobility, Transfer training, Gait training  PT Plan: Ongoing PT  PT Frequency: 3 times per week  PT Discharge Recommendations: Moderate intensity level of continued care  PT Recommended Transfer Status: Assist x1  PT - OK to Discharge: Yes (per PT POC)      General Visit Information:   PT  Visit  PT Received On: 03/11/25  General  Reason for Referral: To ED with SOB, chest pain, and cough. Pt found to be in afib RVR and Flu (+)  Referred By: Tyson Everett DO  Prior to Session Communication: Bedside nurse  Patient Position Received: Bed, 3 rail up, Alarm on  Preferred Learning Style: auditory, visual, verbal  General Comment: Pt pleasant and agreeable to tx.    Subjective   Precautions:  Precautions  Medical Precautions: Fall precautions     Date/Time Vitals Session Patient Position Pulse Resp SpO2 BP MAP (mmHg)    03/11/25 1242 --  --  86  20  97 %  96/57  70     03/11/25 1338 --  --  --  --  --  --  --                 Objective   Pain:  Pain Assessment  Pain Assessment: 0-10  0-10 (Numeric) Pain Score: 9 (RN notified)  Pain Type: Chronic pain  Pain Location: Knee  Pain Orientation: Left  Cognition:  Cognition  Orientation Level: Oriented X4       Postural  Control:  Static Sitting Balance  Static Sitting-Balance Support: Feet supported, No upper extremity supported  Static Sitting-Level of Assistance: Close supervision  Static Sitting-Comment/Number of Minutes: seated EOB, steady.  Static Standing Balance  Static Standing-Balance Support: Bilateral upper extremity supported  Static Standing-Level of Assistance: Contact guard  Static Standing-Comment/Number of Minutes: steady.      Activity Tolerance:  Activity Tolerance  Endurance: Tolerates less than 10 min exercise with changes in vital signs  Treatments:  Bed Mobility  Bed Mobility: Yes  Bed Mobility 1  Bed Mobility 1: Supine to sitting, Sitting to supine  Level of Assistance 1: Distant supervision  Bed Mobility Comments 1: pt able to manage trunk and LEs.    Ambulation/Gait Training  Ambulation/Gait Training Performed: Yes  Ambulation/Gait Training 1  Surface 1: Level tile  Device 1: Rolling walker  Gait Support Devices: Gait belt  Assistance 1: Minimum assistance  Quality of Gait 1: Wide base of support, Antalgic, Knee(s) buckle, Forward flexed posture  Comments/Distance (ft) 1: 20 ft x2, pt required assistance due to slight left knee buckling, cues for upright posture. Pt returned to sitting due to HR increasing to 143, returned to 91 with seated rest.  Transfers  Transfer: Yes  Transfer 1  Transfer From 1: Sit to  Transfer to 1: Stand  Technique 1: Sit to stand, Stand to sit  Transfer Device 1: Walker, Gait belt  Transfer Level of Assistance 1: Contact guard  Trials/Comments 1: pt required cues for proper hand placement and sequencing.    Outcome Measures:  [facility] Basic Mobility  Turning from your back to your side while in a flat bed without using bedrails: A little  Moving from lying on your back to sitting on the side of a flat bed without using bedrails: A little  Moving to and from bed to chair (including a wheelchair): A little  Standing up from a chair using your arms (e.g. wheelchair or bedside chair): A  little  To walk in hospital room: A little  Climbing 3-5 steps with railing: Total  Basic Mobility - Total Score: 16    Education Documentation  Precautions, taught by DIAMOND Reddy at 3/11/2025  2:34 PM.  Learner: Patient  Readiness: Acceptance  Method: Explanation  Response: Verbalizes Understanding    Body Mechanics, taught by DIAMOND Reddy at 3/11/2025  2:34 PM.  Learner: Patient  Readiness: Acceptance  Method: Explanation  Response: Verbalizes Understanding    Mobility Training, taught by DIAMOND Reddy at 3/11/2025  2:34 PM.  Learner: Patient  Readiness: Acceptance  Method: Explanation  Response: Verbalizes Understanding    Education Comments  No comments found.        OP EDUCATION:       Encounter Problems       Encounter Problems (Active)       Balance       complete all mobility with normal balance while dual tasking, negotiating in a dynamic environment, carrying items, etc., with proactive and reactive static and dynamic standing and sitting tasks, with mod I and LRAD, >15 minutes.         Start:  03/09/25    Expected End:  03/23/25               Mobility       STG - Patient will ambulate 75 ft mod I with LRAD and stable vitals.        Start:  03/09/25    Expected End:  03/23/25            Pt will tolerate 15 reps of each LE exercise in order to improve strength and endurance.         Start:  03/09/25    Expected End:  03/23/25               PT Transfers       STG - Patient will perform bed mobility independently. (Progressing)       Start:  03/09/25    Expected End:  03/23/25            STG - Patient will transfer sit to and from stand mod I using LRAD (Progressing)       Start:  03/09/25    Expected End:  03/23/25               Pain - Adult

## 2025-03-12 ENCOUNTER — APPOINTMENT (OUTPATIENT)
Dept: DIALYSIS | Facility: HOSPITAL | Age: 55
End: 2025-03-12
Payer: COMMERCIAL

## 2025-03-12 LAB
GLUCOSE BLD MANUAL STRIP-MCNC: 111 MG/DL (ref 74–99)
GLUCOSE BLD MANUAL STRIP-MCNC: 126 MG/DL (ref 74–99)
GLUCOSE BLD MANUAL STRIP-MCNC: 80 MG/DL (ref 74–99)
GLUCOSE BLD MANUAL STRIP-MCNC: 95 MG/DL (ref 74–99)

## 2025-03-12 PROCEDURE — 97530 THERAPEUTIC ACTIVITIES: CPT | Mod: GP,CQ | Performed by: PHYSICAL THERAPY ASSISTANT

## 2025-03-12 PROCEDURE — 2060000001 HC INTERMEDIATE ICU ROOM DAILY

## 2025-03-12 PROCEDURE — 6350000001 HC RX 635 EPOETIN >10,000 UNITS: Mod: JZ | Performed by: INTERNAL MEDICINE

## 2025-03-12 PROCEDURE — 97110 THERAPEUTIC EXERCISES: CPT | Mod: GO | Performed by: OCCUPATIONAL THERAPIST

## 2025-03-12 PROCEDURE — 2500000001 HC RX 250 WO HCPCS SELF ADMINISTERED DRUGS (ALT 637 FOR MEDICARE OP): Performed by: HOSPITALIST

## 2025-03-12 PROCEDURE — 82947 ASSAY GLUCOSE BLOOD QUANT: CPT

## 2025-03-12 PROCEDURE — 97110 THERAPEUTIC EXERCISES: CPT | Mod: GP,CQ | Performed by: PHYSICAL THERAPY ASSISTANT

## 2025-03-12 PROCEDURE — 99232 SBSQ HOSP IP/OBS MODERATE 35: CPT | Performed by: INTERNAL MEDICINE

## 2025-03-12 PROCEDURE — 8010000001 HC DIALYSIS - HEMODIALYSIS PER DAY

## 2025-03-12 PROCEDURE — 2500000002 HC RX 250 W HCPCS SELF ADMINISTERED DRUGS (ALT 637 FOR MEDICARE OP, ALT 636 FOR OP/ED): Performed by: INTERNAL MEDICINE

## 2025-03-12 PROCEDURE — 2500000002 HC RX 250 W HCPCS SELF ADMINISTERED DRUGS (ALT 637 FOR MEDICARE OP, ALT 636 FOR OP/ED): Performed by: HOSPITALIST

## 2025-03-12 PROCEDURE — 2500000004 HC RX 250 GENERAL PHARMACY W/ HCPCS (ALT 636 FOR OP/ED): Performed by: HOSPITALIST

## 2025-03-12 PROCEDURE — 2500000001 HC RX 250 WO HCPCS SELF ADMINISTERED DRUGS (ALT 637 FOR MEDICARE OP): Performed by: INTERNAL MEDICINE

## 2025-03-12 RX ADMIN — QUETIAPINE FUMARATE 25 MG: 25 TABLET ORAL at 12:42

## 2025-03-12 RX ADMIN — ACETAMINOPHEN 975 MG: 325 TABLET, FILM COATED ORAL at 20:04

## 2025-03-12 RX ADMIN — CALCIUM ACETATE 1334 MG: 667 CAPSULE ORAL at 17:19

## 2025-03-12 RX ADMIN — METOPROLOL SUCCINATE 50 MG: 50 TABLET, EXTENDED RELEASE ORAL at 20:04

## 2025-03-12 RX ADMIN — APIXABAN 2.5 MG: 5 TABLET, FILM COATED ORAL at 20:04

## 2025-03-12 RX ADMIN — TAMSULOSIN HYDROCHLORIDE 0.4 MG: 0.4 CAPSULE ORAL at 12:42

## 2025-03-12 RX ADMIN — ACETAMINOPHEN 975 MG: 325 TABLET, FILM COATED ORAL at 14:56

## 2025-03-12 RX ADMIN — CALCITRIOL CAPSULES 0.25 MCG 0.25 MCG: 0.25 CAPSULE ORAL at 12:42

## 2025-03-12 RX ADMIN — EPOETIN ALFA-EPBX 10000 UNITS: 10000 INJECTION, SOLUTION INTRAVENOUS; SUBCUTANEOUS at 17:18

## 2025-03-12 RX ADMIN — ONDANSETRON 4 MG: 2 INJECTION, SOLUTION INTRAMUSCULAR; INTRAVENOUS at 17:22

## 2025-03-12 RX ADMIN — OLANZAPINE 5 MG: 5 TABLET, FILM COATED ORAL at 20:04

## 2025-03-12 RX ADMIN — FINASTERIDE 5 MG: 5 TABLET, FILM COATED ORAL at 12:42

## 2025-03-12 RX ADMIN — CALCIUM ACETATE 1334 MG: 667 CAPSULE ORAL at 12:42

## 2025-03-12 RX ADMIN — ATORVASTATIN CALCIUM 40 MG: 40 TABLET, FILM COATED ORAL at 20:04

## 2025-03-12 RX ADMIN — QUETIAPINE FUMARATE 25 MG: 25 TABLET ORAL at 17:19

## 2025-03-12 RX ADMIN — Medication 1 CAPSULE: at 12:42

## 2025-03-12 RX ADMIN — OXYCODONE HYDROCHLORIDE 5 MG: 5 TABLET ORAL at 21:11

## 2025-03-12 RX ADMIN — OXYCODONE HYDROCHLORIDE 5 MG: 5 TABLET ORAL at 12:42

## 2025-03-12 RX ADMIN — ASPIRIN 81 MG: 81 TABLET, COATED ORAL at 12:42

## 2025-03-12 RX ADMIN — ESCITALOPRAM OXALATE 20 MG: 20 TABLET ORAL at 12:42

## 2025-03-12 RX ADMIN — PANTOPRAZOLE SODIUM 40 MG: 40 TABLET, DELAYED RELEASE ORAL at 05:23

## 2025-03-12 RX ADMIN — APIXABAN 2.5 MG: 5 TABLET, FILM COATED ORAL at 12:42

## 2025-03-12 ASSESSMENT — PAIN SCALES - GENERAL
PAINLEVEL_OUTOF10: 7
PAINLEVEL_OUTOF10: 5 - MODERATE PAIN
PAINLEVEL_OUTOF10: 7
PAINLEVEL_OUTOF10: 0 - NO PAIN
PAINLEVEL_OUTOF10: 8
PAINLEVEL_OUTOF10: 7
PAINLEVEL_OUTOF10: 3
PAINLEVEL_OUTOF10: 7
PAINLEVEL_OUTOF10: 3
PAINLEVEL_OUTOF10: 2
PAINLEVEL_OUTOF10: 0 - NO PAIN

## 2025-03-12 ASSESSMENT — COGNITIVE AND FUNCTIONAL STATUS - GENERAL
HELP NEEDED FOR BATHING: A LITTLE
STANDING UP FROM CHAIR USING ARMS: A LITTLE
DAILY ACTIVITIY SCORE: 19
DRESSING REGULAR UPPER BODY CLOTHING: A LITTLE
MOBILITY SCORE: 20
DRESSING REGULAR LOWER BODY CLOTHING: A LITTLE
WALKING IN HOSPITAL ROOM: A LITTLE
MOBILITY SCORE: 20
DAILY ACTIVITIY SCORE: 23
CLIMB 3 TO 5 STEPS WITH RAILING: TOTAL
MOVING TO AND FROM BED TO CHAIR: A LITTLE
DRESSING REGULAR LOWER BODY CLOTHING: A LITTLE
TOILETING: A LITTLE
STANDING UP FROM CHAIR USING ARMS: A LITTLE
DRESSING REGULAR LOWER BODY CLOTHING: A LITTLE
CLIMB 3 TO 5 STEPS WITH RAILING: A LITTLE
MOVING TO AND FROM BED TO CHAIR: A LITTLE
DAILY ACTIVITIY SCORE: 23
PERSONAL GROOMING: A LITTLE
WALKING IN HOSPITAL ROOM: A LITTLE
CLIMB 3 TO 5 STEPS WITH RAILING: A LITTLE
TURNING FROM BACK TO SIDE WHILE IN FLAT BAD: A LITTLE
MOVING TO AND FROM BED TO CHAIR: A LITTLE
MOVING FROM LYING ON BACK TO SITTING ON SIDE OF FLAT BED WITH BEDRAILS: A LITTLE
MOBILITY SCORE: 16
STANDING UP FROM CHAIR USING ARMS: A LITTLE
WALKING IN HOSPITAL ROOM: A LITTLE

## 2025-03-12 ASSESSMENT — PAIN DESCRIPTION - ORIENTATION
ORIENTATION: LEFT

## 2025-03-12 ASSESSMENT — PAIN DESCRIPTION - DESCRIPTORS
DESCRIPTORS: ACHING

## 2025-03-12 ASSESSMENT — PAIN DESCRIPTION - LOCATION
LOCATION: KNEE

## 2025-03-12 ASSESSMENT — PAIN - FUNCTIONAL ASSESSMENT
PAIN_FUNCTIONAL_ASSESSMENT: 0-10
PAIN_FUNCTIONAL_ASSESSMENT: NO/DENIES PAIN
PAIN_FUNCTIONAL_ASSESSMENT: 0-10
PAIN_FUNCTIONAL_ASSESSMENT: NO/DENIES PAIN

## 2025-03-12 NOTE — PROGRESS NOTES
Occupational Therapy                 Therapy Communication Note    Patient Name: Kj Colmenares  MRN: [id number]  Department: [facility]  Room: [id number]  Today's Date: 3/12/2025     Discipline: Occupational Therapy    OT Missed Visit: Yes     Missed Visit Reason: Missed Visit Reason: Patient in a medical procedure (Pt currently off floor at dialysis.)    Missed Time: Attempt

## 2025-03-12 NOTE — PROCEDURES
"Patient seen on dialysis.  F180 dialyzer x 3.5 hours, BFR//600 mL/minute, 2K bath with 2.5 calcium with a target UF set for 2 L as tolerated.  Erythropoietin and phosphorus binder ordered.  Tolerating dialysis, continue per submitted orders.    /66 (BP Location: Right arm, Patient Position: Lying)   Pulse 85   Temp 36.6 °C (97.9 °F) (Temporal)   Resp 10   Ht 1.753 m (5' 9\")   Wt 106 kg (233 lb 6.4 oz)   SpO2 96%   BMI 34.47 kg/m²       No current facility-administered medications on file prior to encounter.     Current Outpatient Medications on File Prior to Encounter   Medication Sig Dispense Refill    midodrine (Proamatine) 10 mg tablet Take 1 tablet (10 mg) by mouth if needed (for HD with SBP <90).      albuterol 90 mcg/actuation aerosol powdr breath activated inhaler Inhale 2 puffs every 6 hours if needed for wheezing or shortness of breath.      apixaban (Eliquis) 2.5 mg tablet Take 1 tablet (2.5 mg) by mouth 2 times a day.      aspirin 81 mg EC tablet Take 1 tablet (81 mg) by mouth once daily.      B complex-vitamin C-folic acid (Nephrocaps) 1 mg capsule Take 1 capsule by mouth once daily.      calcitriol (Rocaltrol) 0.25 mcg capsule Take 1 capsule (0.25 mcg) by mouth once daily.      calcium acetate (Phoslo) 667 mg capsule Take 2 capsules (1,334 mg) by mouth 3 times a day with meals. 180 capsule 0    calcium acetate (Phoslo) 667 mg capsule Take 2 capsules (1,334 mg) by mouth 3 times a day. WITH MEALS.      ergocalciferol (Vitamin D-2) 1.25 MG (17408 UT) capsule Take 1 capsule (1,250 mcg) by mouth 1 (one) time per week. MONDAY NEED AN REFILL      escitalopram (Lexapro) 20 mg tablet Take 1 tablet (20 mg) by mouth once daily.      finasteride (Proscar) 5 mg tablet Take 1 tablet (5 mg) by mouth once daily. Do not crush, chew, or split.      melatonin 3 mg capsule Take 1 capsule by mouth once daily at bedtime.      metoprolol succinate XL (Toprol-XL) 25 mg 24 hr tablet Take 2 tablets (50 mg) by " mouth once daily. Do not crush or chew. (Patient taking differently: Take 1 tablet (25 mg) by mouth once daily. Do not crush or chew.) 60 tablet 0    metoprolol succinate XL (Toprol-XL) 25 mg 24 hr tablet Take 1 tablet (25 mg) by mouth once daily. Do not crush or chew.      OLANZapine (ZyPREXA) 5 mg tablet Take 1 tablet (5 mg) by mouth once daily at bedtime. (Patient not taking: Reported on 3/5/2025) 30 tablet 0    OLANZapine (ZyPREXA) 5 mg tablet Take 1 tablet (5 mg) by mouth once daily at bedtime.      ondansetron (Zofran) 4 mg tablet Take 1 tablet (4 mg) by mouth every 12 hours if needed for nausea or vomiting.      pantoprazole (ProtoNix) 40 mg EC tablet Take 1 tablet (40 mg) by mouth once daily in the morning. Take before meals. Do not crush, chew, or split.      QUEtiapine (SEROquel) 25 mg tablet Take 1 tablet (25 mg) by mouth 2 times daily (morning and late afternoon).      rosuvastatin (Crestor) 20 mg tablet Take 1 tablet (20 mg) by mouth once daily at bedtime.      sevelamer carbonate (Renvela) 800 mg tablet Take 1 tablet (800 mg) by mouth 3 times daily (morning, midday, late afternoon). Swallow tablet whole; do not crush, break, or chew.      tamsulosin (Flomax) 0.4 mg 24 hr capsule Take 1 capsule (0.4 mg) by mouth once daily.      [DISCONTINUED] acetaminophen (Tylenol) 325 mg tablet Take 2 tablets (650 mg) by mouth every 8 hours if needed for mild pain (1 - 3).

## 2025-03-12 NOTE — CARE PLAN
Problem: Pain - Adult  Goal: Verbalizes/displays adequate comfort level or baseline comfort level  Outcome: Progressing     Problem: Safety - Adult  Goal: Free from fall injury  Outcome: Progressing     Problem: Chronic Conditions and Co-morbidities  Goal: Patient's chronic conditions and co-morbidity symptoms are monitored and maintained or improved  Outcome: Progressing

## 2025-03-12 NOTE — CARE PLAN
Problem: Pain - Adult  Goal: Verbalizes/displays adequate comfort level or baseline comfort level  Outcome: Progressing     Problem: Safety - Adult  Goal: Free from fall injury  Outcome: Progressing     Problem: Discharge Planning  Goal: Discharge to home or other facility with appropriate resources  Outcome: Progressing     Problem: Chronic Conditions and Co-morbidities  Goal: Patient's chronic conditions and co-morbidity symptoms are monitored and maintained or improved  Outcome: Progressing     Problem: Nutrition  Goal: Nutrient intake appropriate for maintaining nutritional needs  Outcome: Progressing   The patient's goals for the shift include      The clinical goals for the shift include Remains HDS

## 2025-03-12 NOTE — PRE-PROCEDURE NOTE
Report from Sending RN:    Report From: Natali  Recent Surgery of Procedure: No  Baseline Level of Consciousness (LOC): AOx3  Oxygen Use: No  Type: NA  Diabetic: Yes  Last BP Med Given Day of Dialysis: See EMAR  Last Pain Med Given: See EMAR  Lab Tests to be Obtained with Dialysis: No  Blood Transfusion to be Given During Dialysis: No  Available IV Access: Yes  Medications to be Administered During Dialysis: No  Continuous IV Infusion Running: No  Restraints on Currently or in the Last 24 Hours: No  Hand-Off Communication: Pt stable and able to come to PACU for dialysis  Dialysis Catheter Dressing: NA  Last Dressing Change: NA

## 2025-03-12 NOTE — PROGRESS NOTES
Physical Therapy                 Therapy Communication Note    Patient Name: Kj Colmenares  MRN: [id number]  Department: [facility]  Room: [id number]-A  Today's Date: 3/12/2025     Discipline: Physical Therapy          Missed Visit Reason: Missed Visit Reason: Patient in a medical procedure (Per RN, pt currently receiving dialysis, unsure when they will return.)    Missed Time: Attempt    Chart review and documentation was done under the supervision of Senior DONTA Hassan.

## 2025-03-12 NOTE — PROGRESS NOTES
Between 7AM-7PM please message me via Epic Secure Chat.  After 7PM please page Nocturnist on call.    [facility] [place] 2[place] Hospitalist Progress Note      Kj Colmenares    :  1970(54 y.o.)    MRN:  [id number]  Date: 25     Assessment and Plan:       3/12:  ESRD... HD today.   Flu... RA, completed Rx.   Afib... Controlled on eliquis, toprol.   A1c is 5% 4 months ago... probably doesn't have DM.   Hx CVA... asa, statin.   Hx adjustment d/o... home zyprexa, seroquel, lexapro.   Home regimen for chronic conditions.  Dvt px covered with eliquis.   Pt/ot  Dc to snf pending placement, hopefully today.           3/11:  Influenza A infection  - finished tamiflu 5d course; continue supportive care    ESRD  Anemia of CKD  Metabolic   - no HD for 2 months prior to admission  - Nephrology consulted to manage IP HD; continue phoslo, neprocaps. EPO/IV iron per nephrology with HD.     Atrial fibrillation with RVR  - HR control improved with Toprol XL 50 mg nightly; Goal HR <130 without symptoms for now as he is recovering from Flu. Long term goal is <110.   - Continue po eliquis  - Will have OP follow up with cardiology, if remains in afib may need DCCV as OP    Left knee effusion  - CT left knee shows advanced osteoarthritis of the left knee with a large joint effusion. Plan for OP referral to orthopedic surgery to discuss options once acute issues resolved  - continue annetta tylenol, lidocaine patch    T2DM  - continue ssi    HLD  - switched to atorvastatin as he is ESRD on HD    Hx of CVA  - continue asa, statin    BPH  - continue flomax, proscar    DVT Prophylaxis: full anticoagulation with eliquis    Disposition: SNF when auth obtained    Electronically signed by Bertin Garcia MD on 25 at 10:16 AM     Subjective:      Interval History:   Vitals and chart notes from overnight reviewed.   No acute issues overnight.   Patient seen and evaluated at bedside.   No chest pain or shortness of breath. Po  intake better. Intermittent diarrhea.     Review of Systems:   Other than patient's chronic conditions and those complaints in the history above, the rest of the 10 systems review were done and were negative.     Current medications:  Scheduled Meds:acetaminophen, 975 mg, oral, TID  apixaban, 2.5 mg, oral, BID  aspirin, 81 mg, oral, Daily  atorvastatin, 40 mg, oral, Nightly  calcitriol, 0.25 mcg, oral, Daily  calcium acetate, 1,334 mg, oral, TID  epoetin sadie or biosimilar, 100 Units/kg, subcutaneous, Every Mon/Wed/Fri  escitalopram, 20 mg, oral, Daily  finasteride, 5 mg, oral, Daily  insulin lispro, 0-5 Units, subcutaneous, TID AC  lidocaine, 1 patch, transdermal, Daily  metoprolol succinate XL, 50 mg, oral, Nightly  OLANZapine, 5 mg, oral, Nightly  pantoprazole, 40 mg, oral, Daily before breakfast   Or  pantoprazole, 40 mg, intravenous, Daily before breakfast  QUEtiapine, 25 mg, oral, BID  tamsulosin, 0.4 mg, oral, Daily  vitamin B complex-vitamin C-folic acid, 1 capsule, oral, Daily      Continuous Infusions:   PRN Meds:PRN medications: benzocaine-menthol, benzonatate, dextrose, dextrose, glucagon, glucagon, guaiFENesin, midodrine, ondansetron **OR** ondansetron, oxyCODONE      Objective:     Heart Rate:  []   Temp:  [35.8 °C (96.5 °F)-37.1 °C (98.7 °F)]   Resp:  [10-24]   BP: ()/(57-68)   SpO2:  [96 %-100 %]          Physical Exam  Vitals and nursing note reviewed.   Constitutional:       Appearance: He is obese.   HENT:      Mouth/Throat:      Mouth: Mucous membranes are moist.      Pharynx: Oropharynx is clear.   Cardiovascular:      Rate and Rhythm: Normal rate. Rhythm irregular.   Pulmonary:      Effort: Pulmonary effort is normal.   Abdominal:      General: There is no distension.      Palpations: Abdomen is soft.      Tenderness: There is no abdominal tenderness.   Musculoskeletal:      Comments: Swelling around left knee   Neurological:      Mental Status: He is alert and oriented to person,  place, and time.         Labs:   Lab Results   Component Value Date     03/10/2025    K 5.1 03/10/2025     (H) 03/10/2025    CO2 25 03/10/2025    BUN 71 (H) 03/10/2025    CREATININE 9.30 (H) 03/10/2025    GLUCOSE 84 03/10/2025    CALCIUM 10.1 03/10/2025    PROT 6.7 03/04/2025    BILITOT 0.5 03/04/2025    ALKPHOS 333 (H) 03/04/2025    AST 15 03/04/2025    ALT 13 03/04/2025       Lab Results   Component Value Date    WBC 6.2 03/10/2025    HGB 8.6 (L) 03/10/2025    HCT 28.5 (L) 03/10/2025    MCV 97 03/10/2025     03/10/2025

## 2025-03-12 NOTE — PROGRESS NOTES
Physical Therapy    Physical Therapy Treatment    Patient Name: Kj Colmenares  MRN: [id number]  Department: [place] [address]  Room: [address] [place] [address]  Today's Date: 3/12/2025  Time Calculation  Start Time: 1421  Stop Time: 1444  Time Calculation (min): 23 min    Treatment, chart review, and documentation was done under the supervision of Modseto Vega,  PTA.       Assessment/Plan   PT Assessment  Rehab Prognosis: Good  Barriers to Discharge Home: Caregiver assistance, Physical needs  End of Session Communication: Bedside nurse  Assessment Comment: Pt had fair tolerance to tx however was limited due to increase in HR.  End of Session Patient Position: Bed, 3 rail up, Alarm on  PT Plan  Inpatient/Swing Bed or Outpatient: Inpatient  PT Plan  Treatment/Interventions: Bed mobility, Transfer training, Gait training, Therapeutic exercise  PT Plan: Ongoing PT  PT Frequency: 3 times per week  PT Discharge Recommendations: Moderate intensity level of continued care  PT Recommended Transfer Status: Assist x1  PT - OK to Discharge: Yes (per PT POC)      General Visit Information:   PT  Visit  PT Received On: 03/12/25  General  Reason for Referral: To ED with SOB, chest pain, and cough. Pt found to be in afib RVR and Flu (+)  Referred By: Tyson Everett, DO  Missed Visit Reason: Patient in a medical procedure (Per RN, pt currently receiving dialysis, unsure when they will return.)  Prior to Session Communication: Bedside nurse  Patient Position Received: Bed, 3 rail up, Alarm on  Preferred Learning Style: auditory, visual, verbal  General Comment: Pt pleasant and agreeable to tx.    Subjective   Precautions:  Precautions  Medical Precautions: Fall precautions     Date/Time Vitals Session Patient Position Pulse Resp SpO2 BP MAP (mmHg)    03/12/25 1421 --  --  --  --  --  --  --                 Objective   Pain:  Pain Assessment  Pain Assessment: 0-10  0-10 (Numeric) Pain Score: 7 (RN notified.)  Pain Location: Knee  Pain Orientation:  Left  Cognition:  Cognition  Orientation Level: Oriented X4       Postural Control:  Static Sitting Balance  Static Sitting-Balance Support: Feet supported, No upper extremity supported  Static Sitting-Level of Assistance: Close supervision  Static Sitting-Comment/Number of Minutes: seated EOB steady.  Dynamic Sitting Balance  Dynamic Sitting-Balance Support: Bilateral upper extremity supported, Feet unsupported  Dynamic Sitting-Level of Assistance: Close supervision  Dynamic Sitting-Balance:  (LAQ)  Dynamic Sitting-Comments: steady.  Static Standing Balance  Static Standing-Balance Support: Bilateral upper extremity supported  Static Standing-Level of Assistance: Contact guard  Static Standing-Comment/Number of Minutes: steady.  Dynamic Standing Balance  Dynamic Standing-Balance Support: No upper extremity supported  Dynamic Standing-Level of Assistance: Contact guard  Dynamic Standing-Balance: Reaching across midline  Dynamic Standing-Comments: steady.      Activity Tolerance:  Activity Tolerance  Endurance: Tolerates less than 10 min exercise with changes in vital signs  Treatments:  Therapeutic Exercise  Therapeutic Exercise Performed: Yes  Therapeutic Exercise Activity 1: Pt completed ankle pumps x20, LAQ, heel slides, and glute sets x15 reps, required cues for proper contraction of quad muscle and sequencing.    Bed Mobility  Bed Mobility: Yes  Bed Mobility 1  Bed Mobility 1: Supine to sitting, Sitting to supine  Level of Assistance 1: Distant supervision  Bed Mobility Comments 1: Pt able to manage trunk and LEs.    Ambulation/Gait Training  Ambulation/Gait Training Performed: Yes  Ambulation/Gait Training 1  Surface 1: Level tile  Device 1: Rolling walker  Gait Support Devices: Gait belt  Assistance 1: Contact guard  Quality of Gait 1: Wide base of support, Antalgic, Knee(s) buckle, Forward flexed posture  Comments/Distance (ft) 1: 20 ft x2, pt required cues for proper placement of AD, upright posture, and to  push through UEs, demo'd fair carryover.  Transfers  Transfer: Yes  Transfer 1  Transfer From 1: Sit to  Transfer to 1: Stand  Technique 1: Sit to stand, Stand to sit  Transfer Device 1: Walker, Gait belt  Transfer Level of Assistance 1: Contact guard  Trials/Comments 1: Required cues for proper hand placement and sequencing.    Outcome Measures:  [facility] Basic Mobility  Turning from your back to your side while in a flat bed without using bedrails: A little  Moving from lying on your back to sitting on the side of a flat bed without using bedrails: A little  Moving to and from bed to chair (including a wheelchair): A little  Standing up from a chair using your arms (e.g. wheelchair or bedside chair): A little  To walk in hospital room: A little  Climbing 3-5 steps with railing: Total  Basic Mobility - Total Score: 16    Education Documentation  Precautions, taught by DIAMOND Reddy at 3/12/2025  3:42 PM.  Learner: Patient  Readiness: Acceptance  Method: Explanation  Response: Verbalizes Understanding    Body Mechanics, taught by DIAMOND Reddy at 3/12/2025  3:42 PM.  Learner: Patient  Readiness: Acceptance  Method: Explanation  Response: Verbalizes Understanding    Mobility Training, taught by DIAMOND Reddy at 3/12/2025  3:42 PM.  Learner: Patient  Readiness: Acceptance  Method: Explanation  Response: Verbalizes Understanding    Education Comments  No comments found.        OP EDUCATION:       Encounter Problems       Encounter Problems (Active)       Balance       complete all mobility with normal balance while dual tasking, negotiating in a dynamic environment, carrying items, etc., with proactive and reactive static and dynamic standing and sitting tasks, with mod I and LRAD, >15 minutes.   (Progressing)       Start:  03/09/25    Expected End:  03/23/25               Mobility       STG - Patient will ambulate 75 ft mod I with LRAD and stable vitals.  (Progressing)       Start:  03/09/25     Expected End:  03/23/25            Pt will tolerate 15 reps of each LE exercise in order to improve strength and endurance.   (Progressing)       Start:  03/09/25    Expected End:  03/23/25               PT Transfers       STG - Patient will perform bed mobility independently. (Progressing)       Start:  03/09/25    Expected End:  03/23/25            STG - Patient will transfer sit to and from stand mod I using LRAD (Progressing)       Start:  03/09/25    Expected End:  03/23/25               Pain - Adult

## 2025-03-12 NOTE — POST-PROCEDURE NOTE
Report to Receiving RN:    Report To: Natali  Time Report Called: 1200  Hand-Off Communication: pt tolerated tx well took off 1.5L of fluid, post BP 93/62 HR 85 with no c/o  Complications During Treatment: No  Ultrafiltration Treatment: No  Medications Administered During Dialysis: No  Blood Products Administered During Dialysis: No  Labs Sent During Dialysis: No  Heparin Drip Rate Changes: No  Dialysis Catheter Dressing: NA  Last Dressing Change: NA    Electronic Signatures:  Mannie Nair RN (Signed )   Authored:    (Signed )   Authored:     Last Updated: 12:28 PM by MANNIE NAIR

## 2025-03-12 NOTE — PROGRESS NOTES
Occupational Therapy    OT Treatment    Patient Name: Kj Colmenares  MRN: [id number]  Department: [facility] [address]  Room: [address] [place] [address]  Today's Date: 3/12/2025  Time Calculation  Start Time: 1457  Stop Time: 1508  Time Calculation (min): 11 min        Assessment:  Prognosis: Good  Barriers to Discharge Home: Physical needs, Caregiver assistance  Caregiver Assistance: Caregiver assistance needed per identified barriers - however, level of patient's required assistance exceeds assistance available at home  Physical Needs: 24hr mobility assistance needed, Intermittent ADL assistance needed, Ambulating household distances limited by function/safety, High falls risk due to function or environment  Evaluation/Treatment Tolerance: Patient limited by fatigue, Patient limited by pain  Medical Staff Made Aware: Yes  End of Session Communication: Bedside nurse  End of Session Patient Position: Bed, 3 rail up, Alarm on  Prognosis: Good  Evaluation/Treatment Tolerance: Patient limited by fatigue, Patient limited by pain  Medical Staff Made Aware: Yes  Plan:  Treatment Interventions: ADL retraining, Functional transfer training, UE strengthening/ROM, Endurance training, Patient/family training, Equipment evaluation/education, Compensatory technique education  OT Frequency: 3 times per week  OT Discharge Recommendations: Moderate intensity level of continued care  Equipment Recommended upon Discharge:  (TBD)  OT Recommended Transfer Status: Assist of 1  OT - OK to Discharge: Yes (continue per OT POC)  Treatment Interventions: ADL retraining, Functional transfer training, UE strengthening/ROM, Endurance training, Patient/family training, Equipment evaluation/education, Compensatory technique education    Subjective   Previous Visit Info:  OT Last Visit  OT Received On: 03/12/25  General:  General  Reason for Referral: To ED with SOB, chest pain, and cough. Pt found to be in afib RVR and Flu (+)  Referred By: Tysno Everett DO  Past Medical  History Relevant to Rehab:   Past Medical History:   Diagnosis Date    Diabetes mellitus (Multi)     Hypertension     Stroke (Multi)       OT Missed Visit: Yes  Missed Visit Reason: Patient in a medical procedure (Pt currently off floor at dialysis.)  Family/Caregiver Present: No  Prior to Session Communication: Bedside nurse  Patient Position Received: Bed, 3 rail up, Alarm on  Preferred Learning Style: auditory, visual, verbal  General Comment: Pt supine in bed upon arrival and agreeable to treatment. Pt fully participatory, limited by fatigue and elevated HR with activity.  Precautions:  Medical Precautions: Fall precautions     Date/Time Vitals Session Patient Position Pulse Resp SpO2 BP MAP (mmHg)    03/12/25 1457 --  --  --  --  --  --  --             HR in 90s at rest, up to 149 briefly with functional mobility in room, decreased quickly to 120s, RN notified     Pain:  Pain Assessment  Pain Assessment: 0-10  0-10 (Numeric) Pain Score: 7  Pain Type: Acute pain  Pain Location: Knee  Pain Orientation: Left  Pain Interventions: Repositioned, Ambulation/increased activity  Response to Interventions: Resting quietly    Objective    Cognition:  Cognition  Overall Cognitive Status: Within Functional Limits  Orientation Level: Oriented X4    Bed Mobility/Transfers: Bed Mobility  Bed Mobility: Yes  Bed Mobility 1  Bed Mobility 1: Supine to sitting, Sitting to supine  Level of Assistance 1: Modified independent    Transfers  Transfer: Yes  Transfer 1  Technique 1: Sit to stand, Stand to sit  Transfer Device 1: Gait belt, Walker  Transfer Level of Assistance 1: Contact guard, Minimal verbal cues  Trials/Comments 1: cues for sequencing, safe hand placement and walker safety       Sitting Balance:  Static Sitting Balance  Static Sitting-Balance Support: Bilateral upper extremity supported, Feet supported  Static Sitting-Level of Assistance: Modified Independent  Static Sitting-Comment/Number of Minutes: at EOB  Dynamic  "Sitting Balance  Dynamic Sitting-Comments: SUP at EOB  Standing Balance:  Static Standing Balance  Static Standing-Balance Support: Bilateral upper extremity supported  Static Standing-Level of Assistance: Close supervision  Static Standing-Comment/Number of Minutes: FWW  Dynamic Standing Balance  Dynamic Standing-Comments: CGA       Therapy/Activity: Therapeutic Exercise  Therapeutic Exercise Performed: Yes  Therapeutic Exercise Activity 1: To increase B UE functional strength and endurance needed for increased independence with ADLs/IADLs, this OT instructed pt in x1 set of 10 B UE strengthening exercises using cane. Exercises included shoulder flexion/extension, scapular protraction/retraction, elbow flexion/extension and circles (forward and backward). Short seated rest breaks d/t fatigue, no c/o pain.    Therapeutic Activity  Therapeutic Activity Performed: Yes  Therapeutic Activity 1: Pt functionally navigated x min household distance in room using FWW with CGA x1. Cues for walker safety, pt instructed to return to bed d/t elevated HR of 149 with activity, RN notified. No LOB noted.           Outcome Measures:[facility] Daily Activity  Putting on and taking off regular lower body clothing: A little  Bathing (including washing, rinsing, drying): A little  Putting on and taking off regular upper body clothing: A little  Toileting, which includes using toilet, bedpan or urinal: A little  Taking care of personal grooming such as brushing teeth: A little  Eating Meals: None  Daily Activity - Total Score: 19         and OT Adult Other Outcome Measures  Other Outcome Measures: Modified Barthel Index: 66/100 indicating \"moderate dependence.\" Pt is currently requiring assistance with functional transfers/mobility and ADLs, demos significant endurance deficits as well as balance/strength deficits, causing safety concerns with discharging home at this time.    Education Documentation  Body Mechanics, taught by Karyna Hooks, " OT at 3/12/2025  4:29 PM.  Learner: Patient  Readiness: Acceptance  Method: Explanation  Response: Verbalizes Understanding    Precautions, taught by Karyna Hooks OT at 3/12/2025  4:29 PM.  Learner: Patient  Readiness: Acceptance  Method: Explanation  Response: Verbalizes Understanding    ADL Training, taught by Karyna Hooks OT at 3/12/2025  4:29 PM.  Learner: Patient  Readiness: Acceptance  Method: Explanation  Response: Verbalizes Understanding    Education Comments  No comments found.           Goals:  Encounter Problems       Encounter Problems (Active)       ADLs       Patient will perform UB and LB bathing with modified independent level of assistance and grab bars, shower chair, and long-handled sponge.       Start:  03/10/25    Expected End:  03/24/25            Patient with complete upper body dressing with modified independent level of assistance donning and doffing all UE clothes with PRN adaptive equipment.       Start:  03/10/25    Expected End:  03/24/25            Patient with complete lower body dressing with modified independent level of assistance donning and doffing all LE clothes  with PRN adaptive equipment.       Start:  03/10/25    Expected End:  03/24/25            Patient will complete daily grooming tasks with modified independent level of assistance and PRN adaptive equipment.       Start:  03/10/25    Expected End:  03/24/25            Patient will complete toileting including hygiene clothing management/hygiene with modified independent level of assistance and raised toilet seat and grab bars.       Start:  03/10/25    Expected End:  03/24/25               MOBILITY       Patient will perform Functional mobility x Household distances/Community Distances with modified independent level of assistance and least restrictive device in order to improve safety and functional mobility. (Progressing)       Start:  03/10/25    Expected End:  03/24/25               TRANSFERS       Patient will  perform bed mobility independent level of assistance in order to improve safety and independence with mobility (Progressing)       Start:  03/10/25    Expected End:  03/24/25            Patient will complete functional transfers with least restrictive device with modified independent level of assistance. (Progressing)       Start:  03/10/25    Expected End:  03/24/25

## 2025-03-12 NOTE — PROGRESS NOTES
03/12/25 1045   Discharge Planning   Assistance Needed Received message from facility asking if pt was still on isolation. Sent message to medical team, who states that isolation is no longer needed. SW updated facility. Still waiting on auth approval.

## 2025-03-13 LAB
ANION GAP SERPL CALC-SCNC: 13 MMOL/L (ref 10–20)
BUN SERPL-MCNC: 51 MG/DL (ref 6–23)
CALCIUM SERPL-MCNC: 10.4 MG/DL (ref 8.6–10.3)
CHLORIDE SERPL-SCNC: 102 MMOL/L (ref 98–107)
CO2 SERPL-SCNC: 29 MMOL/L (ref 21–32)
CREAT SERPL-MCNC: 6.78 MG/DL (ref 0.5–1.3)
EGFRCR SERPLBLD CKD-EPI 2021: 9 ML/MIN/1.73M*2
ERYTHROCYTE [DISTWIDTH] IN BLOOD BY AUTOMATED COUNT: 15.2 % (ref 11.5–14.5)
GLUCOSE BLD MANUAL STRIP-MCNC: 100 MG/DL (ref 74–99)
GLUCOSE BLD MANUAL STRIP-MCNC: 86 MG/DL (ref 74–99)
GLUCOSE BLD MANUAL STRIP-MCNC: 97 MG/DL (ref 74–99)
GLUCOSE SERPL-MCNC: 107 MG/DL (ref 74–99)
HCT VFR BLD AUTO: 29.3 % (ref 41–52)
HGB BLD-MCNC: 8.9 G/DL (ref 13.5–17.5)
MCH RBC QN AUTO: 29.6 PG (ref 26–34)
MCHC RBC AUTO-ENTMCNC: 30.4 G/DL (ref 32–36)
MCV RBC AUTO: 97 FL (ref 80–100)
NRBC BLD-RTO: 0 /100 WBCS (ref 0–0)
PLATELET # BLD AUTO: 191 X10*3/UL (ref 150–450)
POTASSIUM SERPL-SCNC: 4.3 MMOL/L (ref 3.5–5.3)
RBC # BLD AUTO: 3.01 X10*6/UL (ref 4.5–5.9)
SODIUM SERPL-SCNC: 140 MMOL/L (ref 136–145)
WBC # BLD AUTO: 6.5 X10*3/UL (ref 4.4–11.3)

## 2025-03-13 PROCEDURE — 85027 COMPLETE CBC AUTOMATED: CPT | Performed by: INTERNAL MEDICINE

## 2025-03-13 PROCEDURE — 2500000002 HC RX 250 W HCPCS SELF ADMINISTERED DRUGS (ALT 637 FOR MEDICARE OP, ALT 636 FOR OP/ED): Performed by: HOSPITALIST

## 2025-03-13 PROCEDURE — 36415 COLL VENOUS BLD VENIPUNCTURE: CPT | Performed by: INTERNAL MEDICINE

## 2025-03-13 PROCEDURE — 99232 SBSQ HOSP IP/OBS MODERATE 35: CPT | Performed by: INTERNAL MEDICINE

## 2025-03-13 PROCEDURE — 80048 BASIC METABOLIC PNL TOTAL CA: CPT | Performed by: INTERNAL MEDICINE

## 2025-03-13 PROCEDURE — 2500000004 HC RX 250 GENERAL PHARMACY W/ HCPCS (ALT 636 FOR OP/ED): Performed by: HOSPITALIST

## 2025-03-13 PROCEDURE — 82947 ASSAY GLUCOSE BLOOD QUANT: CPT

## 2025-03-13 PROCEDURE — 2500000001 HC RX 250 WO HCPCS SELF ADMINISTERED DRUGS (ALT 637 FOR MEDICARE OP): Performed by: INTERNAL MEDICINE

## 2025-03-13 PROCEDURE — 2500000001 HC RX 250 WO HCPCS SELF ADMINISTERED DRUGS (ALT 637 FOR MEDICARE OP): Performed by: HOSPITALIST

## 2025-03-13 PROCEDURE — 2060000001 HC INTERMEDIATE ICU ROOM DAILY

## 2025-03-13 PROCEDURE — 2500000002 HC RX 250 W HCPCS SELF ADMINISTERED DRUGS (ALT 637 FOR MEDICARE OP, ALT 636 FOR OP/ED): Performed by: INTERNAL MEDICINE

## 2025-03-13 RX ADMIN — OXYCODONE HYDROCHLORIDE 5 MG: 5 TABLET ORAL at 20:40

## 2025-03-13 RX ADMIN — OXYCODONE HYDROCHLORIDE 5 MG: 5 TABLET ORAL at 14:47

## 2025-03-13 RX ADMIN — TAMSULOSIN HYDROCHLORIDE 0.4 MG: 0.4 CAPSULE ORAL at 09:22

## 2025-03-13 RX ADMIN — ACETAMINOPHEN 975 MG: 325 TABLET, FILM COATED ORAL at 09:22

## 2025-03-13 RX ADMIN — SODIUM CHLORIDE 250 ML: 9 INJECTION, SOLUTION INTRAVENOUS at 20:50

## 2025-03-13 RX ADMIN — ESCITALOPRAM OXALATE 20 MG: 20 TABLET ORAL at 09:22

## 2025-03-13 RX ADMIN — APIXABAN 2.5 MG: 5 TABLET, FILM COATED ORAL at 09:22

## 2025-03-13 RX ADMIN — PANTOPRAZOLE SODIUM 40 MG: 40 TABLET, DELAYED RELEASE ORAL at 05:38

## 2025-03-13 RX ADMIN — ACETAMINOPHEN 975 MG: 325 TABLET, FILM COATED ORAL at 14:45

## 2025-03-13 RX ADMIN — FINASTERIDE 5 MG: 5 TABLET, FILM COATED ORAL at 09:22

## 2025-03-13 RX ADMIN — BENZOCAINE AND MENTHOL 1 LOZENGE: 15; 3.6 LOZENGE ORAL at 14:45

## 2025-03-13 RX ADMIN — ASPIRIN 81 MG: 81 TABLET, COATED ORAL at 09:22

## 2025-03-13 RX ADMIN — ACETAMINOPHEN 975 MG: 325 TABLET, FILM COATED ORAL at 20:36

## 2025-03-13 RX ADMIN — ATORVASTATIN CALCIUM 40 MG: 40 TABLET, FILM COATED ORAL at 20:36

## 2025-03-13 RX ADMIN — CALCITRIOL CAPSULES 0.25 MCG 0.25 MCG: 0.25 CAPSULE ORAL at 09:22

## 2025-03-13 RX ADMIN — Medication 1 CAPSULE: at 09:22

## 2025-03-13 RX ADMIN — APIXABAN 2.5 MG: 5 TABLET, FILM COATED ORAL at 20:36

## 2025-03-13 RX ADMIN — CALCIUM ACETATE 1334 MG: 667 CAPSULE ORAL at 16:19

## 2025-03-13 RX ADMIN — CALCIUM ACETATE 1334 MG: 667 CAPSULE ORAL at 11:42

## 2025-03-13 RX ADMIN — OLANZAPINE 5 MG: 5 TABLET, FILM COATED ORAL at 20:36

## 2025-03-13 RX ADMIN — CALCIUM ACETATE 1334 MG: 667 CAPSULE ORAL at 09:22

## 2025-03-13 RX ADMIN — OXYCODONE HYDROCHLORIDE 5 MG: 5 TABLET ORAL at 05:38

## 2025-03-13 RX ADMIN — QUETIAPINE FUMARATE 25 MG: 25 TABLET ORAL at 16:19

## 2025-03-13 RX ADMIN — QUETIAPINE FUMARATE 25 MG: 25 TABLET ORAL at 09:22

## 2025-03-13 ASSESSMENT — PAIN SCALES - GENERAL
PAINLEVEL_OUTOF10: 9
PAINLEVEL_OUTOF10: 7
PAINLEVEL_OUTOF10: 6
PAINLEVEL_OUTOF10: 4
PAINLEVEL_OUTOF10: 8
PAINLEVEL_OUTOF10: 4

## 2025-03-13 ASSESSMENT — COGNITIVE AND FUNCTIONAL STATUS - GENERAL
MOBILITY SCORE: 20
CLIMB 3 TO 5 STEPS WITH RAILING: A LITTLE
MOVING TO AND FROM BED TO CHAIR: A LITTLE
WALKING IN HOSPITAL ROOM: A LITTLE
STANDING UP FROM CHAIR USING ARMS: A LITTLE

## 2025-03-13 ASSESSMENT — PAIN - FUNCTIONAL ASSESSMENT
PAIN_FUNCTIONAL_ASSESSMENT: 0-10

## 2025-03-13 ASSESSMENT — PAIN SCALES - WONG BAKER
WONGBAKER_NUMERICALRESPONSE: HURTS LITTLE MORE
WONGBAKER_NUMERICALRESPONSE: HURTS LITTLE MORE

## 2025-03-13 ASSESSMENT — PAIN DESCRIPTION - DESCRIPTORS: DESCRIPTORS: ACHING;SORE

## 2025-03-13 ASSESSMENT — PAIN SCALES - PAIN ASSESSMENT IN ADVANCED DEMENTIA (PAINAD): TOTALSCORE: MEDICATION (SEE MAR)

## 2025-03-13 ASSESSMENT — PAIN DESCRIPTION - LOCATION: LOCATION: KNEE

## 2025-03-13 ASSESSMENT — PAIN DESCRIPTION - ORIENTATION: ORIENTATION: LEFT

## 2025-03-13 NOTE — CARE PLAN
The patient's goals for the shift include      The clinical goals for the shift include Patient will be free from falls and injury.      Problem: Pain - Adult  Goal: Verbalizes/displays adequate comfort level or baseline comfort level  Outcome: Progressing     Problem: Safety - Adult  Goal: Free from fall injury  Outcome: Progressing     Problem: Chronic Conditions and Co-morbidities  Goal: Patient's chronic conditions and co-morbidity symptoms are monitored and maintained or improved  Outcome: Progressing

## 2025-03-13 NOTE — CARE PLAN
The patient's goals for the shift include      The clinical goals for the shift include Patient will be free from falls and injury.      Problem: Pain - Adult  Goal: Verbalizes/displays adequate comfort level or baseline comfort level  Outcome: Progressing     Problem: Safety - Adult  Goal: Free from fall injury  Outcome: Progressing

## 2025-03-13 NOTE — PROGRESS NOTES
03/13/25 0736   Discharge Planning   Assistance Needed insurance auth pending-3/12/2025   Expected Discharge Disposition SNF  ([facility]/Pending. Ref #: [id number])   Does the patient need discharge transport arranged? Yes   RoundTrip coordination needed? Yes   Has discharge transport been arranged? No     notified by facility-they misread it the fax as an approval. It was just communication that it was received. Facility  so sorry for the mix up. Facility  will call and see if  can get it expedited.-transport cx for now- IDT updated

## 2025-03-13 NOTE — PROGRESS NOTES
Between 7AM-7PM please message me via Epic Secure Chat.  After 7PM please page Nocturnist on call.    [facility] Hospitalist Progress Note      Kj Colmenares    :  1970(54 y.o.)    MRN:  [id number]  Date: 25     Assessment and Plan:       3/13:  Pt seen this morning, on room air, no overnight events reported.   Cont current regimen.   Dc to snf pending placement.         3/12:  ESRD... HD today.   Flu... RA, completed Rx.   Afib... Controlled on eliquis, toprol.   A1c is 5% 4 months ago... probably doesn't have DM.   Hx CVA... asa, statin.   Hx adjustment d/o... home zyprexa, seroquel, lexapro.   Home regimen for chronic conditions.  Dvt px covered with eliquis.   Pt/ot  Dc to snf pending placement, hopefully today.           3/11:  Influenza A infection  - finished tamiflu 5d course; continue supportive care    ESRD  Anemia of CKD  Metabolic   - no HD for 2 months prior to admission  - Nephrology consulted to manage IP HD; continue phoslo, neprocaps. EPO/IV iron per nephrology with HD.     Atrial fibrillation with RVR  - HR control improved with Toprol XL 50 mg nightly; Goal HR <130 without symptoms for now as he is recovering from Flu. Long term goal is <110.   - Continue po eliquis  - Will have OP follow up with cardiology, if remains in afib may need DCCV as OP    Left knee effusion  - CT left knee shows advanced osteoarthritis of the left knee with a large joint effusion. Plan for OP referral to orthopedic surgery to discuss options once acute issues resolved  - continue annetta tylenol, lidocaine patch    T2DM  - continue ssi    HLD  - switched to atorvastatin as he is ESRD on HD    Hx of CVA  - continue asa, statin    BPH  - continue flomax, proscar    DVT Prophylaxis: full anticoagulation with eliquis    Disposition: SNF when auth obtained    Electronically signed by Bertin Garcia MD on 25 at 5:48 PM     Subjective:      Interval History:   Vitals and chart notes from  "overnight reviewed.   No acute issues overnight.   Patient seen and evaluated at bedside.   No chest pain or shortness of breath. Po intake better. Intermittent diarrhea.     Review of Systems:   Other than patient's chronic conditions and those complaints in the history above, the rest of the 10 systems review were done and were negative.     Current medications:  Scheduled Meds:acetaminophen, 975 mg, oral, TID  apixaban, 2.5 mg, oral, BID  aspirin, 81 mg, oral, Daily  atorvastatin, 40 mg, oral, Nightly  calcitriol, 0.25 mcg, oral, Daily  calcium acetate, 1,334 mg, oral, TID  epoetin sadie or biosimilar, 100 Units/kg, subcutaneous, Every Mon/Wed/Fri  escitalopram, 20 mg, oral, Daily  finasteride, 5 mg, oral, Daily  insulin lispro, 0-5 Units, subcutaneous, TID AC  lidocaine, 1 patch, transdermal, Daily  metoprolol succinate XL, 50 mg, oral, Nightly  OLANZapine, 5 mg, oral, Nightly  pantoprazole, 40 mg, oral, Daily before breakfast   Or  pantoprazole, 40 mg, intravenous, Daily before breakfast  QUEtiapine, 25 mg, oral, BID  tamsulosin, 0.4 mg, oral, Daily  vitamin B complex-vitamin C-folic acid, 1 capsule, oral, Daily      Continuous Infusions:   PRN Meds:PRN medications: benzocaine-menthol, benzonatate, dextrose, dextrose, glucagon, glucagon, guaiFENesin, midodrine, ondansetron **OR** ondansetron, oxyCODONE      Objective:     Heart Rate:  []   Temp:  [36.5 °C (97.7 °F)-36.6 °C (97.9 °F)]   Resp:  [18-21]   BP: ()/(52-64)   Height:  [175.3 cm (5' 9.02\")]   Weight:  [106 kg (233 lb 6.4 oz)]   SpO2:  [96 %-98 %]          Physical Exam  Vitals and nursing note reviewed.   Constitutional:       Appearance: He is obese.   HENT:      Mouth/Throat:      Mouth: Mucous membranes are moist.      Pharynx: Oropharynx is clear.   Cardiovascular:      Rate and Rhythm: Normal rate. Rhythm irregular.   Pulmonary:      Effort: Pulmonary effort is normal.   Abdominal:      General: There is no distension.      Palpations: " Abdomen is soft.      Tenderness: There is no abdominal tenderness.   Musculoskeletal:      Comments: Swelling around left knee   Neurological:      Mental Status: He is alert and oriented to person, place, and time.         Labs:   Lab Results   Component Value Date     03/13/2025    K 4.3 03/13/2025     03/13/2025    CO2 29 03/13/2025    BUN 51 (H) 03/13/2025    CREATININE 6.78 (H) 03/13/2025    GLUCOSE 107 (H) 03/13/2025    CALCIUM 10.4 (H) 03/13/2025    PROT 6.7 03/04/2025    BILITOT 0.5 03/04/2025    ALKPHOS 333 (H) 03/04/2025    AST 15 03/04/2025    ALT 13 03/04/2025       Lab Results   Component Value Date    WBC 6.5 03/13/2025    HGB 8.9 (L) 03/13/2025    HCT 29.3 (L) 03/13/2025    MCV 97 03/13/2025     03/13/2025

## 2025-03-13 NOTE — CONSULTS
"Nutrition Assessment Note  Nutrition Assessment      Reason for Assessment: Length of stay  Admitted with influenza A. MST score of 1 on admit for decreased appetite.  Eating 100% of meals recorded.  No nutrition risk factors identified at this time.  No nutrition intervention is indicated at this time. Please reconsult if there are changes in patient status & nutrition therapy is warranted.     Anthropometrics:  Height: 175.3 cm (5' 9.02\")  Weight: 106 kg (233 lb 6.4 oz)  BMI (Calculated): 34.45  Weight Change: 0  IBW/kg (Dietitian Calculated): 72.6 kg   Amputation Calculations:  BMI Amputation Adjustment: No    Dietary Orders  Adult diet Renal, Consistent Carb; CCD 75 gm/meal; Potassium Restricted 2 gm (50mEq); 2 - 3 grams Sodium      Nutrition Interventions/Recommendations   Nutrition Prescription: Nutrition prescription for oral nutrition  Individualized Nutrition Prescription Provided for : continue diet as ordered.  If elveated phosphorus noted, can add phosphorus restriction to diet order.  Daily renal MVI.    Food and/or Nutrient Delivery Interventions  Meals and Snacks: Mineral-modified diet, Carbohydrate-modified diet  Goal: intake meets >75% estimated nutrient needs.     Last Date of Nutrition Visit: 03/13/25  Nutrition Follow-Up Needed?: Dietitian to reassess per policy  Follow up Comment: BLANKA-TR       "

## 2025-03-14 ENCOUNTER — APPOINTMENT (OUTPATIENT)
Dept: DIALYSIS | Facility: HOSPITAL | Age: 55
End: 2025-03-14
Payer: COMMERCIAL

## 2025-03-14 LAB
ANION GAP SERPL CALC-SCNC: 16 MMOL/L (ref 10–20)
BUN SERPL-MCNC: 71 MG/DL (ref 6–23)
CALCIUM SERPL-MCNC: 10.3 MG/DL (ref 8.6–10.3)
CHLORIDE SERPL-SCNC: 100 MMOL/L (ref 98–107)
CO2 SERPL-SCNC: 29 MMOL/L (ref 21–32)
CREAT SERPL-MCNC: 8.76 MG/DL (ref 0.5–1.3)
EGFRCR SERPLBLD CKD-EPI 2021: 7 ML/MIN/1.73M*2
ERYTHROCYTE [DISTWIDTH] IN BLOOD BY AUTOMATED COUNT: 15.1 % (ref 11.5–14.5)
GLUCOSE BLD MANUAL STRIP-MCNC: 100 MG/DL (ref 74–99)
GLUCOSE BLD MANUAL STRIP-MCNC: 110 MG/DL (ref 74–99)
GLUCOSE BLD MANUAL STRIP-MCNC: 85 MG/DL (ref 74–99)
GLUCOSE BLD MANUAL STRIP-MCNC: 90 MG/DL (ref 74–99)
GLUCOSE SERPL-MCNC: 86 MG/DL (ref 74–99)
HCT VFR BLD AUTO: 28.6 % (ref 41–52)
HGB BLD-MCNC: 8.8 G/DL (ref 13.5–17.5)
MCH RBC QN AUTO: 29.9 PG (ref 26–34)
MCHC RBC AUTO-ENTMCNC: 30.8 G/DL (ref 32–36)
MCV RBC AUTO: 97 FL (ref 80–100)
NRBC BLD-RTO: 0 /100 WBCS (ref 0–0)
PLATELET # BLD AUTO: 196 X10*3/UL (ref 150–450)
POTASSIUM SERPL-SCNC: 4.5 MMOL/L (ref 3.5–5.3)
RBC # BLD AUTO: 2.94 X10*6/UL (ref 4.5–5.9)
SODIUM SERPL-SCNC: 140 MMOL/L (ref 136–145)
WBC # BLD AUTO: 6.5 X10*3/UL (ref 4.4–11.3)

## 2025-03-14 PROCEDURE — 2060000001 HC INTERMEDIATE ICU ROOM DAILY

## 2025-03-14 PROCEDURE — 97116 GAIT TRAINING THERAPY: CPT | Mod: GP,CQ | Performed by: PHYSICAL THERAPY ASSISTANT

## 2025-03-14 PROCEDURE — 80048 BASIC METABOLIC PNL TOTAL CA: CPT | Performed by: INTERNAL MEDICINE

## 2025-03-14 PROCEDURE — 99232 SBSQ HOSP IP/OBS MODERATE 35: CPT | Performed by: INTERNAL MEDICINE

## 2025-03-14 PROCEDURE — 97535 SELF CARE MNGMENT TRAINING: CPT | Mod: GO | Performed by: OCCUPATIONAL THERAPIST

## 2025-03-14 PROCEDURE — 82947 ASSAY GLUCOSE BLOOD QUANT: CPT

## 2025-03-14 PROCEDURE — 8010000001 HC DIALYSIS - HEMODIALYSIS PER DAY

## 2025-03-14 PROCEDURE — 2500000005 HC RX 250 GENERAL PHARMACY W/O HCPCS: Performed by: HOSPITALIST

## 2025-03-14 PROCEDURE — 2500000001 HC RX 250 WO HCPCS SELF ADMINISTERED DRUGS (ALT 637 FOR MEDICARE OP): Performed by: HOSPITALIST

## 2025-03-14 PROCEDURE — 2500000001 HC RX 250 WO HCPCS SELF ADMINISTERED DRUGS (ALT 637 FOR MEDICARE OP): Performed by: INTERNAL MEDICINE

## 2025-03-14 PROCEDURE — 2500000002 HC RX 250 W HCPCS SELF ADMINISTERED DRUGS (ALT 637 FOR MEDICARE OP, ALT 636 FOR OP/ED): Performed by: INTERNAL MEDICINE

## 2025-03-14 PROCEDURE — 36415 COLL VENOUS BLD VENIPUNCTURE: CPT | Performed by: INTERNAL MEDICINE

## 2025-03-14 PROCEDURE — 2500000002 HC RX 250 W HCPCS SELF ADMINISTERED DRUGS (ALT 637 FOR MEDICARE OP, ALT 636 FOR OP/ED): Performed by: HOSPITALIST

## 2025-03-14 PROCEDURE — 85027 COMPLETE CBC AUTOMATED: CPT | Performed by: INTERNAL MEDICINE

## 2025-03-14 PROCEDURE — 6350000001 HC RX 635 EPOETIN >10,000 UNITS: Mod: JZ | Performed by: INTERNAL MEDICINE

## 2025-03-14 PROCEDURE — 97110 THERAPEUTIC EXERCISES: CPT | Mod: GP,CQ | Performed by: PHYSICAL THERAPY ASSISTANT

## 2025-03-14 RX ADMIN — ESCITALOPRAM OXALATE 20 MG: 20 TABLET ORAL at 12:29

## 2025-03-14 RX ADMIN — PANTOPRAZOLE SODIUM 40 MG: 40 TABLET, DELAYED RELEASE ORAL at 06:00

## 2025-03-14 RX ADMIN — ATORVASTATIN CALCIUM 40 MG: 40 TABLET, FILM COATED ORAL at 21:18

## 2025-03-14 RX ADMIN — OLANZAPINE 5 MG: 5 TABLET, FILM COATED ORAL at 21:18

## 2025-03-14 RX ADMIN — FINASTERIDE 5 MG: 5 TABLET, FILM COATED ORAL at 12:29

## 2025-03-14 RX ADMIN — OXYCODONE HYDROCHLORIDE 5 MG: 5 TABLET ORAL at 12:34

## 2025-03-14 RX ADMIN — ACETAMINOPHEN 975 MG: 325 TABLET, FILM COATED ORAL at 21:18

## 2025-03-14 RX ADMIN — APIXABAN 2.5 MG: 5 TABLET, FILM COATED ORAL at 12:29

## 2025-03-14 RX ADMIN — ACETAMINOPHEN 975 MG: 325 TABLET, FILM COATED ORAL at 12:29

## 2025-03-14 RX ADMIN — TAMSULOSIN HYDROCHLORIDE 0.4 MG: 0.4 CAPSULE ORAL at 12:29

## 2025-03-14 RX ADMIN — ASPIRIN 81 MG: 81 TABLET, COATED ORAL at 12:29

## 2025-03-14 RX ADMIN — CALCIUM ACETATE 1334 MG: 667 CAPSULE ORAL at 16:46

## 2025-03-14 RX ADMIN — CALCIUM ACETATE 1334 MG: 667 CAPSULE ORAL at 12:29

## 2025-03-14 RX ADMIN — LIDOCAINE 4% 1 PATCH: 40 PATCH TOPICAL at 12:31

## 2025-03-14 RX ADMIN — APIXABAN 2.5 MG: 5 TABLET, FILM COATED ORAL at 21:18

## 2025-03-14 RX ADMIN — Medication 1 CAPSULE: at 12:28

## 2025-03-14 RX ADMIN — OXYCODONE HYDROCHLORIDE 5 MG: 5 TABLET ORAL at 21:18

## 2025-03-14 RX ADMIN — EPOETIN ALFA-EPBX 10000 UNITS: 10000 INJECTION, SOLUTION INTRAVENOUS; SUBCUTANEOUS at 17:14

## 2025-03-14 RX ADMIN — METOPROLOL SUCCINATE 50 MG: 50 TABLET, EXTENDED RELEASE ORAL at 21:18

## 2025-03-14 RX ADMIN — QUETIAPINE FUMARATE 25 MG: 25 TABLET ORAL at 16:46

## 2025-03-14 ASSESSMENT — PAIN - FUNCTIONAL ASSESSMENT
PAIN_FUNCTIONAL_ASSESSMENT: NO/DENIES PAIN
PAIN_FUNCTIONAL_ASSESSMENT: NO/DENIES PAIN
PAIN_FUNCTIONAL_ASSESSMENT: 0-10
PAIN_FUNCTIONAL_ASSESSMENT: 0-10

## 2025-03-14 ASSESSMENT — ACTIVITIES OF DAILY LIVING (ADL)
HOME_MANAGEMENT_TIME_ENTRY: 13
BATHING_WHERE_ASSESSED: EDGE OF BED;BED LEVEL
BATHING_LEVEL_OF_ASSISTANCE: CLOSE SUPERVISION;MINIMAL VERBAL CUES

## 2025-03-14 ASSESSMENT — COGNITIVE AND FUNCTIONAL STATUS - GENERAL
MOBILITY SCORE: 16
DRESSING REGULAR LOWER BODY CLOTHING: A LITTLE
MOBILITY SCORE: 20
MOVING TO AND FROM BED TO CHAIR: A LITTLE
DAILY ACTIVITIY SCORE: 19
DAILY ACTIVITIY SCORE: 23
MOVING FROM LYING ON BACK TO SITTING ON SIDE OF FLAT BED WITH BEDRAILS: A LITTLE
TOILETING: A LITTLE
WALKING IN HOSPITAL ROOM: A LITTLE
STANDING UP FROM CHAIR USING ARMS: A LITTLE
CLIMB 3 TO 5 STEPS WITH RAILING: A LITTLE
CLIMB 3 TO 5 STEPS WITH RAILING: TOTAL
DRESSING REGULAR LOWER BODY CLOTHING: A LITTLE
DRESSING REGULAR UPPER BODY CLOTHING: A LITTLE
TURNING FROM BACK TO SIDE WHILE IN FLAT BAD: A LITTLE
PERSONAL GROOMING: A LITTLE
WALKING IN HOSPITAL ROOM: A LITTLE
HELP NEEDED FOR BATHING: A LITTLE
STANDING UP FROM CHAIR USING ARMS: A LITTLE
MOVING TO AND FROM BED TO CHAIR: A LITTLE

## 2025-03-14 ASSESSMENT — PAIN SCALES - GENERAL
PAINLEVEL_OUTOF10: 9
PAINLEVEL_OUTOF10: 0 - NO PAIN
PAINLEVEL_OUTOF10: 9
PAINLEVEL_OUTOF10: 0 - NO PAIN
PAINLEVEL_OUTOF10: 9

## 2025-03-14 NOTE — PROGRESS NOTES
"Spiritual Care Visit  Spiritual Care Request    Reason for Visit:  Routine Visit: Introduction  Continue Visiting: No (Pt is preparing for discharge)   Request Received From:  Referral From: Nurse  Focus of Care:  Visited With: Patient   Refer to :  Referral To:    Spiritual Care Assessment    Spiritual Assessment:  Patient Spiritual Care Encounters  Feelings of Loneliness: Excellent  Feelings of Hopelessness: Excellent  Care Provided:  Intended Effects: Bruna affirmation  Methods: Encourage self care, Exploring hope  Interventions: Active listening, Ask guided questions, Ask guided questions about bruna, Prayer for healing  Sense of Community and or Baptist Affiliation   Restorationism    Addressed Needs/Concerns and/or Rasta Through:  Baptist Encounters  Baptist Needs: Prayer  Outcome:  Outcome of Spiritual Care Visit: Identifying patient's strengths/source of hope   Advance Directives:  Not Recieved  Spiritual Care Annotation    Annotation:  The pt discussed his past experiences with near death. He stated that God was not ready for him. He wants to go to rehab so that he can get better and fulfill his goals for living a better life.  After prayer the pt said, \"Thanks so much for coming by.\"    Chaplain Morgan Zhao          "

## 2025-03-14 NOTE — PRE-PROCEDURE NOTE
Report from Sending RN:    Report From: Pavin  Recent Surgery of Procedure: No  Baseline Level of Consciousness (LOC): AOx3  Oxygen Use: No  Type: NA  Diabetic: Yes  Last BP Med Given Day of Dialysis: See EMAR  Last Pain Med Given: See EMAR  Lab Tests to be Obtained with Dialysis: No  Blood Transfusion to be Given During Dialysis: No  Available IV Access: Yes  Medications to be Administered During Dialysis: No  Continuous IV Infusion Running: No  Restraints on Currently or in the Last 24 Hours: No  Hand-Off Communication: Pt stable and able to come to PACU for treatment  Dialysis Catheter Dressing: NA  Last Dressing Change: NA

## 2025-03-14 NOTE — PROCEDURES
"Patient seen on dialysis.  F180 dialyzer x 3.5 hours, BFR//600 mL/minute, 3K bath, 2.5 calcium with a target UF set for 0.5 kg as tolerated.  Erythropoietin and phosphorus binder ordered.  Blood pressures have been soft.  He reports labile blood pressures.  He is currently asymptomatic.  Tolerating dialysis, continue per submitted orders.    /70 (BP Location: Right arm, Patient Position: Lying)   Pulse 94   Temp 36.9 °C (98.5 °F) (Temporal)   Resp 18   Ht 1.753 m (5' 9.02\")   Wt 106 kg (233 lb 6.4 oz)   SpO2 94%   BMI 34.45 kg/m²       No current facility-administered medications on file prior to encounter.     Current Outpatient Medications on File Prior to Encounter   Medication Sig Dispense Refill    midodrine (Proamatine) 10 mg tablet Take 1 tablet (10 mg) by mouth if needed (for HD with SBP <90).      albuterol 90 mcg/actuation aerosol powdr breath activated inhaler Inhale 2 puffs every 6 hours if needed for wheezing or shortness of breath.      apixaban (Eliquis) 2.5 mg tablet Take 1 tablet (2.5 mg) by mouth 2 times a day.      aspirin 81 mg EC tablet Take 1 tablet (81 mg) by mouth once daily.      B complex-vitamin C-folic acid (Nephrocaps) 1 mg capsule Take 1 capsule by mouth once daily.      calcitriol (Rocaltrol) 0.25 mcg capsule Take 1 capsule (0.25 mcg) by mouth once daily.      calcium acetate (Phoslo) 667 mg capsule Take 2 capsules (1,334 mg) by mouth 3 times a day with meals. 180 capsule 0    calcium acetate (Phoslo) 667 mg capsule Take 2 capsules (1,334 mg) by mouth 3 times a day. WITH MEALS.      ergocalciferol (Vitamin D-2) 1.25 MG (91210 UT) capsule Take 1 capsule (1,250 mcg) by mouth 1 (one) time per week. MONDAY NEED AN REFILL      escitalopram (Lexapro) 20 mg tablet Take 1 tablet (20 mg) by mouth once daily.      finasteride (Proscar) 5 mg tablet Take 1 tablet (5 mg) by mouth once daily. Do not crush, chew, or split.      melatonin 3 mg capsule Take 1 capsule by mouth once " daily at bedtime.      metoprolol succinate XL (Toprol-XL) 25 mg 24 hr tablet Take 2 tablets (50 mg) by mouth once daily. Do not crush or chew. (Patient taking differently: Take 1 tablet (25 mg) by mouth once daily. Do not crush or chew.) 60 tablet 0    metoprolol succinate XL (Toprol-XL) 25 mg 24 hr tablet Take 1 tablet (25 mg) by mouth once daily. Do not crush or chew.      OLANZapine (ZyPREXA) 5 mg tablet Take 1 tablet (5 mg) by mouth once daily at bedtime. (Patient not taking: Reported on 3/5/2025) 30 tablet 0    OLANZapine (ZyPREXA) 5 mg tablet Take 1 tablet (5 mg) by mouth once daily at bedtime.      ondansetron (Zofran) 4 mg tablet Take 1 tablet (4 mg) by mouth every 12 hours if needed for nausea or vomiting.      pantoprazole (ProtoNix) 40 mg EC tablet Take 1 tablet (40 mg) by mouth once daily in the morning. Take before meals. Do not crush, chew, or split.      QUEtiapine (SEROquel) 25 mg tablet Take 1 tablet (25 mg) by mouth 2 times daily (morning and late afternoon).      rosuvastatin (Crestor) 20 mg tablet Take 1 tablet (20 mg) by mouth once daily at bedtime.      sevelamer carbonate (Renvela) 800 mg tablet Take 1 tablet (800 mg) by mouth 3 times daily (morning, midday, late afternoon). Swallow tablet whole; do not crush, break, or chew.      tamsulosin (Flomax) 0.4 mg 24 hr capsule Take 1 capsule (0.4 mg) by mouth once daily.      [DISCONTINUED] acetaminophen (Tylenol) 325 mg tablet Take 2 tablets (650 mg) by mouth every 8 hours if needed for mild pain (1 - 3).

## 2025-03-14 NOTE — PROGRESS NOTES
Occupational Therapy    OT Treatment    Patient Name: Kj Colmenares  MRN: [id number]  Department: [place] [address]  Room: [address] [place] [address]  Today's Date: 3/14/2025  Time Calculation  Start Time: 1428  Stop Time: 1441  Time Calculation (min): 13 min        Assessment:  Prognosis: Good  Barriers to Discharge Home: Physical needs, Caregiver assistance  Caregiver Assistance: Caregiver assistance needed per identified barriers - however, level of patient's required assistance exceeds assistance available at home  Physical Needs: 24hr mobility assistance needed, Intermittent ADL assistance needed, Ambulating household distances limited by function/safety, High falls risk due to function or environment  Evaluation/Treatment Tolerance: Patient limited by fatigue, Patient limited by pain  Medical Staff Made Aware: Yes  End of Session Communication: Bedside nurse  End of Session Patient Position:  (seated at EOB, PTA student present for PT session)  Prognosis: Good  Evaluation/Treatment Tolerance: Patient limited by fatigue, Patient limited by pain  Medical Staff Made Aware: Yes  Plan:  Treatment Interventions: ADL retraining, Functional transfer training, UE strengthening/ROM, Endurance training, Patient/family training, Equipment evaluation/education, Compensatory technique education  OT Frequency: 3 times per week  OT Discharge Recommendations: Moderate intensity level of continued care  Equipment Recommended upon Discharge:  (TBD)  OT Recommended Transfer Status: Assist of 1  OT - OK to Discharge: Yes (continue per OT POC)  Treatment Interventions: ADL retraining, Functional transfer training, UE strengthening/ROM, Endurance training, Patient/family training, Equipment evaluation/education, Compensatory technique education    Subjective   Previous Visit Info:  OT Last Visit  OT Received On: 03/14/25  General:  General  Reason for Referral: To ED with SOB, chest pain, and cough. Pt found to be in afib RVR and Flu (+)  Referred By: Deep  DO Karlos  Past Medical History Relevant to Rehab:   Past Medical History:   Diagnosis Date    Diabetes mellitus (Multi)     Hypertension     Stroke (Multi)       Missed Visit Reason: Patient in a medical procedure (Per RN, pt currently off floor for dialysis.)  Family/Caregiver Present: No  Prior to Session Communication: Bedside nurse  Patient Position Received: Bed, 3 rail up, Alarm on  Preferred Learning Style: auditory, visual, verbal  General Comment: Pt supine in bed upon arrival, agreeable to treatment, limited by elevated HR with activity. Pt requires max cues to slow pace and for energy conservation techniques throughout.   Precautions:  Medical Precautions: Fall precautions    Vitals: HR ranged from 96-150s (briefly in 140s/150s- briefly 162 then immediately in 150s with OOB mobility) RN notified         Pain:  Pain Assessment  Pain Assessment: 0-10  0-10 (Numeric) Pain Score: 9  Pain Location: Knee  Pain Orientation: Left  Pain Interventions: Repositioned, Ambulation/increased activity  Response to Interventions: No change in pain (RN notified)    Objective    Cognition:  Cognition  Overall Cognitive Status: Within Functional Limits  Orientation Level: Oriented X4       Activities of Daily Living:      Grooming  Grooming Level of Assistance: Setup  Grooming Where Assessed: Edge of bed  Grooming Comments: for hand hygiene    UE Bathing  UE Bathing Level of Assistance: Minimum assistance, Minimal verbal cues  UE Bathing Where Assessed: Edge of bed  UE Bathing Comments: UB sponge bathing    LE Bathing  LE Bathing Level of Assistance: Close supervision, Minimal verbal cues  LE Bathing Where Assessed: Edge of bed, Bed level  LE Bathing Comments: LB sponge bathing completed EOB/bed level d/t elevated HR in standing, cues to slow pace    UE Dressing  UE Dressing Level of Assistance: Setup  UE Dressing Where Assessed: Edge of bed  UE Dressing Comments: to don/doff gown    LE Dressing  LE Dressing: Yes  Pants  "Level of Assistance: Close supervision, Minimal verbal cues  Sock Level of Assistance: Close supervision, Minimal verbal cues  LE Dressing Where Assessed: Edge of bed  LE Dressing Comments: cues for sequencing, safety awareness and to slow pace          Bed Mobility/Transfers: Bed Mobility  Bed Mobility: Yes  Bed Mobility 1  Bed Mobility 1: Supine to sitting, Sitting to supine  Level of Assistance 1: Modified independent    Transfers  Transfer: Yes  Transfer 1  Technique 1: Sit to stand, Stand to sit  Transfer Device 1: Walker  Transfer Level of Assistance 1: Close supervision, Minimal verbal cues  Trials/Comments 1: from EOB, cues for safe hand placement            Sitting Balance:  Static Sitting Balance  Static Sitting-Balance Support: Bilateral upper extremity supported, Feet supported  Static Sitting-Level of Assistance: Independent  Static Sitting-Comment/Number of Minutes: at EOB  Dynamic Sitting Balance  Dynamic Sitting-Comments: SUP/SBA at EOB  Standing Balance:  Static Standing Balance  Static Standing-Balance Support: Bilateral upper extremity supported  Static Standing-Level of Assistance: Close supervision  Static Standing-Comment/Number of Minutes: FWW  Dynamic Standing Balance  Dynamic Standing-Comments: SBA-CGA      Outcome Measures:[facility] Daily Activity  Putting on and taking off regular lower body clothing: A little  Bathing (including washing, rinsing, drying): A little  Putting on and taking off regular upper body clothing: A little  Toileting, which includes using toilet, bedpan or urinal: A little  Taking care of personal grooming such as brushing teeth: A little  Eating Meals: None  Daily Activity - Total Score: 19         and OT Adult Other Outcome Measures  Other Outcome Measures: Modified Barthel Index: 64/100 indicating \"moderate dependence.\" Pt is currently requiring assistance with functional transfers/mobility and ADLs, demos significant endurance deficits as well as balance/strength " deficits, causing safety concerns with discharging home at this time.    Education Documentation  Body Mechanics, taught by Karyna Hooks OT at 3/14/2025  2:50 PM.  Learner: Patient  Readiness: Acceptance  Method: Explanation  Response: Verbalizes Understanding, Needs Reinforcement    Precautions, taught by Karyna Hooks OT at 3/14/2025  2:50 PM.  Learner: Patient  Readiness: Acceptance  Method: Explanation  Response: Verbalizes Understanding, Needs Reinforcement    ADL Training, taught by Karyna Hooks OT at 3/14/2025  2:50 PM.  Learner: Patient  Readiness: Acceptance  Method: Explanation  Response: Verbalizes Understanding, Needs Reinforcement    Education Comments  No comments found.        OP EDUCATION:       Goals:  Encounter Problems       Encounter Problems (Active)       ADLs       Patient will perform UB and LB bathing with modified independent level of assistance and grab bars, shower chair, and long-handled sponge. (Progressing)       Start:  03/10/25    Expected End:  03/24/25            Patient with complete upper body dressing with modified independent level of assistance donning and doffing all UE clothes with PRN adaptive equipment. (Progressing)       Start:  03/10/25    Expected End:  03/24/25            Patient with complete lower body dressing with modified independent level of assistance donning and doffing all LE clothes  with PRN adaptive equipment. (Progressing)       Start:  03/10/25    Expected End:  03/24/25            Patient will complete daily grooming tasks with modified independent level of assistance and PRN adaptive equipment. (Progressing)       Start:  03/10/25    Expected End:  03/24/25            Patient will complete toileting including hygiene clothing management/hygiene with modified independent level of assistance and raised toilet seat and grab bars. (Progressing)       Start:  03/10/25    Expected End:  03/24/25               MOBILITY       Patient will perform  Functional mobility x Household distances/Community Distances with modified independent level of assistance and least restrictive device in order to improve safety and functional mobility. (Progressing)       Start:  03/10/25    Expected End:  03/24/25               TRANSFERS       Patient will perform bed mobility independent level of assistance in order to improve safety and independence with mobility (Progressing)       Start:  03/10/25    Expected End:  03/24/25            Patient will complete functional transfers with least restrictive device with modified independent level of assistance. (Progressing)       Start:  03/10/25    Expected End:  03/24/25

## 2025-03-14 NOTE — PROGRESS NOTES
Airway  Date/Time: 2/22/2024 7:50 AM  Urgency: elective    Difficult airway    Staffing  Performed: CAA   Authorized by: Jose Raul Sauer MD    Performed by: SIMI Blanco  Patient location during procedure: OR    Indications and Patient Condition  Indications for airway management: anesthesia and airway protection  Spontaneous Ventilation: absent  Sedation level: deep  Preoxygenated: yes  Patient position: sniffing  Mask difficulty assessment: 2 - vent by mask + OA or adjuvant +/- NMBA  Planned trial extubation    Final Airway Details  Final airway type: endotracheal airway      Successful airway: ETT  Cuffed: yes   Successful intubation technique: video laryngoscopy  Facilitating devices/methods: intubating stylet  Endotracheal tube insertion site: oral  Blade: Jami  Blade size: #4  ETT size (mm): 7.0  Cormack-Lehane Classification: grade IIa - partial view of glottis  Placement verified by: chest auscultation   Measured from: lips  ETT to lips (cm): 22  Number of attempts at approach: 1  Ventilation between attempts: BVM    Additional Comments  Easy Glidescope by SIMI, small mouth opening.  Nasal airway placed for mask ventilation         Between 7AM-7PM please message me via Epic Secure Chat.  After 7PM please page Nocturnist on call.    [facility] Hospitalist Progress Note      Kj Colmenares    :  1970(54 y.o.)    MRN:  [id number]  Date: 25     Assessment and Plan:         3/14:  Cont current regimen.   HD today.   Dispo is to snf pending placement.       3/13:  Pt seen this morning, on room air, no overnight events reported.   Cont current regimen.   Dc to snf pending placement.         3/12:  ESRD... HD today.   Flu... RA, completed Rx.   Afib... Controlled on eliquis, toprol.   A1c is 5% 4 months ago... probably doesn't have DM.   Hx CVA... asa, statin.   Hx adjustment d/o... home zyprexa, seroquel, lexapro.   Home regimen for chronic conditions.  Dvt px covered with eliquis.   Pt/ot  Dc to snf pending placement, hopefully today.           3/11:  Influenza A infection  - finished tamiflu 5d course; continue supportive care    ESRD  Anemia of CKD  Metabolic   - no HD for 2 months prior to admission  - Nephrology consulted to manage IP HD; continue phoslo, neprocaps. EPO/IV iron per nephrology with HD.     Atrial fibrillation with RVR  - HR control improved with Toprol XL 50 mg nightly; Goal HR <130 without symptoms for now as he is recovering from Flu. Long term goal is <110.   - Continue po eliquis  - Will have OP follow up with cardiology, if remains in afib may need DCCV as OP    Left knee effusion  - CT left knee shows advanced osteoarthritis of the left knee with a large joint effusion. Plan for OP referral to orthopedic surgery to discuss options once acute issues resolved  - continue annetta tylenol, lidocaine patch    T2DM  - continue ssi    HLD  - switched to atorvastatin as he is ESRD on HD    Hx of CVA  - continue asa, statin    BPH  - continue flomax, proscar    DVT Prophylaxis: full anticoagulation with eliquis    Disposition: SNF when auth obtained    Electronically signed by Bertin Garcia MD on  "03/14/25 at 11:29 AM     Subjective:      Interval History:   Vitals and chart notes from overnight reviewed.   No acute issues overnight.   Patient seen and evaluated at bedside.   No chest pain or shortness of breath. Po intake better. Intermittent diarrhea.     Review of Systems:   Other than patient's chronic conditions and those complaints in the history above, the rest of the 10 systems review were done and were negative.     Current medications:  Scheduled Meds:acetaminophen, 975 mg, oral, TID  apixaban, 2.5 mg, oral, BID  aspirin, 81 mg, oral, Daily  atorvastatin, 40 mg, oral, Nightly  [Held by provider] calcitriol, 0.25 mcg, oral, Daily  calcium acetate, 1,334 mg, oral, TID  epoetin sadie or biosimilar, 100 Units/kg, subcutaneous, Every Mon/Wed/Fri  escitalopram, 20 mg, oral, Daily  finasteride, 5 mg, oral, Daily  insulin lispro, 0-5 Units, subcutaneous, TID AC  lidocaine, 1 patch, transdermal, Daily  metoprolol succinate XL, 50 mg, oral, Nightly  OLANZapine, 5 mg, oral, Nightly  pantoprazole, 40 mg, oral, Daily before breakfast   Or  pantoprazole, 40 mg, intravenous, Daily before breakfast  QUEtiapine, 25 mg, oral, BID  tamsulosin, 0.4 mg, oral, Daily  vitamin B complex-vitamin C-folic acid, 1 capsule, oral, Daily      Continuous Infusions:   PRN Meds:PRN medications: benzocaine-menthol, benzonatate, dextrose, dextrose, glucagon, glucagon, guaiFENesin, midodrine, ondansetron **OR** ondansetron, oxyCODONE      Objective:     Heart Rate:  []   Temp:  [36.4 °C (97.6 °F)-36.9 °C (98.5 °F)]   Resp:  [12-24]   BP: ()/(52-70)   Height:  [175.3 cm (5' 9.02\")]   Weight:  [106 kg (233 lb 6.4 oz)]   SpO2:  [94 %-98 %]          Physical Exam  Vitals and nursing note reviewed.   Constitutional:       Appearance: He is obese.   HENT:      Mouth/Throat:      Mouth: Mucous membranes are moist.      Pharynx: Oropharynx is clear.   Cardiovascular:      Rate and Rhythm: Normal rate. Rhythm irregular.   Pulmonary: "      Effort: Pulmonary effort is normal.   Abdominal:      General: There is no distension.      Palpations: Abdomen is soft.      Tenderness: There is no abdominal tenderness.   Musculoskeletal:      Comments: Swelling around left knee   Neurological:      Mental Status: He is alert and oriented to person, place, and time.         Labs:   Lab Results   Component Value Date     03/14/2025    K 4.5 03/14/2025     03/14/2025    CO2 29 03/14/2025    BUN 71 (H) 03/14/2025    CREATININE 8.76 (H) 03/14/2025    GLUCOSE 86 03/14/2025    CALCIUM 10.3 03/14/2025    PROT 6.7 03/04/2025    BILITOT 0.5 03/04/2025    ALKPHOS 333 (H) 03/04/2025    AST 15 03/04/2025    ALT 13 03/04/2025       Lab Results   Component Value Date    WBC 6.5 03/14/2025    HGB 8.8 (L) 03/14/2025    HCT 28.6 (L) 03/14/2025    MCV 97 03/14/2025     03/14/2025

## 2025-03-14 NOTE — PROGRESS NOTES
03/14/25 9162   Discharge Planning   Assistance Needed updated by [facility] patient's insurance was out of network; updated therapy notes requested to submit auth at new facility foc;discussed with patient;[facility];   Expected Discharge Disposition SNF  ([facility])

## 2025-03-14 NOTE — PROGRESS NOTES
Physical Therapy                 Therapy Communication Note    Patient Name: Kj Colmenares  MRN: [id number]  Department: [facility]  Room: [address] [place], [place] [address]  Today's Date: 3/14/2025     Discipline: Physical Therapy    PT Missed Visit: Yes     Missed Visit Reason: Missed Visit Reason: Patient in a medical procedure (Per RN, pt currently off floor at dialysis, unsure when they will return.)    Missed Time: Attempt    Chart review and documentation was done under the supervision of Senior DONTA Hassan.

## 2025-03-14 NOTE — POST-PROCEDURE NOTE
Report to Receiving RN:    Report To: Pavin  Time Report Called: 1200  Hand-Off Communication: pt tolerated tx well took off 0.5L of fluid post bp 105/66 hr 73  Complications During Treatment: No  Ultrafiltration Treatment: No  Medications Administered During Dialysis: No  Blood Products Administered During Dialysis: No  Labs Sent During Dialysis: No  Heparin Drip Rate Changes: No  Dialysis Catheter Dressing: NA  Last Dressing Change: NA    Electronic Signatures:  Mannie Nair RN (Signed )   Authored:    (Signed )   Authored:     Last Updated: 12:03 PM by MANNIE NAIR

## 2025-03-14 NOTE — PROGRESS NOTES
Physical Therapy    Physical Therapy Treatment    Patient Name: Kj Colmenares  MRN: [id number]  Department: [place] [address]  Room: [address] [place] [address]  Today's Date: 3/14/2025  Time Calculation  Start Time: 1442  Stop Time: 1506  Time Calculation (min): 24 min    Treatment, chart review, and documentation was done under the supervision of Modesto Vega,  PTA.       Assessment/Plan   PT Assessment  Rehab Prognosis: Good  Barriers to Discharge Home: Caregiver assistance, Physical needs  End of Session Communication: Bedside nurse  Assessment Comment: Pt had fair tolerance to tx however was limited due to increase in HR.  End of Session Patient Position: Bed, 3 rail up, Alarm on  PT Plan  Inpatient/Swing Bed or Outpatient: Inpatient  PT Plan  Treatment/Interventions: Bed mobility, Transfer training, Gait training, Therapeutic exercise  PT Plan: Ongoing PT  PT Frequency: 3 times per week  PT Discharge Recommendations: Moderate intensity level of continued care  PT Recommended Transfer Status: Assist x1  PT - OK to Discharge: Yes (per PT POC)      General Visit Information:   PT  Visit  PT Received On: 03/14/25  General  Reason for Referral: To ED with SOB, chest pain, and cough. Pt found to be in afib RVR and Flu (+)  Referred By: Tyson Everett DO  PT Missed Visit: Yes  Missed Visit Reason: Patient in a medical procedure (Per RN, pt currently off floor at dialysis, unsure when they will return.)  Prior to Session Communication: Bedside nurse  Patient Position Received: Bed, 3 rail up, Alarm off, caregiver present (seated EOB, OT present, finishing OT session.)  Preferred Learning Style: auditory, kinesthetic, verbal  General Comment: Pt pleasant and agreeable to tx.    Subjective   Precautions:        Date/Time Vitals Session Patient Position Pulse Resp SpO2 BP MAP (mmHg)    03/14/25 1428 --  --  --  --  --  --  --     03/14/25 1442 --  --  --  --  --  --  --                 Objective   Pain:  Pain Assessment  Pain Assessment:  0-10  0-10 (Numeric) Pain Score: 9 (RN notified)  Pain Location: Knee  Pain Orientation: Left  Pain Interventions: Ambulation/increased activity  Response to Interventions: No change in pain  Cognition:  Cognition  Orientation Level: Oriented X4       Postural Control:  Static Sitting Balance  Static Sitting-Balance Support: Feet supported, No upper extremity supported  Static Sitting-Level of Assistance: Close supervision  Static Sitting-Comment/Number of Minutes: seated EOB, steady.  Dynamic Sitting Balance  Dynamic Sitting-Balance Support: Bilateral upper extremity supported, Feet unsupported  Dynamic Sitting-Level of Assistance: Close supervision  Dynamic Sitting-Balance:  (LAQ)  Dynamic Sitting-Comments: steady.  Static Standing Balance  Static Standing-Balance Support: Bilateral upper extremity supported  Static Standing-Level of Assistance: Contact guard  Static Standing-Comment/Number of Minutes: 2 mins, steady.  Dynamic Standing Balance  Dynamic Standing-Balance Support: No upper extremity supported  Dynamic Standing-Level of Assistance: Contact guard  Dynamic Standing-Balance: Reaching across midline  Dynamic Standing-Comments: steady.    Activity Tolerance:  Activity Tolerance  Endurance: Tolerates less than 10 min exercise with changes in vital signs  Treatments:  Therapeutic Exercise  Therapeutic Exercise Performed: Yes  Therapeutic Exercise Activity 1: Pt completed ankle pumps x20, LAQ, heel slides, and glute sets x15 reps, required cues for proper contraction of quad muscle and sequencing.    Bed Mobility  Bed Mobility: Yes  Bed Mobility 1  Bed Mobility 1: Sitting to supine  Level of Assistance 1: Modified independent  Bed Mobility Comments 1: Pt able to manage trunk and LEs, HOB elevated.    Ambulation/Gait Training  Ambulation/Gait Training Performed: Yes  Ambulation/Gait Training 1  Surface 1: Level tile  Device 1: Rolling walker  Gait Support Devices: Gait belt  Assistance 1: Contact  guard  Quality of Gait 1: Wide base of support, Antalgic, Knee(s) buckle, Forward flexed posture  Comments/Distance (ft) 1: 20 ft x2, pt required a seated rest between trials due to increase in HR to 163, RN notified, pt reports increased pain in left knee with weight bearing, cues for proper placement of AD and to push through upper extremities, demo'd fair carryover.  Transfer 1  Transfer From 1: Sit to  Transfer to 1: Stand  Technique 1: Sit to stand, Stand to sit  Transfer Device 1: Walker, Gait belt  Transfer Level of Assistance 1: Close supervision  Trials/Comments 1: 3 trials, pt required cues for proper hand placement and sequencing, demo'd good carryover.    Outcome Measures:  [facility] Basic Mobility  Turning from your back to your side while in a flat bed without using bedrails: A little  Moving from lying on your back to sitting on the side of a flat bed without using bedrails: A little  Moving to and from bed to chair (including a wheelchair): A little  Standing up from a chair using your arms (e.g. wheelchair or bedside chair): A little  To walk in hospital room: A little  Climbing 3-5 steps with railing: Total  Basic Mobility - Total Score: 16    Education Documentation  Precautions, taught by DIAMOND Reddy at 3/14/2025  3:18 PM.  Learner: Patient  Readiness: Acceptance  Method: Explanation  Response: Verbalizes Understanding    Body Mechanics, taught by DIAMOND Reddy at 3/14/2025  3:18 PM.  Learner: Patient  Readiness: Acceptance  Method: Explanation  Response: Verbalizes Understanding    Mobility Training, taught by DIAMOND Reddy at 3/14/2025  3:18 PM.  Learner: Patient  Readiness: Acceptance  Method: Explanation  Response: Verbalizes Understanding    Education Comments  No comments found.        OP EDUCATION:       Encounter Problems       Encounter Problems (Active)       Balance       complete all mobility with normal balance while dual tasking, negotiating in a dynamic  environment, carrying items, etc., with proactive and reactive static and dynamic standing and sitting tasks, with mod I and LRAD, >15 minutes.   (Progressing)       Start:  03/09/25    Expected End:  03/23/25               Mobility       STG - Patient will ambulate 75 ft mod I with LRAD and stable vitals.  (Progressing)       Start:  03/09/25    Expected End:  03/23/25            Pt will tolerate 15 reps of each LE exercise in order to improve strength and endurance.   (Progressing)       Start:  03/09/25    Expected End:  03/23/25               PT Transfers       STG - Patient will perform bed mobility independently. (Progressing)       Start:  03/09/25    Expected End:  03/23/25            STG - Patient will transfer sit to and from stand mod I using LRAD (Progressing)       Start:  03/09/25    Expected End:  03/23/25               Pain - Adult

## 2025-03-15 LAB
GLUCOSE BLD MANUAL STRIP-MCNC: 100 MG/DL (ref 74–99)
GLUCOSE BLD MANUAL STRIP-MCNC: 119 MG/DL (ref 74–99)
GLUCOSE BLD MANUAL STRIP-MCNC: 89 MG/DL (ref 74–99)

## 2025-03-15 PROCEDURE — 2060000001 HC INTERMEDIATE ICU ROOM DAILY

## 2025-03-15 PROCEDURE — 99232 SBSQ HOSP IP/OBS MODERATE 35: CPT | Performed by: INTERNAL MEDICINE

## 2025-03-15 PROCEDURE — 82947 ASSAY GLUCOSE BLOOD QUANT: CPT

## 2025-03-15 PROCEDURE — 2500000002 HC RX 250 W HCPCS SELF ADMINISTERED DRUGS (ALT 637 FOR MEDICARE OP, ALT 636 FOR OP/ED): Performed by: HOSPITALIST

## 2025-03-15 PROCEDURE — 2500000001 HC RX 250 WO HCPCS SELF ADMINISTERED DRUGS (ALT 637 FOR MEDICARE OP): Performed by: INTERNAL MEDICINE

## 2025-03-15 PROCEDURE — 2500000005 HC RX 250 GENERAL PHARMACY W/O HCPCS: Performed by: HOSPITALIST

## 2025-03-15 PROCEDURE — 2500000001 HC RX 250 WO HCPCS SELF ADMINISTERED DRUGS (ALT 637 FOR MEDICARE OP): Performed by: HOSPITALIST

## 2025-03-15 PROCEDURE — 2500000002 HC RX 250 W HCPCS SELF ADMINISTERED DRUGS (ALT 637 FOR MEDICARE OP, ALT 636 FOR OP/ED): Performed by: INTERNAL MEDICINE

## 2025-03-15 RX ADMIN — APIXABAN 2.5 MG: 5 TABLET, FILM COATED ORAL at 09:25

## 2025-03-15 RX ADMIN — ACETAMINOPHEN 975 MG: 325 TABLET, FILM COATED ORAL at 09:25

## 2025-03-15 RX ADMIN — ESCITALOPRAM OXALATE 20 MG: 20 TABLET ORAL at 09:25

## 2025-03-15 RX ADMIN — OXYCODONE HYDROCHLORIDE 5 MG: 5 TABLET ORAL at 09:27

## 2025-03-15 RX ADMIN — ASPIRIN 81 MG: 81 TABLET, COATED ORAL at 09:25

## 2025-03-15 RX ADMIN — CALCIUM ACETATE 1334 MG: 667 CAPSULE ORAL at 17:27

## 2025-03-15 RX ADMIN — QUETIAPINE FUMARATE 25 MG: 25 TABLET ORAL at 17:27

## 2025-03-15 RX ADMIN — CALCIUM ACETATE 1334 MG: 667 CAPSULE ORAL at 09:25

## 2025-03-15 RX ADMIN — TAMSULOSIN HYDROCHLORIDE 0.4 MG: 0.4 CAPSULE ORAL at 09:25

## 2025-03-15 RX ADMIN — FINASTERIDE 5 MG: 5 TABLET, FILM COATED ORAL at 09:25

## 2025-03-15 RX ADMIN — ACETAMINOPHEN 975 MG: 325 TABLET, FILM COATED ORAL at 20:29

## 2025-03-15 RX ADMIN — OXYCODONE HYDROCHLORIDE 5 MG: 5 TABLET ORAL at 21:08

## 2025-03-15 RX ADMIN — APIXABAN 2.5 MG: 5 TABLET, FILM COATED ORAL at 20:29

## 2025-03-15 RX ADMIN — PANTOPRAZOLE SODIUM 40 MG: 40 TABLET, DELAYED RELEASE ORAL at 07:13

## 2025-03-15 RX ADMIN — Medication 1 CAPSULE: at 09:25

## 2025-03-15 RX ADMIN — ACETAMINOPHEN 975 MG: 325 TABLET, FILM COATED ORAL at 17:27

## 2025-03-15 RX ADMIN — OLANZAPINE 5 MG: 5 TABLET, FILM COATED ORAL at 20:29

## 2025-03-15 RX ADMIN — LIDOCAINE 4% 1 PATCH: 40 PATCH TOPICAL at 09:25

## 2025-03-15 RX ADMIN — ATORVASTATIN CALCIUM 40 MG: 40 TABLET, FILM COATED ORAL at 20:29

## 2025-03-15 RX ADMIN — QUETIAPINE FUMARATE 25 MG: 25 TABLET ORAL at 09:25

## 2025-03-15 RX ADMIN — CALCIUM ACETATE 1334 MG: 667 CAPSULE ORAL at 12:45

## 2025-03-15 ASSESSMENT — COGNITIVE AND FUNCTIONAL STATUS - GENERAL
MOVING TO AND FROM BED TO CHAIR: A LITTLE
CLIMB 3 TO 5 STEPS WITH RAILING: A LITTLE
MOBILITY SCORE: 20
WALKING IN HOSPITAL ROOM: A LITTLE
STANDING UP FROM CHAIR USING ARMS: A LITTLE
DAILY ACTIVITIY SCORE: 24
MOBILITY SCORE: 20
MOVING TO AND FROM BED TO CHAIR: A LITTLE
WALKING IN HOSPITAL ROOM: A LITTLE
CLIMB 3 TO 5 STEPS WITH RAILING: A LITTLE
STANDING UP FROM CHAIR USING ARMS: A LITTLE
DAILY ACTIVITIY SCORE: 24

## 2025-03-15 ASSESSMENT — PAIN SCALES - GENERAL
PAINLEVEL_OUTOF10: 8

## 2025-03-15 ASSESSMENT — PAIN - FUNCTIONAL ASSESSMENT
PAIN_FUNCTIONAL_ASSESSMENT: 0-10
PAIN_FUNCTIONAL_ASSESSMENT: 0-10

## 2025-03-15 ASSESSMENT — PAIN DESCRIPTION - LOCATION: LOCATION: KNEE

## 2025-03-15 NOTE — PROGRESS NOTES
03/15/25 1043   Discharge Planning   Expected Discharge Disposition SNF     Per notes patient to discharge to [facility], updated PT/OT notes sent and TCC asked facility to start auth, per secure chat with DSC , HENS and PASSR completed on 3/11. I will continue to monitor for discharge planing.

## 2025-03-15 NOTE — PROGRESS NOTES
Between 7AM-7PM please message me via Epic Secure Chat.  After 7PM please page Nocturnist on call.    [facility] [place] Hospitalist Progress Note      Kj Colmenares    :  1970(54 y.o.)    MRN:  [id number]  Date: 03/15/25     Assessment and Plan:     3/15:  S/p HD yesterday.   No overnight events reported.   Cont current regimen and dc pending placement.           3/14:  Cont current regimen.   HD today.   Dispo is to snf pending placement.       3/13:  Pt seen this morning, on room air, no overnight events reported.   Cont current regimen.   Dc to snf pending placement.         3/12:  ESRD... HD today.   Flu... RA, completed Rx.   Afib... Controlled on eliquis, toprol.   A1c is 5% 4 months ago... probably doesn't have DM.   Hx CVA... asa, statin.   Hx adjustment d/o... home zyprexa, seroquel, lexapro.   Home regimen for chronic conditions.  Dvt px covered with eliquis.   Pt/ot  Dc to snf pending placement, hopefully today.           3/11:  Influenza A infection  - finished tamiflu 5d course; continue supportive care    ESRD  Anemia of CKD  Metabolic   - no HD for 2 months prior to admission  - Nephrology consulted to manage IP HD; continue phoslo, neprocaps. EPO/IV iron per nephrology with HD.     Atrial fibrillation with RVR  - HR control improved with Toprol XL 50 mg nightly; Goal HR <130 without symptoms for now as he is recovering from Flu. Long term goal is <110.   - Continue po eliquis  - Will have OP follow up with cardiology, if remains in afib may need DCCV as OP    Left knee effusion  - CT left knee shows advanced osteoarthritis of the left knee with a large joint effusion. Plan for OP referral to orthopedic surgery to discuss options once acute issues resolved  - continue annetta tylenol, lidocaine patch    T2DM  - continue ssi    HLD  - switched to atorvastatin as he is ESRD on HD    Hx of CVA  - continue asa, statin    BPH  - continue flomax, proscar    DVT Prophylaxis: full anticoagulation  with eliquis    Disposition: SNF when auth obtained    Electronically signed by Bertin Garcia MD on 03/15/25 at 3:47 PM     Subjective:      Interval History:   Vitals and chart notes from overnight reviewed.   No acute issues overnight.   Patient seen and evaluated at bedside.   No chest pain or shortness of breath. Po intake better. Intermittent diarrhea.     Review of Systems:   Other than patient's chronic conditions and those complaints in the history above, the rest of the 10 systems review were done and were negative.     Current medications:  Scheduled Meds:acetaminophen, 975 mg, oral, TID  apixaban, 2.5 mg, oral, BID  aspirin, 81 mg, oral, Daily  atorvastatin, 40 mg, oral, Nightly  [Held by provider] calcitriol, 0.25 mcg, oral, Daily  calcium acetate, 1,334 mg, oral, TID  epoetin sadie or biosimilar, 100 Units/kg, subcutaneous, Every Mon/Wed/Fri  escitalopram, 20 mg, oral, Daily  finasteride, 5 mg, oral, Daily  insulin lispro, 0-5 Units, subcutaneous, TID AC  lidocaine, 1 patch, transdermal, Daily  metoprolol succinate XL, 50 mg, oral, Nightly  OLANZapine, 5 mg, oral, Nightly  pantoprazole, 40 mg, oral, Daily before breakfast   Or  pantoprazole, 40 mg, intravenous, Daily before breakfast  QUEtiapine, 25 mg, oral, BID  tamsulosin, 0.4 mg, oral, Daily  vitamin B complex-vitamin C-folic acid, 1 capsule, oral, Daily      Continuous Infusions:   PRN Meds:PRN medications: benzocaine-menthol, benzonatate, dextrose, dextrose, glucagon, glucagon, guaiFENesin, midodrine, ondansetron **OR** ondansetron, oxyCODONE      Objective:     Heart Rate:  [84-94]   Temp:  [36.2 °C (97.2 °F)-37 °C (98.6 °F)]   Resp:  [10-18]   BP: ()/(55-77)   SpO2:  [94 %-100 %]          Physical Exam  Vitals and nursing note reviewed.   Constitutional:       Appearance: He is obese.   HENT:      Mouth/Throat:      Mouth: Mucous membranes are moist.      Pharynx: Oropharynx is clear.   Cardiovascular:      Rate and Rhythm: Normal rate.  Rhythm irregular.   Pulmonary:      Effort: Pulmonary effort is normal.   Abdominal:      General: There is no distension.      Palpations: Abdomen is soft.      Tenderness: There is no abdominal tenderness.   Musculoskeletal:      Comments: Swelling around left knee   Neurological:      Mental Status: He is alert and oriented to person, place, and time.         Labs:   Lab Results   Component Value Date     03/14/2025    K 4.5 03/14/2025     03/14/2025    CO2 29 03/14/2025    BUN 71 (H) 03/14/2025    CREATININE 8.76 (H) 03/14/2025    GLUCOSE 86 03/14/2025    CALCIUM 10.3 03/14/2025    PROT 6.7 03/04/2025    BILITOT 0.5 03/04/2025    ALKPHOS 333 (H) 03/04/2025    AST 15 03/04/2025    ALT 13 03/04/2025       Lab Results   Component Value Date    WBC 6.5 03/14/2025    HGB 8.8 (L) 03/14/2025    HCT 28.6 (L) 03/14/2025    MCV 97 03/14/2025     03/14/2025

## 2025-03-16 VITALS
WEIGHT: 233.4 LBS | TEMPERATURE: 98.6 F | SYSTOLIC BLOOD PRESSURE: 102 MMHG | RESPIRATION RATE: 18 BRPM | BODY MASS INDEX: 34.57 KG/M2 | DIASTOLIC BLOOD PRESSURE: 69 MMHG | HEART RATE: 81 BPM | HEIGHT: 69 IN | OXYGEN SATURATION: 98 %

## 2025-03-16 LAB
GLUCOSE BLD MANUAL STRIP-MCNC: 122 MG/DL (ref 74–99)
GLUCOSE BLD MANUAL STRIP-MCNC: 85 MG/DL (ref 74–99)
GLUCOSE BLD MANUAL STRIP-MCNC: 88 MG/DL (ref 74–99)
GLUCOSE BLD MANUAL STRIP-MCNC: 90 MG/DL (ref 74–99)

## 2025-03-16 PROCEDURE — 2500000002 HC RX 250 W HCPCS SELF ADMINISTERED DRUGS (ALT 637 FOR MEDICARE OP, ALT 636 FOR OP/ED): Performed by: INTERNAL MEDICINE

## 2025-03-16 PROCEDURE — 2500000002 HC RX 250 W HCPCS SELF ADMINISTERED DRUGS (ALT 637 FOR MEDICARE OP, ALT 636 FOR OP/ED): Performed by: HOSPITALIST

## 2025-03-16 PROCEDURE — 82947 ASSAY GLUCOSE BLOOD QUANT: CPT

## 2025-03-16 PROCEDURE — 2060000001 HC INTERMEDIATE ICU ROOM DAILY

## 2025-03-16 PROCEDURE — 99232 SBSQ HOSP IP/OBS MODERATE 35: CPT | Performed by: INTERNAL MEDICINE

## 2025-03-16 PROCEDURE — 2500000001 HC RX 250 WO HCPCS SELF ADMINISTERED DRUGS (ALT 637 FOR MEDICARE OP): Performed by: INTERNAL MEDICINE

## 2025-03-16 PROCEDURE — 2500000001 HC RX 250 WO HCPCS SELF ADMINISTERED DRUGS (ALT 637 FOR MEDICARE OP): Performed by: HOSPITALIST

## 2025-03-16 PROCEDURE — 2500000005 HC RX 250 GENERAL PHARMACY W/O HCPCS: Performed by: HOSPITALIST

## 2025-03-16 RX ADMIN — QUETIAPINE FUMARATE 25 MG: 25 TABLET ORAL at 08:44

## 2025-03-16 RX ADMIN — METOPROLOL SUCCINATE 50 MG: 50 TABLET, EXTENDED RELEASE ORAL at 20:46

## 2025-03-16 RX ADMIN — ESCITALOPRAM OXALATE 20 MG: 20 TABLET ORAL at 08:44

## 2025-03-16 RX ADMIN — OXYCODONE HYDROCHLORIDE 5 MG: 5 TABLET ORAL at 14:03

## 2025-03-16 RX ADMIN — QUETIAPINE FUMARATE 25 MG: 25 TABLET ORAL at 17:28

## 2025-03-16 RX ADMIN — ACETAMINOPHEN 975 MG: 325 TABLET, FILM COATED ORAL at 20:46

## 2025-03-16 RX ADMIN — OLANZAPINE 5 MG: 5 TABLET, FILM COATED ORAL at 20:46

## 2025-03-16 RX ADMIN — CALCIUM ACETATE 1334 MG: 667 CAPSULE ORAL at 12:18

## 2025-03-16 RX ADMIN — APIXABAN 2.5 MG: 5 TABLET, FILM COATED ORAL at 08:44

## 2025-03-16 RX ADMIN — FINASTERIDE 5 MG: 5 TABLET, FILM COATED ORAL at 08:44

## 2025-03-16 RX ADMIN — Medication 1 CAPSULE: at 08:44

## 2025-03-16 RX ADMIN — TAMSULOSIN HYDROCHLORIDE 0.4 MG: 0.4 CAPSULE ORAL at 08:44

## 2025-03-16 RX ADMIN — OXYCODONE HYDROCHLORIDE 5 MG: 5 TABLET ORAL at 06:09

## 2025-03-16 RX ADMIN — ASPIRIN 81 MG: 81 TABLET, COATED ORAL at 08:44

## 2025-03-16 RX ADMIN — APIXABAN 2.5 MG: 5 TABLET, FILM COATED ORAL at 20:46

## 2025-03-16 RX ADMIN — LIDOCAINE 4% 1 PATCH: 40 PATCH TOPICAL at 08:44

## 2025-03-16 RX ADMIN — PANTOPRAZOLE SODIUM 40 MG: 40 TABLET, DELAYED RELEASE ORAL at 06:09

## 2025-03-16 RX ADMIN — ACETAMINOPHEN 975 MG: 325 TABLET, FILM COATED ORAL at 14:03

## 2025-03-16 RX ADMIN — OXYCODONE HYDROCHLORIDE 5 MG: 5 TABLET ORAL at 20:46

## 2025-03-16 RX ADMIN — ATORVASTATIN CALCIUM 40 MG: 40 TABLET, FILM COATED ORAL at 20:46

## 2025-03-16 RX ADMIN — CALCIUM ACETATE 1334 MG: 667 CAPSULE ORAL at 17:28

## 2025-03-16 RX ADMIN — ACETAMINOPHEN 975 MG: 325 TABLET, FILM COATED ORAL at 08:44

## 2025-03-16 RX ADMIN — CALCIUM ACETATE 1334 MG: 667 CAPSULE ORAL at 08:44

## 2025-03-16 ASSESSMENT — COGNITIVE AND FUNCTIONAL STATUS - GENERAL
DAILY ACTIVITIY SCORE: 24
STANDING UP FROM CHAIR USING ARMS: A LITTLE
MOVING TO AND FROM BED TO CHAIR: A LITTLE
CLIMB 3 TO 5 STEPS WITH RAILING: A LITTLE
MOBILITY SCORE: 20
CLIMB 3 TO 5 STEPS WITH RAILING: A LITTLE
STANDING UP FROM CHAIR USING ARMS: A LITTLE
WALKING IN HOSPITAL ROOM: A LITTLE
MOBILITY SCORE: 20
WALKING IN HOSPITAL ROOM: A LITTLE
MOVING TO AND FROM BED TO CHAIR: A LITTLE
DAILY ACTIVITIY SCORE: 24

## 2025-03-16 ASSESSMENT — PAIN DESCRIPTION - ORIENTATION: ORIENTATION: LEFT

## 2025-03-16 ASSESSMENT — PAIN DESCRIPTION - LOCATION: LOCATION: KNEE

## 2025-03-16 ASSESSMENT — PAIN SCALES - GENERAL
PAINLEVEL_OUTOF10: 8
PAINLEVEL_OUTOF10: 7
PAINLEVEL_OUTOF10: 9

## 2025-03-16 ASSESSMENT — PAIN - FUNCTIONAL ASSESSMENT: PAIN_FUNCTIONAL_ASSESSMENT: 0-10

## 2025-03-16 NOTE — PROGRESS NOTES
Kj Colmenares is a 54 y.o. male on day 11 of admission presenting with Influenza A.      Subjective   Seen and examined.   Breathing comfortably on room air.   No longer in influenza isolation.  No acute events.       Objective          Vitals 24HR  Heart Rate:  [82-89]   Temp:  [36.6 °C (97.9 °F)-37.1 °C (98.8 °F)]   Resp:  [18-20]   BP: ()/(61-74)   SpO2:  [94 %-100 %]     Intake/Output last 3 Shifts:    Intake/Output Summary (Last 24 hours) at 3/16/2025 1546  Last data filed at 3/16/2025 0600  Gross per 24 hour   Intake --   Output 200 ml   Net -200 ml       Physical Exam  Constitutional:       Appearance: Normal appearance.   HENT:      Mouth/Throat:      Mouth: Mucous membranes are moist.   Eyes:      Extraocular Movements: Extraocular movements intact.      Pupils: Pupils are equal, round, and reactive to light.   Cardiovascular:      Rate and Rhythm: Regular rhythm.      Heart sounds: S1 normal and S2 normal.   Pulmonary:      Breath sounds: Normal breath sounds.   Abdominal:      Comments: Soft, NT/ND, no masses, normal bowel sounds   Genitourinary:     Comments: No mazariegos  Musculoskeletal:      Right lower leg: No edema.      Left lower leg: No edema.   Skin:     General: Skin is warm and dry.   Neurological:      General: No focal deficit present.      Mental Status: She is alert and oriented to person, place, and time.   Psychiatric:         Behavior: Behavior normal.    Left upper extremity thrill and bruit, AV fistula    Scheduled Medications  acetaminophen, 975 mg, oral, TID  apixaban, 2.5 mg, oral, BID  aspirin, 81 mg, oral, Daily  atorvastatin, 40 mg, oral, Nightly  [Held by provider] calcitriol, 0.25 mcg, oral, Daily  calcium acetate, 1,334 mg, oral, TID  epoetin sadie or biosimilar, 100 Units/kg, subcutaneous, Every Mon/Wed/Fri  escitalopram, 20 mg, oral, Daily  finasteride, 5 mg, oral, Daily  insulin lispro, 0-5 Units, subcutaneous, TID AC  lidocaine, 1 patch, transdermal, Daily  metoprolol  succinate XL, 50 mg, oral, Nightly  OLANZapine, 5 mg, oral, Nightly  pantoprazole, 40 mg, oral, Daily before breakfast   Or  pantoprazole, 40 mg, intravenous, Daily before breakfast  QUEtiapine, 25 mg, oral, BID  tamsulosin, 0.4 mg, oral, Daily  vitamin B complex-vitamin C-folic acid, 1 capsule, oral, Daily      Continuous medications       PRN medications: benzocaine-menthol, benzonatate, dextrose, dextrose, glucagon, glucagon, guaiFENesin, midodrine, ondansetron **OR** ondansetron, oxyCODONE     Relevant Results  Results from last 7 days   Lab Units 03/14/25  0705 03/13/25  0633 03/10/25  0658   WBC AUTO x10*3/uL 6.5 6.5 6.2   HEMOGLOBIN g/dL 8.8* 8.9* 8.6*   HEMATOCRIT % 28.6* 29.3* 28.5*   PLATELETS AUTO x10*3/uL 196 191 187   NEUTROS PCT AUTO %  --   --  51.1   LYMPHS PCT AUTO %  --   --  24.3   MONOS PCT AUTO %  --   --  8.4   EOS PCT AUTO %  --   --  15.4     Results from last 7 days   Lab Units 03/14/25  0705 03/13/25 0633 03/10/25  0658   SODIUM mmol/L 140 140 143   POTASSIUM mmol/L 4.5 4.3 5.1   CHLORIDE mmol/L 100 102 108*   CO2 mmol/L 29 29 25   BUN mg/dL 71* 51* 71*   CREATININE mg/dL 8.76* 6.78* 9.30*   GLUCOSE mg/dL 86 107* 84   CALCIUM mg/dL 10.3 10.4* 10.1       CT knee left wo IV contrast   Final Result   Advanced osteoarthritis of the left knee with a large joint effusion.   This is grossly similar to the previous examination of July 17, 2024        No evidence fracture.             MACRO:   None        Signed by: Robert Zapata 3/8/2025 7:53 AM   Dictation workstation:   [id number]      XR chest 2 views   Final Result   Prominence of the hilar structures on the lateral view. Consider   follow-up CT of chest with contrast to exclude any adenopathy.   Signed by Ross Smith, DO               Assessment/Plan      ADALI MILES is a 54 year old Male with a past medical history of end-stage renal disease on hemodialysis via left arm AVF, diabetes, heart failure, CAD, atrial fibrillation, gout,  hypertension, neuropathy, history of trach/PEG status post removal, manic behavior, SAH s/p aneurysm clipping, DVT on Eliquis who presented with a productive cough, chest congestion, shortness of breath, body aches, and chills who was found to be influenza A positive.  Upon arrival, ECG demonstrated atrial fibrillation with RVR.  Nephrology is consulted for renal care.      We have dialyzed Mr. Underwood uneventfully.  He has stable respiratory parameters. I will place orders for dialysis again tomorrow.  He will get erythropoietin and is on a phosphorus binder.  Discharge planning to SNF is noted.  Nephrology will follow with his care while in house.        Assessment & Plan  Influenza A         I spent 40 minutes in the professional and overall care of this patient.      Pj Dixon, DO

## 2025-03-16 NOTE — CARE PLAN
The clinical goals for the shift include patient remains safe for this shift      Problem: Pain - Adult  Goal: Verbalizes/displays adequate comfort level or baseline comfort level  Outcome: Progressing     Problem: Safety - Adult  Goal: Free from fall injury  Outcome: Progressing     Problem: Discharge Planning  Goal: Discharge to home or other facility with appropriate resources  Outcome: Progressing     Problem: Chronic Conditions and Co-morbidities  Goal: Patient's chronic conditions and co-morbidity symptoms are monitored and maintained or improved  Outcome: Progressing     Problem: Nutrition  Goal: Nutrient intake appropriate for maintaining nutritional needs  Outcome: Progressing

## 2025-03-16 NOTE — CARE PLAN
The patient's goals for the shift include      The clinical goals for the shift include pt will sit up in a chair today      Problem: Safety - Adult  Goal: Free from fall injury  Outcome: Progressing

## 2025-03-16 NOTE — PROGRESS NOTES
03/16/25 0834   Discharge Planning   Expected Discharge Disposition SNF   Does the patient need discharge transport arranged? Yes   RoundTrip coordination needed? Yes   Has discharge transport been arranged? No   Stroke Family Assessment   Stroke Family Assessment Needed No   Intensity of Service   Intensity of Service 0-30 min     Patient is medically cleared for discharge.  Per james, [facility] has not received the 7000 and needs it to start auth.  Secure chat to DSC, to request she upload it to the SNF as they do not have access to it on the weekends.  Auth needs started by SNF once they receive it.  Estelita Capellan RN     3/16/25 @ 0848  DSC uploaded the 7000.  Requested the SNF start auth.  Estelita Capellan RN

## 2025-03-16 NOTE — PROGRESS NOTES
Between 7AM-7PM please message me via Epic Secure Chat.  After 7PM please page Nocturnist on call.    [facility] Hospitalist Progress Note      Kj Colmenares    :  1970(54 y.o.)    MRN:  [id number]  Date: 25     Assessment and Plan:       3/16:  Pt feels well, on room air, afebrile, has resolving productive cough.   Cont regimen.   HD per renal.   Dc to snf is pending.       3/15:  S/p HD yesterday.   No overnight events reported.   Cont current regimen and dc pending placement.       3/14:  Cont current regimen.   HD today.   Dispo is to snf pending placement.       3/13:  Pt seen this morning, on room air, no overnight events reported.   Cont current regimen.   Dc to snf pending placement.         3/12:  ESRD... HD today.   Flu... RA, completed Rx.   Afib... Controlled on eliquis, toprol.   A1c is 5% 4 months ago... probably doesn't have DM.   Hx CVA... asa, statin.   Hx adjustment d/o... home zyprexa, seroquel, lexapro.   Home regimen for chronic conditions.  Dvt px covered with eliquis.   Pt/ot  Dc to snf pending placement, hopefully today.           3/11:  Influenza A infection  - finished tamiflu 5d course; continue supportive care    ESRD  Anemia of CKD  Metabolic   - no HD for 2 months prior to admission  - Nephrology consulted to manage IP HD; continue phoslo, neprocaps. EPO/IV iron per nephrology with HD.     Atrial fibrillation with RVR  - HR control improved with Toprol XL 50 mg nightly; Goal HR <130 without symptoms for now as he is recovering from Flu. Long term goal is <110.   - Continue po eliquis  - Will have OP follow up with cardiology, if remains in afib may need DCCV as OP    Left knee effusion  - CT left knee shows advanced osteoarthritis of the left knee with a large joint effusion. Plan for OP referral to orthopedic surgery to discuss options once acute issues resolved  - continue annetta tylenol, lidocaine patch    T2DM  - continue ssi    HLD  - switched to  atorvastatin as he is ESRD on HD    Hx of CVA  - continue asa, statin    BPH  - continue flomax, proscar    DVT Prophylaxis: full anticoagulation with eliquis    Disposition: SNF when auth obtained    Electronically signed by Bertin Garcia MD on 03/16/25 at 10:26 AM     Subjective:      Interval History:   Vitals and chart notes from overnight reviewed.   No acute issues overnight.   Patient seen and evaluated at bedside.   No chest pain or shortness of breath. Po intake better. Intermittent diarrhea.     Review of Systems:   Other than patient's chronic conditions and those complaints in the history above, the rest of the 10 systems review were done and were negative.     Current medications:  Scheduled Meds:acetaminophen, 975 mg, oral, TID  apixaban, 2.5 mg, oral, BID  aspirin, 81 mg, oral, Daily  atorvastatin, 40 mg, oral, Nightly  [Held by provider] calcitriol, 0.25 mcg, oral, Daily  calcium acetate, 1,334 mg, oral, TID  epoetin sadie or biosimilar, 100 Units/kg, subcutaneous, Every Mon/Wed/Fri  escitalopram, 20 mg, oral, Daily  finasteride, 5 mg, oral, Daily  insulin lispro, 0-5 Units, subcutaneous, TID AC  lidocaine, 1 patch, transdermal, Daily  metoprolol succinate XL, 50 mg, oral, Nightly  OLANZapine, 5 mg, oral, Nightly  pantoprazole, 40 mg, oral, Daily before breakfast   Or  pantoprazole, 40 mg, intravenous, Daily before breakfast  QUEtiapine, 25 mg, oral, BID  tamsulosin, 0.4 mg, oral, Daily  vitamin B complex-vitamin C-folic acid, 1 capsule, oral, Daily      Continuous Infusions:   PRN Meds:PRN medications: benzocaine-menthol, benzonatate, dextrose, dextrose, glucagon, glucagon, guaiFENesin, midodrine, ondansetron **OR** ondansetron, oxyCODONE      Objective:     Heart Rate:  [82-94]   Temp:  [36.2 °C (97.2 °F)-37.1 °C (98.8 °F)]   Resp:  [18-20]   BP: ()/(55-74)   SpO2:  [94 %-100 %]          Physical Exam  Vitals and nursing note reviewed.   Constitutional:       Appearance: He is obese.    HENT:      Mouth/Throat:      Mouth: Mucous membranes are moist.      Pharynx: Oropharynx is clear.   Cardiovascular:      Rate and Rhythm: Normal rate. Rhythm irregular.   Pulmonary:      Effort: Pulmonary effort is normal.   Abdominal:      General: There is no distension.      Palpations: Abdomen is soft.      Tenderness: There is no abdominal tenderness.   Musculoskeletal:      Comments: Swelling around left knee   Neurological:      Mental Status: He is alert and oriented to person, place, and time.         Labs:   Lab Results   Component Value Date     03/14/2025    K 4.5 03/14/2025     03/14/2025    CO2 29 03/14/2025    BUN 71 (H) 03/14/2025    CREATININE 8.76 (H) 03/14/2025    GLUCOSE 86 03/14/2025    CALCIUM 10.3 03/14/2025    PROT 6.7 03/04/2025    BILITOT 0.5 03/04/2025    ALKPHOS 333 (H) 03/04/2025    AST 15 03/04/2025    ALT 13 03/04/2025       Lab Results   Component Value Date    WBC 6.5 03/14/2025    HGB 8.8 (L) 03/14/2025    HCT 28.6 (L) 03/14/2025    MCV 97 03/14/2025     03/14/2025

## 2025-03-17 ENCOUNTER — APPOINTMENT (OUTPATIENT)
Dept: DIALYSIS | Facility: HOSPITAL | Age: 55
End: 2025-03-17
Payer: COMMERCIAL

## 2025-03-17 LAB
ANION GAP SERPL CALC-SCNC: 15 MMOL/L (ref 10–20)
BUN SERPL-MCNC: 80 MG/DL (ref 6–23)
CALCIUM SERPL-MCNC: 10.1 MG/DL (ref 8.6–10.3)
CHLORIDE SERPL-SCNC: 99 MMOL/L (ref 98–107)
CO2 SERPL-SCNC: 28 MMOL/L (ref 21–32)
CREAT SERPL-MCNC: 9.45 MG/DL (ref 0.5–1.3)
EGFRCR SERPLBLD CKD-EPI 2021: 6 ML/MIN/1.73M*2
ERYTHROCYTE [DISTWIDTH] IN BLOOD BY AUTOMATED COUNT: 15.1 % (ref 11.5–14.5)
GLUCOSE BLD MANUAL STRIP-MCNC: 105 MG/DL (ref 74–99)
GLUCOSE BLD MANUAL STRIP-MCNC: 113 MG/DL (ref 74–99)
GLUCOSE BLD MANUAL STRIP-MCNC: 88 MG/DL (ref 74–99)
GLUCOSE SERPL-MCNC: 84 MG/DL (ref 74–99)
HCT VFR BLD AUTO: 26.4 % (ref 41–52)
HGB BLD-MCNC: 8 G/DL (ref 13.5–17.5)
MCH RBC QN AUTO: 29.5 PG (ref 26–34)
MCHC RBC AUTO-ENTMCNC: 30.3 G/DL (ref 32–36)
MCV RBC AUTO: 97 FL (ref 80–100)
NRBC BLD-RTO: 0 /100 WBCS (ref 0–0)
PLATELET # BLD AUTO: 193 X10*3/UL (ref 150–450)
POTASSIUM SERPL-SCNC: 4.7 MMOL/L (ref 3.5–5.3)
RBC # BLD AUTO: 2.71 X10*6/UL (ref 4.5–5.9)
SODIUM SERPL-SCNC: 137 MMOL/L (ref 136–145)
WBC # BLD AUTO: 6.1 X10*3/UL (ref 4.4–11.3)

## 2025-03-17 PROCEDURE — 8010000001 HC DIALYSIS - HEMODIALYSIS PER DAY

## 2025-03-17 PROCEDURE — 97116 GAIT TRAINING THERAPY: CPT | Mod: GP,CQ | Performed by: PHYSICAL THERAPY ASSISTANT

## 2025-03-17 PROCEDURE — 6350000001 HC RX 635 EPOETIN >10,000 UNITS: Mod: JZ | Performed by: INTERNAL MEDICINE

## 2025-03-17 PROCEDURE — 97530 THERAPEUTIC ACTIVITIES: CPT | Mod: GO | Performed by: OCCUPATIONAL THERAPIST

## 2025-03-17 PROCEDURE — 2060000001 HC INTERMEDIATE ICU ROOM DAILY

## 2025-03-17 PROCEDURE — 85027 COMPLETE CBC AUTOMATED: CPT | Performed by: INTERNAL MEDICINE

## 2025-03-17 PROCEDURE — 82947 ASSAY GLUCOSE BLOOD QUANT: CPT

## 2025-03-17 PROCEDURE — 2500000001 HC RX 250 WO HCPCS SELF ADMINISTERED DRUGS (ALT 637 FOR MEDICARE OP): Performed by: INTERNAL MEDICINE

## 2025-03-17 PROCEDURE — 36415 COLL VENOUS BLD VENIPUNCTURE: CPT | Performed by: INTERNAL MEDICINE

## 2025-03-17 PROCEDURE — 2500000001 HC RX 250 WO HCPCS SELF ADMINISTERED DRUGS (ALT 637 FOR MEDICARE OP): Performed by: HOSPITALIST

## 2025-03-17 PROCEDURE — 99232 SBSQ HOSP IP/OBS MODERATE 35: CPT | Performed by: INTERNAL MEDICINE

## 2025-03-17 PROCEDURE — 2500000002 HC RX 250 W HCPCS SELF ADMINISTERED DRUGS (ALT 637 FOR MEDICARE OP, ALT 636 FOR OP/ED): Performed by: INTERNAL MEDICINE

## 2025-03-17 PROCEDURE — 2500000002 HC RX 250 W HCPCS SELF ADMINISTERED DRUGS (ALT 637 FOR MEDICARE OP, ALT 636 FOR OP/ED): Performed by: HOSPITALIST

## 2025-03-17 PROCEDURE — 80048 BASIC METABOLIC PNL TOTAL CA: CPT | Performed by: INTERNAL MEDICINE

## 2025-03-17 PROCEDURE — 90935 HEMODIALYSIS ONE EVALUATION: CPT | Performed by: INTERNAL MEDICINE

## 2025-03-17 RX ADMIN — ACETAMINOPHEN 975 MG: 325 TABLET, FILM COATED ORAL at 20:51

## 2025-03-17 RX ADMIN — APIXABAN 2.5 MG: 5 TABLET, FILM COATED ORAL at 12:46

## 2025-03-17 RX ADMIN — QUETIAPINE FUMARATE 25 MG: 25 TABLET ORAL at 16:38

## 2025-03-17 RX ADMIN — CALCIUM ACETATE 1334 MG: 667 CAPSULE ORAL at 12:46

## 2025-03-17 RX ADMIN — OXYCODONE HYDROCHLORIDE 5 MG: 5 TABLET ORAL at 12:47

## 2025-03-17 RX ADMIN — OXYCODONE HYDROCHLORIDE 5 MG: 5 TABLET ORAL at 05:27

## 2025-03-17 RX ADMIN — EPOETIN ALFA-EPBX 10000 UNITS: 10000 INJECTION, SOLUTION INTRAVENOUS; SUBCUTANEOUS at 18:20

## 2025-03-17 RX ADMIN — APIXABAN 2.5 MG: 5 TABLET, FILM COATED ORAL at 20:52

## 2025-03-17 RX ADMIN — FINASTERIDE 5 MG: 5 TABLET, FILM COATED ORAL at 12:46

## 2025-03-17 RX ADMIN — ATORVASTATIN CALCIUM 40 MG: 40 TABLET, FILM COATED ORAL at 20:51

## 2025-03-17 RX ADMIN — METOPROLOL SUCCINATE 50 MG: 50 TABLET, EXTENDED RELEASE ORAL at 20:51

## 2025-03-17 RX ADMIN — ASPIRIN 81 MG: 81 TABLET, COATED ORAL at 12:46

## 2025-03-17 RX ADMIN — ESCITALOPRAM OXALATE 20 MG: 20 TABLET ORAL at 12:46

## 2025-03-17 RX ADMIN — ACETAMINOPHEN 975 MG: 325 TABLET, FILM COATED ORAL at 15:01

## 2025-03-17 RX ADMIN — QUETIAPINE FUMARATE 25 MG: 25 TABLET ORAL at 12:46

## 2025-03-17 RX ADMIN — OLANZAPINE 5 MG: 5 TABLET, FILM COATED ORAL at 20:51

## 2025-03-17 RX ADMIN — Medication 1 CAPSULE: at 12:46

## 2025-03-17 RX ADMIN — PANTOPRAZOLE SODIUM 40 MG: 40 TABLET, DELAYED RELEASE ORAL at 05:27

## 2025-03-17 RX ADMIN — ACETAMINOPHEN 975 MG: 325 TABLET, FILM COATED ORAL at 11:33

## 2025-03-17 RX ADMIN — CALCIUM ACETATE 1334 MG: 667 CAPSULE ORAL at 16:38

## 2025-03-17 RX ADMIN — OXYCODONE HYDROCHLORIDE 5 MG: 5 TABLET ORAL at 20:52

## 2025-03-17 RX ADMIN — TAMSULOSIN HYDROCHLORIDE 0.4 MG: 0.4 CAPSULE ORAL at 12:46

## 2025-03-17 ASSESSMENT — COGNITIVE AND FUNCTIONAL STATUS - GENERAL
CLIMB 3 TO 5 STEPS WITH RAILING: A LOT
PERSONAL GROOMING: A LITTLE
MOBILITY SCORE: 21
MOVING FROM LYING ON BACK TO SITTING ON SIDE OF FLAT BED WITH BEDRAILS: A LITTLE
TURNING FROM BACK TO SIDE WHILE IN FLAT BAD: A LITTLE
STANDING UP FROM CHAIR USING ARMS: A LITTLE
DAILY ACTIVITIY SCORE: 24
TOILETING: A LITTLE
DRESSING REGULAR LOWER BODY CLOTHING: A LITTLE
WALKING IN HOSPITAL ROOM: A LITTLE
CLIMB 3 TO 5 STEPS WITH RAILING: TOTAL
HELP NEEDED FOR BATHING: A LITTLE
DAILY ACTIVITIY SCORE: 19
WALKING IN HOSPITAL ROOM: A LITTLE
DRESSING REGULAR UPPER BODY CLOTHING: A LITTLE
STANDING UP FROM CHAIR USING ARMS: A LITTLE
MOVING TO AND FROM BED TO CHAIR: A LITTLE
MOBILITY SCORE: 16
MOBILITY SCORE: 20
WALKING IN HOSPITAL ROOM: A LITTLE
MOVING TO AND FROM BED TO CHAIR: A LITTLE
DAILY ACTIVITIY SCORE: 24
CLIMB 3 TO 5 STEPS WITH RAILING: A LITTLE

## 2025-03-17 ASSESSMENT — PAIN SCALES - GENERAL
PAINLEVEL_OUTOF10: 10 - WORST POSSIBLE PAIN
PAINLEVEL_OUTOF10: 0 - NO PAIN
PAINLEVEL_OUTOF10: 3
PAINLEVEL_OUTOF10: 0 - NO PAIN
PAINLEVEL_OUTOF10: 9
PAINLEVEL_OUTOF10: 10 - WORST POSSIBLE PAIN
PAINLEVEL_OUTOF10: 3
PAINLEVEL_OUTOF10: 8

## 2025-03-17 ASSESSMENT — PAIN DESCRIPTION - ORIENTATION: ORIENTATION: LEFT

## 2025-03-17 ASSESSMENT — PAIN - FUNCTIONAL ASSESSMENT
PAIN_FUNCTIONAL_ASSESSMENT: NO/DENIES PAIN
PAIN_FUNCTIONAL_ASSESSMENT: 0-10
PAIN_FUNCTIONAL_ASSESSMENT: 0-10

## 2025-03-17 ASSESSMENT — PAIN DESCRIPTION - LOCATION
LOCATION: KNEE
LOCATION: HEAD

## 2025-03-17 ASSESSMENT — PAIN SCALES - WONG BAKER: WONGBAKER_NUMERICALRESPONSE: HURTS WORST

## 2025-03-17 NOTE — PROGRESS NOTES
Physical Therapy    Physical Therapy Treatment    Patient Name: Kj Colmenares  MRN: [id number]  Department: [place] [address]  Room: [address] [place] [address]  Today's Date: 3/17/2025  Time Calculation  Start Time: 1402  Stop Time: 1417  Time Calculation (min): 15 min         Assessment/Plan   PT Assessment  Rehab Prognosis: Good  Barriers to Discharge Home: Caregiver assistance, Physical needs  End of Session Communication: Bedside nurse  Assessment Comment:  (pt demo fair dmitriy to activity,some SOB and dizziness towards end of session,subsided wiht rest. RN notified.)  End of Session Patient Position: Up in chair, Alarm off, caregiver present (OT entered room)  PT Plan  Inpatient/Swing Bed or Outpatient: Inpatient  PT Plan  Treatment/Interventions: Bed mobility, Transfer training, Gait training, Therapeutic activity  PT Plan: Ongoing PT  PT Frequency: 3 times per week  PT Discharge Recommendations: Moderate intensity level of continued care  PT Recommended Transfer Status: Assist x1  PT - OK to Discharge: Yes (per PT POC)      General Visit Information:   PT  Visit  PT Received On: 03/17/25  General  Reason for Referral: To ED with SOB, chest pain, and cough. Pt found to be in afib RVR and Flu (+)  Referred By: Tyson Everett, DO  Prior to Session Communication: Bedside nurse  Patient Position Received: Bed, 3 rail up, Alarm on  Preferred Learning Style: auditory, kinesthetic, verbal  General Comment:  (pt starting to sit at EOB,alarm going off upon entry,instructed to wait for assistance,agreeable to tx)    Subjective   Precautions:  Precautions  Medical Precautions: Fall precautions     Date/Time Vitals Session Patient Position Pulse Resp SpO2 BP MAP (mmHg)    03/17/25 1402 --  --  95  --  98 %  124/72  --                 Objective   Pain:  Pain Assessment  0-10 (Numeric) Pain Score: 3  Pain Type: Acute pain  Pain Location: Knee  Pain Orientation: Left  Cognition:  Cognition  Orientation Level: Oriented X4  Impulsive: Mildly  Coordination:      Postural Control:     Extremity/Trunk Assessments:    Activity Tolerance:     Treatments:       Bed Mobility  Bed Mobility: Yes  Bed Mobility 1  Bed Mobility 1: Sitting to supine  Level of Assistance 1: Close supervision  Bed Mobility Comments 1:  (pt req increased cues for proper hand placement to improve safety with transfer.)    Ambulation/Gait Training  Ambulation/Gait Training Performed: Yes  Ambulation/Gait Training 1  Surface 1: Level tile  Device 1: Rolling walker  Gait Support Devices: Gait belt  Assistance 1: Minimum assistance  Quality of Gait 1: Wide base of support, Antalgic, Forward flexed posture  Comments/Distance (ft) 1: 10ft with cane Mod A,55ft x 2 with FWW Min A  Transfers  Transfer: Yes  Transfer 1  Transfer From 1: Sit to, Stand to  Technique 1: Sit to stand, Stand to sit  Transfer Device 1: Walker, Gait belt  Transfer Level of Assistance 1: Contact guard  Trials/Comments 1: 2 (pt req increased cues for proper hand placement)         Outcome Measures:  [facility] Basic Mobility  Turning from your back to your side while in a flat bed without using bedrails: A little  Moving from lying on your back to sitting on the side of a flat bed without using bedrails: A little  Moving to and from bed to chair (including a wheelchair): A little  Standing up from a chair using your arms (e.g. wheelchair or bedside chair): A little  To walk in hospital room: A little  Climbing 3-5 steps with railing: Total  Basic Mobility - Total Score: 16    Education Documentation  Precautions, taught by Modesto Vega PTA at 3/17/2025  2:59 PM.  Learner: Patient  Readiness: Acceptance  Method: Explanation  Response: Needs Reinforcement    Body Mechanics, taught by Modesto Vega PTA at 3/17/2025  2:59 PM.  Learner: Patient  Readiness: Acceptance  Method: Explanation  Response: Needs Reinforcement    Mobility Training, taught by Modesto Vega PTA at 3/17/2025  2:59 PM.  Learner: Patient  Readiness:  Acceptance  Method: Explanation  Response: Needs Reinforcement    Education Comments  No comments found.        OP EDUCATION:       Encounter Problems       Encounter Problems (Active)       Balance       complete all mobility with normal balance while dual tasking, negotiating in a dynamic environment, carrying items, etc., with proactive and reactive static and dynamic standing and sitting tasks, with mod I and LRAD, >15 minutes.   (Progressing)       Start:  03/09/25    Expected End:  03/23/25               Mobility       STG - Patient will ambulate 75 ft mod I with LRAD and stable vitals.  (Progressing)       Start:  03/09/25    Expected End:  03/23/25            Pt will tolerate 15 reps of each LE exercise in order to improve strength and endurance.   (Progressing)       Start:  03/09/25    Expected End:  03/23/25               PT Transfers       STG - Patient will perform bed mobility independently. (Progressing)       Start:  03/09/25    Expected End:  03/23/25            STG - Patient will transfer sit to and from stand mod I using LRAD (Progressing)       Start:  03/09/25    Expected End:  03/23/25               Pain - Adult

## 2025-03-17 NOTE — PRE-PROCEDURE NOTE
Report from Sending RN:    Report From: Darlene DEXTER   Recent Surgery of Procedure: No  Baseline Level of Consciousness (LOC): x  Oxygen Use: No  Type: na  Diabetic: Yes  Last BP Med Given Day of Dialysis: see emar   Last Pain Med Given: see emar   Lab Tests to be Obtained with Dialysis: No  Blood Transfusion to be Given During Dialysis: No  Available IV Access: Yes  Medications to be Administered During Dialysis: No  Continuous IV Infusion Running: No  Restraints on Currently or in the Last 24 Hours: No  Hand-Off Communication: stable for hd no issues   Dialysis Catheter Dressing:   Last Dressing Change:

## 2025-03-17 NOTE — CARE PLAN
The clinical goals for the shift include Patient will remain free from injury       Problem: Pain - Adult  Goal: Verbalizes/displays adequate comfort level or baseline comfort level  Outcome: Progressing     Problem: Safety - Adult  Goal: Free from fall injury  Outcome: Progressing     Problem: Discharge Planning  Goal: Discharge to home or other facility with appropriate resources  Outcome: Progressing     Problem: Chronic Conditions and Co-morbidities  Goal: Patient's chronic conditions and co-morbidity symptoms are monitored and maintained or improved  Outcome: Progressing     Problem: Nutrition  Goal: Nutrient intake appropriate for maintaining nutritional needs  Outcome: Progressing

## 2025-03-17 NOTE — PROGRESS NOTES
Occupational Therapy    OT Treatment    Patient Name: Kj Colmenares  MRN: [id number]  Department: [facility] [id number]  Room: [address] [place] [address]  Today's Date: 3/17/2025  Time Calculation  Start Time: 1418  Stop Time: 1434  Time Calculation (min): 16 min        Assessment:  Prognosis: Good  Barriers to Discharge Home: Physical needs, Caregiver assistance  Caregiver Assistance: Caregiver assistance needed per identified barriers - however, level of patient's required assistance exceeds assistance available at home  Physical Needs: 24hr mobility assistance needed, Intermittent ADL assistance needed, Ambulating household distances limited by function/safety, High falls risk due to function or environment  Evaluation/Treatment Tolerance: Patient limited by fatigue, Patient limited by pain  Medical Staff Made Aware: Yes  End of Session Communication: Bedside nurse  End of Session Patient Position: Bed, 3 rail up, Alarm on  Prognosis: Good  Evaluation/Treatment Tolerance: Patient limited by fatigue, Patient limited by pain  Medical Staff Made Aware: Yes  Plan:  Treatment Interventions: ADL retraining, Functional transfer training, UE strengthening/ROM, Endurance training, Patient/family training, Equipment evaluation/education, Compensatory technique education  OT Frequency: 3 times per week  OT Discharge Recommendations: Moderate intensity level of continued care  Equipment Recommended upon Discharge:  (TBD)  OT Recommended Transfer Status: Assist of 1  OT - OK to Discharge: Yes (continue per OT POC)  Treatment Interventions: ADL retraining, Functional transfer training, UE strengthening/ROM, Endurance training, Patient/family training, Equipment evaluation/education, Compensatory technique education    Subjective   Previous Visit Info:  OT Last Visit  OT Received On: 03/17/25  General:  General  Reason for Referral: To ED with SOB, chest pain, and cough. Pt found to be in afib RVR and Flu (+)  Referred By: Tyson Everett DO  Past Medical  History Relevant to Rehab:   Past Medical History:   Diagnosis Date    Diabetes mellitus (Multi)     Hypertension     Stroke (Multi)       Family/Caregiver Present: No  Prior to Session Communication: Bedside nurse  Patient Position Received: Up in chair, Alarm off, caregiver present (PTA in room)  Preferred Learning Style: auditory, visual, verbal  General Comment: Pt seated in chair upon arrival and agreeable to treatment. Pt fully participatory.  Precautions:  Medical Precautions: Fall precautions     Date/Time Vitals Session Patient Position Pulse Resp SpO2 BP MAP (mmHg)    03/17/25 1402 --  --  95  --  98 %  124/72  --     03/17/25 1418 --  --  --  --  99 %  --  --         HR ranged from 90s-129 with activity, RN notified         Pain:  Pain Assessment  Pain Assessment: 0-10  0-10 (Numeric) Pain Score: 9  Pain Type: Acute pain  Pain Location: Knee  Pain Orientation: Left  Pain Interventions: Repositioned, Ambulation/increased activity  Response to Interventions: Resting quietly (RN notified, pt declined ice pack)    Objective    Cognition:  Cognition  Orientation Level: Oriented X4  Insight: Mild  Impulsive: Mildly         Bed Mobility/Transfers: Bed Mobility  Bed Mobility: Yes  Bed Mobility 1  Bed Mobility 1: Sitting to supine  Level of Assistance 1: Modified independent  Bed Mobility Comments 1: HOB flat    Transfers  Transfer: Yes  Transfer 1  Technique 1: Sit to stand, Stand to sit  Transfer Device 1: Walker  Transfer Level of Assistance 1: Close supervision, Minimal verbal cues  Trials/Comments 1: from chair x8 trials, cues for sequencing, safe hand placement and walker safety  Transfers 2  Transfer From 2: Chair with arms to  Transfer to 2: Bed  Technique 2: Stand pivot  Transfer Device 2: Walker  Transfer Level of Assistance 2: Close supervision, Minimal verbal cues  Trials/Comments 2: cues for sequencing and walker safety       Sitting Balance:  Static Sitting Balance  Static Sitting-Balance Support:  Bilateral upper extremity supported, Feet supported  Static Sitting-Level of Assistance: Independent  Static Sitting-Comment/Number of Minutes: at EOB  Standing Balance:  Static Standing Balance  Static Standing-Balance Support: Bilateral upper extremity supported  Static Standing-Level of Assistance: Close supervision  Static Standing-Comment/Number of Minutes: FWW  Dynamic Standing Balance  Dynamic Standing-Comments: SBA using FWW       Therapy/Activity: Therapeutic Activity  Therapeutic Activity Performed: Yes  Therapeutic Activity 1: Pt completed x2 standing trials for ~1 minute duration each while performing alternating B UE functional reach, SBA for balance, no LOB Noted. Short seated rest break d/t SOB and fatigue, VSS.  Therapeutic Activity 2: While seated in chair, pt instructed in x1 set of 10 B UE CANE exercises including scapular protraction/retraction, circles (fwd and backward) and elbow flexion/extension. No c/o pain. Cues for correct technique and to slow pace. Pt initially attempted ther ex in standing however limited by fatigue and L knee pain.      Outcome Measures:[facility] Daily Activity  Putting on and taking off regular lower body clothing: A little  Bathing (including washing, rinsing, drying): A little  Putting on and taking off regular upper body clothing: A little  Toileting, which includes using toilet, bedpan or urinal: A little  Taking care of personal grooming such as brushing teeth: A little  Eating Meals: None  Daily Activity - Total Score: 19         and OT Adult Other Outcome Measures  5x Sit to Stand: Pt performed in 33 seconds, which the normal is </=12 seconds. Pt required use of B UEs to complete d/t generalized weakness, unable to perform sit<>stand without use of UEs. Pt demos significantly decreased functional endurance, balance and strength and is a high falls risk which causes safety concerns with discharging home at this time.    Education Documentation  Body Mechanics,  taught by Karyna Hooks OT at 3/17/2025  3:10 PM.  Learner: Patient  Readiness: Acceptance  Method: Explanation  Response: Verbalizes Understanding    Precautions, taught by Karyna Hooks OT at 3/17/2025  3:10 PM.  Learner: Patient  Readiness: Acceptance  Method: Explanation  Response: Verbalizes Understanding    ADL Training, taught by Karyna Hooks OT at 3/17/2025  3:10 PM.  Learner: Patient  Readiness: Acceptance  Method: Explanation  Response: Verbalizes Understanding    Education Comments  No comments found.           Goals:  Encounter Problems       Encounter Problems (Active)       ADLs       Patient will perform UB and LB bathing with modified independent level of assistance and grab bars, shower chair, and long-handled sponge. (Progressing)       Start:  03/10/25    Expected End:  03/24/25            Patient with complete upper body dressing with modified independent level of assistance donning and doffing all UE clothes with PRN adaptive equipment. (Progressing)       Start:  03/10/25    Expected End:  03/24/25            Patient with complete lower body dressing with modified independent level of assistance donning and doffing all LE clothes  with PRN adaptive equipment. (Progressing)       Start:  03/10/25    Expected End:  03/24/25            Patient will complete daily grooming tasks with modified independent level of assistance and PRN adaptive equipment. (Progressing)       Start:  03/10/25    Expected End:  03/24/25            Patient will complete toileting including hygiene clothing management/hygiene with modified independent level of assistance and raised toilet seat and grab bars. (Progressing)       Start:  03/10/25    Expected End:  03/24/25               MOBILITY       Patient will perform Functional mobility x Household distances/Community Distances with modified independent level of assistance and least restrictive device in order to improve safety and functional mobility.  (Progressing)       Start:  03/10/25    Expected End:  03/24/25               TRANSFERS       Patient will perform bed mobility independent level of assistance in order to improve safety and independence with mobility (Progressing)       Start:  03/10/25    Expected End:  03/24/25            Patient will complete functional transfers with least restrictive device with modified independent level of assistance. (Progressing)       Start:  03/10/25    Expected End:  03/24/25

## 2025-03-17 NOTE — PROGRESS NOTES
Between 7AM-7PM please message me via Epic Secure Chat.  After 7PM please page Nocturnist on call.    [facility] [place] [facility] [place]      Kj Colmenares    :  1970(54 y.o.)    MRN:  [id number]  Date: 25     Assessment and Plan:       3/17:  No new events reported.   HD today.   He complete tamiflu and currently on room air.   Cont current regimen.     Dc to snf is pending. Pt is medically cleared.         3/16:  Pt feels well, on room air, afebrile, has resolving productive cough.   Cont regimen.   HD per renal.   Dc to snf is pending.       3/15:  S/p HD yesterday.   No overnight events reported.   Cont current regimen and dc pending placement.       3/14:  Cont current regimen.   HD today.   Dispo is to snf pending placement.       3/13:  Pt seen this morning, on room air, no overnight events reported.   Cont current regimen.   Dc to snf pending placement.         3/12:  ESRD... HD today.   Flu... RA, completed Rx.   Afib... Controlled on eliquis, toprol.   A1c is 5% 4 months ago... probably doesn't have DM.   Hx CVA... asa, statin.   Hx adjustment d/o... home zyprexa, seroquel, lexapro.   Home regimen for chronic conditions.  Dvt px covered with eliquis.   Pt/ot  Dc to snf pending placement, hopefully today.           3/11:  Influenza A infection  - finished tamiflu 5d course; continue supportive care    ESRD  Anemia of CKD  Metabolic   - no HD for 2 months prior to admission  - Nephrology consulted to manage IP HD; continue phoslo, neprocaps. EPO/IV iron per nephrology with HD.     Atrial fibrillation with RVR  - HR control improved with Toprol XL 50 mg nightly; Goal HR <130 without symptoms for now as he is recovering from Flu. Long term goal is <110.   - Continue po eliquis  - Will have OP follow up with cardiology, if remains in afib may need DCCV as OP    Left knee effusion  - CT left knee shows advanced osteoarthritis of the left knee with a large joint effusion. Plan for  OP referral to orthopedic surgery to discuss options once acute issues resolved  - continue annetta tylenol, lidocaine patch    T2DM  - continue ssi    HLD  - switched to atorvastatin as he is ESRD on HD    Hx of CVA  - continue asa, statin    BPH  - continue flomax, proscar    DVT Prophylaxis: full anticoagulation with eliquis    Disposition: SNF when auth obtained    Electronically signed by Bertin Garcia MD on 03/17/25 at 5:28 PM     Subjective:      Interval History:   Vitals and chart notes from overnight reviewed.   No acute issues overnight.   Patient seen and evaluated at bedside.   No chest pain or shortness of breath. Po intake better. Intermittent diarrhea.     Review of Systems:   Other than patient's chronic conditions and those complaints in the history above, the rest of the 10 systems review were done and were negative.     Current medications:  Scheduled Meds:acetaminophen, 975 mg, oral, TID  apixaban, 2.5 mg, oral, BID  aspirin, 81 mg, oral, Daily  atorvastatin, 40 mg, oral, Nightly  [Held by provider] calcitriol, 0.25 mcg, oral, Daily  calcium acetate, 1,334 mg, oral, TID  epoetin sadie or biosimilar, 100 Units/kg, subcutaneous, Every Mon/Wed/Fri  escitalopram, 20 mg, oral, Daily  finasteride, 5 mg, oral, Daily  insulin lispro, 0-5 Units, subcutaneous, TID AC  lidocaine, 1 patch, transdermal, Daily  metoprolol succinate XL, 50 mg, oral, Nightly  OLANZapine, 5 mg, oral, Nightly  pantoprazole, 40 mg, oral, Daily before breakfast   Or  pantoprazole, 40 mg, intravenous, Daily before breakfast  QUEtiapine, 25 mg, oral, BID  tamsulosin, 0.4 mg, oral, Daily  vitamin B complex-vitamin C-folic acid, 1 capsule, oral, Daily      Continuous Infusions:   PRN Meds:PRN medications: benzocaine-menthol, benzonatate, dextrose, dextrose, glucagon, glucagon, guaiFENesin, midodrine, ondansetron **OR** ondansetron, oxyCODONE      Objective:     Heart Rate:  [60-95]   Temp:  [36.1 °C (97 °F)-37 °C (98.6 °F)]   Resp:   [16-18]   BP: ()/(61-79)   SpO2:  [97 %-100 %]          Physical Exam  Vitals and nursing note reviewed.   Constitutional:       Appearance: He is obese.   HENT:      Mouth/Throat:      Mouth: Mucous membranes are moist.      Pharynx: Oropharynx is clear.   Cardiovascular:      Rate and Rhythm: Normal rate. Rhythm irregular.   Pulmonary:      Effort: Pulmonary effort is normal.   Abdominal:      General: There is no distension.      Palpations: Abdomen is soft.      Tenderness: There is no abdominal tenderness.   Musculoskeletal:      Comments: Swelling around left knee   Neurological:      Mental Status: He is alert and oriented to person, place, and time.         Labs:   Lab Results   Component Value Date     03/17/2025    K 4.7 03/17/2025    CL 99 03/17/2025    CO2 28 03/17/2025    BUN 80 (H) 03/17/2025    CREATININE 9.45 (H) 03/17/2025    GLUCOSE 84 03/17/2025    CALCIUM 10.1 03/17/2025    PROT 6.7 03/04/2025    BILITOT 0.5 03/04/2025    ALKPHOS 333 (H) 03/04/2025    AST 15 03/04/2025    ALT 13 03/04/2025       Lab Results   Component Value Date    WBC 6.1 03/17/2025    HGB 8.0 (L) 03/17/2025    HCT 26.4 (L) 03/17/2025    MCV 97 03/17/2025     03/17/2025

## 2025-03-17 NOTE — POST-PROCEDURE NOTE
..Report to Receiving RN:    Report To: ARI Mullen  Time Report Called: 1200  Hand-Off Communication: Patient fluid removal and vitals.  Complications During Treatment: No  Ultrafiltration Treatment: No  Medications Administered During Dialysis: Yes, Tylenol  Blood Products Administered During Dialysis: No  Labs Sent During Dialysis: No  Heparin Drip Rate Changes: N/A  Dialysis Catheter Dressing: N/A  Last Dressing Change: N/A    Electronic Signatures:   (Signed )   Authored:    (Signed )   Authored:     Last Updated: 4:56 PM by NGUYỄN LUND

## 2025-03-17 NOTE — PROCEDURES
"Hemodialysis Note    Patient seen and examined while on dialysis, recent events, labs, medications reviewed.   Tolerating well, target UF 2L  /61 (BP Location: Right arm, Patient Position: Lying)   Pulse 60   Temp 36.1 °C (97 °F) (Temporal)   Resp 18   Ht 1.753 m (5' 9.02\")   Wt 106 kg (233 lb 6.4 oz)   SpO2 97%   BMI 34.45 kg/m²      Scheduled medications  acetaminophen, 975 mg, oral, TID  apixaban, 2.5 mg, oral, BID  aspirin, 81 mg, oral, Daily  atorvastatin, 40 mg, oral, Nightly  [Held by provider] calcitriol, 0.25 mcg, oral, Daily  calcium acetate, 1,334 mg, oral, TID  epoetin sadie or biosimilar, 100 Units/kg, subcutaneous, Every Mon/Wed/Fri  escitalopram, 20 mg, oral, Daily  finasteride, 5 mg, oral, Daily  insulin lispro, 0-5 Units, subcutaneous, TID AC  lidocaine, 1 patch, transdermal, Daily  metoprolol succinate XL, 50 mg, oral, Nightly  OLANZapine, 5 mg, oral, Nightly  pantoprazole, 40 mg, oral, Daily before breakfast   Or  pantoprazole, 40 mg, intravenous, Daily before breakfast  QUEtiapine, 25 mg, oral, BID  tamsulosin, 0.4 mg, oral, Daily  vitamin B complex-vitamin C-folic acid, 1 capsule, oral, Daily      Continuous medications     PRN medications  PRN medications: benzocaine-menthol, benzonatate, dextrose, dextrose, glucagon, glucagon, guaiFENesin, midodrine, ondansetron **OR** ondansetron, oxyCODONE    Came with FluA  Using LUE AVF  No edema    HD now, keeping MWF schedule       Will continue to follow, overall management per primary team, and continue regular dialysis.      Jason Ann MD  Division of Nephrology and Hypertension    "

## 2025-03-17 NOTE — CARE PLAN
The patient's goals for the shift include      The clinical goals for the shift include patient will remain free from falls      Problem: Safety - Adult  Goal: Free from fall injury  Outcome: Progressing

## 2025-03-18 LAB
ANION GAP SERPL CALC-SCNC: 11 MMOL/L (ref 10–20)
BUN SERPL-MCNC: 49 MG/DL (ref 6–23)
CALCIUM SERPL-MCNC: 10.1 MG/DL (ref 8.6–10.3)
CHLORIDE SERPL-SCNC: 99 MMOL/L (ref 98–107)
CO2 SERPL-SCNC: 31 MMOL/L (ref 21–32)
CREAT SERPL-MCNC: 6.41 MG/DL (ref 0.5–1.3)
EGFRCR SERPLBLD CKD-EPI 2021: 10 ML/MIN/1.73M*2
ERYTHROCYTE [DISTWIDTH] IN BLOOD BY AUTOMATED COUNT: 15.2 % (ref 11.5–14.5)
GLUCOSE BLD MANUAL STRIP-MCNC: 114 MG/DL (ref 74–99)
GLUCOSE BLD MANUAL STRIP-MCNC: 93 MG/DL (ref 74–99)
GLUCOSE SERPL-MCNC: 112 MG/DL (ref 74–99)
HCT VFR BLD AUTO: 27.5 % (ref 41–52)
HGB BLD-MCNC: 8.3 G/DL (ref 13.5–17.5)
MCH RBC QN AUTO: 29.3 PG (ref 26–34)
MCHC RBC AUTO-ENTMCNC: 30.2 G/DL (ref 32–36)
MCV RBC AUTO: 97 FL (ref 80–100)
NRBC BLD-RTO: 0 /100 WBCS (ref 0–0)
PLATELET # BLD AUTO: 218 X10*3/UL (ref 150–450)
POTASSIUM SERPL-SCNC: 4.2 MMOL/L (ref 3.5–5.3)
RBC # BLD AUTO: 2.83 X10*6/UL (ref 4.5–5.9)
SODIUM SERPL-SCNC: 137 MMOL/L (ref 136–145)
WBC # BLD AUTO: 7.8 X10*3/UL (ref 4.4–11.3)

## 2025-03-18 PROCEDURE — 85027 COMPLETE CBC AUTOMATED: CPT | Performed by: INTERNAL MEDICINE

## 2025-03-18 PROCEDURE — 2500000001 HC RX 250 WO HCPCS SELF ADMINISTERED DRUGS (ALT 637 FOR MEDICARE OP): Performed by: HOSPITALIST

## 2025-03-18 PROCEDURE — 2500000004 HC RX 250 GENERAL PHARMACY W/ HCPCS (ALT 636 FOR OP/ED): Performed by: INTERNAL MEDICINE

## 2025-03-18 PROCEDURE — 2060000001 HC INTERMEDIATE ICU ROOM DAILY

## 2025-03-18 PROCEDURE — 2500000001 HC RX 250 WO HCPCS SELF ADMINISTERED DRUGS (ALT 637 FOR MEDICARE OP): Performed by: INTERNAL MEDICINE

## 2025-03-18 PROCEDURE — 80048 BASIC METABOLIC PNL TOTAL CA: CPT | Performed by: INTERNAL MEDICINE

## 2025-03-18 PROCEDURE — 36415 COLL VENOUS BLD VENIPUNCTURE: CPT | Performed by: INTERNAL MEDICINE

## 2025-03-18 PROCEDURE — 82947 ASSAY GLUCOSE BLOOD QUANT: CPT

## 2025-03-18 PROCEDURE — 2500000005 HC RX 250 GENERAL PHARMACY W/O HCPCS: Performed by: STUDENT IN AN ORGANIZED HEALTH CARE EDUCATION/TRAINING PROGRAM

## 2025-03-18 PROCEDURE — 99232 SBSQ HOSP IP/OBS MODERATE 35: CPT | Performed by: STUDENT IN AN ORGANIZED HEALTH CARE EDUCATION/TRAINING PROGRAM

## 2025-03-18 PROCEDURE — 2500000002 HC RX 250 W HCPCS SELF ADMINISTERED DRUGS (ALT 637 FOR MEDICARE OP, ALT 636 FOR OP/ED): Performed by: HOSPITALIST

## 2025-03-18 PROCEDURE — 2500000005 HC RX 250 GENERAL PHARMACY W/O HCPCS: Performed by: HOSPITALIST

## 2025-03-18 PROCEDURE — 2500000002 HC RX 250 W HCPCS SELF ADMINISTERED DRUGS (ALT 637 FOR MEDICARE OP, ALT 636 FOR OP/ED): Performed by: INTERNAL MEDICINE

## 2025-03-18 RX ORDER — TALC
6 POWDER (GRAM) TOPICAL NIGHTLY
Start: 2025-03-18

## 2025-03-18 RX ORDER — TALC
6 POWDER (GRAM) TOPICAL NIGHTLY
Status: DISCONTINUED | OUTPATIENT
Start: 2025-03-18 | End: 2025-03-19 | Stop reason: HOSPADM

## 2025-03-18 RX ADMIN — PANTOPRAZOLE SODIUM 40 MG: 40 TABLET, DELAYED RELEASE ORAL at 06:19

## 2025-03-18 RX ADMIN — ESCITALOPRAM OXALATE 20 MG: 20 TABLET ORAL at 09:21

## 2025-03-18 RX ADMIN — APIXABAN 2.5 MG: 5 TABLET, FILM COATED ORAL at 09:21

## 2025-03-18 RX ADMIN — QUETIAPINE FUMARATE 25 MG: 25 TABLET ORAL at 09:23

## 2025-03-18 RX ADMIN — CALCIUM ACETATE 1334 MG: 667 CAPSULE ORAL at 12:31

## 2025-03-18 RX ADMIN — LIDOCAINE 4% 1 PATCH: 40 PATCH TOPICAL at 09:18

## 2025-03-18 RX ADMIN — OXYCODONE HYDROCHLORIDE 5 MG: 5 TABLET ORAL at 20:37

## 2025-03-18 RX ADMIN — ACETAMINOPHEN 975 MG: 325 TABLET, FILM COATED ORAL at 20:37

## 2025-03-18 RX ADMIN — CALCIUM ACETATE 1334 MG: 667 CAPSULE ORAL at 09:21

## 2025-03-18 RX ADMIN — ACETAMINOPHEN 975 MG: 325 TABLET, FILM COATED ORAL at 14:53

## 2025-03-18 RX ADMIN — APIXABAN 2.5 MG: 5 TABLET, FILM COATED ORAL at 20:37

## 2025-03-18 RX ADMIN — Medication 6 MG: at 20:37

## 2025-03-18 RX ADMIN — ASPIRIN 81 MG: 81 TABLET, COATED ORAL at 09:21

## 2025-03-18 RX ADMIN — OXYCODONE HYDROCHLORIDE 5 MG: 5 TABLET ORAL at 14:54

## 2025-03-18 RX ADMIN — CALCIUM ACETATE 1334 MG: 667 CAPSULE ORAL at 17:03

## 2025-03-18 RX ADMIN — OLANZAPINE 5 MG: 5 TABLET, FILM COATED ORAL at 20:36

## 2025-03-18 RX ADMIN — GUAIFENESIN 600 MG: 600 TABLET ORAL at 09:21

## 2025-03-18 RX ADMIN — QUETIAPINE FUMARATE 25 MG: 25 TABLET ORAL at 17:03

## 2025-03-18 RX ADMIN — OXYCODONE HYDROCHLORIDE 5 MG: 5 TABLET ORAL at 09:23

## 2025-03-18 RX ADMIN — Medication 1 CAPSULE: at 09:20

## 2025-03-18 RX ADMIN — ACETAMINOPHEN 975 MG: 325 TABLET, FILM COATED ORAL at 09:21

## 2025-03-18 RX ADMIN — TAMSULOSIN HYDROCHLORIDE 0.4 MG: 0.4 CAPSULE ORAL at 09:21

## 2025-03-18 RX ADMIN — ATORVASTATIN CALCIUM 40 MG: 40 TABLET, FILM COATED ORAL at 20:36

## 2025-03-18 RX ADMIN — METOPROLOL SUCCINATE 50 MG: 50 TABLET, EXTENDED RELEASE ORAL at 20:37

## 2025-03-18 RX ADMIN — FINASTERIDE 5 MG: 5 TABLET, FILM COATED ORAL at 09:21

## 2025-03-18 ASSESSMENT — COGNITIVE AND FUNCTIONAL STATUS - GENERAL
MOVING TO AND FROM BED TO CHAIR: A LITTLE
WALKING IN HOSPITAL ROOM: A LITTLE
DAILY ACTIVITIY SCORE: 23
MOBILITY SCORE: 19
STANDING UP FROM CHAIR USING ARMS: A LITTLE
CLIMB 3 TO 5 STEPS WITH RAILING: A LOT
HELP NEEDED FOR BATHING: A LITTLE

## 2025-03-18 ASSESSMENT — PAIN SCALES - GENERAL
PAINLEVEL_OUTOF10: 6
PAINLEVEL_OUTOF10: 7
PAINLEVEL_OUTOF10: 2
PAINLEVEL_OUTOF10: 7
PAINLEVEL_OUTOF10: 8
PAINLEVEL_OUTOF10: 1
PAINLEVEL_OUTOF10: 0 - NO PAIN

## 2025-03-18 ASSESSMENT — PAIN DESCRIPTION - ORIENTATION
ORIENTATION: LEFT
ORIENTATION: LEFT

## 2025-03-18 ASSESSMENT — PAIN DESCRIPTION - LOCATION
LOCATION: KNEE
LOCATION: KNEE

## 2025-03-18 ASSESSMENT — PAIN - FUNCTIONAL ASSESSMENT
PAIN_FUNCTIONAL_ASSESSMENT: 0-10

## 2025-03-18 ASSESSMENT — PAIN DESCRIPTION - DESCRIPTORS
DESCRIPTORS: ACHING

## 2025-03-18 NOTE — CARE PLAN
The patient's goals for the shift include      The clinical goals for the shift include pt will remain safe thoughout shift    Over the shift, the patient did not make progress toward the following goals. Barriers to progression include . Recommendations to address these barriers include   Problem: Pain - Adult  Goal: Verbalizes/displays adequate comfort level or baseline comfort level  Outcome: Progressing     Problem: Safety - Adult  Goal: Free from fall injury  Outcome: Progressing     Problem: Discharge Planning  Goal: Discharge to home or other facility with appropriate resources  Outcome: Progressing     Problem: Chronic Conditions and Co-morbidities  Goal: Patient's chronic conditions and co-morbidity symptoms are monitored and maintained or improved  Outcome: Progressing     Problem: Nutrition  Goal: Nutrient intake appropriate for maintaining nutritional needs  Outcome: Progressing   .

## 2025-03-18 NOTE — CARE PLAN
Problem: Chronic Conditions and Co-morbidities  Goal: Patient's chronic conditions and co-morbidity symptoms are monitored and maintained or improved  Flowsheets (Taken 3/18/2025 2826)  Care Plan - Patient's Chronic Conditions and Co-Morbidity Symptoms are Monitored and Maintained or Improved: Update acute care plan with appropriate goals if chronic or comorbid symptoms are exacerbated and prevent overall improvement and discharge   The patient's goals for the shift include      The clinical goals for the shift include pt will remain safe thoughout shift    Over the shift, the patient did not make progress toward the following goals. Barriers to progression include . Recommendations to address these barriers include .

## 2025-03-18 NOTE — PROGRESS NOTES
03/18/25 0716   Discharge Planning   Expected Discharge Disposition SNF     Patient med ready for dc. Auth pending for [facility]. Message sent for update on auth this am.

## 2025-03-18 NOTE — CARE PLAN
The patient's goals for the shift include      The clinical goals for the shift include pt will remain safe thoughout shift    Over the shift, the patient did not make progress toward the following goals. Barriers to progression include . Recommendations to address these barriers include   Problem: Pain - Adult  Goal: Verbalizes/displays adequate comfort level or baseline comfort level  3/18/2025 1123 by Nikole Perez RN  Outcome: Progressing  3/18/2025 1122 by Nikole Perez RN  Outcome: Progressing     Problem: Safety - Adult  Goal: Free from fall injury  3/18/2025 1123 by Nikole Perez RN  Outcome: Progressing  3/18/2025 1122 by Nikole Perez RN  Outcome: Progressing     Problem: Discharge Planning  Goal: Discharge to home or other facility with appropriate resources  3/18/2025 1123 by Nikole Perez RN  Outcome: Progressing  3/18/2025 1122 by Nikole Perez RN  Outcome: Progressing     Problem: Chronic Conditions and Co-morbidities  Goal: Patient's chronic conditions and co-morbidity symptoms are monitored and maintained or improved  3/18/2025 1123 by Nikole Perez RN  Outcome: Progressing  3/18/2025 1122 by Nikole Perez RN  Outcome: Progressing     Problem: Nutrition  Goal: Nutrient intake appropriate for maintaining nutritional needs  3/18/2025 1123 by Nikole Perez RN  Outcome: Progressing  3/18/2025 1122 by Nikole Perez RN  Outcome: Progressing   .

## 2025-03-18 NOTE — PROGRESS NOTES
Kj Colmenares is a 54 y.o. male on day 13 of admission presenting with Influenza A.      Subjective   No acute events overnight. Patient is doing better.He is medically ready for discharge         Objective     Last Recorded Vitals  BP 96/58   Pulse 90   Temp 36.6 °C (97.9 °F) (Temporal)   Resp 16   Wt 106 kg (233 lb 6.4 oz)   SpO2 100%   Intake/Output last 3 Shifts:    Intake/Output Summary (Last 24 hours) at 3/18/2025 1824  Last data filed at 3/18/2025 1232  Gross per 24 hour   Intake --   Output 975 ml   Net -975 ml       Admission Weight  Weight: 111 kg (245 lb) (03/04/25 2100)    Daily Weight  03/13/25 : 106 kg (233 lb 6.4 oz)    Image Results  CT knee left wo IV contrast  Narrative: Interpreted By:  Robert Zapata,   STUDY:  CT KNEE LEFT WO IV CONTRAST; ;  3/7/2025 8:40 pm      INDICATION:  Signs/Symptoms:left knee pain, swelling around knee.          COMPARISON:  Plain film radiographs July 17, 2024      ACCESSION NUMBER(S):  [id number]      ORDERING CLINICIAN:  DEEP PETER      TECHNIQUE:  Multiple thin-section axial images left knee without contrast.      FINDINGS:  Advanced osteoarthritis is again noted worst in the medial  compartment. This is grossly similar to the previous plain film  examination.      There is no evidence of acute fracture.      No bony lesions are seen.      The menisci are not evaluated in a diagnostic manner.      There are no CT findings highly suggestive of acute tendinous or  ligamentous abnormality.      Vascular arterial calcifications.      There is a large left knee joint effusion.      No other fluid collections are seen.      No evidence of a soft tissue mass.      No muscular attenuation changes.      Impression: Advanced osteoarthritis of the left knee with a large joint effusion.  This is grossly similar to the previous examination of July 17, 2024      No evidence fracture.          MACRO:  None      Signed by: Robert Zapata 3/8/2025 7:53 AM  Dictation workstation:    [id number]      Physical Exam  Vitals and nursing note reviewed.   Constitutional:       Appearance: He is obese.   HENT:      Mouth/Throat:      Mouth: Mucous membranes are moist.      Pharynx: Oropharynx is clear.   Cardiovascular:      Rate and Rhythm: Normal rate. Rhythm irregular.   Pulmonary:      Effort: Pulmonary effort is normal.   Abdominal:      General: There is no distension.      Palpations: Abdomen is soft.      Tenderness: There is no abdominal tenderness.   Musculoskeletal:      Comments: Swelling around left knee   Neurological:      Mental Status: He is alert and oriented to person, place, and time.   Relevant Results               Assessment/Plan                  Assessment & Plan  Influenza A         3/17/18:  No new events reported.   HD today.   He complete tamiflu and currently on room air.   Cont current regimen.      Dc to snf is pending. Pt is medically cleared.            3/16:  Pt feels well, on room air, afebrile, has resolving productive cough.   Cont regimen.   HD per renal.   Dc to snf is pending.         3/15:  S/p HD yesterday.   No overnight events reported.   Cont current regimen and dc pending placement.         3/14:  Cont current regimen.   HD today.   Dispo is to snf pending placement.         3/13:  Pt seen this morning, on room air, no overnight events reported.   Cont current regimen.   Dc to snf pending placement.            3/12:  ESRD... HD today.   Flu... RA, completed Rx.   Afib... Controlled on eliquis, toprol.   A1c is 5% 4 months ago... probably doesn't have DM.   Hx CVA... asa, statin.   Hx adjustment d/o... home zyprexa, seroquel, lexapro.   Home regimen for chronic conditions.  Dvt px covered with eliquis.   Pt/ot  Dc to snf pending placement, hopefully today.               3/11:  Influenza A infection  - finished tamiflu 5d course; continue supportive care     ESRD  Anemia of CKD  Metabolic   - no HD for 2 months prior to admission  - Nephrology consulted  to manage IP HD; continue phoslo, neprocaps. EPO/IV iron per nephrology with HD.      Atrial fibrillation with RVR  - HR control improved with Toprol XL 50 mg nightly; Goal HR <130 without symptoms for now as he is recovering from Flu. Long term goal is <110.   - Continue po eliquis  - Will have OP follow up with cardiology, if remains in afib may need DCCV as OP     Left knee effusion  - CT left knee shows advanced osteoarthritis of the left knee with a large joint effusion. Plan for OP referral to orthopedic surgery to discuss options once acute issues resolved  - continue annetta tylenol, lidocaine patch     T2DM  - continue ssi     HLD  - switched to atorvastatin as he is ESRD on HD     Hx of CVA  - continue asa, statin     BPH  - continue flomax, proscar     DVT Prophylaxis: full anticoagulation with eliquis     Disposition: SNF when auth obtained              Lillian Fowler MD

## 2025-03-19 ENCOUNTER — APPOINTMENT (OUTPATIENT)
Dept: DIALYSIS | Facility: HOSPITAL | Age: 55
End: 2025-03-19
Payer: COMMERCIAL

## 2025-03-19 VITALS
SYSTOLIC BLOOD PRESSURE: 111 MMHG | TEMPERATURE: 97.7 F | RESPIRATION RATE: 18 BRPM | WEIGHT: 233.4 LBS | BODY MASS INDEX: 34.57 KG/M2 | HEART RATE: 100 BPM | DIASTOLIC BLOOD PRESSURE: 74 MMHG | OXYGEN SATURATION: 97 % | HEIGHT: 69 IN

## 2025-03-19 LAB
GLUCOSE BLD MANUAL STRIP-MCNC: 108 MG/DL (ref 74–99)
GLUCOSE BLD MANUAL STRIP-MCNC: 83 MG/DL (ref 74–99)

## 2025-03-19 PROCEDURE — 8010000001 HC DIALYSIS - HEMODIALYSIS PER DAY

## 2025-03-19 PROCEDURE — 2500000002 HC RX 250 W HCPCS SELF ADMINISTERED DRUGS (ALT 637 FOR MEDICARE OP, ALT 636 FOR OP/ED): Performed by: INTERNAL MEDICINE

## 2025-03-19 PROCEDURE — 2500000002 HC RX 250 W HCPCS SELF ADMINISTERED DRUGS (ALT 637 FOR MEDICARE OP, ALT 636 FOR OP/ED): Performed by: HOSPITALIST

## 2025-03-19 PROCEDURE — 2500000001 HC RX 250 WO HCPCS SELF ADMINISTERED DRUGS (ALT 637 FOR MEDICARE OP): Performed by: HOSPITALIST

## 2025-03-19 PROCEDURE — 2500000001 HC RX 250 WO HCPCS SELF ADMINISTERED DRUGS (ALT 637 FOR MEDICARE OP): Performed by: INTERNAL MEDICINE

## 2025-03-19 PROCEDURE — 99239 HOSP IP/OBS DSCHRG MGMT >30: CPT | Performed by: STUDENT IN AN ORGANIZED HEALTH CARE EDUCATION/TRAINING PROGRAM

## 2025-03-19 PROCEDURE — 82947 ASSAY GLUCOSE BLOOD QUANT: CPT

## 2025-03-19 PROCEDURE — 2500000005 HC RX 250 GENERAL PHARMACY W/O HCPCS: Performed by: HOSPITALIST

## 2025-03-19 PROCEDURE — 90935 HEMODIALYSIS ONE EVALUATION: CPT | Performed by: INTERNAL MEDICINE

## 2025-03-19 RX ADMIN — OXYCODONE HYDROCHLORIDE 5 MG: 5 TABLET ORAL at 11:56

## 2025-03-19 RX ADMIN — TAMSULOSIN HYDROCHLORIDE 0.4 MG: 0.4 CAPSULE ORAL at 11:48

## 2025-03-19 RX ADMIN — ACETAMINOPHEN 975 MG: 325 TABLET, FILM COATED ORAL at 11:48

## 2025-03-19 RX ADMIN — CALCIUM ACETATE 1334 MG: 667 CAPSULE ORAL at 11:48

## 2025-03-19 RX ADMIN — PANTOPRAZOLE SODIUM 40 MG: 40 TABLET, DELAYED RELEASE ORAL at 11:49

## 2025-03-19 RX ADMIN — LIDOCAINE 4% 1 PATCH: 40 PATCH TOPICAL at 11:48

## 2025-03-19 RX ADMIN — Medication 1 CAPSULE: at 11:48

## 2025-03-19 RX ADMIN — ASPIRIN 81 MG: 81 TABLET, COATED ORAL at 11:48

## 2025-03-19 RX ADMIN — ACETAMINOPHEN 975 MG: 325 TABLET, FILM COATED ORAL at 15:43

## 2025-03-19 RX ADMIN — QUETIAPINE FUMARATE 25 MG: 25 TABLET ORAL at 11:48

## 2025-03-19 RX ADMIN — FINASTERIDE 5 MG: 5 TABLET, FILM COATED ORAL at 11:49

## 2025-03-19 RX ADMIN — ESCITALOPRAM OXALATE 20 MG: 20 TABLET ORAL at 11:48

## 2025-03-19 RX ADMIN — APIXABAN 2.5 MG: 5 TABLET, FILM COATED ORAL at 11:49

## 2025-03-19 ASSESSMENT — COGNITIVE AND FUNCTIONAL STATUS - GENERAL
WALKING IN HOSPITAL ROOM: A LITTLE
STANDING UP FROM CHAIR USING ARMS: A LITTLE
DRESSING REGULAR LOWER BODY CLOTHING: A LITTLE
CLIMB 3 TO 5 STEPS WITH RAILING: A LOT
DAILY ACTIVITIY SCORE: 23
MOBILITY SCORE: 20

## 2025-03-19 ASSESSMENT — PAIN SCALES - GENERAL
PAINLEVEL_OUTOF10: 9
PAINLEVEL_OUTOF10: 0 - NO PAIN

## 2025-03-19 ASSESSMENT — PAIN DESCRIPTION - ORIENTATION: ORIENTATION: LEFT

## 2025-03-19 ASSESSMENT — PAIN DESCRIPTION - LOCATION: LOCATION: KNEE

## 2025-03-19 ASSESSMENT — PAIN - FUNCTIONAL ASSESSMENT: PAIN_FUNCTIONAL_ASSESSMENT: NO/DENIES PAIN

## 2025-03-19 NOTE — PROGRESS NOTES
Occupational Therapy                 Therapy Communication Note    Patient Name: Kj Colmenares  MRN: [id number]  Department: [facility] DIALYSIS  Room: [facility]23-A  Today's Date: 3/19/2025     Discipline: Occupational Therapy    OT Missed Visit: Yes     Missed Visit Reason: Missed Visit Reason: Patient in a medical procedure (Per RN, pt currently off floor for dialysis.)    Missed Time: Attempt

## 2025-03-19 NOTE — PROGRESS NOTES
Physical Therapy                 Therapy Communication Note    Patient Name: Kj Colmenares  MRN: [id number]  Department: [facility] DIALYSIS  Room: [facility]23-A  Today's Date: 3/19/2025     Discipline: Physical Therapy    PT Missed Visit: Yes     Missed Visit Reason: Missed Visit Reason: Patient in a medical procedure (pt off the floor at dialysis)    Missed Time: Attempt    Comment:

## 2025-03-19 NOTE — PROGRESS NOTES
03/19/25 1346   Discharge Planning   Assistance Needed SW called and made pt wife Wendy aware of 1430 transport to the [facility] today.

## 2025-03-19 NOTE — PROGRESS NOTES
Care Coordinator Note:    Plan: Patient completed tamiflu course for Flu A. On RA. Plan for HD today then has auth for [facility] SN[facility].- have onsite chair time set up as well. DSC to send HD flowsheets over in care\[facility]\"".  Will dc this afternoon.     Status: Inpatient  Payor: Sol  Disposition: [facility] SN[facility]- has auth and HD onsite chair time.   Barrier: HD today at [facility]  ADOD: later this afternoon.   Transport set for 1430. MD RN SEC and family made aware. Nurse to call -Report # [phone]   Shahida Toño [facility]      03/19/25 0927   Discharge Planning   Expected Discharge Disposition SNF   Does the patient need discharge transport arranged? Yes   RoundTrip coordination needed? Yes   Has discharge transport been arranged? No   Patient Choice   Provider Choice list and CMS website (https://medicare.gov/care-compare#search) for post-acute Quality and Resource Measure Data were provided and reviewed with: Patient   Patient / Family choosing to utilize agency / facility established prior to hospitalization Yes   Intensity of Service   Intensity of Service 0-30 min

## 2025-03-19 NOTE — PROCEDURES
"Hemodialysis Note    Patient seen and examined while on dialysis, recent events, labs, medications reviewed.   Tolerating well, target UF 2L  BP 95/65 (BP Location: Right arm, Patient Position: Lying)   Pulse 87   Temp 36.5 °C (97.7 °F) (Temporal)   Resp 17   Ht 1.753 m (5' 9.02\")   Wt 106 kg (233 lb 6.4 oz)   SpO2 99%   BMI 34.45 kg/m²      Scheduled medications  acetaminophen, 975 mg, oral, TID  apixaban, 2.5 mg, oral, BID  aspirin, 81 mg, oral, Daily  atorvastatin, 40 mg, oral, Nightly  [Held by provider] calcitriol, 0.25 mcg, oral, Daily  calcium acetate, 1,334 mg, oral, TID  epoetin sadie or biosimilar, 100 Units/kg, subcutaneous, Every Mon/Wed/Fri  escitalopram, 20 mg, oral, Daily  finasteride, 5 mg, oral, Daily  insulin lispro, 0-5 Units, subcutaneous, TID AC  lidocaine, 1 patch, transdermal, Daily  melatonin, 6 mg, oral, Nightly  metoprolol succinate XL, 50 mg, oral, Nightly  OLANZapine, 5 mg, oral, Nightly  pantoprazole, 40 mg, oral, Daily before breakfast   Or  pantoprazole, 40 mg, intravenous, Daily before breakfast  QUEtiapine, 25 mg, oral, BID  tamsulosin, 0.4 mg, oral, Daily  vitamin B complex-vitamin C-folic acid, 1 capsule, oral, Daily      Continuous medications     PRN medications  PRN medications: benzocaine-menthol, benzonatate, dextrose, dextrose, glucagon, glucagon, guaiFENesin, midodrine, ondansetron **OR** ondansetron, oxyCODONE    Came with FluA  Using LUE AVF  No edema  Hb 8.3 on epogen    HD now, keeping MWF schedule     Will continue to follow, overall management per primary team, and continue regular dialysis.      Jason Ann MD  Division of Nephrology and Hypertension    "

## 2025-03-19 NOTE — POST-PROCEDURE NOTE
Report to Receiving RN:    Report To: Amanda  Time Report Called: 1142  Hand-Off Communication: pt tolerated tx well with no c/o, pt took off 1.5L of fluid, post /81 HR 57  Complications During Treatment: No  Ultrafiltration Treatment: No  Medications Administered During Dialysis: No  Blood Products Administered During Dialysis: No  Labs Sent During Dialysis: No  Heparin Drip Rate Changes: No  Dialysis Catheter Dressing: NA  Last Dressing Change: NA    Electronic Signatures:  Mannie Nair RN (Signed )   Authored:    (Signed )   Authored:     Last Updated: 12:11 PM by MANNIE NAIR

## 2025-03-19 NOTE — PRE-PROCEDURE NOTE
Report from Sending RN:    Report From: Amanda  Recent Surgery of Procedure: No  Baseline Level of Consciousness (LOC): AOx3  Oxygen Use: No  Type: NA  Diabetic: Yes  Last BP Med Given Day of Dialysis: See EMAR  Last Pain Med Given: See EMAR  Lab Tests to be Obtained with Dialysis: No  Blood Transfusion to be Given During Dialysis: No  Available IV Access: Yes  Medications to be Administered During Dialysis: No  Continuous IV Infusion Running: No  Restraints on Currently or in the Last 24 Hours: No  Hand-Off Communication: Pt able to come to PACU for treatment  Dialysis Catheter Dressing: NA  Last Dressing Change: NA

## 2025-03-20 NOTE — DISCHARGE SUMMARY
Discharge Diagnosis  Influenza A    Issues Requiring Follow-Up  PCP     Discharge Meds     Medication List      START taking these medications     atorvastatin 40 mg tablet; Commonly known as: Lipitor; Take 1 tablet (40   mg) by mouth once daily at bedtime.   guaiFENesin 600 mg 12 hr tablet; Commonly known as: Mucinex; Take 1   tablet (600 mg) by mouth 2 times a day as needed for cough. Do not crush,   chew, or split.   lidocaine 4 % patch; Place 1 patch over 12 hours on the skin once daily.   Remove & discard patch within 12 hours or as directed by MD.Apply to left   knee. Patch will remain on for 12 hours, then removed for 12 hours. Do NOT   place patch directly over any surgical incisions or wounds.   oxyCODONE 5 mg immediate release tablet; Commonly known as: Roxicodone;   Take 1 tablet (5 mg) by mouth every 6 hours if needed for severe pain (7 -   10).     CHANGE how you take these medications     acetaminophen 325 mg tablet; Commonly known as: Tylenol; Take 3 tablets   (975 mg) by mouth every 8 hours if needed (pain).; What changed: how much   to take, reasons to take this   calcium acetate 667 mg capsule; Commonly known as: Phoslo; What changed:   Another medication with the same name was removed. Continue taking this   medication, and follow the directions you see here.   * melatonin 3 mg capsule; What changed: Another medication with the same   name was added. Make sure you understand how and when to take each.   * melatonin 3 mg tablet; Take 2 tablets (6 mg) by mouth once daily at   bedtime.; What changed: You were already taking a medication with the same   name, and this prescription was added. Make sure you understand how and   when to take each.   metoprolol succinate XL 50 mg 24 hr tablet; Commonly known as:   Toprol-XL; Take 1 tablet (50 mg) by mouth once daily at bedtime. Do not   crush or chew.; What changed: medication strength, when to take this,   Another medication with the same name was  removed. Continue taking this   medication, and follow the directions you see here.   midodrine 10 mg tablet; Commonly known as: Proamatine; Take 1 tablet (10   mg) by mouth if needed (for HD with SBP <90).   OLANZapine 5 mg tablet; Commonly known as: ZyPREXA; What changed:   Another medication with the same name was removed. Continue taking this   medication, and follow the directions you see here.  * This list has 2 medication(s) that are the same as other medications   prescribed for you. Read the directions carefully, and ask your doctor or   other care provider to review them with you.     CONTINUE taking these medications     albuterol 90 mcg/actuation aerosol powdr breath activated inhaler   aspirin 81 mg EC tablet   calcitriol 0.25 mcg capsule; Commonly known as: Rocaltrol   Eliquis 2.5 mg tablet; Generic drug: apixaban   ergocalciferol 1250 mcg (50,000 units) capsule; Commonly known as:   Vitamin D-2   escitalopram 20 mg tablet; Commonly known as: Lexapro   finasteride 5 mg tablet; Commonly known as: Proscar   Flomax 0.4 mg 24 hr capsule; Generic drug: tamsulosin   ondansetron 4 mg tablet; Commonly known as: Zofran   pantoprazole 40 mg EC tablet; Commonly known as: ProtoNix   QUEtiapine 25 mg tablet; Commonly known as: SEROquel   vitamin B complex-vitamin C-folic acid 1 mg capsule; Commonly known as:   Nephrocaps     STOP taking these medications     rosuvastatin 20 mg tablet; Commonly known as: Crestor   sevelamer carbonate 800 mg tablet; Commonly known as: Renvela       Test Results Pending At Discharge  Pending Labs       No current pending labs.            Hospital Course   Kj Colmenares is a 54 y.o. 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  His symptoms have become progressively worse over the past 4 days.  He is end-stage renal disease but makes a small amount of urine.  He endorses that he has not received hemodialysis over the past 2 months.  Denies any abdominal pain nausea or vomiting.  He was admitted at [facility] with RSV and discharged 1/10/2025. Patient was found to have flu and was treated with 5 day course tamiflu. Course was complicated A fib with RVR. Patient was treated with metoprolol 50 and was recommended for OP f/up with cards for possible DCCV. While here patient also had inpatient HD. Patient was discharged to SNF     Pertinent Physical Exam At Time of Discharge  Physical Exam  Vitals and nursing note reviewed.   Constitutional:       Appearance: He is obese.   HENT:      Mouth/Throat:      Mouth: Mucous membranes are moist.      Pharynx: Oropharynx is clear.   Cardiovascular:      Rate and Rhythm: Normal rate. Rhythm irregular.   Pulmonary:      Effort: Pulmonary effort is normal.   Abdominal:      General: There is no distension.      Palpations: Abdomen is soft.      Tenderness: There is no abdominal tenderness.   Musculoskeletal:      Comments: Swelling around left knee ( chronic)   Neurological:      Mental Status: He is alert and oriented to person, place, and time.   Outpatient Follow-Up  No future appointments.      Lillian Fowler MD

## 2025-04-24 ENCOUNTER — APPOINTMENT (OUTPATIENT)
Dept: CARDIOLOGY | Facility: CLINIC | Age: 55
End: 2025-04-24
Payer: COMMERCIAL

## 2025-04-30 ENCOUNTER — APPOINTMENT (OUTPATIENT)
Dept: CARDIOLOGY | Facility: CLINIC | Age: 55
End: 2025-04-30
Payer: COMMERCIAL

## 2025-05-08 ENCOUNTER — APPOINTMENT (OUTPATIENT)
Dept: CARDIOLOGY | Facility: CLINIC | Age: 55
End: 2025-05-08
Payer: COMMERCIAL

## 2025-06-08 ENCOUNTER — APPOINTMENT (OUTPATIENT)
Dept: RADIOLOGY | Facility: HOSPITAL | Age: 55
End: 2025-06-08
Payer: COMMERCIAL

## 2025-06-08 ENCOUNTER — HOSPITAL ENCOUNTER (OUTPATIENT)
Facility: HOSPITAL | Age: 55
Setting detail: OBSERVATION
End: 2025-06-08
Attending: EMERGENCY MEDICINE | Admitting: INTERNAL MEDICINE
Payer: COMMERCIAL

## 2025-06-08 ENCOUNTER — APPOINTMENT (OUTPATIENT)
Dept: CARDIOLOGY | Facility: HOSPITAL | Age: 55
End: 2025-06-08
Payer: COMMERCIAL

## 2025-06-08 VITALS
HEIGHT: 69 IN | SYSTOLIC BLOOD PRESSURE: 139 MMHG | OXYGEN SATURATION: 98 % | TEMPERATURE: 97.7 F | DIASTOLIC BLOOD PRESSURE: 97 MMHG | WEIGHT: 267 LBS | BODY MASS INDEX: 39.55 KG/M2 | RESPIRATION RATE: 18 BRPM | HEART RATE: 95 BPM

## 2025-06-08 DIAGNOSIS — E87.5 HYPERKALEMIA: Primary | ICD-10-CM

## 2025-06-08 DIAGNOSIS — G47.33 OSA (OBSTRUCTIVE SLEEP APNEA): ICD-10-CM

## 2025-06-08 DIAGNOSIS — I10 ESSENTIAL HYPERTENSION: Chronic | ICD-10-CM

## 2025-06-08 DIAGNOSIS — N18.9 CHRONIC KIDNEY DISEASE, UNSPECIFIED CKD STAGE: ICD-10-CM

## 2025-06-08 DIAGNOSIS — E87.79 OTHER HYPERVOLEMIA: ICD-10-CM

## 2025-06-08 DIAGNOSIS — E11.9 TYPE 2 DIABETES MELLITUS WITHOUT COMPLICATION, WITHOUT LONG-TERM CURRENT USE OF INSULIN: ICD-10-CM

## 2025-06-08 DIAGNOSIS — I48.0 PAROXYSMAL ATRIAL FIBRILLATION WITH RVR (MULTI): ICD-10-CM

## 2025-06-08 DIAGNOSIS — E78.5 DYSLIPIDEMIA: ICD-10-CM

## 2025-06-08 PROBLEM — R06.02 SHORTNESS OF BREATH: Status: ACTIVE | Noted: 2025-06-08

## 2025-06-08 LAB
ALBUMIN SERPL BCP-MCNC: 4.1 G/DL (ref 3.4–5)
ALP SERPL-CCNC: 376 U/L (ref 33–120)
ALT SERPL W P-5'-P-CCNC: 9 U/L (ref 10–52)
ANION GAP BLDV CALCULATED.4IONS-SCNC: 13 MMOL/L (ref 10–25)
ANION GAP SERPL CALC-SCNC: 14 MMOL/L (ref 10–20)
AST SERPL W P-5'-P-CCNC: 10 U/L (ref 9–39)
BASE EXCESS BLDV CALC-SCNC: -7.6 MMOL/L (ref -2–3)
BASOPHILS # BLD AUTO: 0.02 X10*3/UL (ref 0–0.1)
BASOPHILS NFR BLD AUTO: 0.4 %
BILIRUB DIRECT SERPL-MCNC: 0.1 MG/DL (ref 0–0.3)
BILIRUB SERPL-MCNC: 0.4 MG/DL (ref 0–1.2)
BNP SERPL-MCNC: 1579 PG/ML (ref 0–99)
BODY TEMPERATURE: 37 DEGREES CELSIUS
BUN SERPL-MCNC: 91 MG/DL (ref 6–23)
CA-I BLDV-SCNC: 1.36 MMOL/L (ref 1.1–1.33)
CALCIUM SERPL-MCNC: 9.9 MG/DL (ref 8.6–10.3)
CHLORIDE BLDV-SCNC: 118 MMOL/L (ref 98–107)
CHLORIDE SERPL-SCNC: 118 MMOL/L (ref 98–107)
CO2 SERPL-SCNC: 19 MMOL/L (ref 21–32)
CREAT SERPL-MCNC: 11.49 MG/DL (ref 0.5–1.3)
EGFRCR SERPLBLD CKD-EPI 2021: 5 ML/MIN/1.73M*2
EOSINOPHIL # BLD AUTO: 0.45 X10*3/UL (ref 0–0.7)
EOSINOPHIL NFR BLD AUTO: 9 %
ERYTHROCYTE [DISTWIDTH] IN BLOOD BY AUTOMATED COUNT: 15.2 % (ref 11.5–14.5)
GLUCOSE BLD MANUAL STRIP-MCNC: 103 MG/DL (ref 74–99)
GLUCOSE BLD MANUAL STRIP-MCNC: 43 MG/DL (ref 74–99)
GLUCOSE BLD MANUAL STRIP-MCNC: 96 MG/DL (ref 74–99)
GLUCOSE BLDV-MCNC: 91 MG/DL (ref 74–99)
GLUCOSE SERPL-MCNC: 96 MG/DL (ref 74–99)
HCO3 BLDV-SCNC: 18.8 MMOL/L (ref 22–26)
HCT VFR BLD AUTO: 34.8 % (ref 41–52)
HCT VFR BLD EST: 29 % (ref 41–52)
HGB BLD-MCNC: 10 G/DL (ref 13.5–17.5)
HGB BLDV-MCNC: 9.5 G/DL (ref 13.5–17.5)
IMM GRANULOCYTES # BLD AUTO: 0.02 X10*3/UL (ref 0–0.7)
IMM GRANULOCYTES NFR BLD AUTO: 0.4 % (ref 0–0.9)
INHALED O2 CONCENTRATION: 21 %
LACTATE BLDV-SCNC: 0.9 MMOL/L (ref 0.4–2)
LYMPHOCYTES # BLD AUTO: 0.75 X10*3/UL (ref 1.2–4.8)
LYMPHOCYTES NFR BLD AUTO: 15.1 %
MCH RBC QN AUTO: 28.7 PG (ref 26–34)
MCHC RBC AUTO-ENTMCNC: 28.7 G/DL (ref 32–36)
MCV RBC AUTO: 100 FL (ref 80–100)
MONOCYTES # BLD AUTO: 0.33 X10*3/UL (ref 0.1–1)
MONOCYTES NFR BLD AUTO: 6.6 %
NEUTROPHILS # BLD AUTO: 3.41 X10*3/UL (ref 1.2–7.7)
NEUTROPHILS NFR BLD AUTO: 68.5 %
NRBC BLD-RTO: 0 /100 WBCS (ref 0–0)
OXYHGB MFR BLDV: 82.7 % (ref 45–75)
PCO2 BLDV: 41 MM HG (ref 41–51)
PH BLDV: 7.27 PH (ref 7.33–7.43)
PLATELET # BLD AUTO: 191 X10*3/UL (ref 150–450)
PO2 BLDV: 52 MM HG (ref 35–45)
POTASSIUM BLDV-SCNC: 7.1 MMOL/L (ref 3.5–5.3)
POTASSIUM SERPL-SCNC: 6 MMOL/L (ref 3.5–5.3)
PROT SERPL-MCNC: 6.8 G/DL (ref 6.4–8.2)
RBC # BLD AUTO: 3.49 X10*6/UL (ref 4.5–5.9)
SAO2 % BLDV: 85 % (ref 45–75)
SODIUM BLDV-SCNC: 143 MMOL/L (ref 136–145)
SODIUM SERPL-SCNC: 145 MMOL/L (ref 136–145)
WBC # BLD AUTO: 5 X10*3/UL (ref 4.4–11.3)

## 2025-06-08 PROCEDURE — 2500000002 HC RX 250 W HCPCS SELF ADMINISTERED DRUGS (ALT 637 FOR MEDICARE OP, ALT 636 FOR OP/ED)

## 2025-06-08 PROCEDURE — 84075 ASSAY ALKALINE PHOSPHATASE: CPT | Performed by: EMERGENCY MEDICINE

## 2025-06-08 PROCEDURE — 94640 AIRWAY INHALATION TREATMENT: CPT

## 2025-06-08 PROCEDURE — 71046 X-RAY EXAM CHEST 2 VIEWS: CPT

## 2025-06-08 PROCEDURE — G0378 HOSPITAL OBSERVATION PER HR: HCPCS

## 2025-06-08 PROCEDURE — 85025 COMPLETE CBC W/AUTO DIFF WBC: CPT | Performed by: EMERGENCY MEDICINE

## 2025-06-08 PROCEDURE — 2500000005 HC RX 250 GENERAL PHARMACY W/O HCPCS

## 2025-06-08 PROCEDURE — 96375 TX/PRO/DX INJ NEW DRUG ADDON: CPT

## 2025-06-08 PROCEDURE — 2500000004 HC RX 250 GENERAL PHARMACY W/ HCPCS (ALT 636 FOR OP/ED): Performed by: EMERGENCY MEDICINE

## 2025-06-08 PROCEDURE — 93005 ELECTROCARDIOGRAM TRACING: CPT

## 2025-06-08 PROCEDURE — 99222 1ST HOSP IP/OBS MODERATE 55: CPT

## 2025-06-08 PROCEDURE — 36415 COLL VENOUS BLD VENIPUNCTURE: CPT | Performed by: EMERGENCY MEDICINE

## 2025-06-08 PROCEDURE — 2500000001 HC RX 250 WO HCPCS SELF ADMINISTERED DRUGS (ALT 637 FOR MEDICARE OP)

## 2025-06-08 PROCEDURE — 82947 ASSAY GLUCOSE BLOOD QUANT: CPT

## 2025-06-08 PROCEDURE — 80053 COMPREHEN METABOLIC PANEL: CPT | Performed by: EMERGENCY MEDICINE

## 2025-06-08 PROCEDURE — 96365 THER/PROPH/DIAG IV INF INIT: CPT

## 2025-06-08 PROCEDURE — 83880 ASSAY OF NATRIURETIC PEPTIDE: CPT | Performed by: EMERGENCY MEDICINE

## 2025-06-08 PROCEDURE — 83605 ASSAY OF LACTIC ACID: CPT | Performed by: EMERGENCY MEDICINE

## 2025-06-08 PROCEDURE — 2500000002 HC RX 250 W HCPCS SELF ADMINISTERED DRUGS (ALT 637 FOR MEDICARE OP, ALT 636 FOR OP/ED): Performed by: EMERGENCY MEDICINE

## 2025-06-08 PROCEDURE — 2500000005 HC RX 250 GENERAL PHARMACY W/O HCPCS: Performed by: EMERGENCY MEDICINE

## 2025-06-08 PROCEDURE — 82248 BILIRUBIN DIRECT: CPT | Performed by: EMERGENCY MEDICINE

## 2025-06-08 PROCEDURE — 82374 ASSAY BLOOD CARBON DIOXIDE: CPT | Performed by: EMERGENCY MEDICINE

## 2025-06-08 PROCEDURE — 84132 ASSAY OF SERUM POTASSIUM: CPT | Performed by: EMERGENCY MEDICINE

## 2025-06-08 PROCEDURE — 99291 CRITICAL CARE FIRST HOUR: CPT | Performed by: EMERGENCY MEDICINE

## 2025-06-08 RX ORDER — QUETIAPINE FUMARATE 25 MG/1
25 TABLET, FILM COATED ORAL
Status: ACTIVE | OUTPATIENT
Start: 2025-06-09

## 2025-06-08 RX ORDER — DEXTROSE 50 % IN WATER (D50W) INTRAVENOUS SYRINGE
25
Status: ACTIVE | OUTPATIENT
Start: 2025-06-08

## 2025-06-08 RX ORDER — PANTOPRAZOLE SODIUM 40 MG/1
40 TABLET, DELAYED RELEASE ORAL
Status: ACTIVE | OUTPATIENT
Start: 2025-06-09

## 2025-06-08 RX ORDER — GUAIFENESIN 600 MG/1
600 TABLET, EXTENDED RELEASE ORAL 2 TIMES DAILY PRN
Status: ACTIVE | OUTPATIENT
Start: 2025-06-08

## 2025-06-08 RX ORDER — OLANZAPINE 5 MG/1
5 TABLET, FILM COATED ORAL NIGHTLY
Status: DISPENSED | OUTPATIENT
Start: 2025-06-08

## 2025-06-08 RX ORDER — DEXTROSE 50 % IN WATER (D50W) INTRAVENOUS SYRINGE
25 ONCE
Status: COMPLETED | OUTPATIENT
Start: 2025-06-08 | End: 2025-06-08

## 2025-06-08 RX ORDER — ASPIRIN 81 MG/1
81 TABLET ORAL DAILY
Status: ACTIVE | OUTPATIENT
Start: 2025-06-09

## 2025-06-08 RX ORDER — ESCITALOPRAM OXALATE 20 MG/1
20 TABLET ORAL DAILY
Status: ACTIVE | OUTPATIENT
Start: 2025-06-09

## 2025-06-08 RX ORDER — ATORVASTATIN CALCIUM 40 MG/1
40 TABLET, FILM COATED ORAL NIGHTLY
Status: DISPENSED | OUTPATIENT
Start: 2025-06-08

## 2025-06-08 RX ORDER — ONDANSETRON 4 MG/1
4 TABLET, FILM COATED ORAL EVERY 12 HOURS PRN
Status: ACTIVE | OUTPATIENT
Start: 2025-06-08

## 2025-06-08 RX ORDER — TAMSULOSIN HYDROCHLORIDE 0.4 MG/1
0.4 CAPSULE ORAL DAILY
Status: ACTIVE | OUTPATIENT
Start: 2025-06-09

## 2025-06-08 RX ORDER — SODIUM BICARBONATE 1 MEQ/ML
50 SYRINGE (ML) INTRAVENOUS ONCE
Status: COMPLETED | OUTPATIENT
Start: 2025-06-08 | End: 2025-06-08

## 2025-06-08 RX ORDER — CALCIUM GLUCONATE 20 MG/ML
2 INJECTION, SOLUTION INTRAVENOUS ONCE
Status: COMPLETED | OUTPATIENT
Start: 2025-06-08 | End: 2025-06-08

## 2025-06-08 RX ORDER — MIDODRINE HYDROCHLORIDE 5 MG/1
10 TABLET ORAL AS NEEDED
Status: ACTIVE | OUTPATIENT
Start: 2025-06-08

## 2025-06-08 RX ORDER — TALC
6 POWDER (GRAM) TOPICAL NIGHTLY
Status: DISPENSED | OUTPATIENT
Start: 2025-06-08

## 2025-06-08 RX ORDER — LIDOCAINE 560 MG/1
1 PATCH PERCUTANEOUS; TOPICAL; TRANSDERMAL DAILY
OUTPATIENT
Start: 2025-06-08

## 2025-06-08 RX ORDER — METOPROLOL SUCCINATE 50 MG/1
50 TABLET, EXTENDED RELEASE ORAL NIGHTLY
Status: DISPENSED | OUTPATIENT
Start: 2025-06-08

## 2025-06-08 RX ORDER — FINASTERIDE 5 MG/1
5 TABLET, FILM COATED ORAL DAILY
Status: ACTIVE | OUTPATIENT
Start: 2025-06-09

## 2025-06-08 RX ORDER — CALCIUM ACETATE 667 MG/1
1334 CAPSULE ORAL 3 TIMES DAILY
Status: DISPENSED | OUTPATIENT
Start: 2025-06-08

## 2025-06-08 RX ORDER — ALBUTEROL SULFATE 0.83 MG/ML
3 SOLUTION RESPIRATORY (INHALATION) EVERY 6 HOURS PRN
Status: DISCONTINUED | OUTPATIENT
Start: 2025-06-08 | End: 2025-06-08

## 2025-06-08 RX ORDER — ALBUTEROL SULFATE 0.83 MG/ML
2.5 SOLUTION RESPIRATORY (INHALATION) EVERY 2 HOUR PRN
Status: ACTIVE | OUTPATIENT
Start: 2025-06-08

## 2025-06-08 RX ORDER — CALCITRIOL 0.25 UG/1
0.25 CAPSULE ORAL DAILY
Status: DISPENSED | OUTPATIENT
Start: 2025-06-09

## 2025-06-08 RX ORDER — DEXTROSE 50 % IN WATER (D50W) INTRAVENOUS SYRINGE
12.5
Status: ACTIVE | OUTPATIENT
Start: 2025-06-08

## 2025-06-08 RX ORDER — INSULIN LISPRO 100 [IU]/ML
0-5 INJECTION, SOLUTION INTRAVENOUS; SUBCUTANEOUS
Status: ACTIVE | OUTPATIENT
Start: 2025-06-08

## 2025-06-08 RX ORDER — ERGOCALCIFEROL 1.25 MG/1
1.25 CAPSULE ORAL
Status: DISPENSED | OUTPATIENT
Start: 2025-06-09

## 2025-06-08 RX ORDER — ALBUTEROL SULFATE 0.83 MG/ML
3 SOLUTION RESPIRATORY (INHALATION)
Status: ACTIVE | OUTPATIENT
Start: 2025-06-09

## 2025-06-08 RX ORDER — POLYETHYLENE GLYCOL 3350 17 G/17G
17 POWDER, FOR SOLUTION ORAL DAILY
Status: ACTIVE | OUTPATIENT
Start: 2025-06-09

## 2025-06-08 RX ADMIN — METOPROLOL SUCCINATE 50 MG: 50 TABLET, EXTENDED RELEASE ORAL at 23:29

## 2025-06-08 RX ADMIN — SODIUM BICARBONATE 50 MEQ: 84 INJECTION INTRAVENOUS at 20:36

## 2025-06-08 RX ADMIN — CALCIUM GLUCONATE 2 G: 20 INJECTION, SOLUTION INTRAVENOUS at 20:34

## 2025-06-08 RX ADMIN — DEXTROSE MONOHYDRATE 25 G: 25 INJECTION, SOLUTION INTRAVENOUS at 20:36

## 2025-06-08 RX ADMIN — APIXABAN 2.5 MG: 2.5 TABLET, FILM COATED ORAL at 23:29

## 2025-06-08 RX ADMIN — INSULIN HUMAN 10 UNITS: 100 INJECTION, SOLUTION PARENTERAL at 20:35

## 2025-06-08 RX ADMIN — SODIUM ZIRCONIUM CYCLOSILICATE 10 G: 10 POWDER, FOR SUSPENSION ORAL at 20:36

## 2025-06-08 RX ADMIN — OLANZAPINE 5 MG: 5 TABLET, FILM COATED ORAL at 23:28

## 2025-06-08 RX ADMIN — ALBUTEROL SULFATE 3 ML: 2.5 SOLUTION RESPIRATORY (INHALATION) at 22:02

## 2025-06-08 RX ADMIN — CALCIUM ACETATE 1334 MG: 667 CAPSULE ORAL at 23:29

## 2025-06-08 RX ADMIN — ATORVASTATIN CALCIUM 40 MG: 40 TABLET, FILM COATED ORAL at 23:28

## 2025-06-08 RX ADMIN — Medication 6 MG: at 23:29

## 2025-06-08 SDOH — SOCIAL STABILITY: SOCIAL INSECURITY: HAVE YOU HAD ANY THOUGHTS OF HARMING ANYONE ELSE?: NO

## 2025-06-08 SDOH — SOCIAL STABILITY: SOCIAL INSECURITY: WITHIN THE LAST YEAR, HAVE YOU BEEN HUMILIATED OR EMOTIONALLY ABUSED IN OTHER WAYS BY YOUR PARTNER OR EX-PARTNER?: NO

## 2025-06-08 SDOH — ECONOMIC STABILITY: HOUSING INSECURITY: IN THE LAST 12 MONTHS, WAS THERE A TIME WHEN YOU WERE NOT ABLE TO PAY THE MORTGAGE OR RENT ON TIME?: YES

## 2025-06-08 SDOH — SOCIAL STABILITY: SOCIAL INSECURITY: HAVE YOU HAD THOUGHTS OF HARMING ANYONE ELSE?: NO

## 2025-06-08 SDOH — SOCIAL STABILITY: SOCIAL INSECURITY: WITHIN THE LAST YEAR, HAVE YOU BEEN AFRAID OF YOUR PARTNER OR EX-PARTNER?: NO

## 2025-06-08 SDOH — ECONOMIC STABILITY: HOUSING INSECURITY: AT ANY TIME IN THE PAST 12 MONTHS, WERE YOU HOMELESS OR LIVING IN A SHELTER (INCLUDING NOW)?: NO

## 2025-06-08 SDOH — HEALTH STABILITY: MENTAL HEALTH: HOW OFTEN DO YOU HAVE SIX OR MORE DRINKS ON ONE OCCASION?: NEVER

## 2025-06-08 SDOH — SOCIAL STABILITY: SOCIAL INSECURITY: ARE YOU OR HAVE YOU BEEN THREATENED OR ABUSED PHYSICALLY, EMOTIONALLY, OR SEXUALLY BY ANYONE?: NO

## 2025-06-08 SDOH — SOCIAL STABILITY: SOCIAL INSECURITY: DO YOU FEEL UNSAFE GOING BACK TO THE PLACE WHERE YOU ARE LIVING?: NO

## 2025-06-08 SDOH — HEALTH STABILITY: MENTAL HEALTH: HOW MANY DRINKS CONTAINING ALCOHOL DO YOU HAVE ON A TYPICAL DAY WHEN YOU ARE DRINKING?: PATIENT DOES NOT DRINK

## 2025-06-08 SDOH — ECONOMIC STABILITY: FOOD INSECURITY: WITHIN THE PAST 12 MONTHS, THE FOOD YOU BOUGHT JUST DIDN'T LAST AND YOU DIDN'T HAVE MONEY TO GET MORE.: SOMETIMES TRUE

## 2025-06-08 SDOH — ECONOMIC STABILITY: TRANSPORTATION INSECURITY: IN THE PAST 12 MONTHS, HAS LACK OF TRANSPORTATION KEPT YOU FROM MEDICAL APPOINTMENTS OR FROM GETTING MEDICATIONS?: YES

## 2025-06-08 SDOH — SOCIAL STABILITY: SOCIAL INSECURITY: HAS ANYONE EVER THREATENED TO HURT YOUR FAMILY OR YOUR PETS?: NO

## 2025-06-08 SDOH — HEALTH STABILITY: MENTAL HEALTH: HOW OFTEN DO YOU HAVE A DRINK CONTAINING ALCOHOL?: NEVER

## 2025-06-08 SDOH — SOCIAL STABILITY: SOCIAL INSECURITY: ABUSE: ADULT

## 2025-06-08 SDOH — ECONOMIC STABILITY: FOOD INSECURITY: HOW HARD IS IT FOR YOU TO PAY FOR THE VERY BASICS LIKE FOOD, HOUSING, MEDICAL CARE, AND HEATING?: VERY HARD

## 2025-06-08 SDOH — ECONOMIC STABILITY: INCOME INSECURITY: IN THE PAST 12 MONTHS HAS THE ELECTRIC, GAS, OIL, OR WATER COMPANY THREATENED TO SHUT OFF SERVICES IN YOUR HOME?: NO

## 2025-06-08 SDOH — ECONOMIC STABILITY: HOUSING INSECURITY: IN THE PAST 12 MONTHS, HOW MANY TIMES HAVE YOU MOVED WHERE YOU WERE LIVING?: 2

## 2025-06-08 SDOH — SOCIAL STABILITY: SOCIAL INSECURITY: DO YOU FEEL ANYONE HAS EXPLOITED OR TAKEN ADVANTAGE OF YOU FINANCIALLY OR OF YOUR PERSONAL PROPERTY?: NO

## 2025-06-08 SDOH — SOCIAL STABILITY: SOCIAL INSECURITY: ARE THERE ANY APPARENT SIGNS OF INJURIES/BEHAVIORS THAT COULD BE RELATED TO ABUSE/NEGLECT?: NO

## 2025-06-08 SDOH — SOCIAL STABILITY: SOCIAL INSECURITY: WERE YOU ABLE TO COMPLETE ALL THE BEHAVIORAL HEALTH SCREENINGS?: YES

## 2025-06-08 SDOH — SOCIAL STABILITY: SOCIAL INSECURITY: DOES ANYONE TRY TO KEEP YOU FROM HAVING/CONTACTING OTHER FRIENDS OR DOING THINGS OUTSIDE YOUR HOME?: NO

## 2025-06-08 ASSESSMENT — ACTIVITIES OF DAILY LIVING (ADL)
HEARING - RIGHT EAR: FUNCTIONAL
LACK_OF_TRANSPORTATION: YES
GROOMING: INDEPENDENT
HEARING - RIGHT EAR: FUNCTIONAL
DRESSING YOURSELF: INDEPENDENT
FEEDING YOURSELF: INDEPENDENT
ASSISTIVE_DEVICE: WALKER;CANE
WALKS IN HOME: NEEDS ASSISTANCE
ASSISTIVE_DEVICE: WALKER;CANE
JUDGMENT_ADEQUATE_SAFELY_COMPLETE_DAILY_ACTIVITIES: YES
PATIENT'S MEMORY ADEQUATE TO SAFELY COMPLETE DAILY ACTIVITIES?: YES
JUDGMENT_ADEQUATE_SAFELY_COMPLETE_DAILY_ACTIVITIES: YES
HEARING - LEFT EAR: FUNCTIONAL
GROOMING: INDEPENDENT
BATHING: INDEPENDENT
ADEQUATE_TO_COMPLETE_ADL: YES
HEARING - LEFT EAR: FUNCTIONAL
TOILETING: INDEPENDENT
ADEQUATE_TO_COMPLETE_ADL: YES
BATHING: INDEPENDENT
TOILETING: INDEPENDENT
PATIENT'S MEMORY ADEQUATE TO SAFELY COMPLETE DAILY ACTIVITIES?: YES
DRESSING YOURSELF: INDEPENDENT
WALKS IN HOME: INDEPENDENT
FEEDING YOURSELF: INDEPENDENT

## 2025-06-08 ASSESSMENT — COLUMBIA-SUICIDE SEVERITY RATING SCALE - C-SSRS
1. IN THE PAST MONTH, HAVE YOU WISHED YOU WERE DEAD OR WISHED YOU COULD GO TO SLEEP AND NOT WAKE UP?: NO
2. HAVE YOU ACTUALLY HAD ANY THOUGHTS OF KILLING YOURSELF?: NO
6. HAVE YOU EVER DONE ANYTHING, STARTED TO DO ANYTHING, OR PREPARED TO DO ANYTHING TO END YOUR LIFE?: NO

## 2025-06-08 ASSESSMENT — LIFESTYLE VARIABLES
AUDIT-C TOTAL SCORE: 0
SKIP TO QUESTIONS 9-10: 1
AUDIT-C TOTAL SCORE: 0
AUDIT-C TOTAL SCORE: 0
HOW OFTEN DO YOU HAVE A DRINK CONTAINING ALCOHOL: NEVER
SKIP TO QUESTIONS 9-10: 1
HOW MANY STANDARD DRINKS CONTAINING ALCOHOL DO YOU HAVE ON A TYPICAL DAY: PATIENT DOES NOT DRINK
HOW OFTEN DO YOU HAVE 6 OR MORE DRINKS ON ONE OCCASION: NEVER

## 2025-06-08 ASSESSMENT — ENCOUNTER SYMPTOMS
PSYCHIATRIC NEGATIVE: 1
CONSTITUTIONAL NEGATIVE: 1
SHORTNESS OF BREATH: 1
ALLERGIC/IMMUNOLOGIC NEGATIVE: 1
HEMATOLOGIC/LYMPHATIC NEGATIVE: 1
GASTROINTESTINAL NEGATIVE: 1
PALPITATIONS: 1
ENDOCRINE NEGATIVE: 1
MUSCULOSKELETAL NEGATIVE: 1
EYES NEGATIVE: 1
NEUROLOGICAL NEGATIVE: 1

## 2025-06-08 ASSESSMENT — COGNITIVE AND FUNCTIONAL STATUS - GENERAL
DAILY ACTIVITIY SCORE: 24
MOBILITY SCORE: 22
CLIMB 3 TO 5 STEPS WITH RAILING: A LITTLE
WALKING IN HOSPITAL ROOM: A LITTLE
PATIENT BASELINE BEDBOUND: NO

## 2025-06-08 ASSESSMENT — PAIN SCALES - GENERAL
PAINLEVEL_OUTOF10: 0 - NO PAIN
PAINLEVEL_OUTOF10: 3

## 2025-06-08 ASSESSMENT — PAIN - FUNCTIONAL ASSESSMENT: PAIN_FUNCTIONAL_ASSESSMENT: 0-10

## 2025-06-08 NOTE — ED TRIAGE NOTES
Pt c/o increased SOB, was recently released from skilled facility, states has not had dialysis in 2 weeks.

## 2025-06-09 ENCOUNTER — APPOINTMENT (OUTPATIENT)
Dept: DIALYSIS | Facility: HOSPITAL | Age: 55
End: 2025-06-09
Payer: COMMERCIAL

## 2025-06-09 ENCOUNTER — APPOINTMENT (OUTPATIENT)
Dept: CARDIOLOGY | Facility: HOSPITAL | Age: 55
End: 2025-06-09
Payer: COMMERCIAL

## 2025-06-09 VITALS
WEIGHT: 267 LBS | BODY MASS INDEX: 39.55 KG/M2 | HEIGHT: 69 IN | DIASTOLIC BLOOD PRESSURE: 101 MMHG | RESPIRATION RATE: 18 BRPM | SYSTOLIC BLOOD PRESSURE: 137 MMHG | OXYGEN SATURATION: 99 % | TEMPERATURE: 97.9 F | HEART RATE: 95 BPM

## 2025-06-09 LAB
ALBUMIN SERPL BCP-MCNC: 3.3 G/DL (ref 3.4–5)
ANION GAP SERPL CALC-SCNC: 14 MMOL/L (ref 10–20)
BUN SERPL-MCNC: 97 MG/DL (ref 6–23)
CALCIUM SERPL-MCNC: 9.9 MG/DL (ref 8.6–10.3)
CHLORIDE SERPL-SCNC: 119 MMOL/L (ref 98–107)
CO2 SERPL-SCNC: 18 MMOL/L (ref 21–32)
CREAT SERPL-MCNC: 11.44 MG/DL (ref 0.5–1.3)
EGFRCR SERPLBLD CKD-EPI 2021: 5 ML/MIN/1.73M*2
ERYTHROCYTE [DISTWIDTH] IN BLOOD BY AUTOMATED COUNT: 15.4 % (ref 11.5–14.5)
GLUCOSE BLD MANUAL STRIP-MCNC: 80 MG/DL (ref 74–99)
GLUCOSE BLD MANUAL STRIP-MCNC: 95 MG/DL (ref 74–99)
GLUCOSE SERPL-MCNC: 95 MG/DL (ref 74–99)
HCT VFR BLD AUTO: 28.3 % (ref 41–52)
HGB BLD-MCNC: 8.5 G/DL (ref 13.5–17.5)
MCH RBC QN AUTO: 29 PG (ref 26–34)
MCHC RBC AUTO-ENTMCNC: 30 G/DL (ref 32–36)
MCV RBC AUTO: 97 FL (ref 80–100)
NRBC BLD-RTO: 0 /100 WBCS (ref 0–0)
PHOSPHATE SERPL-MCNC: 3.8 MG/DL (ref 2.5–4.9)
PLATELET # BLD AUTO: 152 X10*3/UL (ref 150–450)
POTASSIUM SERPL-SCNC: 5.6 MMOL/L (ref 3.5–5.3)
Q ONSET: 218 MS
QRS COUNT: 16 BEATS
QRS DURATION: 94 MS
QT INTERVAL: 344 MS
QTC CALCULATION(BAZETT): 430 MS
QTC FREDERICIA: 399 MS
R AXIS: 26 DEGREES
RBC # BLD AUTO: 2.93 X10*6/UL (ref 4.5–5.9)
SODIUM SERPL-SCNC: 145 MMOL/L (ref 136–145)
T AXIS: 70 DEGREES
T OFFSET: 390 MS
VENTRICULAR RATE: 94 BPM
WBC # BLD AUTO: 5.7 X10*3/UL (ref 4.4–11.3)

## 2025-06-09 PROCEDURE — 2500000002 HC RX 250 W HCPCS SELF ADMINISTERED DRUGS (ALT 637 FOR MEDICARE OP, ALT 636 FOR OP/ED)

## 2025-06-09 PROCEDURE — G0378 HOSPITAL OBSERVATION PER HR: HCPCS

## 2025-06-09 PROCEDURE — 82947 ASSAY GLUCOSE BLOOD QUANT: CPT

## 2025-06-09 PROCEDURE — 90937 HEMODIALYSIS REPEATED EVAL: CPT

## 2025-06-09 PROCEDURE — 94640 AIRWAY INHALATION TREATMENT: CPT

## 2025-06-09 PROCEDURE — 93005 ELECTROCARDIOGRAM TRACING: CPT | Mod: 59

## 2025-06-09 PROCEDURE — 99238 HOSP IP/OBS DSCHRG MGMT 30/<: CPT

## 2025-06-09 PROCEDURE — 80069 RENAL FUNCTION PANEL: CPT

## 2025-06-09 PROCEDURE — 2500000001 HC RX 250 WO HCPCS SELF ADMINISTERED DRUGS (ALT 637 FOR MEDICARE OP)

## 2025-06-09 PROCEDURE — 36415 COLL VENOUS BLD VENIPUNCTURE: CPT

## 2025-06-09 PROCEDURE — 85027 COMPLETE CBC AUTOMATED: CPT

## 2025-06-09 PROCEDURE — 2500000004 HC RX 250 GENERAL PHARMACY W/ HCPCS (ALT 636 FOR OP/ED)

## 2025-06-09 PROCEDURE — 93005 ELECTROCARDIOGRAM TRACING: CPT

## 2025-06-09 RX ORDER — CINACALCET 30 MG/1
30 TABLET, FILM COATED ORAL
Status: DISCONTINUED | OUTPATIENT
Start: 2025-06-11 | End: 2025-06-09 | Stop reason: HOSPADM

## 2025-06-09 RX ORDER — SEVELAMER HYDROCHLORIDE 800 MG/1
800 TABLET, FILM COATED ORAL
COMMUNITY

## 2025-06-09 RX ORDER — ALBUTEROL SULFATE 0.83 MG/ML
2.5 SOLUTION RESPIRATORY (INHALATION) EVERY 4 HOURS PRN
COMMUNITY

## 2025-06-09 RX ORDER — DICLOFENAC SODIUM 10 MG/G
2-4 GEL TOPICAL 2 TIMES DAILY
COMMUNITY

## 2025-06-09 RX ORDER — TRAZODONE HYDROCHLORIDE 50 MG/1
50 TABLET ORAL NIGHTLY
Status: DISCONTINUED | OUTPATIENT
Start: 2025-06-09 | End: 2025-06-09 | Stop reason: HOSPADM

## 2025-06-09 RX ORDER — ACETAMINOPHEN 325 MG/1
650 TABLET ORAL EVERY 6 HOURS PRN
Status: DISCONTINUED | OUTPATIENT
Start: 2025-06-09 | End: 2025-06-09 | Stop reason: HOSPADM

## 2025-06-09 RX ORDER — FOLIC ACID 1 MG/1
1 TABLET ORAL DAILY
Status: DISCONTINUED | OUTPATIENT
Start: 2025-06-10 | End: 2025-06-09 | Stop reason: HOSPADM

## 2025-06-09 RX ORDER — FOLIC ACID 1 MG/1
1 TABLET ORAL DAILY
COMMUNITY

## 2025-06-09 RX ORDER — CINACALCET 30 MG/1
30 TABLET, FILM COATED ORAL
COMMUNITY

## 2025-06-09 RX ORDER — TRAZODONE HYDROCHLORIDE 50 MG/1
50 TABLET ORAL NIGHTLY
COMMUNITY

## 2025-06-09 RX ORDER — SEVELAMER CARBONATE 800 MG/1
800 TABLET, FILM COATED ORAL
Status: DISCONTINUED | OUTPATIENT
Start: 2025-06-09 | End: 2025-06-09 | Stop reason: HOSPADM

## 2025-06-09 RX ADMIN — ACETAMINOPHEN 650 MG: 325 TABLET ORAL at 13:48

## 2025-06-09 RX ADMIN — POLYETHYLENE GLYCOL 3350 17 G: 17 POWDER, FOR SOLUTION ORAL at 08:10

## 2025-06-09 RX ADMIN — PANTOPRAZOLE SODIUM 40 MG: 40 TABLET, DELAYED RELEASE ORAL at 08:10

## 2025-06-09 RX ADMIN — QUETIAPINE FUMARATE 25 MG: 25 TABLET ORAL at 08:10

## 2025-06-09 RX ADMIN — TAMSULOSIN HYDROCHLORIDE 0.4 MG: 0.4 CAPSULE ORAL at 08:10

## 2025-06-09 RX ADMIN — FINASTERIDE 5 MG: 5 TABLET, FILM COATED ORAL at 08:10

## 2025-06-09 RX ADMIN — ASPIRIN 81 MG: 81 TABLET, COATED ORAL at 08:10

## 2025-06-09 RX ADMIN — CALCITRIOL CAPSULES 0.25 MCG 0.25 MCG: 0.25 CAPSULE ORAL at 08:10

## 2025-06-09 RX ADMIN — CALCIUM ACETATE 1334 MG: 667 CAPSULE ORAL at 08:10

## 2025-06-09 RX ADMIN — ESCITALOPRAM OXALATE 20 MG: 20 TABLET ORAL at 08:10

## 2025-06-09 RX ADMIN — Medication 1 CAPSULE: at 08:10

## 2025-06-09 RX ADMIN — ALBUTEROL SULFATE 3 ML: 2.5 SOLUTION RESPIRATORY (INHALATION) at 07:35

## 2025-06-09 RX ADMIN — ERGOCALCIFEROL 1.25 MG: 1.25 CAPSULE ORAL at 08:10

## 2025-06-09 RX ADMIN — APIXABAN 2.5 MG: 2.5 TABLET, FILM COATED ORAL at 08:10

## 2025-06-09 ASSESSMENT — COGNITIVE AND FUNCTIONAL STATUS - GENERAL
DAILY ACTIVITIY SCORE: 24
WALKING IN HOSPITAL ROOM: A LITTLE
CLIMB 3 TO 5 STEPS WITH RAILING: A LITTLE
MOBILITY SCORE: 22

## 2025-06-09 ASSESSMENT — PAIN SCALES - GENERAL
PAINLEVEL_OUTOF10: 0 - NO PAIN
PAINLEVEL_OUTOF10: 10 - WORST POSSIBLE PAIN

## 2025-06-09 NOTE — POST-PROCEDURE NOTE
..Report to Receiving RN:    Report To: Jen Atkinson RN  Time Report Called: 1600  Hand-Off Communication: no complications. Gave him Tylenol 650mg, /106 HR 81 UF removed 1.5L. Will put in for transport.   Complications During Treatment: No  Ultrafiltration Treatment: No  Medications Administered During Dialysis: Yes, see MAR  Blood Products Administered During Dialysis: No  Labs Sent During Dialysis: No  Heparin Drip Rate Changes: No  Dialysis Catheter Dressing: AVF pressure dressing CDI.  Last Dressing Change: 06/09/25    Electronic Signatures:   (Signed )   Authored:    (Signed )   Authored:     Last Updated: 4:08 PM by ROJAS BUTLER

## 2025-06-09 NOTE — ED PROVIDER NOTES
Emergency Department Provider Note       HPI: Eloina Colmenares is a 54 y.o. male with a past medical history of persistent atrial fibrillation,Hypertension hyperlipidemia, type 2 diabetes mellitus, class II obesity BMI 36, end-stage renal disease on hemodialysis, noncompliance, anemia, remote stroke, adjustment disorder with mixed disturbance of emotions and conduct who presented to [facility] ER complaining shortness of breath.  He states he has not been dialyzed for 3 weeks duration.  Due to social issues.  He denies any abdominal pain nausea vomiting diarrhea, fever chills cough congestion incontinences years syncope or near syncope no hematemesis melena hematochezia hemoptysis.    Past history: Hypertension, A-fib, dyslipidemia, diabetes, high BMI, ESRD on hemodialysis, noncompliance, adjustment disorder, remote CVA, anemia    Social: Patient denies a current tobacco alcohol drug abuse.    REVIEW OF SYSTEMS:    GENERAL.: No weight loss, fatigue, anorexia, insomnia, fever.    EYES: No vision loss, double vision, drainage, eye pain.    ENT: No pharyngitis, dry mouth.    CARDIOPULMONARY: No chest pain, palpitations, syncope, near syncope.  Positive for shortness of breath, cough, hemoptysis.    GI: No abdominal pain, change in bowel habits, melena, hematemesis, hematochezia, nausea, vomiting, diarrhea.    : No discharge, dysuria, frequency, urgency, hematuria.    MS: No limb pain, joint pain, joint swelling.    SKIN: No rashes.    PSYCH: No depression, anxiety, suicidality, homicidality.    Review of systems is otherwise negative unless stated above or in history of present illness.  Social history, family history, allergies reviewed.  PHYSICAL EXAM:    GENERAL: Vitals noted, no distress. Alert and oriented  x 3. Non-toxic.  Pressured speech high BMI    EENT: TMs clear. Posterior oropharynx unremarkable. No meningismus. No LAD.     NECK: Supple. Nontender. No midline tenderness.     CARDIAC: Regular,  rate, rhythm. No murmurs rubs or gallops.  4 cm JVD    PULMONARY: Lungs clear bilaterally with good aeration. No wheezes rales or rhonchi. No respiratory distress.  Fine bibasilar crackles no tachypnea stridor or retractions able to speak in full sentences    ABDOMEN: Soft, nonsurgical. Nontender. No peritoneal signs. Normoactive bowel sounds. No pulsatile masses.     EXTREMITIES: No peripheral edema. Negative Homans bilaterally, no cords.  2+ bounding pulses well-perfused.    SKIN: No rash. Intact.  Tenderness chest wall hemodialysis catheter anchored in place with no infection.    NEURO: No focal neurologic deficits, NIH score of 0. Cranial nerves normal as tested from II through XII.     MEDICAL DECISION MAKING:  EKG on my interpretation showed normal sinus rhythm normal axis rate in the mid 80s with no acute ischemic or hyperkalemic changes.    CBC shows no leukocytosis stable hemoglobin, basic metabolic panel shows ESRD with a potassium of 6.0, BNP 1600 elevated, chest ray shows a pulmonary vessel congestion.  Venous gas shows no hypercapnia.    Treatment ED: IV established placed on a cardiac monitor given insulin dextrose sodium bicarbonate, calcium gluconate and oral Lokelma.    ED course: Patient remained stable hemodynamically repeat potassium is pending.    Impression: #1 mild volume overload, #2 hyperkalemia #3 noncompliance with hemodialysis    Plan/MDM: 54-year-old male with a history of hypertension diabetes ESRD on hemodialysis who is noncompliant comes in with volume overload and hyperkalemia in the setting of not be dialyzed for 3-week duration.  Currently is no sign evidence of sepsis or septic shock or pulmonary edema, low suspicion for pneumonia or uremia, patient will be hospitalized to manage her potassium and urgent hemodialysis.                                                       Gloria Landrum MD  06/08/25 3507

## 2025-06-09 NOTE — ED PROCEDURE NOTE
Procedure  Critical Care    Performed by: Gloria Landrum MD  Authorized by: Gloria Landrum MD    Critical care provider statement:     Critical care time (minutes):  45    Critical care time was exclusive of:  Separately billable procedures and treating other patients    Critical care was necessary to treat or prevent imminent or life-threatening deterioration of the following conditions:  Metabolic crisis    Critical care was time spent personally by me on the following activities:  Blood draw for specimens, development of treatment plan with patient or surrogate, discussions with primary provider, evaluation of patient's response to treatment, examination of patient, review of old charts, re-evaluation of patient's condition, pulse oximetry, ordering and review of radiographic studies, ordering and review of laboratory studies, ordering and performing treatments and interventions and obtaining history from patient or surrogate    Care discussed with: admitting provider                 Gloria Landrum MD  06/08/25 8970

## 2025-06-09 NOTE — PROCEDURES
"Patient seen on dialysis.  Patient presents after missed dialysis for weeks following discharge from a skilled nursing facility.  He is dialyzing on an F180 x 3.5 hours, BFR//600 mL/minute, 2K bath, 2.5 calcium with a target UF set for 1.5 L as tolerated.  I will order erythropoietin.  His phosphorus level is acceptable on Renvela.  Tolerating dialysis, continue per submitted orders.     BP (!) 137/94   Pulse 80   Temp 36.5 °C (97.7 °F) (Tympanic)   Resp 18   Ht 1.753 m (5' 9\")   Wt 121 kg (267 lb)   SpO2 98%   BMI 39.43 kg/m²       Medications Ordered Prior to Encounter[1]         [1]   No current facility-administered medications on file prior to encounter.     Current Outpatient Medications on File Prior to Encounter   Medication Sig Dispense Refill    albuterol 90 mcg/actuation inhaler Inhale 2 puffs every 6 hours if needed for wheezing or shortness of breath.      apixaban (Eliquis) 2.5 mg tablet Take 1 tablet (2.5 mg) by mouth 2 times a day.      atorvastatin (Lipitor) 40 mg tablet Take 1 tablet (40 mg) by mouth once daily at bedtime.      B complex-vitamin C-folic acid (Nephrocaps) 1 mg capsule Take 1 capsule by mouth once daily.      escitalopram (Lexapro) 20 mg tablet Take 1 tablet (20 mg) by mouth once daily.      finasteride (Proscar) 5 mg tablet Take 1 tablet (5 mg) by mouth once daily. Do not crush, chew, or split.      melatonin 3 mg tablet Take 2 tablets (6 mg) by mouth once daily at bedtime.      metoprolol succinate XL (Toprol-XL) 50 mg 24 hr tablet Take 1 tablet (50 mg) by mouth once daily at bedtime. Do not crush or chew.      midodrine (Proamatine) 10 mg tablet Take 1 tablet (10 mg) by mouth if needed (for HD with SBP <90). (Patient taking differently: Take 1 tablet (10 mg) by mouth once daily as needed (for HD with SBP <90 during dialysis).)      ondansetron (Zofran) 4 mg tablet Take 1 tablet (4 mg) by mouth every 12 hours if needed for nausea or vomiting.      oxyCODONE " (Roxicodone) 5 mg immediate release tablet Take 1 tablet (5 mg) by mouth every 6 hours if needed for severe pain (7 - 10). (Patient taking differently: Take 1 tablet (5 mg) by mouth every 8 hours if needed for severe pain (7 - 10).) 15 tablet 0    pantoprazole (ProtoNix) 40 mg EC tablet Take 1 tablet (40 mg) by mouth once daily in the morning. Take before meals. Do not crush, chew, or split.      tamsulosin (Flomax) 0.4 mg 24 hr capsule Take 1 capsule (0.4 mg) by mouth once daily.      [DISCONTINUED] calcitriol (Rocaltrol) 0.25 mcg capsule Take 1 capsule (0.25 mcg) by mouth once daily.      [DISCONTINUED] calcium acetate (Phoslo) 667 mg capsule Take 2 capsules (1,334 mg) by mouth 3 times a day. WITH MEALS.      [DISCONTINUED] OLANZapine (ZyPREXA) 5 mg tablet Take 1 tablet (5 mg) by mouth once daily at bedtime.      [DISCONTINUED] QUEtiapine (SEROquel) 25 mg tablet Take 1 tablet (25 mg) by mouth 2 times daily (morning and late afternoon).      acetaminophen (Tylenol) 325 mg tablet Take 3 tablets (975 mg) by mouth every 8 hours if needed (pain).      albuterol 2.5 mg /3 mL (0.083 %) nebulizer solution Take 3 mL (2.5 mg) by nebulization every 4 hours if needed for wheezing or shortness of breath.      aspirin 81 mg EC tablet Take 1 tablet (81 mg) by mouth once daily.      cinacalcet (Sensipar) 30 mg tablet Take 1 tablet (30 mg) by mouth once a day on Monday, Wednesday, and Friday. Take with food or shortly afer a meal. Swallow tablet whole; do not break or divide. On dialysis days      diclofenac sodium (Voltaren Arthritis Pain) 1 % gel Apply 2.25-4.5 inches (2-4 g) topically 2 times a day. To left knee      ergocalciferol (Vitamin D-2) 1.25 MG (49714 UT) capsule Take 1 capsule (1.25 mg) by mouth 1 (one) time per week. MONDAY NEED AN REFILL      folic acid (Folvite) 1 mg tablet Take 1 tablet (1 mg) by mouth once daily.      guaiFENesin (Mucinex) 600 mg 12 hr tablet Take 1 tablet (600 mg) by mouth 2 times a day as  needed for cough. Do not crush, chew, or split. (Patient not taking: Reported on 6/9/2025)      lidocaine 4 % patch Place 1 patch over 12 hours on the skin once daily. Remove & discard patch within 12 hours or as directed by MD.Apply to left knee. Patch will remain on for 12 hours, then removed for 12 hours. Do NOT place patch directly over any surgical incisions or wounds. (Patient not taking: Reported on 6/9/2025)      sevelamer HCl (Renagel) 800 mg tablet Take 1 tablet (800 mg) by mouth 3 times daily (morning, midday, late afternoon). Swallow tablet whole; do not crush, break, or chew.      traZODone (Desyrel) 50 mg tablet Take 1 tablet (50 mg) by mouth once daily at bedtime.      [DISCONTINUED] melatonin 3 mg capsule Take 1 capsule by mouth once daily at bedtime.

## 2025-06-09 NOTE — PROGRESS NOTES
Pharmacy Medication History     Source of Information: Per nurse at [facility]     Additional concerns with the patient's PTA list.   The following were the most recent meds patient was taking and discharged with per SNF.   Notified Provider via Haiku : Yes    The following updates were made to the Prior to Admission medication list:     Medications ADDED:   Cinacalcet  Folic acid   Albuterol nebulizer   Voltaren gel   Trazodone   Sevelamer   Medications CHANGED:  Oxycodone is every 8 hours prn   Medications REMOVED:   Calcitriol   Phoslo   Olanzapine   Seroquel   Medications NOT TAKING:   Lidocaine   Guaifenesin     Allergy reviewed : Yes    Meds 2 Beds : Yes    Outpatient pharmacy confirmed and updated in chart : N/A    Pharmacy name: was from [facility]     The list below reflectives the updated PTA list. Please review each medication in order reconciliation for additional clarification and justification.    Prior to Admission Medications   Prescriptions Last Dose   B complex-vitamin C-folic acid (Nephrocaps) 1 mg capsule 6/7/2025   Sig: Take 1 capsule by mouth once daily.   acetaminophen (Tylenol) 325 mg tablet    Sig: Take 3 tablets (975 mg) by mouth every 8 hours if needed (pain).   albuterol 2.5 mg /3 mL (0.083 %) nebulizer solution    Sig: Take 3 mL (2.5 mg) by nebulization every 4 hours if needed for wheezing or shortness of breath.   albuterol 90 mcg/actuation inhaler 6/8/2025   Sig: Inhale 2 puffs every 6 hours if needed for wheezing or shortness of breath.   apixaban (Eliquis) 2.5 mg tablet 6/8/2025   Sig: Take 1 tablet (2.5 mg) by mouth 2 times a day.   aspirin 81 mg EC tablet    Sig: Take 1 tablet (81 mg) by mouth once daily.   atorvastatin (Lipitor) 40 mg tablet 6/8/2025   Sig: Take 1 tablet (40 mg) by mouth once daily at bedtime.   cinacalcet (Sensipar) 30 mg tablet    Sig: Take 1 tablet (30 mg) by mouth once a day on Monday, Wednesday, and Friday. Take with food or shortly afer a meal. Swallow  tablet whole; do not break or divide. On dialysis days   diclofenac sodium (Voltaren Arthritis Pain) 1 % gel    Sig: Apply 2.25-4.5 inches (2-4 g) topically 2 times a day. To left knee   ergocalciferol (Vitamin D-2) 1.25 MG (80941 UT) capsule 6/2/2025   Sig: Take 1 capsule (1.25 mg) by mouth 1 (one) time per week. MONDAY NEED AN REFILL   escitalopram (Lexapro) 20 mg tablet 6/8/2025   Sig: Take 1 tablet (20 mg) by mouth once daily.   finasteride (Proscar) 5 mg tablet 6/7/2025 Morning   Sig: Take 1 tablet (5 mg) by mouth once daily. Do not crush, chew, or split.   folic acid (Folvite) 1 mg tablet    Sig: Take 1 tablet (1 mg) by mouth once daily.                       melatonin 3 mg tablet 6/8/2025   Sig: Take 2 tablets (6 mg) by mouth once daily at bedtime.   metoprolol succinate XL (Toprol-XL) 50 mg 24 hr tablet 6/8/2025   Sig: Take 1 tablet (50 mg) by mouth once daily at bedtime. Do not crush or chew.   midodrine (Proamatine) 10 mg tablet Past Month   Sig: Take 1 tablet (10 mg) by mouth if needed (for HD with SBP <90).   Patient taking differently: Take 1 tablet (10 mg) by mouth once daily as needed (for HD with SBP <90 during dialysis).   ondansetron (Zofran) 4 mg tablet Past Month   Sig: Take 1 tablet (4 mg) by mouth every 12 hours if needed for nausea or vomiting.   oxyCODONE (Roxicodone) 5 mg immediate release tablet Past Week   Sig: Take 1 tablet (5 mg) by mouth every 6 hours if needed for severe pain (7 - 10).   Patient taking differently: Take 1 tablet (5 mg) by mouth every 8 hours if needed for severe pain (7 - 10).   pantoprazole (ProtoNix) 40 mg EC tablet 6/7/2025   Sig: Take 1 tablet (40 mg) by mouth once daily in the morning. Take before meals. Do not crush, chew, or split.   sevelamer HCl (Renagel) 800 mg tablet    Sig: Take 1 tablet (800 mg) by mouth 3 times daily (morning, midday, late afternoon). Swallow tablet whole; do not crush, break, or chew.   tamsulosin (Flomax) 0.4 mg 24 hr capsule 6/7/2025  Morning   Sig: Take 1 capsule (0.4 mg) by mouth once daily.   traZODone (Desyrel) 50 mg tablet    Sig: Take 1 tablet (50 mg) by mouth once daily at bedtime.      Facility-Administered Medications: None       The list below reflectives the updated allergy list. Please review each documented allergy for additional clarification and justification.    No Known Allergies       06/09/25 at 12:14 PM - Rocio Patel

## 2025-06-09 NOTE — DISCHARGE INSTRUCTIONS
Hemodialysis  Femi Berumen   [phone]  Monday Wednesday Friday  12 pm--please be there at 1130 to complete paperwork

## 2025-06-09 NOTE — H&P
History Of Present Illness  Kj Colmenares is a 54 y.o. male with a past medical history of persistent atrial fibrillation,Hypertension hyperlipidemia, type 2 diabetes mellitus, class II obesity BMI 36, end-stage renal disease on hemodialysis, noncompliance, anemia, remote stroke, adjustment disorder with mixed disturbance of emotions and conduct who presented to [facility] ER complaining of  increased shortness of breath. Patient states he was recently released from his skilled nursing facility and has not had dialysis in 2 weeks. States when he got released from SNF he failed to follow up for HD as it was not set up for him. He reports being compliant with all his medications. Reports persistent shortness of breath over this past week, his girlfriend was most concerned and brought him into ED for evaluation. He denies any abdominal pain nausea vomiting diarrhea, fever chills cough congestion incontinences years syncope or near syncope no hematemesis melena hematochezia hemoptysis.     ED work up:  EKG: NSR axis rate in the mid 80s with no acute ischemic or hyperkalemic changes   Vital signs:36.8 (98.2) HR 94, RR 18, /95, 98% on room air  Labs: potassium 6.0, chloride 118, bicarbonate 19, BUN 91, CRT 11.49, EGFR 5, alk phos 376, ALT 9, BNP 1,579. Hemoglobin 10.0, hematocrit 34.  Imaging: CXR Mild pulmonary vascular congestion, without evidence of consolidation or sizable effusion    ED medications administered:  2 g IV calcium gluconate   25 g IV dextrose   10 units humulin insulin   10 g Lokelma packet     Past Medical History  He has a past medical history of Diabetes mellitus (Multi), Hypertension, and Stroke (Multi).    Surgical History  He has a past surgical history that includes CT angio head w and wo IV contrast (2/4/2014) and CT angio head w and wo IV contrast (2/13/2014).     Social History  He reports that he has never smoked. He has never used smokeless tobacco. He reports that he does  not drink alcohol and does not use drugs.    Family History  Family History[1]     Allergies  Patient has no known allergies.    Review of Systems   Constitutional: Negative.    HENT: Negative.     Eyes: Negative.    Respiratory:  Positive for shortness of breath.    Cardiovascular:  Positive for palpitations and leg swelling.   Gastrointestinal: Negative.    Endocrine: Negative.    Genitourinary: Negative.    Musculoskeletal: Negative.         Knee pain   Skin: Negative.    Allergic/Immunologic: Negative.    Neurological: Negative.    Hematological: Negative.    Psychiatric/Behavioral: Negative.     All other systems reviewed and are negative.      Physical Exam  Constitutional: pt alert and cooperative. Sitting up in bed in no acute distress.  Respiratory/Thorax: non-labored breathing, no cough, on RA  Cardiovascular: irregular rate and rhythm seen on EKG  Gastrointestinal: Nondistended, non tender to self palpation, last BM yesterday  Musculoskeletal: ROM intact  Neurological: A&O x 3, speech clear, follows commands appropriately    Last Recorded Vitals  /84   Pulse 99   Temp 36.8 °C (98.2 °F)   Resp 18   Wt 121 kg (267 lb)   SpO2 99%     Relevant Results  Scheduled medications  Scheduled Medications[2]  Continuous medications  Continuous Medications[3]  PRN medications  PRN Medications[4]  Results for orders placed or performed during the hospital encounter of 06/08/25 (from the past 24 hours)   POCT GLUCOSE   Result Value Ref Range    POCT Glucose 103 (H) 74 - 99 mg/dL   CBC and Auto Differential   Result Value Ref Range    WBC 5.0 4.4 - 11.3 x10*3/uL    nRBC 0.0 0.0 - 0.0 /100 WBCs    RBC 3.49 (L) 4.50 - 5.90 x10*6/uL    Hemoglobin 10.0 (L) 13.5 - 17.5 g/dL    Hematocrit 34.8 (L) 41.0 - 52.0 %     80 - 100 fL    MCH 28.7 26.0 - 34.0 pg    MCHC 28.7 (L) 32.0 - 36.0 g/dL    RDW 15.2 (H) 11.5 - 14.5 %    Platelets 191 150 - 450 x10*3/uL    Neutrophils % 68.5 40.0 - 80.0 %    Immature  Granulocytes %, Automated 0.4 0.0 - 0.9 %    Lymphocytes % 15.1 13.0 - 44.0 %    Monocytes % 6.6 2.0 - 10.0 %    Eosinophils % 9.0 0.0 - 6.0 %    Basophils % 0.4 0.0 - 2.0 %    Neutrophils Absolute 3.41 1.20 - 7.70 x10*3/uL    Immature Granulocytes Absolute, Automated 0.02 0.00 - 0.70 x10*3/uL    Lymphocytes Absolute 0.75 (L) 1.20 - 4.80 x10*3/uL    Monocytes Absolute 0.33 0.10 - 1.00 x10*3/uL    Eosinophils Absolute 0.45 0.00 - 0.70 x10*3/uL    Basophils Absolute 0.02 0.00 - 0.10 x10*3/uL   Basic metabolic panel   Result Value Ref Range    Glucose 96 74 - 99 mg/dL    Sodium 145 136 - 145 mmol/L    Potassium 6.0 (H) 3.5 - 5.3 mmol/L    Chloride 118 (H) 98 - 107 mmol/L    Bicarbonate 19 (L) 21 - 32 mmol/L    Anion Gap 14 10 - 20 mmol/L    Urea Nitrogen 91 (HH) 6 - 23 mg/dL    Creatinine 11.49 (H) 0.50 - 1.30 mg/dL    eGFR 5 (L) >60 mL/min/1.73m*2    Calcium 9.9 8.6 - 10.3 mg/dL   Hepatic function panel   Result Value Ref Range    Albumin 4.1 3.4 - 5.0 g/dL    Bilirubin, Total 0.4 0.0 - 1.2 mg/dL    Bilirubin, Direct 0.1 0.0 - 0.3 mg/dL    Alkaline Phosphatase 376 (H) 33 - 120 U/L    ALT 9 (L) 10 - 52 U/L    AST 10 9 - 39 U/L    Total Protein 6.8 6.4 - 8.2 g/dL   B-Type Natriuretic Peptide   Result Value Ref Range    BNP 1,579 (H) 0 - 99 pg/mL   Blood Gas Venous Full Panel   Result Value Ref Range    POCT pH, Venous 7.27 (L) 7.33 - 7.43 pH    POCT pCO2, Venous 41 41 - 51 mm Hg    POCT pO2, Venous 52 (H) 35 - 45 mm Hg    POCT SO2, Venous 85 (H) 45 - 75 %    POCT Oxy Hemoglobin, Venous 82.7 (H) 45.0 - 75.0 %    POCT Hematocrit Calculated, Venous 29.0 (L) 41.0 - 52.0 %    POCT Sodium, Venous 143 136 - 145 mmol/L    POCT Potassium, Venous 7.1 (HH) 3.5 - 5.3 mmol/L    POCT Chloride, Venous 118 (H) 98 - 107 mmol/L    POCT Ionized Calicum, Venous 1.36 (H) 1.10 - 1.33 mmol/L    POCT Glucose, Venous 91 74 - 99 mg/dL    POCT Lactate, Venous 0.9 0.4 - 2.0 mmol/L    POCT Base Excess, Venous -7.6 (L) -2.0 - 3.0 mmol/L    POCT HCO3  Calculated, Venous 18.8 (L) 22.0 - 26.0 mmol/L    POCT Hemoglobin, Venous 9.5 (L) 13.5 - 17.5 g/dL    POCT Anion Gap, Venous 13.0 10.0 - 25.0 mmol/L    Patient Temperature 37.0 degrees Celsius    FiO2 21 %   POCT GLUCOSE   Result Value Ref Range    POCT Glucose 43 (L) 74 - 99 mg/dL     XR chest 2 views  Result Date: 6/8/2025  Interpreted By:  Chantal Willingham, STUDY: XR CHEST 2 VIEWS;  6/8/2025 7:08 pm   INDICATION: Signs/Symptoms:sob.     COMPARISON: Radiographs of the chest dated 03/04/2025.   ACCESSION NUMBER(S): [id number]   ORDERING CLINICIAN: GEMMA PAREDES   FINDINGS: AP radiograph of the chest was provided.       CARDIOMEDIASTINAL SILHOUETTE: Cardiomediastinal silhouette is mildly enlarged, similar in appearance to prior exam.   LUNGS: Mild pulmonary vascular congestion is present without evidence of consolidation or sizable effusion.   ABDOMEN: No remarkable upper abdominal findings.   BONES: No acute osseous changes.       1.  Mild pulmonary vascular congestion, without evidence of consolidation or sizable effusion.       MACRO: None   Signed by: Chantal Willingham 6/8/2025 7:16 PM Dictation workstation:   [id number]     Assessment/Plan   Assessment & Plan  Shortness of breath    Hyperkalemia      Kj Colmenares is a 54 y.o. male with a past medical history of persistent atrial fibrillation,Hypertension hyperlipidemia, type 2 diabetes mellitus, class II obesity BMI 36, end-stage renal disease on hemodialysis, noncompliance, anemia, remote stroke, adjustment disorder with mixed disturbance of emotions and conduct who presented to [facility] ER complaining of  increased shortness of breath. Patient states he was recently released from his skilled nursing facility and has not had dialysis in 2 weeks. States when he got released from SNF he failed to follow up for HD as it was not set up for him. He reports being compliant with all his medications. Reports persistent shortness of breath  over this past week, his girlfriend was most concerned and brought him into ED for evaluation. He denies any abdominal pain nausea vomiting diarrhea, fever chills cough congestion incontinences years syncope or near syncope no hematemesis melena hematochezia hemoptysis.     ED work up:  EKG: NSR axis rate in the mid 80s with no acute ischemic or hyperkalemic changes   Vital signs:36.8 (98.2) HR 94, RR 18, /95, 98% on room air  Labs: potassium 6.0, chloride 118, bicarbonate 19, BUN 91, CRT 11.49, EGFR 5, alk phos 376, ALT 9, BNP 1,579. Hemoglobin 10.0, hematocrit 34.  Imaging: CXR Mild pulmonary vascular congestion, without evidence of consolidation or sizable effusion    ED medications administered:  2 g IV calcium gluconate   25 g IV dextrose   10 units humulin insulin   10 g Lokelma packet        ESRD  Anemia of CKD  - no HD for 2 weeks prior to admission  - Nephrology consulted to manage IP HD; continue phoslo, neprocaps  - Renal diet  - S.W. for HD OP set up    Hyperkalemia; secondary to non compliance of HD  - K 6.0 on admission; received cocktail  - HD tomorrow  - Recheck in AM     Atrial fibrillation   - Continue po metoprolol and eliquis    Persistent Left knee  pain  - Recent left knee effusion; arthritis  - has OP Ortho appointment on 7/1/25, hoping to see sooner; as pain is interfering with ambulation   - continue scheduled tylenol, lidocaine patch     T2DM  - hypoglycemia protocol and continue ssi     HLD  - continue atorvastatin      Hx of CVA  - continue asa, statin     BPH  - continue flomax, proscar     DVT Prophylaxis: full anticoagulation with eliquis    Discharge dispo: will have SW evaluate dc needs    Chart, medical history, and labs/testing reviewed in detail.   Case and plan of care to be discussed with attending provider.        NIGEL Taylor-CNP   Observation/Internal Med ARIELLE  [facility]  06/08/25  10:33 PM  Total time of 45 minutes spent on professional and overall  care, with >50% of time dedicated to counseling/coordination of care.    Virtual or Telephone Consent     An interactive audio and video telecommunication system which permits real time communications between the patient (at the originating site) and provider (at the distant site) was utilized to provide this telehealth service.   Verbal consent was requested and obtained from (Kj Sheehan)  on this date (6/8/25) for a telehealth visit.          [1] No family history on file.  [2] [START ON 6/9/2025] albuterol, 3 mL, nebulization, TID  apixaban, 2.5 mg, oral, BID  [START ON 6/9/2025] aspirin, 81 mg, oral, Daily  atorvastatin, 40 mg, oral, Nightly  [START ON 6/9/2025] calcitriol, 0.25 mcg, oral, Daily  calcium acetate, 1,334 mg, oral, TID  [START ON 6/9/2025] ergocalciferol, 1.25 mg, oral, Every Monday  [START ON 6/9/2025] escitalopram, 20 mg, oral, Daily  [START ON 6/9/2025] finasteride, 5 mg, oral, Daily  insulin lispro, 0-5 Units, subcutaneous, Before meals & nightly  melatonin, 6 mg, oral, Nightly  metoprolol succinate XL, 50 mg, oral, Nightly  OLANZapine, 5 mg, oral, Nightly  [START ON 6/9/2025] pantoprazole, 40 mg, oral, Daily before breakfast  [START ON 6/9/2025] polyethylene glycol, 17 g, oral, Daily  [START ON 6/9/2025] QUEtiapine, 25 mg, oral, BID  [START ON 6/9/2025] tamsulosin, 0.4 mg, oral, Daily  [START ON 6/9/2025] vitamin B complex-vitamin C-folic acid, 1 capsule, oral, Daily  [3]    [4] PRN medications: albuterol, dextrose, dextrose, glucagon, glucagon, guaiFENesin, midodrine, ondansetron

## 2025-06-09 NOTE — CARE PLAN
The patient's goals for the shift include     Problem: Discharge Planning  Goal: Discharge to home or other facility with appropriate resources  Outcome: Progressing     Problem: Safety - Adult  Goal: Free from fall injury  Outcome: Progressing     Problem: Pain - Adult  Goal: Verbalizes/displays adequate comfort level or baseline comfort level  Outcome: Progressing       The clinical goals for the shift include    Problem: Chronic Conditions and Co-morbidities  Goal: Patient's chronic conditions and co-morbidity symptoms are monitored and maintained or improved  Outcome: Progressing     Problem: Nutrition  Goal: Nutrient intake appropriate for maintaining nutritional needs  Outcome: Progressing

## 2025-06-09 NOTE — PROGRESS NOTES
06/09/25 1120   Discharge Planning   Support Systems Spouse/significant other;Parent   Type of Residence Private residence   Home or Post Acute Services Community services   Expected Discharge Disposition Home   Does the patient need discharge transport arranged? Yes     Patient was recently at the [facility]--patient said his community HD chair was not set up for him.  SNF said HD chair was set up at [facility] MWF 1200.    Confirmed chair time with [facility] [phone].  Patient needs to be there at 1130 to complete paperwork.  Facility confirmed chair time with patient last week.  Patient to have dialysis in the hospital today and then will be discharge.  He can got to his normal chair time on Wednesday.  Chair info included on AVS and written out for patient and handed to him.  Patient stated understanding of chair time.  He understood that the plan for today was dialysis and then dc home.  Patient asked for an uber to be set up.  GUERITA Li updated on chair time and need for uber.  Will continue to follow for discharge planning needs.

## 2025-06-09 NOTE — CARE PLAN
The patient's goals for the shift include remain safe    The clinical goals for the shift include decrease episodes of shortness of breath      Problem: Pain - Adult  Goal: Verbalizes/displays adequate comfort level or baseline comfort level  Outcome: Progressing     Problem: Safety - Adult  Goal: Free from fall injury  Outcome: Progressing     Problem: Discharge Planning  Goal: Discharge to home or other facility with appropriate resources  Outcome: Progressing     Problem: Chronic Conditions and Co-morbidities  Goal: Patient's chronic conditions and co-morbidity symptoms are monitored and maintained or improved  Outcome: Progressing     Problem: Nutrition  Goal: Nutrient intake appropriate for maintaining nutritional needs  Outcome: Progressing

## 2025-06-09 NOTE — PRE-PROCEDURE NOTE
Report from Sending RN:    Report From: Jen Sibley  Recent Surgery of Procedure: No  Baseline Level of Consciousness (LOC): A&Ox3  Oxygen Use: No  Type: RA  Diabetic: Yes  Last BP Med Given Day of Dialysis: see MAR  Last Pain Med Given: see MAR  Lab Tests to be Obtained with Dialysis: No  Blood Transfusion to be Given During Dialysis: No  Available IV Access: Yes  Medications to be Administered During Dialysis: see MAR  Continuous IV Infusion Running: No  Restraints on Currently or in the Last 24 Hours: No  Hand-Off Communication: full code/ stable/missed hd; K 5.6  Dialysis Access: LAVF

## 2025-06-10 NOTE — DISCHARGE SUMMARY
Discharge Diagnosis  Shortness of breath    Issues Requiring Follow-Up  PCP, Nephrology    Discharge Meds     Medication List      CONTINUE taking these medications     acetaminophen 325 mg tablet; Commonly known as: Tylenol; Take 3 tablets   (975 mg) by mouth every 8 hours if needed (pain).   * albuterol 90 mcg/actuation inhaler   * albuterol 2.5 mg /3 mL (0.083 %) nebulizer solution   aspirin 81 mg EC tablet   atorvastatin 40 mg tablet; Commonly known as: Lipitor; Take 1 tablet (40   mg) by mouth once daily at bedtime.   cinacalcet 30 mg tablet; Commonly known as: Sensipar   Eliquis 2.5 mg tablet; Generic drug: apixaban   ergocalciferol 1250 mcg (50,000 units) capsule; Commonly known as:   Vitamin D-2   escitalopram 20 mg tablet; Commonly known as: Lexapro   finasteride 5 mg tablet; Commonly known as: Proscar   Flomax 0.4 mg 24 hr capsule; Generic drug: tamsulosin   folic acid 1 mg tablet; Commonly known as: Folvite   melatonin 3 mg tablet; Take 2 tablets (6 mg) by mouth once daily at   bedtime.   metoprolol succinate XL 50 mg 24 hr tablet; Commonly known as:   Toprol-XL; Take 1 tablet (50 mg) by mouth once daily at bedtime. Do not   crush or chew.   midodrine 10 mg tablet; Commonly known as: Proamatine; Take 1 tablet (10   mg) by mouth if needed (for HD with SBP <90).   ondansetron 4 mg tablet; Commonly known as: Zofran   oxyCODONE 5 mg immediate release tablet; Commonly known as: Roxicodone;   Take 1 tablet (5 mg) by mouth every 6 hours if needed for severe pain (7 -   10).   pantoprazole 40 mg EC tablet; Commonly known as: ProtoNix   sevelamer HCl 800 mg tablet; Commonly known as: Renagel   traZODone 50 mg tablet; Commonly known as: Desyrel   vitamin B complex-vitamin C-folic acid 1 mg capsule; Commonly known as:   Nephrocaps   Voltaren Arthritis Pain 1 % gel; Generic drug: diclofenac sodium  * This list has 2 medication(s) that are the same as other medications   prescribed for you. Read the directions  carefully, and ask your doctor or   other care provider to review them with you.     STOP taking these medications     guaiFENesin 600 mg 12 hr tablet; Commonly known as: Mucinex   lidocaine 4 % patch       Test Results Pending At Discharge  Pending Labs       No current pending labs.            Hospital Course  Kj Colmenares is a 54 y.o. male with a past medical history of persistent atrial fibrillation,Hypertension hyperlipidemia, type 2 diabetes mellitus, class II obesity BMI 36, end-stage renal disease on hemodialysis, noncompliance, anemia, remote stroke, adjustment disorder with mixed disturbance of emotions and conduct who presented to [facility] ER complaining of  increased shortness of breath. Patient states he was recently released from his skilled nursing facility and has not had dialysis in 2 weeks. States when he got released from SNF he failed to follow up for HD as it was not set up for him. He reports being compliant with all his medications. Reports persistent shortness of breath over this past week, his girlfriend was most concerned and brought him into ED for evaluation. He denies any abdominal pain nausea vomiting diarrhea, fever chills cough congestion incontinences years syncope or near syncope no hematemesis melena hematochezia hemoptysis.      ED work up:  EKG: NSR axis rate in the mid 80s with no acute ischemic or hyperkalemic changes   Vital signs:36.8 (98.2) HR 94, RR 18, /95, 98% on room air  Labs: potassium 6.0, chloride 118, bicarbonate 19, BUN 91, CRT 11.49, EGFR 5, alk phos 376, ALT 9, BNP 1,579. Hemoglobin 10.0, hematocrit 34.  Imaging: CXR Mild pulmonary vascular congestion, without evidence of consolidation or sizable effusion     ED medications administered:  2 g IV calcium gluconate   25 g IV dextrose   10 units humulin insulin   10 g Lokelma packet     Patient was admitted to the observation unit. Patient completed one round of dialysis while admitted. Patient  reports he feels improved since being admitted. Patient's potassium low did decrease with the lokelma. Patient's chair was confirmed for dialysis with [employer]. Chair info provided to patient and verbalized. Patient stable for discharge.    Pertinent Physical Exam At Time of Discharge  Physical Exam  Constitutional:       Appearance: Normal appearance.   Cardiovascular:      Rate and Rhythm: Tachycardia present. Rhythm irregular.   Pulmonary:      Effort: Pulmonary effort is normal.      Breath sounds: Normal breath sounds.   Neurological:      Mental Status: He is alert.         Outpatient Follow-Up  Future Appointments   Date Time Provider Department Center   7/1/2025  9:30 AM Jared Olivera MD [id number] [facility]     Patient stable for discharge. Vitals and labs reviewed. Both plan and discharge discussed with Dr. Moeller and the interdispclinary team.    Denise Bran PA-C

## 2025-06-19 LAB
Q ONSET: 218 MS
QRS COUNT: 16 BEATS
QRS DURATION: 94 MS
QT INTERVAL: 344 MS
QTC CALCULATION(BAZETT): 430 MS
QTC FREDERICIA: 399 MS
R AXIS: 26 DEGREES
T AXIS: 70 DEGREES
T OFFSET: 390 MS
VENTRICULAR RATE: 94 BPM

## 2025-07-01 ENCOUNTER — HOSPITAL ENCOUNTER (OUTPATIENT)
Dept: RADIOLOGY | Facility: CLINIC | Age: 55
Discharge: HOME | End: 2025-07-01
Payer: COMMERCIAL

## 2025-07-01 ENCOUNTER — OFFICE VISIT (OUTPATIENT)
Dept: ORTHOPEDIC SURGERY | Facility: CLINIC | Age: 55
End: 2025-07-01
Payer: COMMERCIAL

## 2025-07-01 DIAGNOSIS — M17.12 ARTHRITIS OF LEFT KNEE: ICD-10-CM

## 2025-07-01 DIAGNOSIS — M25.562 LEFT KNEE PAIN, UNSPECIFIED CHRONICITY: ICD-10-CM

## 2025-07-01 PROCEDURE — 99212 OFFICE O/P EST SF 10 MIN: CPT | Performed by: STUDENT IN AN ORGANIZED HEALTH CARE EDUCATION/TRAINING PROGRAM

## 2025-07-01 PROCEDURE — 99213 OFFICE O/P EST LOW 20 MIN: CPT | Performed by: STUDENT IN AN ORGANIZED HEALTH CARE EDUCATION/TRAINING PROGRAM

## 2025-07-01 PROCEDURE — 1036F TOBACCO NON-USER: CPT | Performed by: STUDENT IN AN ORGANIZED HEALTH CARE EDUCATION/TRAINING PROGRAM

## 2025-07-01 PROCEDURE — 73564 X-RAY EXAM KNEE 4 OR MORE: CPT | Mod: LEFT SIDE | Performed by: RADIOLOGY

## 2025-07-01 PROCEDURE — 73564 X-RAY EXAM KNEE 4 OR MORE: CPT | Mod: LT

## 2025-07-01 NOTE — PROGRESS NOTES
PRIMARY CARE PHYSICIAN: No Assigned PCP Generic Provider, MD  REFERRING PROVIDER: Tyson Everett DO  [id number] [place] [address]       SUBJECTIVE  CHIEF COMPLAINT:   Chief Complaint   Patient presents with    Left Knee - Follow-up        HPI: Kj Colmenares is a pleasant 54 y.o. year-old male who is seen today for evaluation of left knee pain.  The patient has had longstanding knee pain.  In the past, he received intra-articular injections twice into the knee.  Most recently, he was seen in clinic with our team on 7/23/25 at which time his left knee was aspirated with cell count of 237 and negative cultures. Discussed possibility of management of his L knee at that time with [facility].     The patient presents today due to continuation of his left knee pain. Patient has been using a rollator walker for 3-4 months.  He feels disabled by his knee pain.  He does have a mild effusion of the left knee today.  He is using over-the-counter medications which are insufficient to alleviate his symptoms.  He has not had any recent fall or trauma.    He continues to be on MWF dialysis. Reports no timeline for kidney transplant at this time, remains on transplant list per patient. He denies any history of infection in the knee.  No recent systemic infection.    REVIEW OF SYSTEMS  The patient denies any fever, chills, chest pain, shortness of breath or difficulty breathing.  Patient denies any numbness, tingling, or radicular symptoms.  Adult patient history sheet was filled out by the patient today in clinic and will be scanned into the EMR.  I personally reviewed this form which will be scanned into the EMR.  This includes Past Medical History, Past Surgical History, Medications, Allergies, Social History, Family History and 12 point review of systems.    Past Medical History:   Diagnosis Date    Diabetes mellitus (Multi)     Hypertension     Stroke (Multi)         No Known Allergies     Past Surgical History:   Procedure  Laterality Date    CT HEAD ANGIO W AND WO IV CONTRAST  2/4/2014    CT HEAD ANGIO W AND WO IV CONTRAST 2/4/2014 [facility]ICAL [facility]GAC[facility]    CT HEAD ANGIO W AND WO IV CONTRAST  2/13/2014    CT HEAD ANGIO W AND WO IV CONTRAST 2/13/2014 [facility] CLINICAL LEGACY        No family history on file.     Social History     Socioeconomic History    Marital status:      Spouse name: Not on file    Number of children: Not on file    Years of education: Not on file    Highest education level: Not on file   Occupational History    Not on file   Tobacco Use    Smoking status: Never    Smokeless tobacco: Never   Substance and Sexual Activity    Alcohol use: Never    Drug use: Never    Sexual activity: Defer   Other Topics Concern    Not on file   Social History Narrative    Not on file     Social Drivers of Health     Financial Resource Strain: High Risk (6/8/2025)    Overall Financial Resource Strain (CARDIA)     Difficulty of Paying Living Expenses: Very hard   Food Insecurity: Food Insecurity Present (6/8/2025)    Hunger Vital Sign     Worried About Running Out of Food in the Last Year: Sometimes true     Ran Out of Food in the Last Year: Sometimes true   Transportation Needs: Unmet Transportation Needs (6/8/2025)    PRAPARE - Transportation     Lack of Transportation (Medical): Yes     Lack of Transportation (Non-Medical): Yes   Physical Activity: Unknown (3/5/2025)    Exercise Vital Sign     Days of Exercise per Week: 0 days     Minutes of Exercise per Session: Not on file   Stress: No Stress Concern Present (11/11/2023)    [employer] [place] [employer] - Occupational Stress Questionnaire     Feeling of Stress : Not at all   Social Connections: Moderately Integrated (11/11/2023)    Social Connection and Isolation Panel [NHANES]     Frequency of Communication with Friends and Family: More than three times a week     Frequency of Social Gatherings with Friends and Family: Once a week     Attends Alevism Services: 1 to  4 times per year     Active Member of Clubs or Organizations: Yes     Attends Club or Organization Meetings: 1 to 4 times per year     Marital Status: Never    Intimate Partner Violence: Not At Risk (6/8/2025)    Humiliation, Afraid, Rape, and Kick questionnaire     Fear of Current or Ex-Partner: No     Emotionally Abused: No     Physically Abused: No     Sexually Abused: No   Housing Stability: High Risk (6/8/2025)    Housing Stability Vital Sign     Unable to Pay for Housing in the Last Year: Yes     Number of Times Moved in the Last Year: 2     Homeless in the Last Year: No        CURRENT MEDICATIONS:   Current Outpatient Medications   Medication Sig Dispense Refill    acetaminophen (Tylenol) 325 mg tablet Take 3 tablets (975 mg) by mouth every 8 hours if needed (pain).      albuterol 2.5 mg /3 mL (0.083 %) nebulizer solution Take 3 mL (2.5 mg) by nebulization every 4 hours if needed for wheezing or shortness of breath.      albuterol 90 mcg/actuation inhaler Inhale 2 puffs every 6 hours if needed for wheezing or shortness of breath.      apixaban (Eliquis) 2.5 mg tablet Take 1 tablet (2.5 mg) by mouth 2 times a day.      aspirin 81 mg EC tablet Take 1 tablet (81 mg) by mouth once daily.      atorvastatin (Lipitor) 40 mg tablet Take 1 tablet (40 mg) by mouth once daily at bedtime.      B complex-vitamin C-folic acid (Nephrocaps) 1 mg capsule Take 1 capsule by mouth once daily.      cinacalcet (Sensipar) 30 mg tablet Take 1 tablet (30 mg) by mouth once a day on Monday, Wednesday, and Friday. Take with food or shortly afer a meal. Swallow tablet whole; do not break or divide. On dialysis days      diclofenac sodium (Voltaren Arthritis Pain) 1 % gel Apply 2.25-4.5 inches (2-4 g) topically 2 times a day. To left knee      ergocalciferol (Vitamin D-2) 1.25 MG (48916 UT) capsule Take 1 capsule (1.25 mg) by mouth 1 (one) time per week. MONDAY NEED AN REFILL      escitalopram (Lexapro) 20 mg tablet Take 1 tablet  (20 mg) by mouth once daily.      finasteride (Proscar) 5 mg tablet Take 1 tablet (5 mg) by mouth once daily. Do not crush, chew, or split.      folic acid (Folvite) 1 mg tablet Take 1 tablet (1 mg) by mouth once daily.      melatonin 3 mg tablet Take 2 tablets (6 mg) by mouth once daily at bedtime.      metoprolol succinate XL (Toprol-XL) 50 mg 24 hr tablet Take 1 tablet (50 mg) by mouth once daily at bedtime. Do not crush or chew.      midodrine (Proamatine) 10 mg tablet Take 1 tablet (10 mg) by mouth if needed (for HD with SBP <90). (Patient taking differently: Take 1 tablet (10 mg) by mouth once daily as needed (for HD with SBP <90 during dialysis).)      ondansetron (Zofran) 4 mg tablet Take 1 tablet (4 mg) by mouth every 12 hours if needed for nausea or vomiting.      oxyCODONE (Roxicodone) 5 mg immediate release tablet Take 1 tablet (5 mg) by mouth every 6 hours if needed for severe pain (7 - 10). (Patient taking differently: Take 1 tablet (5 mg) by mouth every 8 hours if needed for severe pain (7 - 10).) 15 tablet 0    pantoprazole (ProtoNix) 40 mg EC tablet Take 1 tablet (40 mg) by mouth once daily in the morning. Take before meals. Do not crush, chew, or split.      sevelamer HCl (Renagel) 800 mg tablet Take 1 tablet (800 mg) by mouth 3 times daily (morning, midday, late afternoon). Swallow tablet whole; do not crush, break, or chew.      tamsulosin (Flomax) 0.4 mg 24 hr capsule Take 1 capsule (0.4 mg) by mouth once daily.      traZODone (Desyrel) 50 mg tablet Take 1 tablet (50 mg) by mouth once daily at bedtime.       No current facility-administered medications for this visit.        OBJECTIVE    PHYSICAL EXAM  There is no height or weight on file to calculate BMI.    General: Well-appearing male in no acute distress.  Awake, alert and oriented.  Pleasant and cooperative.  Respiratory: Non-labored breathing  Mood: Euthymic   Gait: Antalgic  Assistive Device: Rollator walker    Affected Left Knee  Limb  Alignment: About 15 degrees of varus  ROM:   Stable to varus and valgus stress at full extension and 30 degrees of flexion  Skin: Intact, no abrasions or draining sinuses  Effusion: None  Quad Strength: 5/5  Hamstring Strength: 5/5  Patella Crepitus: None  Patella Grind: Negative  Tenderness: Medial and lateral joint line  Sensation: Intact to light touch distally  Motor function: Able to fire TA, EHL, G/S  Pulses: Foot warm well-perfused.  No palpable pulses    IMAGING:  AP / lateral/ mid-flexion/sunrise views: Independent review of left knee x-rays was performed. The findings were reviewed with the patient.  The patient has significant erosive changes in the medial tibial plateau.  He also has significant erosive changes in the medial femoral condyle. These changes appear stable from x-rays prior, however with more varus alignment.     ASSESSMENT & PLAN    IMPRESSION:  Kj Colmenares is a 54 y.o. male with degenerative changes of the left knee, likely related to renal osteodystrophy.    PLAN:  - Given patient's history of ESRD with current MWF hemodialysis, he is unfortunately not a surgical candidate at this time.   - Recommend that the patient establish with a pain management clinic for optimization of his pain control with consideration of oral medications and possible aspirations/corticosteroid injections/other procedures as indicated. Patient is a high risk for infection in the setting of hemodialysis, therefore, recommend aspiration of the knee with cell count/culture prior to any injection of corticosteroid.    - Discussed with patient at length the above recommendations, and also that he would qualify for operative intervention for his right knee with a right total knee arthroplasty once he is able to obtain a kidney transplant. Patient in agreement with plan, all questions answered. He will follow-up as needed, and will remain in contact with our office in terms of course of his potential kidney  transplant.     Grady Moreira MD  Orthopaedic Surgery PGY2  [facility]  EPIC Chat Preferred

## (undated) DEVICE — LUBRICANT SURG JELLY ST BACTER TUBE 4.25OZ

## (undated) DEVICE — SONY PRINTER PAPER

## (undated) DEVICE — BRUSH CLEANING ENDOSCOPE CHAN DISP

## (undated) DEVICE — ENDO CARRY-ON PROCEDURE KIT: Brand: ENDO CARRY-ON PROCEDURE KIT

## (undated) DEVICE — ADAPTER FLSH PMP FLD MGMT GI IRRIG OFP 2 DISPOSABLE

## (undated) DEVICE — GLOVE SURG SZ 85 STD WHT LTX SYN POLYMER BEAD REINF ANTI RL

## (undated) DEVICE — CANNULA CAPNOGRAPHY AD O2 TBNG L7FT FEM LUER STYL 4707F7725] SALTER LABS INC]

## (undated) DEVICE — CONMED SCOPE SAVER BITE BLOCK, 20X27 MM: Brand: SCOPE SAVER

## (undated) DEVICE — TUBE SET 96 MM 64 MM H2O PERISTALTIC STD AUX CHANNEL

## (undated) DEVICE — Device: Brand: ENDO SMARTCAP